# Patient Record
Sex: FEMALE | Race: WHITE | NOT HISPANIC OR LATINO | URBAN - METROPOLITAN AREA
[De-identification: names, ages, dates, MRNs, and addresses within clinical notes are randomized per-mention and may not be internally consistent; named-entity substitution may affect disease eponyms.]

---

## 2017-02-16 ENCOUNTER — INPATIENT (INPATIENT)
Facility: HOSPITAL | Age: 82
LOS: 2 days | Discharge: HOME | DRG: 393 | End: 2017-02-19
Attending: INTERNAL MEDICINE | Admitting: INTERNAL MEDICINE
Payer: COMMERCIAL

## 2017-02-16 ENCOUNTER — RESULT REVIEW (OUTPATIENT)
Age: 82
End: 2017-02-16

## 2017-02-16 VITALS
HEART RATE: 75 BPM | TEMPERATURE: 98 F | SYSTOLIC BLOOD PRESSURE: 146 MMHG | DIASTOLIC BLOOD PRESSURE: 80 MMHG | OXYGEN SATURATION: 98 % | WEIGHT: 223.99 LBS | RESPIRATION RATE: 16 BRPM | HEIGHT: 67 IN

## 2017-02-16 DIAGNOSIS — K94.01 COLOSTOMY HEMORRHAGE: ICD-10-CM

## 2017-02-16 DIAGNOSIS — R18.0 MALIGNANT ASCITES: ICD-10-CM

## 2017-02-16 LAB
ALBUMIN SERPL ELPH-MCNC: 3.6 G/DL — SIGNIFICANT CHANGE UP (ref 3.3–5)
ALP SERPL-CCNC: 110 U/L — SIGNIFICANT CHANGE UP (ref 40–120)
ALT FLD-CCNC: 17 U/L — SIGNIFICANT CHANGE UP (ref 12–78)
ANION GAP SERPL CALC-SCNC: 8 MMOL/L — SIGNIFICANT CHANGE UP (ref 5–17)
APTT BLD: 37.4 SEC — SIGNIFICANT CHANGE UP (ref 27.5–37.4)
AST SERPL-CCNC: 22 U/L — SIGNIFICANT CHANGE UP (ref 15–37)
BASOPHILS # BLD AUTO: 0.1 K/UL — SIGNIFICANT CHANGE UP (ref 0–0.2)
BASOPHILS NFR BLD AUTO: 1.2 % — SIGNIFICANT CHANGE UP (ref 0–2)
BILIRUB SERPL-MCNC: 0.5 MG/DL — SIGNIFICANT CHANGE UP (ref 0.2–1.2)
BUN SERPL-MCNC: 21 MG/DL — SIGNIFICANT CHANGE UP (ref 7–23)
CALCIUM SERPL-MCNC: 8.5 MG/DL — SIGNIFICANT CHANGE UP (ref 8.5–10.1)
CHLORIDE SERPL-SCNC: 104 MMOL/L — SIGNIFICANT CHANGE UP (ref 96–108)
CO2 SERPL-SCNC: 28 MMOL/L — SIGNIFICANT CHANGE UP (ref 22–31)
CREAT SERPL-MCNC: 1.2 MG/DL — SIGNIFICANT CHANGE UP (ref 0.5–1.3)
EOSINOPHIL # BLD AUTO: 0.1 K/UL — SIGNIFICANT CHANGE UP (ref 0–0.5)
EOSINOPHIL NFR BLD AUTO: 1.6 % — SIGNIFICANT CHANGE UP (ref 0–6)
GLUCOSE SERPL-MCNC: 105 MG/DL — HIGH (ref 70–99)
HCT VFR BLD CALC: 37.8 % — SIGNIFICANT CHANGE UP (ref 34.5–45)
HGB BLD-MCNC: 12 G/DL — SIGNIFICANT CHANGE UP (ref 11.5–15.5)
INR BLD: 1.9 RATIO — HIGH (ref 0.88–1.16)
LACTATE SERPL-SCNC: 1.1 MMOL/L — SIGNIFICANT CHANGE UP (ref 0.7–2)
LYMPHOCYTES # BLD AUTO: 0.6 K/UL — LOW (ref 1–3.3)
LYMPHOCYTES # BLD AUTO: 9.6 % — LOW (ref 13–44)
MCHC RBC-ENTMCNC: 26.1 PG — LOW (ref 27–34)
MCHC RBC-ENTMCNC: 31.8 GM/DL — LOW (ref 32–36)
MCV RBC AUTO: 82.1 FL — SIGNIFICANT CHANGE UP (ref 80–100)
MONOCYTES # BLD AUTO: 0.5 K/UL — SIGNIFICANT CHANGE UP (ref 0–0.9)
MONOCYTES NFR BLD AUTO: 7.6 % — SIGNIFICANT CHANGE UP (ref 1–9)
NEUTROPHILS # BLD AUTO: 5 K/UL — SIGNIFICANT CHANGE UP (ref 1.8–7.4)
NEUTROPHILS NFR BLD AUTO: 80 % — HIGH (ref 43–77)
PLATELET # BLD AUTO: 350 K/UL — SIGNIFICANT CHANGE UP (ref 150–400)
POTASSIUM SERPL-MCNC: 4.4 MMOL/L — SIGNIFICANT CHANGE UP (ref 3.5–5.3)
POTASSIUM SERPL-SCNC: 4.4 MMOL/L — SIGNIFICANT CHANGE UP (ref 3.5–5.3)
PROT SERPL-MCNC: 8.3 G/DL — SIGNIFICANT CHANGE UP (ref 6–8.3)
PROTHROM AB SERPL-ACNC: 21.2 SEC — HIGH (ref 10–13.1)
RBC # BLD: 4.61 M/UL — SIGNIFICANT CHANGE UP (ref 3.8–5.2)
RBC # FLD: 15.2 % — HIGH (ref 10.3–14.5)
SODIUM SERPL-SCNC: 140 MMOL/L — SIGNIFICANT CHANGE UP (ref 135–145)
WBC # BLD: 6.3 K/UL — SIGNIFICANT CHANGE UP (ref 3.8–10.5)
WBC # FLD AUTO: 6.3 K/UL — SIGNIFICANT CHANGE UP (ref 3.8–10.5)

## 2017-02-16 PROCEDURE — 99285 EMERGENCY DEPT VISIT HI MDM: CPT

## 2017-02-16 PROCEDURE — 74177 CT ABD & PELVIS W/CONTRAST: CPT | Mod: 26

## 2017-02-16 PROCEDURE — 71020: CPT | Mod: 26

## 2017-02-16 RX ORDER — GABAPENTIN 400 MG/1
300 CAPSULE ORAL THREE TIMES A DAY
Qty: 0 | Refills: 0 | Status: DISCONTINUED | OUTPATIENT
Start: 2017-02-16 | End: 2017-02-19

## 2017-02-16 RX ORDER — FUROSEMIDE 40 MG
20 TABLET ORAL
Qty: 0 | Refills: 0 | Status: DISCONTINUED | OUTPATIENT
Start: 2017-02-16 | End: 2017-02-19

## 2017-02-16 RX ORDER — CHOLECALCIFEROL (VITAMIN D3) 125 MCG
1000 CAPSULE ORAL DAILY
Qty: 0 | Refills: 0 | Status: DISCONTINUED | OUTPATIENT
Start: 2017-02-16 | End: 2017-02-19

## 2017-02-16 RX ORDER — LISINOPRIL 2.5 MG/1
5 TABLET ORAL DAILY
Qty: 0 | Refills: 0 | Status: DISCONTINUED | OUTPATIENT
Start: 2017-02-16 | End: 2017-02-19

## 2017-02-16 RX ORDER — FOLIC ACID 0.8 MG
1 TABLET ORAL DAILY
Qty: 0 | Refills: 0 | Status: DISCONTINUED | OUTPATIENT
Start: 2017-02-16 | End: 2017-02-19

## 2017-02-16 RX ORDER — MONTELUKAST 4 MG/1
10 TABLET, CHEWABLE ORAL AT BEDTIME
Qty: 0 | Refills: 0 | Status: DISCONTINUED | OUTPATIENT
Start: 2017-02-16 | End: 2017-02-19

## 2017-02-16 RX ORDER — IPRATROPIUM/ALBUTEROL SULFATE 18-103MCG
3 AEROSOL WITH ADAPTER (GRAM) INHALATION ONCE
Qty: 0 | Refills: 0 | Status: COMPLETED | OUTPATIENT
Start: 2017-02-16 | End: 2017-02-16

## 2017-02-16 RX ORDER — LEVOTHYROXINE SODIUM 125 MCG
225 TABLET ORAL DAILY
Qty: 0 | Refills: 0 | Status: DISCONTINUED | OUTPATIENT
Start: 2017-02-16 | End: 2017-02-19

## 2017-02-16 RX ORDER — ACETAMINOPHEN 500 MG
650 TABLET ORAL EVERY 6 HOURS
Qty: 0 | Refills: 0 | Status: DISCONTINUED | OUTPATIENT
Start: 2017-02-16 | End: 2017-02-19

## 2017-02-16 RX ORDER — PYRIDOXINE HCL (VITAMIN B6) 100 MG
100 TABLET ORAL DAILY
Qty: 0 | Refills: 0 | Status: DISCONTINUED | OUTPATIENT
Start: 2017-02-16 | End: 2017-02-19

## 2017-02-16 RX ADMIN — GABAPENTIN 300 MILLIGRAM(S): 400 CAPSULE ORAL at 23:48

## 2017-02-16 RX ADMIN — Medication 650 MILLIGRAM(S): at 23:48

## 2017-02-16 RX ADMIN — Medication 3 MILLILITER(S): at 16:12

## 2017-02-16 NOTE — ED ADULT TRIAGE NOTE - CHIEF COMPLAINT QUOTE
"I took a shower and then my stoma started bleeding. I changed my colostomy bag 4 times, it keeps bleeding & the bag won't stay on. I take xarelto"

## 2017-02-16 NOTE — ED PROVIDER NOTE - MEDICAL DECISION MAKING DETAILS
here with bleeding from colostomy x 1 day, now mostly resolved. No pain, nontender on exam. Lungs with wheezing. Plan for cbc to evaluate H&H, will check coags, CXR and nebs, consider CT abdomen, will touch base with Dr. Mckinney

## 2017-02-16 NOTE — ED PROVIDER NOTE - ENMT NEGATIVE STATEMENT, MLM
Ears: no ear pain and no hearing problems Nose: no nasal congestion and no nasal drainage.Mouth/Throat: no dysphagia, no hoarseness and no throat pain.Neck: no lumps, no pain, no stiffness and no swollen glands.

## 2017-02-16 NOTE — ED PROVIDER NOTE - SKIN, MLM
Skin normal color for race, warm, dry and intact. No evidence of rash. +right lower tib/fib [id number] black eschar ulcer wound

## 2017-02-16 NOTE — ED ADULT NURSE NOTE - OBJECTIVE STATEMENT
pt reports stoma is bleeding around the sides, bright red blood. minimal bleeding on arrival. pt on Xarelto. Surrounding stoma, redness noted, no pain on palpation. pt has hernia noted. pt abdomen distended on the left lower quad. hardness noted. pt also is wheezing throughout and reports she has been taking breathing treatments for a few days for it. denies fever, bowel and urinary problems. pt also hit right leg according to daughter, ulcer noted to the lateral middle of leg, black with sludge. 3cm oval shaped

## 2017-02-16 NOTE — ED ADULT NURSE NOTE - PSH
Artificial pacemaker    History of Appendectomy    History of Tonsillectomy    S/P Cataract Surgery  CASI  S/P colon resection    Status Post Total Knee Replacement  Left

## 2017-02-16 NOTE — ED ADULT NURSE NOTE - PMH
Asthma    Colon cancer    DM Type 2 (Diabetes Mellitus, Type 2)    HTN (Hypertension)    Hypothyroidism    Obese    Overactive Bladder

## 2017-02-16 NOTE — ED PROVIDER NOTE - OBJECTIVE STATEMENT
89F DM, HTN, hypothyroid, colon CA with colostomy, on Xarelto. Here with bleeding from stoma this morning. Reports changed colostomy bag this morning, noted bleeding, bag would not stay on due to bright red flood. Bleeding has now slowed down. no abdominal pain. No vomiting. Otherwise with normal stool output from colostomy. No fever, chills. No urinary symptoms. Also with cough and asthma exacerbation over the last week, relieved with home albuterol. No CP or SOB. No other easy bruising or bleeding noted.

## 2017-02-16 NOTE — ED PROVIDER NOTE - CARE PLAN
Principal Discharge DX:	Bleeding from colostomy Principal Discharge DX:	Bleeding from colostomy  Secondary Diagnosis:	Malignant ascites

## 2017-02-16 NOTE — ED PROVIDER NOTE - ATTENDING CONTRIBUTION TO CARE
89 yo female hx colon CA with colostomy bag c/o bleeding around the stoma noticed today and abdominal swelling, has hx of hernia in that area.  Physical exam pertinent for abdominal swelling and erythema around the left lower quadrant in the stoma region, +warmth.  r/o incarcerated hernia, bloodwork, CT a/p, [facility] surgical consult, lactate.      I performed a history and physical exam of the patient and discussed their management with the advanced care provider. I reviewed the advanced care provider's note and agree with the documented findings and plan of care. My medical decision making and objective findings are found above.

## 2017-02-16 NOTE — PATIENT PROFILE ADULT. - VISION (WITH CORRECTIVE LENSES IF THE PATIENT USUALLY WEARS THEM):
Partially impaired: cannot see medication labels or newsprint, but can see obstacles in path, and the surrounding layout; can count fingers at arm's length bl hearing aide/Partially impaired: cannot see medication labels or newsprint, but can see obstacles in path, and the surrounding layout; can count fingers at arm's length

## 2017-02-17 LAB
AFP-TM SERPL-MCNC: 2 NG/ML — SIGNIFICANT CHANGE UP
ALBUMIN FLD-MCNC: 2.8 G/DL — SIGNIFICANT CHANGE UP
ANION GAP SERPL CALC-SCNC: 7 MMOL/L — SIGNIFICANT CHANGE UP (ref 5–17)
B PERT IGG+IGM PNL SER: ABNORMAL
BUN SERPL-MCNC: 20 MG/DL — SIGNIFICANT CHANGE UP (ref 7–23)
CALCIUM SERPL-MCNC: 8 MG/DL — LOW (ref 8.5–10.1)
CANCER AG125 SERPL-ACNC: 698 U/ML — HIGH
CANCER AG19-9 SERPL-ACNC: 52.5 U/ML — HIGH
CEA SERPL-MCNC: 2.1 NG/ML — SIGNIFICANT CHANGE UP (ref 0–3.8)
CHLORIDE SERPL-SCNC: 107 MMOL/L — SIGNIFICANT CHANGE UP (ref 96–108)
CO2 SERPL-SCNC: 29 MMOL/L — SIGNIFICANT CHANGE UP (ref 22–31)
COLOR FLD: YELLOW — SIGNIFICANT CHANGE UP
CREAT SERPL-MCNC: 1.1 MG/DL — SIGNIFICANT CHANGE UP (ref 0.5–1.3)
FLUID INTAKE SUBSTANCE CLASS: SIGNIFICANT CHANGE UP
FLUID SEGMENTED GRANULOCYTES: 15 % — SIGNIFICANT CHANGE UP
GLUCOSE FLD-MCNC: 107 MG/DL — SIGNIFICANT CHANGE UP
GLUCOSE SERPL-MCNC: 78 MG/DL — SIGNIFICANT CHANGE UP (ref 70–99)
GRAM STN FLD: SIGNIFICANT CHANGE UP
HAV IGM SER-ACNC: SIGNIFICANT CHANGE UP
HBV CORE IGM SER-ACNC: SIGNIFICANT CHANGE UP
HBV SURFACE AG SER-ACNC: SIGNIFICANT CHANGE UP
HCT VFR BLD CALC: 33.6 % — LOW (ref 34.5–45)
HCV AB S/CO SERPL IA: 0.14 S/CO — SIGNIFICANT CHANGE UP
HCV AB SERPL-IMP: SIGNIFICANT CHANGE UP
HGB BLD-MCNC: 10.5 G/DL — LOW (ref 11.5–15.5)
INR BLD: 1.5 RATIO — HIGH (ref 0.88–1.16)
LDH SERPL L TO P-CCNC: 88 U/L — SIGNIFICANT CHANGE UP
LYMPHOCYTES # FLD: 41 % — SIGNIFICANT CHANGE UP
MAGNESIUM SERPL-MCNC: 2.4 MG/DL — SIGNIFICANT CHANGE UP (ref 1.8–2.4)
MCHC RBC-ENTMCNC: 26.2 PG — LOW (ref 27–34)
MCHC RBC-ENTMCNC: 31.2 GM/DL — LOW (ref 32–36)
MCV RBC AUTO: 83.8 FL — SIGNIFICANT CHANGE UP (ref 80–100)
MESOTHL CELL # FLD: 4 % — SIGNIFICANT CHANGE UP
MONOS+MACROS # FLD: 40 % — SIGNIFICANT CHANGE UP
NIGHT BLUE STAIN TISS: SIGNIFICANT CHANGE UP
PLATELET # BLD AUTO: 226 K/UL — SIGNIFICANT CHANGE UP (ref 150–400)
POTASSIUM SERPL-MCNC: 4.2 MMOL/L — SIGNIFICANT CHANGE UP (ref 3.5–5.3)
POTASSIUM SERPL-SCNC: 4.2 MMOL/L — SIGNIFICANT CHANGE UP (ref 3.5–5.3)
PROT FLD-MCNC: 5.2 G/DL — SIGNIFICANT CHANGE UP
PROTHROM AB SERPL-ACNC: 16.7 SEC — HIGH (ref 10–13.1)
RBC # BLD: 4.01 M/UL — SIGNIFICANT CHANGE UP (ref 3.8–5.2)
RBC # FLD: 15.3 % — HIGH (ref 10.3–14.5)
RCV VOL RI: 1000 /UL — HIGH (ref 0–5)
SODIUM SERPL-SCNC: 143 MMOL/L — SIGNIFICANT CHANGE UP (ref 135–145)
SPECIMEN SOURCE: SIGNIFICANT CHANGE UP
SPECIMEN SOURCE: SIGNIFICANT CHANGE UP
TOTAL NUCLEATED CELL COUNT, BODY FLUID: 212 /UL — HIGH (ref 0–5)
TUBE TYPE: SIGNIFICANT CHANGE UP
WBC # BLD: 4.5 K/UL — SIGNIFICANT CHANGE UP (ref 3.8–10.5)
WBC # FLD AUTO: 4.5 K/UL — SIGNIFICANT CHANGE UP (ref 3.8–10.5)

## 2017-02-17 PROCEDURE — 88108 CYTOPATH CONCENTRATE TECH: CPT | Mod: 26

## 2017-02-17 PROCEDURE — 88305 TISSUE EXAM BY PATHOLOGIST: CPT | Mod: 26

## 2017-02-17 PROCEDURE — 49083 ABD PARACENTESIS W/IMAGING: CPT

## 2017-02-17 PROCEDURE — 99222 1ST HOSP IP/OBS MODERATE 55: CPT

## 2017-02-17 RX ORDER — COLLAGENASE CLOSTRIDIUM HIST. 250 UNIT/G
1 OINTMENT (GRAM) TOPICAL DAILY
Qty: 0 | Refills: 0 | Status: DISCONTINUED | OUTPATIENT
Start: 2017-02-17 | End: 2017-02-19

## 2017-02-17 RX ORDER — RIVAROXABAN 15 MG-20MG
15 KIT ORAL
Qty: 0 | Refills: 0 | Status: DISCONTINUED | OUTPATIENT
Start: 2017-02-17 | End: 2017-02-19

## 2017-02-17 RX ORDER — IPRATROPIUM/ALBUTEROL SULFATE 18-103MCG
3 AEROSOL WITH ADAPTER (GRAM) INHALATION ONCE
Qty: 0 | Refills: 0 | Status: COMPLETED | OUTPATIENT
Start: 2017-02-17 | End: 2017-02-17

## 2017-02-17 RX ORDER — TIOTROPIUM BROMIDE 18 UG/1
1 CAPSULE ORAL; RESPIRATORY (INHALATION) ONCE
Qty: 0 | Refills: 0 | Status: DISCONTINUED | OUTPATIENT
Start: 2017-02-17 | End: 2017-02-19

## 2017-02-17 RX ORDER — FLUTICASONE PROPIONATE AND SALMETEROL 50; 250 UG/1; UG/1
1 POWDER ORAL; RESPIRATORY (INHALATION)
Qty: 0 | Refills: 0 | Status: DISCONTINUED | OUTPATIENT
Start: 2017-02-17 | End: 2017-02-19

## 2017-02-17 RX ORDER — IPRATROPIUM/ALBUTEROL SULFATE 18-103MCG
3 AEROSOL WITH ADAPTER (GRAM) INHALATION EVERY 6 HOURS
Qty: 0 | Refills: 0 | Status: DISCONTINUED | OUTPATIENT
Start: 2017-02-17 | End: 2017-02-19

## 2017-02-17 RX ADMIN — Medication 650 MILLIGRAM(S): at 00:40

## 2017-02-17 RX ADMIN — Medication 20 MILLIGRAM(S): at 17:10

## 2017-02-17 RX ADMIN — FLUTICASONE PROPIONATE AND SALMETEROL 1 DOSE(S): 50; 250 POWDER ORAL; RESPIRATORY (INHALATION) at 17:12

## 2017-02-17 RX ADMIN — GABAPENTIN 300 MILLIGRAM(S): 400 CAPSULE ORAL at 11:14

## 2017-02-17 RX ADMIN — Medication 1 MILLIGRAM(S): at 11:14

## 2017-02-17 RX ADMIN — Medication 100 MILLIGRAM(S): at 11:14

## 2017-02-17 RX ADMIN — GABAPENTIN 300 MILLIGRAM(S): 400 CAPSULE ORAL at 05:25

## 2017-02-17 RX ADMIN — Medication 3 MILLILITER(S): at 19:24

## 2017-02-17 RX ADMIN — Medication 1 TABLET(S): at 11:14

## 2017-02-17 RX ADMIN — Medication 3 MILLILITER(S): at 14:01

## 2017-02-17 RX ADMIN — GABAPENTIN 300 MILLIGRAM(S): 400 CAPSULE ORAL at 21:14

## 2017-02-17 RX ADMIN — Medication 1000 UNIT(S): at 11:14

## 2017-02-17 RX ADMIN — RIVAROXABAN 15 MILLIGRAM(S): KIT at 17:10

## 2017-02-17 RX ADMIN — Medication 20 MILLIGRAM(S): at 05:25

## 2017-02-17 RX ADMIN — MONTELUKAST 10 MILLIGRAM(S): 4 TABLET, CHEWABLE ORAL at 21:14

## 2017-02-17 RX ADMIN — Medication 225 MICROGRAM(S): at 05:25

## 2017-02-17 RX ADMIN — LISINOPRIL 5 MILLIGRAM(S): 2.5 TABLET ORAL at 05:25

## 2017-02-17 RX ADMIN — Medication 100 MILLIGRAM(S): at 17:10

## 2017-02-17 RX ADMIN — Medication 100 MILLIGRAM(S): at 06:34

## 2017-02-17 RX ADMIN — Medication 1 APPLICATION(S): at 17:12

## 2017-02-17 RX ADMIN — Medication 3 MILLILITER(S): at 07:10

## 2017-02-18 LAB
ANION GAP SERPL CALC-SCNC: 10 MMOL/L — SIGNIFICANT CHANGE UP (ref 5–17)
BUN SERPL-MCNC: 17 MG/DL — SIGNIFICANT CHANGE UP (ref 7–23)
CALCIUM SERPL-MCNC: 8.1 MG/DL — LOW (ref 8.5–10.1)
CHLORIDE SERPL-SCNC: 108 MMOL/L — SIGNIFICANT CHANGE UP (ref 96–108)
CHOLEST SERPL-MCNC: 118 MG/DL — SIGNIFICANT CHANGE UP (ref 10–199)
CO2 SERPL-SCNC: 26 MMOL/L — SIGNIFICANT CHANGE UP (ref 22–31)
CREAT SERPL-MCNC: 1.2 MG/DL — SIGNIFICANT CHANGE UP (ref 0.5–1.3)
GLUCOSE SERPL-MCNC: 134 MG/DL — HIGH (ref 70–99)
HCT VFR BLD CALC: 35.8 % — SIGNIFICANT CHANGE UP (ref 34.5–45)
HDLC SERPL-MCNC: 55 MG/DL — SIGNIFICANT CHANGE UP (ref 40–125)
HGB BLD-MCNC: 11.1 G/DL — LOW (ref 11.5–15.5)
INR BLD: 1.59 RATIO — HIGH (ref 0.88–1.16)
LIPID PNL WITH DIRECT LDL SERPL: 51 MG/DL — SIGNIFICANT CHANGE UP
MAGNESIUM SERPL-MCNC: 2.2 MG/DL — SIGNIFICANT CHANGE UP (ref 1.8–2.4)
MCHC RBC-ENTMCNC: 25.9 PG — LOW (ref 27–34)
MCHC RBC-ENTMCNC: 31.1 GM/DL — LOW (ref 32–36)
MCV RBC AUTO: 83.5 FL — SIGNIFICANT CHANGE UP (ref 80–100)
PLATELET # BLD AUTO: 284 K/UL — SIGNIFICANT CHANGE UP (ref 150–400)
POTASSIUM SERPL-MCNC: 3.9 MMOL/L — SIGNIFICANT CHANGE UP (ref 3.5–5.3)
POTASSIUM SERPL-SCNC: 3.9 MMOL/L — SIGNIFICANT CHANGE UP (ref 3.5–5.3)
PROTHROM AB SERPL-ACNC: 17.7 SEC — HIGH (ref 10–13.1)
RBC # BLD: 4.29 M/UL — SIGNIFICANT CHANGE UP (ref 3.8–5.2)
RBC # FLD: 15.6 % — HIGH (ref 10.3–14.5)
SODIUM SERPL-SCNC: 144 MMOL/L — SIGNIFICANT CHANGE UP (ref 135–145)
TOTAL CHOLESTEROL/HDL RATIO MEASUREMENT: 2.1 RATIO — LOW (ref 3.3–7.1)
TRIGL SERPL-MCNC: 62 MG/DL — SIGNIFICANT CHANGE UP (ref 10–149)
TSH SERPL-MCNC: 2.61 UIU/ML — SIGNIFICANT CHANGE UP (ref 0.36–3.74)
WBC # BLD: 5.8 K/UL — SIGNIFICANT CHANGE UP (ref 3.8–10.5)
WBC # FLD AUTO: 5.8 K/UL — SIGNIFICANT CHANGE UP (ref 3.8–10.5)

## 2017-02-18 PROCEDURE — 93010 ELECTROCARDIOGRAM REPORT: CPT

## 2017-02-18 RX ORDER — POTASSIUM CHLORIDE 20 MEQ
20 PACKET (EA) ORAL DAILY
Qty: 0 | Refills: 0 | Status: DISCONTINUED | OUTPATIENT
Start: 2017-02-18 | End: 2017-02-19

## 2017-02-18 RX ADMIN — Medication 100 MILLIGRAM(S): at 11:53

## 2017-02-18 RX ADMIN — RIVAROXABAN 15 MILLIGRAM(S): KIT at 17:30

## 2017-02-18 RX ADMIN — Medication 1 APPLICATION(S): at 13:53

## 2017-02-18 RX ADMIN — Medication 225 MICROGRAM(S): at 06:13

## 2017-02-18 RX ADMIN — GABAPENTIN 300 MILLIGRAM(S): 400 CAPSULE ORAL at 21:35

## 2017-02-18 RX ADMIN — GABAPENTIN 300 MILLIGRAM(S): 400 CAPSULE ORAL at 06:13

## 2017-02-18 RX ADMIN — Medication 3 MILLILITER(S): at 08:13

## 2017-02-18 RX ADMIN — GABAPENTIN 300 MILLIGRAM(S): 400 CAPSULE ORAL at 13:54

## 2017-02-18 RX ADMIN — Medication 3 MILLILITER(S): at 20:21

## 2017-02-18 RX ADMIN — MONTELUKAST 10 MILLIGRAM(S): 4 TABLET, CHEWABLE ORAL at 21:35

## 2017-02-18 RX ADMIN — LISINOPRIL 5 MILLIGRAM(S): 2.5 TABLET ORAL at 06:13

## 2017-02-18 RX ADMIN — Medication 20 MILLIGRAM(S): at 17:30

## 2017-02-18 RX ADMIN — FLUTICASONE PROPIONATE AND SALMETEROL 1 DOSE(S): 50; 250 POWDER ORAL; RESPIRATORY (INHALATION) at 08:13

## 2017-02-18 RX ADMIN — Medication 1 TABLET(S): at 11:54

## 2017-02-18 RX ADMIN — Medication 1 MILLIGRAM(S): at 11:53

## 2017-02-18 RX ADMIN — Medication 20 MILLIGRAM(S): at 06:13

## 2017-02-18 RX ADMIN — Medication 1000 UNIT(S): at 11:53

## 2017-02-18 RX ADMIN — FLUTICASONE PROPIONATE AND SALMETEROL 1 DOSE(S): 50; 250 POWDER ORAL; RESPIRATORY (INHALATION) at 19:00

## 2017-02-18 RX ADMIN — Medication 3 MILLILITER(S): at 13:34

## 2017-02-19 VITALS — OXYGEN SATURATION: 97 %

## 2017-02-19 RX ADMIN — Medication 225 MICROGRAM(S): at 05:57

## 2017-02-19 RX ADMIN — Medication 3 MILLILITER(S): at 08:05

## 2017-02-19 RX ADMIN — Medication 20 MILLIEQUIVALENT(S): at 11:48

## 2017-02-19 RX ADMIN — FLUTICASONE PROPIONATE AND SALMETEROL 1 DOSE(S): 50; 250 POWDER ORAL; RESPIRATORY (INHALATION) at 05:57

## 2017-02-19 RX ADMIN — Medication 1000 UNIT(S): at 11:48

## 2017-02-19 RX ADMIN — Medication 20 MILLIGRAM(S): at 05:57

## 2017-02-19 RX ADMIN — Medication 100 MILLIGRAM(S): at 11:48

## 2017-02-19 RX ADMIN — Medication 1 TABLET(S): at 11:48

## 2017-02-19 RX ADMIN — Medication 3 MILLILITER(S): at 13:52

## 2017-02-19 RX ADMIN — GABAPENTIN 300 MILLIGRAM(S): 400 CAPSULE ORAL at 05:57

## 2017-02-19 RX ADMIN — Medication 3 MILLILITER(S): at 00:20

## 2017-02-19 RX ADMIN — LISINOPRIL 5 MILLIGRAM(S): 2.5 TABLET ORAL at 05:57

## 2017-02-19 NOTE — DISCHARGE NOTE ADULT - HOSPITAL COURSE
Patient came in c/o bleeding from ostomy. H/H remained stable and it resolved on its own. CT: + ascites. S/P paracentesis: 4.9 L drained. Likely malignant, however, cytopath is still pending. CA-125: 698. She has RLE and para-ostomy wounds. No evidence of cellulitis. Para-ostomy wound was biopsied. Results pending. Needs to follow-up with Dr Devine in 2 weeks. She has appointment at wound care center on 2/20/17. Arrange for elective paracentesis if ascites recurs.

## 2017-02-19 NOTE — DISCHARGE NOTE ADULT - PLAN OF CARE
. Follow-up with Dr Bright in 1-2 weeks Follow-up with Dr Devine in 2 weeks. Follow-up with wound care clinic on 2/20/17. Apply bacitracin to wound.

## 2017-02-19 NOTE — DISCHARGE NOTE ADULT - CARE PLAN
Principal Discharge DX:	Malignant ascites  Goal:	.  Instructions for follow-up, activity and diet:	Follow-up with Dr Bright in 1-2 weeks  Secondary Diagnosis:	Bleeding from colostomy  Instructions for follow-up, activity and diet:	Follow-up with Dr Devine in 2 weeks.  Secondary Diagnosis:	Leg wound, right  Instructions for follow-up, activity and diet:	Follow-up with wound care clinic on 2/20/17. Apply bacitracin to wound.

## 2017-02-19 NOTE — DISCHARGE NOTE ADULT - MEDICATION SUMMARY - MEDICATIONS TO TAKE
I will START or STAY ON the medications listed below when I get home from the hospital:    lisinopril 5 mg oral tablet  -- 1 tab(s) by mouth once a day  -- Indication: For BP    Xarelto 15 mg oral tablet  -- 1 tab(s) by mouth once a day (in the evening)  -- Indication: For A fib    gabapentin 300 mg oral capsule  -- 1 cap(s) by mouth 3 times a day  -- Indication: For neuropathy    Lasix 20 mg oral tablet  -- 1 tab(s) by mouth 2 times a day  -- Indication: For water pill    montelukast 10 mg oral tablet  -- 1 tab(s) by mouth once a day (in the evening)  -- Indication: For asthma    magnesium gluconate 500 mg oral tablet  --  by mouth once a day  -- Indication: For supplement    potassium chloride 20 mEq oral granule, extended release  -- 1 tab(s) by mouth once a day  -- Indication: For supplement    Fish Oil 1000 mg oral capsule  -- 1 cap(s) by mouth once a day  -- Indication: For supplement    levothyroxine  -- 225 microgram(s) by mouth once a day  -- Indication: For thyroid    PreserVision oral capsule  -- 1 cap(s) by mouth 2 times a day  -- Indication: For supplement    Nephro-Tommy oral tablet  -- 1 tab(s) by mouth once a day  -- Indication: For supplement    Vitamin B6 100 mg oral tablet  -- 1 tab(s) by mouth once a day  -- Indication: For supplement    Vitamin D3 2000 intl units oral capsule  -- 1 cap(s) by mouth once a day  -- Indication: For supplement    folic acid  -- 400 milligram(s) by mouth once a day  -- Indication: For supplement

## 2017-02-19 NOTE — DISCHARGE NOTE ADULT - CARE PROVIDERS DIRECT ADDRESSES
[email or web address],DirectAddress_Unknown,DirectAddress_Unknown

## 2017-02-19 NOTE — DISCHARGE NOTE ADULT - CARE PROVIDER_API CALL
Aliza Chun), Internal Medicine  [address] [place] [address]  Phone: [phone]  Fax: [phone]    Nilesh Devine), ColonRectal Surgery; Surgery  [address]  [place] [address]  Phone: [phone]  Fax: [phone]    Iesha Bright), Hematology; Medical Oncology  [address] [place] [address] [address]  [place] [address]  Phone: [phone]  Fax: [phone]

## 2017-02-19 NOTE — DISCHARGE NOTE ADULT - PATIENT PORTAL LINK FT
“You can access the Follow[employer] Patient Portal, offered by [facility], by registering with the following website: [email or web address]”

## 2017-02-19 NOTE — DISCHARGE NOTE ADULT - VISION (WITH CORRECTIVE LENSES IF THE PATIENT USUALLY WEARS THEM):
bl hearing aide/Partially impaired: cannot see medication labels or newsprint, but can see obstacles in path, and the surrounding layout; can count fingers at arm's length

## 2017-02-20 ENCOUNTER — OUTPATIENT (OUTPATIENT)
Dept: OUTPATIENT SERVICES | Facility: HOSPITAL | Age: 82
LOS: 1 days | End: 2017-02-20
Payer: COMMERCIAL

## 2017-02-20 ENCOUNTER — RESULT REVIEW (OUTPATIENT)
Age: 82
End: 2017-02-20

## 2017-02-20 DIAGNOSIS — L97.801 NON-PRESSURE CHRONIC ULCER OF OTHER PART OF UNSPECIFIED LOWER LEG LIMITED TO BREAKDOWN OF SKIN: ICD-10-CM

## 2017-02-20 PROCEDURE — G0463: CPT

## 2017-02-21 LAB
CULTURE RESULTS: SIGNIFICANT CHANGE UP
CULTURE RESULTS: SIGNIFICANT CHANGE UP
NON-GYN CYTOLOGY SPEC: SIGNIFICANT CHANGE UP
SPECIMEN SOURCE: SIGNIFICANT CHANGE UP
SPECIMEN SOURCE: SIGNIFICANT CHANGE UP

## 2017-02-21 PROCEDURE — 80074 ACUTE HEPATITIS PANEL: CPT

## 2017-02-21 PROCEDURE — 80048 BASIC METABOLIC PNL TOTAL CA: CPT

## 2017-02-21 PROCEDURE — 87116 MYCOBACTERIA CULTURE: CPT

## 2017-02-21 PROCEDURE — 80053 COMPREHEN METABOLIC PANEL: CPT

## 2017-02-21 PROCEDURE — 87075 CULTR BACTERIA EXCEPT BLOOD: CPT

## 2017-02-21 PROCEDURE — 86304 IMMUNOASSAY TUMOR CA 125: CPT

## 2017-02-21 PROCEDURE — 87070 CULTURE OTHR SPECIMN AEROBIC: CPT

## 2017-02-21 PROCEDURE — 82945 GLUCOSE OTHER FLUID: CPT

## 2017-02-21 PROCEDURE — 93005 ELECTROCARDIOGRAM TRACING: CPT

## 2017-02-21 PROCEDURE — 80061 LIPID PANEL: CPT

## 2017-02-21 PROCEDURE — 86850 RBC ANTIBODY SCREEN: CPT

## 2017-02-21 PROCEDURE — 82042 OTHER SOURCE ALBUMIN QUAN EA: CPT

## 2017-02-21 PROCEDURE — 86301 IMMUNOASSAY TUMOR CA 19-9: CPT

## 2017-02-21 PROCEDURE — 94640 AIRWAY INHALATION TREATMENT: CPT

## 2017-02-21 PROCEDURE — 83605 ASSAY OF LACTIC ACID: CPT

## 2017-02-21 PROCEDURE — 86901 BLOOD TYPING SEROLOGIC RH(D): CPT

## 2017-02-21 PROCEDURE — 83735 ASSAY OF MAGNESIUM: CPT

## 2017-02-21 PROCEDURE — 82105 ALPHA-FETOPROTEIN SERUM: CPT

## 2017-02-21 PROCEDURE — 85730 THROMBOPLASTIN TIME PARTIAL: CPT

## 2017-02-21 PROCEDURE — 82378 CARCINOEMBRYONIC ANTIGEN: CPT

## 2017-02-21 PROCEDURE — 87206 SMEAR FLUORESCENT/ACID STAI: CPT

## 2017-02-21 PROCEDURE — 84443 ASSAY THYROID STIM HORMONE: CPT

## 2017-02-21 PROCEDURE — 88305 TISSUE EXAM BY PATHOLOGIST: CPT

## 2017-02-21 PROCEDURE — 84157 ASSAY OF PROTEIN OTHER: CPT

## 2017-02-21 PROCEDURE — 49083 ABD PARACENTESIS W/IMAGING: CPT

## 2017-02-21 PROCEDURE — 85027 COMPLETE CBC AUTOMATED: CPT

## 2017-02-21 PROCEDURE — 89051 BODY FLUID CELL COUNT: CPT

## 2017-02-21 PROCEDURE — 85610 PROTHROMBIN TIME: CPT

## 2017-02-21 PROCEDURE — 86900 BLOOD TYPING SEROLOGIC ABO: CPT

## 2017-02-21 PROCEDURE — 88305 TISSUE EXAM BY PATHOLOGIST: CPT | Mod: 26

## 2017-02-21 PROCEDURE — 87040 BLOOD CULTURE FOR BACTERIA: CPT

## 2017-02-21 PROCEDURE — 88108 CYTOPATH CONCENTRATE TECH: CPT

## 2017-02-21 PROCEDURE — 74177 CT ABD & PELVIS W/CONTRAST: CPT

## 2017-02-21 PROCEDURE — 71046 X-RAY EXAM CHEST 2 VIEWS: CPT

## 2017-02-21 PROCEDURE — 83615 LACTATE (LD) (LDH) ENZYME: CPT

## 2017-02-21 PROCEDURE — 87015 SPECIMEN INFECT AGNT CONCNTJ: CPT

## 2017-02-21 PROCEDURE — 87102 FUNGUS ISOLATION CULTURE: CPT

## 2017-02-21 PROCEDURE — 87205 SMEAR GRAM STAIN: CPT

## 2017-02-21 PROCEDURE — 99285 EMERGENCY DEPT VISIT HI MDM: CPT | Mod: 25

## 2017-02-22 DIAGNOSIS — M47.9 SPONDYLOSIS, UNSPECIFIED: ICD-10-CM

## 2017-02-22 DIAGNOSIS — K42.9 UMBILICAL HERNIA WITHOUT OBSTRUCTION OR GANGRENE: ICD-10-CM

## 2017-02-22 DIAGNOSIS — I48.91 UNSPECIFIED ATRIAL FIBRILLATION: ICD-10-CM

## 2017-02-22 DIAGNOSIS — R01.1 CARDIAC MURMUR, UNSPECIFIED: ICD-10-CM

## 2017-02-22 DIAGNOSIS — Z96.652 PRESENCE OF LEFT ARTIFICIAL KNEE JOINT: ICD-10-CM

## 2017-02-22 DIAGNOSIS — K25.4 CHRONIC OR UNSPECIFIED GASTRIC ULCER WITH HEMORRHAGE: ICD-10-CM

## 2017-02-22 DIAGNOSIS — Z95.0 PRESENCE OF CARDIAC PACEMAKER: ICD-10-CM

## 2017-02-22 DIAGNOSIS — Z79.01 LONG TERM (CURRENT) USE OF ANTICOAGULANTS: ICD-10-CM

## 2017-02-22 DIAGNOSIS — Z85.038 PERSONAL HISTORY OF OTHER MALIGNANT NEOPLASM OF LARGE INTESTINE: ICD-10-CM

## 2017-02-22 DIAGNOSIS — R18.0 MALIGNANT ASCITES: ICD-10-CM

## 2017-02-22 DIAGNOSIS — K94.01 COLOSTOMY HEMORRHAGE: ICD-10-CM

## 2017-02-22 DIAGNOSIS — Z92.21 PERSONAL HISTORY OF ANTINEOPLASTIC CHEMOTHERAPY: ICD-10-CM

## 2017-02-22 DIAGNOSIS — N32.81 OVERACTIVE BLADDER: ICD-10-CM

## 2017-02-22 DIAGNOSIS — D64.9 ANEMIA, UNSPECIFIED: ICD-10-CM

## 2017-02-22 DIAGNOSIS — K94.09 OTHER COMPLICATIONS OF COLOSTOMY: ICD-10-CM

## 2017-02-22 DIAGNOSIS — Z85.43 PERSONAL HISTORY OF MALIGNANT NEOPLASM OF OVARY: ICD-10-CM

## 2017-02-22 DIAGNOSIS — J45.909 UNSPECIFIED ASTHMA, UNCOMPLICATED: ICD-10-CM

## 2017-02-22 DIAGNOSIS — J44.9 CHRONIC OBSTRUCTIVE PULMONARY DISEASE, UNSPECIFIED: ICD-10-CM

## 2017-02-22 DIAGNOSIS — E03.9 HYPOTHYROIDISM, UNSPECIFIED: ICD-10-CM

## 2017-02-22 DIAGNOSIS — K74.60 UNSPECIFIED CIRRHOSIS OF LIVER: ICD-10-CM

## 2017-02-22 DIAGNOSIS — E66.9 OBESITY, UNSPECIFIED: ICD-10-CM

## 2017-02-22 DIAGNOSIS — I11.0 HYPERTENSIVE HEART DISEASE WITH HEART FAILURE: ICD-10-CM

## 2017-02-22 DIAGNOSIS — E11.9 TYPE 2 DIABETES MELLITUS WITHOUT COMPLICATIONS: ICD-10-CM

## 2017-02-22 DIAGNOSIS — I50.9 HEART FAILURE, UNSPECIFIED: ICD-10-CM

## 2017-02-22 LAB
CULTURE RESULTS: SIGNIFICANT CHANGE UP
SPECIMEN SOURCE: SIGNIFICANT CHANGE UP

## 2017-02-23 DIAGNOSIS — I48.91 UNSPECIFIED ATRIAL FIBRILLATION: ICD-10-CM

## 2017-02-23 DIAGNOSIS — K42.9 UMBILICAL HERNIA WITHOUT OBSTRUCTION OR GANGRENE: ICD-10-CM

## 2017-02-27 ENCOUNTER — OUTPATIENT (OUTPATIENT)
Dept: OUTPATIENT SERVICES | Facility: HOSPITAL | Age: 82
LOS: 1 days | End: 2017-02-27
Payer: COMMERCIAL

## 2017-02-27 DIAGNOSIS — E66.9 OBESITY, UNSPECIFIED: ICD-10-CM

## 2017-02-27 DIAGNOSIS — E11.9 TYPE 2 DIABETES MELLITUS WITHOUT COMPLICATIONS: ICD-10-CM

## 2017-02-27 DIAGNOSIS — Z93.3 COLOSTOMY STATUS: ICD-10-CM

## 2017-02-27 DIAGNOSIS — Z85.43 PERSONAL HISTORY OF MALIGNANT NEOPLASM OF OVARY: ICD-10-CM

## 2017-02-27 DIAGNOSIS — Z79.899 OTHER LONG TERM (CURRENT) DRUG THERAPY: ICD-10-CM

## 2017-02-27 DIAGNOSIS — J45.909 UNSPECIFIED ASTHMA, UNCOMPLICATED: ICD-10-CM

## 2017-02-27 DIAGNOSIS — Z98.41 CATARACT EXTRACTION STATUS, RIGHT EYE: ICD-10-CM

## 2017-02-27 DIAGNOSIS — Z96.653 PRESENCE OF ARTIFICIAL KNEE JOINT, BILATERAL: ICD-10-CM
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
## 2017-02-27 DIAGNOSIS — S80.811D ABRASION, RIGHT LOWER LEG, SUBSEQUENT ENCOUNTER: ICD-10-CM

## 2017-02-27 DIAGNOSIS — Z98.890 OTHER SPECIFIED POSTPROCEDURAL STATES: ICD-10-CM

## 2017-02-27 DIAGNOSIS — I48.91 UNSPECIFIED ATRIAL FIBRILLATION: ICD-10-CM
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
## 2017-02-27 DIAGNOSIS — Z90.49 ACQUIRED ABSENCE OF OTHER SPECIFIED PARTS OF DIGESTIVE TRACT: ICD-10-CM
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
## 2017-02-27 DIAGNOSIS — Z90.710 ACQUIRED ABSENCE OF BOTH CERVIX AND UTERUS: ICD-10-CM

## 2017-02-27 DIAGNOSIS — E03.9 HYPOTHYROIDISM, UNSPECIFIED: ICD-10-CM
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
## 2017-02-27 DIAGNOSIS — I50.9 HEART FAILURE, UNSPECIFIED: ICD-10-CM

## 2017-02-27 DIAGNOSIS — Z95.0 PRESENCE OF CARDIAC PACEMAKER: ICD-10-CM

## 2017-02-27 DIAGNOSIS — Z98.42 CATARACT EXTRACTION STATUS, LEFT EYE: ICD-10-CM

## 2017-02-27 DIAGNOSIS — L97.801 NON-PRESSURE CHRONIC ULCER OF OTHER PART OF UNSPECIFIED LOWER LEG LIMITED TO BREAKDOWN OF SKIN: ICD-10-CM

## 2017-02-27 DIAGNOSIS — I10 ESSENTIAL (PRIMARY) HYPERTENSION: ICD-10-CM

## 2017-02-28 DIAGNOSIS — E11.9 TYPE 2 DIABETES MELLITUS WITHOUT COMPLICATIONS: ICD-10-CM

## 2017-02-28 DIAGNOSIS — Z79.899 OTHER LONG TERM (CURRENT) DRUG THERAPY: ICD-10-CM

## 2017-02-28 DIAGNOSIS — Z90.49 ACQUIRED ABSENCE OF OTHER SPECIFIED PARTS OF DIGESTIVE TRACT: ICD-10-CM

## 2017-02-28 DIAGNOSIS — I83.90 ASYMPTOMATIC VARICOSE VEINS OF UNSPECIFIED LOWER EXTREMITY: ICD-10-CM

## 2017-02-28 DIAGNOSIS — Z98.42 CATARACT EXTRACTION STATUS, LEFT EYE: ICD-10-CM

## 2017-02-28 DIAGNOSIS — Z98.41 CATARACT EXTRACTION STATUS, RIGHT EYE: ICD-10-CM

## 2017-02-28 DIAGNOSIS — E03.9 HYPOTHYROIDISM, UNSPECIFIED: ICD-10-CM

## 2017-02-28 DIAGNOSIS — S80.811D ABRASION, RIGHT LOWER LEG, SUBSEQUENT ENCOUNTER: ICD-10-CM

## 2017-02-28 DIAGNOSIS — I50.9 HEART FAILURE, UNSPECIFIED: ICD-10-CM

## 2017-02-28 DIAGNOSIS — Z85.43 PERSONAL HISTORY OF MALIGNANT NEOPLASM OF OVARY: ICD-10-CM
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
## 2017-02-28 DIAGNOSIS — Z95.0 PRESENCE OF CARDIAC PACEMAKER: ICD-10-CM

## 2017-02-28 DIAGNOSIS — I10 ESSENTIAL (PRIMARY) HYPERTENSION: ICD-10-CM

## 2017-02-28 DIAGNOSIS — I48.91 UNSPECIFIED ATRIAL FIBRILLATION: ICD-10-CM

## 2017-02-28 DIAGNOSIS — Z96.653 PRESENCE OF ARTIFICIAL KNEE JOINT, BILATERAL: ICD-10-CM

## 2017-02-28 DIAGNOSIS — Z93.3 COLOSTOMY STATUS: ICD-10-CM

## 2017-02-28 DIAGNOSIS — Z98.890 OTHER SPECIFIED POSTPROCEDURAL STATES: ICD-10-CM

## 2017-02-28 DIAGNOSIS — J45.909 UNSPECIFIED ASTHMA, UNCOMPLICATED: ICD-10-CM
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
## 2017-02-28 DIAGNOSIS — Z90.710 ACQUIRED ABSENCE OF BOTH CERVIX AND UTERUS: ICD-10-CM

## 2017-02-28 DIAGNOSIS — E66.9 OBESITY, UNSPECIFIED: ICD-10-CM

## 2017-03-02 ENCOUNTER — APPOINTMENT (OUTPATIENT)
Dept: COLORECTAL SURGERY | Facility: CLINIC | Age: 82
End: 2017-03-02

## 2017-03-02 VITALS
HEIGHT: 68 IN | BODY MASS INDEX: 32.89 KG/M2 | DIASTOLIC BLOOD PRESSURE: 74 MMHG | SYSTOLIC BLOOD PRESSURE: 124 MMHG | RESPIRATION RATE: 14 BRPM | HEART RATE: 82 BPM | TEMPERATURE: 97.8 F | WEIGHT: 217 LBS

## 2017-03-06 ENCOUNTER — APPOINTMENT (OUTPATIENT)
Dept: COLORECTAL SURGERY | Facility: CLINIC | Age: 82
End: 2017-03-06

## 2017-03-06 ENCOUNTER — OUTPATIENT (OUTPATIENT)
Dept: OUTPATIENT SERVICES | Facility: HOSPITAL | Age: 82
LOS: 1 days | End: 2017-03-06
Payer: COMMERCIAL

## 2017-03-06 DIAGNOSIS — L97.801 NON-PRESSURE CHRONIC ULCER OF OTHER PART OF UNSPECIFIED LOWER LEG LIMITED TO BREAKDOWN OF SKIN: ICD-10-CM

## 2017-03-06 PROCEDURE — G0463: CPT

## 2017-03-07 DIAGNOSIS — J45.909 UNSPECIFIED ASTHMA, UNCOMPLICATED: ICD-10-CM
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
## 2017-03-07 DIAGNOSIS — I10 ESSENTIAL (PRIMARY) HYPERTENSION: ICD-10-CM

## 2017-03-07 DIAGNOSIS — Z95.0 PRESENCE OF CARDIAC PACEMAKER: ICD-10-CM

## 2017-03-07 DIAGNOSIS — E66.9 OBESITY, UNSPECIFIED: ICD-10-CM
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
## 2017-03-07 DIAGNOSIS — Z90.710 ACQUIRED ABSENCE OF BOTH CERVIX AND UTERUS: ICD-10-CM

## 2017-03-07 DIAGNOSIS — I50.9 HEART FAILURE, UNSPECIFIED: ICD-10-CM

## 2017-03-07 DIAGNOSIS — S80.811D ABRASION, RIGHT LOWER LEG, SUBSEQUENT ENCOUNTER: ICD-10-CM

## 2017-03-07 DIAGNOSIS — Z98.42 CATARACT EXTRACTION STATUS, LEFT EYE: ICD-10-CM

## 2017-03-07 DIAGNOSIS — Z96.653 PRESENCE OF ARTIFICIAL KNEE JOINT, BILATERAL: ICD-10-CM
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
## 2017-03-07 DIAGNOSIS — E11.9 TYPE 2 DIABETES MELLITUS WITHOUT COMPLICATIONS: ICD-10-CM

## 2017-03-07 DIAGNOSIS — S41.112A LACERATION WITHOUT FOREIGN BODY OF LEFT UPPER ARM, INITIAL ENCOUNTER: ICD-10-CM

## 2017-03-07 DIAGNOSIS — Z85.43 PERSONAL HISTORY OF MALIGNANT NEOPLASM OF OVARY: ICD-10-CM

## 2017-03-07 DIAGNOSIS — Z93.3 COLOSTOMY STATUS: ICD-10-CM

## 2017-03-07 DIAGNOSIS — Z79.899 OTHER LONG TERM (CURRENT) DRUG THERAPY: ICD-10-CM

## 2017-03-07 DIAGNOSIS — E03.9 HYPOTHYROIDISM, UNSPECIFIED: ICD-10-CM

## 2017-03-07 DIAGNOSIS — Z98.890 OTHER SPECIFIED POSTPROCEDURAL STATES: ICD-10-CM

## 2017-03-07 DIAGNOSIS — I48.91 UNSPECIFIED ATRIAL FIBRILLATION: ICD-10-CM
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
## 2017-03-07 DIAGNOSIS — Z90.49 ACQUIRED ABSENCE OF OTHER SPECIFIED PARTS OF DIGESTIVE TRACT: ICD-10-CM

## 2017-03-07 DIAGNOSIS — Z98.41 CATARACT EXTRACTION STATUS, RIGHT EYE: ICD-10-CM
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
## 2017-03-07 DIAGNOSIS — I83.90 ASYMPTOMATIC VARICOSE VEINS OF UNSPECIFIED LOWER EXTREMITY: ICD-10-CM

## 2017-03-13 ENCOUNTER — OUTPATIENT (OUTPATIENT)
Dept: OUTPATIENT SERVICES | Facility: HOSPITAL | Age: 82
LOS: 1 days | End: 2017-03-13
Payer: COMMERCIAL

## 2017-03-13 DIAGNOSIS — L97.801 NON-PRESSURE CHRONIC ULCER OF OTHER PART OF UNSPECIFIED LOWER LEG LIMITED TO BREAKDOWN OF SKIN: ICD-10-CM

## 2017-03-13 PROCEDURE — G0463: CPT
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
## 2017-03-16 DIAGNOSIS — Z98.42 CATARACT EXTRACTION STATUS, LEFT EYE: ICD-10-CM
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
## 2017-03-16 DIAGNOSIS — Z90.49 ACQUIRED ABSENCE OF OTHER SPECIFIED PARTS OF DIGESTIVE TRACT: ICD-10-CM

## 2017-03-16 DIAGNOSIS — Z85.43 PERSONAL HISTORY OF MALIGNANT NEOPLASM OF OVARY: ICD-10-CM

## 2017-03-16 DIAGNOSIS — S80.811D ABRASION, RIGHT LOWER LEG, SUBSEQUENT ENCOUNTER: ICD-10-CM

## 2017-03-16 DIAGNOSIS — Z98.890 OTHER SPECIFIED POSTPROCEDURAL STATES: ICD-10-CM

## 2017-03-16 DIAGNOSIS — I83.90 ASYMPTOMATIC VARICOSE VEINS OF UNSPECIFIED LOWER EXTREMITY: ICD-10-CM

## 2017-03-16 DIAGNOSIS — I48.91 UNSPECIFIED ATRIAL FIBRILLATION: ICD-10-CM

## 2017-03-16 DIAGNOSIS — S41.112D LACERATION WITHOUT FOREIGN BODY OF LEFT UPPER ARM, SUBSEQUENT ENCOUNTER: ICD-10-CM

## 2017-03-16 DIAGNOSIS — Z93.3 COLOSTOMY STATUS: ICD-10-CM
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
## 2017-03-16 DIAGNOSIS — I50.9 HEART FAILURE, UNSPECIFIED: ICD-10-CM

## 2017-03-16 DIAGNOSIS — Z98.41 CATARACT EXTRACTION STATUS, RIGHT EYE: ICD-10-CM

## 2017-03-16 DIAGNOSIS — J45.909 UNSPECIFIED ASTHMA, UNCOMPLICATED: ICD-10-CM

## 2017-03-16 DIAGNOSIS — Z90.710 ACQUIRED ABSENCE OF BOTH CERVIX AND UTERUS: ICD-10-CM

## 2017-03-16 DIAGNOSIS — I10 ESSENTIAL (PRIMARY) HYPERTENSION: ICD-10-CM

## 2017-03-16 DIAGNOSIS — Z96.653 PRESENCE OF ARTIFICIAL KNEE JOINT, BILATERAL: ICD-10-CM

## 2017-03-16 DIAGNOSIS — E66.9 OBESITY, UNSPECIFIED: ICD-10-CM

## 2017-03-16 DIAGNOSIS — E03.9 HYPOTHYROIDISM, UNSPECIFIED: ICD-10-CM

## 2017-03-16 DIAGNOSIS — E11.9 TYPE 2 DIABETES MELLITUS WITHOUT COMPLICATIONS: ICD-10-CM

## 2017-03-16 DIAGNOSIS — Z79.899 OTHER LONG TERM (CURRENT) DRUG THERAPY: ICD-10-CM

## 2017-03-16 DIAGNOSIS — Z95.0 PRESENCE OF CARDIAC PACEMAKER: ICD-10-CM
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
## 2017-03-18 LAB
CULTURE RESULTS: SIGNIFICANT CHANGE UP
SPECIMEN SOURCE: SIGNIFICANT CHANGE UP

## 2017-03-20 ENCOUNTER — OUTPATIENT (OUTPATIENT)
Dept: OUTPATIENT SERVICES | Facility: HOSPITAL | Age: 82
LOS: 1 days | End: 2017-03-20
Payer: COMMERCIAL

## 2017-03-20 DIAGNOSIS — L97.801 NON-PRESSURE CHRONIC ULCER OF OTHER PART OF UNSPECIFIED LOWER LEG LIMITED TO BREAKDOWN OF SKIN: ICD-10-CM

## 2017-03-20 PROCEDURE — 17250 CHEM CAUT OF GRANLTJ TISSUE: CPT

## 2017-03-21 DIAGNOSIS — I48.91 UNSPECIFIED ATRIAL FIBRILLATION: ICD-10-CM

## 2017-03-21 DIAGNOSIS — E03.9 HYPOTHYROIDISM, UNSPECIFIED: ICD-10-CM

## 2017-03-21 DIAGNOSIS — S81.801D UNSPECIFIED OPEN WOUND, RIGHT LOWER LEG, SUBSEQUENT ENCOUNTER: ICD-10-CM
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
## 2017-03-21 DIAGNOSIS — Z85.43 PERSONAL HISTORY OF MALIGNANT NEOPLASM OF OVARY: ICD-10-CM

## 2017-03-21 DIAGNOSIS — Z96.653 PRESENCE OF ARTIFICIAL KNEE JOINT, BILATERAL: ICD-10-CM

## 2017-03-21 DIAGNOSIS — L92.9 GRANULOMATOUS DISORDER OF THE SKIN AND SUBCUTANEOUS TISSUE, UNSPECIFIED: ICD-10-CM

## 2017-03-21 DIAGNOSIS — Z79.899 OTHER LONG TERM (CURRENT) DRUG THERAPY: ICD-10-CM

## 2017-03-21 DIAGNOSIS — Z95.0 PRESENCE OF CARDIAC PACEMAKER: ICD-10-CM

## 2017-03-21 DIAGNOSIS — I50.9 HEART FAILURE, UNSPECIFIED: ICD-10-CM

## 2017-03-21 DIAGNOSIS — Z93.3 COLOSTOMY STATUS: ICD-10-CM

## 2017-03-21 DIAGNOSIS — Z98.42 CATARACT EXTRACTION STATUS, LEFT EYE: ICD-10-CM
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
## 2017-03-21 DIAGNOSIS — J45.909 UNSPECIFIED ASTHMA, UNCOMPLICATED: ICD-10-CM

## 2017-03-21 DIAGNOSIS — Z90.710 ACQUIRED ABSENCE OF BOTH CERVIX AND UTERUS: ICD-10-CM

## 2017-03-21 DIAGNOSIS — I10 ESSENTIAL (PRIMARY) HYPERTENSION: ICD-10-CM

## 2017-03-21 DIAGNOSIS — I83.90 ASYMPTOMATIC VARICOSE VEINS OF UNSPECIFIED LOWER EXTREMITY: ICD-10-CM

## 2017-03-21 DIAGNOSIS — Z98.890 OTHER SPECIFIED POSTPROCEDURAL STATES: ICD-10-CM

## 2017-03-21 DIAGNOSIS — Z98.41 CATARACT EXTRACTION STATUS, RIGHT EYE: ICD-10-CM

## 2017-03-21 DIAGNOSIS — E11.9 TYPE 2 DIABETES MELLITUS WITHOUT COMPLICATIONS: ICD-10-CM

## 2017-03-21 DIAGNOSIS — Z90.49 ACQUIRED ABSENCE OF OTHER SPECIFIED PARTS OF DIGESTIVE TRACT: ICD-10-CM

## 2017-03-21 DIAGNOSIS — E66.9 OBESITY, UNSPECIFIED: ICD-10-CM

## 2017-04-01 LAB
CULTURE RESULTS: SIGNIFICANT CHANGE UP
SPECIMEN SOURCE: SIGNIFICANT CHANGE UP

## 2017-04-03 ENCOUNTER — OUTPATIENT (OUTPATIENT)
Dept: OUTPATIENT SERVICES | Facility: HOSPITAL | Age: 82
LOS: 1 days | End: 2017-04-03
Payer: COMMERCIAL

## 2017-04-03 DIAGNOSIS — L97.801 NON-PRESSURE CHRONIC ULCER OF OTHER PART OF UNSPECIFIED LOWER LEG LIMITED TO BREAKDOWN OF SKIN: ICD-10-CM

## 2017-04-03 PROCEDURE — G0463: CPT

## 2017-04-04 DIAGNOSIS — Z85.43 PERSONAL HISTORY OF MALIGNANT NEOPLASM OF OVARY: ICD-10-CM

## 2017-04-04 DIAGNOSIS — Z95.0 PRESENCE OF CARDIAC PACEMAKER: ICD-10-CM

## 2017-04-04 DIAGNOSIS — I10 ESSENTIAL (PRIMARY) HYPERTENSION: ICD-10-CM

## 2017-04-04 DIAGNOSIS — Z93.3 COLOSTOMY STATUS: ICD-10-CM

## 2017-04-04 DIAGNOSIS — I48.91 UNSPECIFIED ATRIAL FIBRILLATION: ICD-10-CM

## 2017-04-04 DIAGNOSIS — Z96.653 PRESENCE OF ARTIFICIAL KNEE JOINT, BILATERAL: ICD-10-CM

## 2017-04-04 DIAGNOSIS — Z90.710 ACQUIRED ABSENCE OF BOTH CERVIX AND UTERUS: ICD-10-CM

## 2017-04-04 DIAGNOSIS — E03.9 HYPOTHYROIDISM, UNSPECIFIED: ICD-10-CM

## 2017-04-04 DIAGNOSIS — Z79.899 OTHER LONG TERM (CURRENT) DRUG THERAPY: ICD-10-CM

## 2017-04-04 DIAGNOSIS — S81.801D UNSPECIFIED OPEN WOUND, RIGHT LOWER LEG, SUBSEQUENT ENCOUNTER: ICD-10-CM

## 2017-04-04 DIAGNOSIS — E11.9 TYPE 2 DIABETES MELLITUS WITHOUT COMPLICATIONS: ICD-10-CM
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
## 2017-04-04 DIAGNOSIS — Z98.42 CATARACT EXTRACTION STATUS, LEFT EYE: ICD-10-CM
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
## 2017-04-04 DIAGNOSIS — Z98.41 CATARACT EXTRACTION STATUS, RIGHT EYE: ICD-10-CM

## 2017-04-04 DIAGNOSIS — J45.909 UNSPECIFIED ASTHMA, UNCOMPLICATED: ICD-10-CM

## 2017-04-04 DIAGNOSIS — I50.9 HEART FAILURE, UNSPECIFIED: ICD-10-CM

## 2017-04-04 DIAGNOSIS — Z98.890 OTHER SPECIFIED POSTPROCEDURAL STATES: ICD-10-CM

## 2017-04-04 DIAGNOSIS — Z90.49 ACQUIRED ABSENCE OF OTHER SPECIFIED PARTS OF DIGESTIVE TRACT: ICD-10-CM

## 2017-04-04 DIAGNOSIS — I83.90 ASYMPTOMATIC VARICOSE VEINS OF UNSPECIFIED LOWER EXTREMITY: ICD-10-CM
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
## 2017-04-04 DIAGNOSIS — E66.9 OBESITY, UNSPECIFIED: ICD-10-CM
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
## 2017-04-13 ENCOUNTER — APPOINTMENT (OUTPATIENT)
Dept: COLORECTAL SURGERY | Facility: CLINIC | Age: 82
End: 2017-04-13

## 2017-04-13 VITALS
SYSTOLIC BLOOD PRESSURE: 154 MMHG | DIASTOLIC BLOOD PRESSURE: 74 MMHG | TEMPERATURE: 97.6 F | HEART RATE: 90 BPM | HEIGHT: 68 IN | BODY MASS INDEX: 32.89 KG/M2 | WEIGHT: 217 LBS | RESPIRATION RATE: 14 BRPM

## 2017-04-13 DIAGNOSIS — Z87.09 PERSONAL HISTORY OF OTHER DISEASES OF THE RESPIRATORY SYSTEM: ICD-10-CM

## 2017-04-13 DIAGNOSIS — Z86.39 PERSONAL HISTORY OF OTHER ENDOCRINE, NUTRITIONAL AND METABOLIC DISEASE: ICD-10-CM

## 2017-04-13 DIAGNOSIS — Z87.39 PERSONAL HISTORY OF OTHER DISEASES OF THE MUSCULOSKELETAL SYSTEM AND CONNECTIVE TISSUE: ICD-10-CM

## 2017-04-13 PROCEDURE — 97602 WOUND(S) CARE NON-SELECTIVE: CPT

## 2017-04-14 ENCOUNTER — EMERGENCY (EMERGENCY)
Facility: HOSPITAL | Age: 82
LOS: 1 days | Discharge: HOME | End: 2017-04-14
Attending: EMERGENCY MEDICINE | Admitting: EMERGENCY MEDICINE
Payer: COMMERCIAL

## 2017-04-14 VITALS
TEMPERATURE: 98 F | DIASTOLIC BLOOD PRESSURE: 73 MMHG | HEART RATE: 80 BPM | SYSTOLIC BLOOD PRESSURE: 120 MMHG | OXYGEN SATURATION: 98 % | RESPIRATION RATE: 16 BRPM

## 2017-04-14 VITALS
SYSTOLIC BLOOD PRESSURE: 136 MMHG | RESPIRATION RATE: 18 BRPM | TEMPERATURE: 98 F | WEIGHT: 210.1 LBS | DIASTOLIC BLOOD PRESSURE: 72 MMHG | HEIGHT: 68 IN | OXYGEN SATURATION: 100 % | HEART RATE: 80 BPM

## 2017-04-14 DIAGNOSIS — R18.8 OTHER ASCITES: ICD-10-CM

## 2017-04-14 DIAGNOSIS — C18.9 MALIGNANT NEOPLASM OF COLON, UNSPECIFIED: ICD-10-CM

## 2017-04-14 DIAGNOSIS — E11.9 TYPE 2 DIABETES MELLITUS WITHOUT COMPLICATIONS: ICD-10-CM

## 2017-04-14 DIAGNOSIS — I10 ESSENTIAL (PRIMARY) HYPERTENSION: ICD-10-CM

## 2017-04-14 DIAGNOSIS — Z90.49 ACQUIRED ABSENCE OF OTHER SPECIFIED PARTS OF DIGESTIVE TRACT: ICD-10-CM

## 2017-04-14 DIAGNOSIS — E03.9 HYPOTHYROIDISM, UNSPECIFIED: ICD-10-CM

## 2017-04-14 DIAGNOSIS — J45.909 UNSPECIFIED ASTHMA, UNCOMPLICATED: ICD-10-CM

## 2017-04-14 LAB
ALBUMIN SERPL ELPH-MCNC: 3.3 G/DL — SIGNIFICANT CHANGE UP (ref 3.3–5)
ALP SERPL-CCNC: 112 U/L — SIGNIFICANT CHANGE UP (ref 40–120)
ALT FLD-CCNC: 20 U/L — SIGNIFICANT CHANGE UP (ref 12–78)
AMYLASE P1 CFR SERPL: 63 U/L — SIGNIFICANT CHANGE UP (ref 25–115)
ANION GAP SERPL CALC-SCNC: 9 MMOL/L — SIGNIFICANT CHANGE UP (ref 5–17)
APTT BLD: 32.1 SEC — SIGNIFICANT CHANGE UP (ref 27.5–37.4)
AST SERPL-CCNC: 27 U/L — SIGNIFICANT CHANGE UP (ref 15–37)
BASOPHILS # BLD AUTO: 0.1 K/UL — SIGNIFICANT CHANGE UP (ref 0–0.2)
BASOPHILS NFR BLD AUTO: 2.1 % — HIGH (ref 0–2)
BILIRUB SERPL-MCNC: 0.7 MG/DL — SIGNIFICANT CHANGE UP (ref 0.2–1.2)
BLD GP AB SCN SERPL QL: SIGNIFICANT CHANGE UP
BUN SERPL-MCNC: 18 MG/DL — SIGNIFICANT CHANGE UP (ref 7–23)
CALCIUM SERPL-MCNC: 8.8 MG/DL — SIGNIFICANT CHANGE UP (ref 8.5–10.1)
CHLORIDE SERPL-SCNC: 104 MMOL/L — SIGNIFICANT CHANGE UP (ref 96–108)
CO2 SERPL-SCNC: 28 MMOL/L — SIGNIFICANT CHANGE UP (ref 22–31)
CREAT SERPL-MCNC: 1 MG/DL — SIGNIFICANT CHANGE UP (ref 0.5–1.3)
EOSINOPHIL # BLD AUTO: 0.2 K/UL — SIGNIFICANT CHANGE UP (ref 0–0.5)
EOSINOPHIL NFR BLD AUTO: 2.9 % — SIGNIFICANT CHANGE UP (ref 0–6)
GLUCOSE SERPL-MCNC: 127 MG/DL — HIGH (ref 70–99)
HCT VFR BLD CALC: 38.2 % — SIGNIFICANT CHANGE UP (ref 34.5–45)
HGB BLD-MCNC: 12 G/DL — SIGNIFICANT CHANGE UP (ref 11.5–15.5)
INR BLD: 1.62 RATIO — HIGH (ref 0.88–1.16)
LIDOCAIN IGE QN: 182 U/L — SIGNIFICANT CHANGE UP (ref 73–393)
LYMPHOCYTES # BLD AUTO: 0.9 K/UL — LOW (ref 1–3.3)
LYMPHOCYTES # BLD AUTO: 14.8 % — SIGNIFICANT CHANGE UP (ref 13–44)
MCHC RBC-ENTMCNC: 26.5 PG — LOW (ref 27–34)
MCHC RBC-ENTMCNC: 31.4 GM/DL — LOW (ref 32–36)
MCV RBC AUTO: 84.6 FL — SIGNIFICANT CHANGE UP (ref 80–100)
MONOCYTES # BLD AUTO: 0.4 K/UL — SIGNIFICANT CHANGE UP (ref 0–0.9)
MONOCYTES NFR BLD AUTO: 6.7 % — SIGNIFICANT CHANGE UP (ref 1–9)
NEUTROPHILS # BLD AUTO: 4.5 K/UL — SIGNIFICANT CHANGE UP (ref 1.8–7.4)
NEUTROPHILS NFR BLD AUTO: 73.5 % — SIGNIFICANT CHANGE UP (ref 43–77)
PLATELET # BLD AUTO: 452 K/UL — HIGH (ref 150–400)
POTASSIUM SERPL-MCNC: 3.9 MMOL/L — SIGNIFICANT CHANGE UP (ref 3.5–5.3)
POTASSIUM SERPL-SCNC: 3.9 MMOL/L — SIGNIFICANT CHANGE UP (ref 3.5–5.3)
PROT SERPL-MCNC: 8.1 G/DL — SIGNIFICANT CHANGE UP (ref 6–8.3)
PROTHROM AB SERPL-ACNC: 17.8 SEC — HIGH (ref 9.8–12.7)
RBC # BLD: 4.51 M/UL — SIGNIFICANT CHANGE UP (ref 3.8–5.2)
RBC # FLD: 15 % — HIGH (ref 10.3–14.5)
SODIUM SERPL-SCNC: 141 MMOL/L — SIGNIFICANT CHANGE UP (ref 135–145)
WBC # BLD: 6.1 K/UL — SIGNIFICANT CHANGE UP (ref 3.8–10.5)
WBC # FLD AUTO: 6.1 K/UL — SIGNIFICANT CHANGE UP (ref 3.8–10.5)

## 2017-04-14 PROCEDURE — 74176 CT ABD & PELVIS W/O CONTRAST: CPT | Mod: 26

## 2017-04-14 PROCEDURE — 85610 PROTHROMBIN TIME: CPT

## 2017-04-14 PROCEDURE — 86850 RBC ANTIBODY SCREEN: CPT

## 2017-04-14 PROCEDURE — 49083 ABD PARACENTESIS W/IMAGING: CPT

## 2017-04-14 PROCEDURE — 85027 COMPLETE CBC AUTOMATED: CPT

## 2017-04-14 PROCEDURE — 96374 THER/PROPH/DIAG INJ IV PUSH: CPT | Mod: 59

## 2017-04-14 PROCEDURE — 80053 COMPREHEN METABOLIC PANEL: CPT

## 2017-04-14 PROCEDURE — 96375 TX/PRO/DX INJ NEW DRUG ADDON: CPT | Mod: 59

## 2017-04-14 PROCEDURE — 99284 EMERGENCY DEPT VISIT MOD MDM: CPT | Mod: 25

## 2017-04-14 PROCEDURE — 85730 THROMBOPLASTIN TIME PARTIAL: CPT

## 2017-04-14 PROCEDURE — 96361 HYDRATE IV INFUSION ADD-ON: CPT | Mod: 59

## 2017-04-14 PROCEDURE — 82150 ASSAY OF AMYLASE: CPT

## 2017-04-14 PROCEDURE — 99285 EMERGENCY DEPT VISIT HI MDM: CPT

## 2017-04-14 PROCEDURE — 83690 ASSAY OF LIPASE: CPT

## 2017-04-14 PROCEDURE — 86900 BLOOD TYPING SEROLOGIC ABO: CPT

## 2017-04-14 PROCEDURE — 86901 BLOOD TYPING SEROLOGIC RH(D): CPT

## 2017-04-14 PROCEDURE — 74176 CT ABD & PELVIS W/O CONTRAST: CPT

## 2017-04-14 RX ORDER — SODIUM CHLORIDE 9 MG/ML
1000 INJECTION INTRAMUSCULAR; INTRAVENOUS; SUBCUTANEOUS ONCE
Qty: 0 | Refills: 0 | Status: COMPLETED | OUTPATIENT
Start: 2017-04-14 | End: 2017-04-14

## 2017-04-14 RX ORDER — MORPHINE SULFATE 50 MG/1
2 CAPSULE, EXTENDED RELEASE ORAL ONCE
Qty: 0 | Refills: 0 | Status: DISCONTINUED | OUTPATIENT
Start: 2017-04-14 | End: 2017-04-14

## 2017-04-14 RX ORDER — ONDANSETRON 8 MG/1
4 TABLET, FILM COATED ORAL ONCE
Qty: 0 | Refills: 0 | Status: COMPLETED | OUTPATIENT
Start: 2017-04-14 | End: 2017-04-14

## 2017-04-14 RX ADMIN — ONDANSETRON 4 MILLIGRAM(S): 8 TABLET, FILM COATED ORAL at 08:30

## 2017-04-14 RX ADMIN — SODIUM CHLORIDE 1000 MILLILITER(S): 9 INJECTION INTRAMUSCULAR; INTRAVENOUS; SUBCUTANEOUS at 08:21

## 2017-04-14 RX ADMIN — MORPHINE SULFATE 2 MILLIGRAM(S): 50 CAPSULE, EXTENDED RELEASE ORAL at 08:30

## 2017-04-14 RX ADMIN — MORPHINE SULFATE 2 MILLIGRAM(S): 50 CAPSULE, EXTENDED RELEASE ORAL at 10:49

## 2017-04-14 NOTE — ED PROVIDER NOTE - PROGRESS NOTE DETAILS
Pt resting comfortably.  No distress. Feels much better and wants to go home.  Ate a meal.   Abd: soft, ND/NT.   D/W surgery Dr Mckinney.  Will d/c home.  Will f/u in the office.  Pt/family given detailed return instructions.

## 2017-04-14 NOTE — ED ADULT NURSE NOTE - OBJECTIVE STATEMENT
Pt arrived c/o abdominal pain today with nausea and dry heaving. Pt alert no acute distress VSS afebrile at this time. Abd soft, tender. Hernia present. Colostomy present. Patient states she emptied colostomy this AM. Family at bedside. IV started labs drawn and sent pt tolerated well. Dr Mckinney at bedside. Awaiting further orders.

## 2017-04-14 NOTE — CONSULT NOTE ADULT - SUBJECTIVE AND OBJECTIVE BOX
Pt well-known to me from [facility] colostomy for obstruction of sigmoid colon 5 years ago.  I saw her in ER 2 months ago at which time she had tense ascites from metastatic ovarian cancer causing pain at her chronic paracolostomy hernia.  IR drained 4 liters of ascites by paracentesis and she was no longer symptomatic.  Early this AM she developed pain at a new incisional hernia in RLQ with retching.    PE; 10 cm ventral hernia in RLQ which I was able to reduce fairly easily.  There is a 1 1/2 cm fascial defect.  Pain and retching resolved.  Paracolostomy hernia  in LLQ has no pain or tenderness.    CT shows large amount of ascites, a little less than in February    Labs noted    P- paracentesis today .  She can go home if she remains free of pain and nausea.  I told daughter that a tunneled catheter can be placed for home paracentesis if she is off xarelto for 2 days.  Would repair hernia if incarceration recurs but healing would be problematic if she has recurrent ascites.  Tunneled catheter cannot be done until ascites reaccumulates

## 2017-04-14 NOTE — ED PROVIDER NOTE - OBJECTIVE STATEMENT
89 y/o F with h/o DM, HTN, colon cancer s/p colon resection with colostomy 5 years ago c/o abdominal pain and vomiting today . Has chronic ventral hernia.  c/o pain over hernia and into the back.  Denies fever.  + output in colostomy.

## 2017-04-17 ENCOUNTER — OUTPATIENT (OUTPATIENT)
Dept: OUTPATIENT SERVICES | Facility: HOSPITAL | Age: 82
LOS: 1 days | End: 2017-04-17
Payer: COMMERCIAL

## 2017-04-17 DIAGNOSIS — L97.801 NON-PRESSURE CHRONIC ULCER OF OTHER PART OF UNSPECIFIED LOWER LEG LIMITED TO BREAKDOWN OF SKIN: ICD-10-CM

## 2017-04-17 PROCEDURE — G0463: CPT
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
## 2017-04-20 DIAGNOSIS — Z98.890 OTHER SPECIFIED POSTPROCEDURAL STATES: ICD-10-CM

## 2017-04-20 DIAGNOSIS — Z90.710 ACQUIRED ABSENCE OF BOTH CERVIX AND UTERUS: ICD-10-CM

## 2017-04-20 DIAGNOSIS — E11.9 TYPE 2 DIABETES MELLITUS WITHOUT COMPLICATIONS: ICD-10-CM

## 2017-04-20 DIAGNOSIS — S81.801D UNSPECIFIED OPEN WOUND, RIGHT LOWER LEG, SUBSEQUENT ENCOUNTER: ICD-10-CM

## 2017-04-20 DIAGNOSIS — Z98.41 CATARACT EXTRACTION STATUS, RIGHT EYE: ICD-10-CM
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
## 2017-04-20 DIAGNOSIS — J45.909 UNSPECIFIED ASTHMA, UNCOMPLICATED: ICD-10-CM

## 2017-04-20 DIAGNOSIS — Z96.653 PRESENCE OF ARTIFICIAL KNEE JOINT, BILATERAL: ICD-10-CM

## 2017-04-20 DIAGNOSIS — I83.90 ASYMPTOMATIC VARICOSE VEINS OF UNSPECIFIED LOWER EXTREMITY: ICD-10-CM

## 2017-04-20 DIAGNOSIS — E03.9 HYPOTHYROIDISM, UNSPECIFIED: ICD-10-CM
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
## 2017-04-20 DIAGNOSIS — I50.9 HEART FAILURE, UNSPECIFIED: ICD-10-CM

## 2017-04-20 DIAGNOSIS — Z98.42 CATARACT EXTRACTION STATUS, LEFT EYE: ICD-10-CM

## 2017-04-20 DIAGNOSIS — E66.3 OVERWEIGHT: ICD-10-CM
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
## 2017-04-20 DIAGNOSIS — Z90.49 ACQUIRED ABSENCE OF OTHER SPECIFIED PARTS OF DIGESTIVE TRACT: ICD-10-CM

## 2017-04-20 DIAGNOSIS — I48.91 UNSPECIFIED ATRIAL FIBRILLATION: ICD-10-CM

## 2017-04-20 DIAGNOSIS — Z85.43 PERSONAL HISTORY OF MALIGNANT NEOPLASM OF OVARY: ICD-10-CM

## 2017-04-20 DIAGNOSIS — Z93.3 COLOSTOMY STATUS: ICD-10-CM

## 2017-04-20 DIAGNOSIS — I10 ESSENTIAL (PRIMARY) HYPERTENSION: ICD-10-CM

## 2017-04-20 DIAGNOSIS — Z79.899 OTHER LONG TERM (CURRENT) DRUG THERAPY: ICD-10-CM

## 2017-04-20 DIAGNOSIS — Z95.0 PRESENCE OF CARDIAC PACEMAKER: ICD-10-CM

## 2017-05-03 ENCOUNTER — OUTPATIENT (OUTPATIENT)
Dept: OUTPATIENT SERVICES | Facility: HOSPITAL | Age: 82
LOS: 1 days | End: 2017-05-03
Payer: COMMERCIAL

## 2017-05-03 DIAGNOSIS — L97.801 NON-PRESSURE CHRONIC ULCER OF OTHER PART OF UNSPECIFIED LOWER LEG LIMITED TO BREAKDOWN OF SKIN: ICD-10-CM

## 2017-05-03 PROCEDURE — 17250 CHEM CAUT OF GRANLTJ TISSUE: CPT

## 2017-05-04 DIAGNOSIS — E11.9 TYPE 2 DIABETES MELLITUS WITHOUT COMPLICATIONS: ICD-10-CM

## 2017-05-04 DIAGNOSIS — I10 ESSENTIAL (PRIMARY) HYPERTENSION: ICD-10-CM

## 2017-05-04 DIAGNOSIS — L92.9 GRANULOMATOUS DISORDER OF THE SKIN AND SUBCUTANEOUS TISSUE, UNSPECIFIED: ICD-10-CM

## 2017-05-04 DIAGNOSIS — Z95.0 PRESENCE OF CARDIAC PACEMAKER: ICD-10-CM

## 2017-05-04 DIAGNOSIS — E03.9 HYPOTHYROIDISM, UNSPECIFIED: ICD-10-CM

## 2017-05-04 DIAGNOSIS — Z85.43 PERSONAL HISTORY OF MALIGNANT NEOPLASM OF OVARY: ICD-10-CM
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
## 2017-05-04 DIAGNOSIS — Z79.899 OTHER LONG TERM (CURRENT) DRUG THERAPY: ICD-10-CM

## 2017-05-04 DIAGNOSIS — Z98.41 CATARACT EXTRACTION STATUS, RIGHT EYE: ICD-10-CM

## 2017-05-04 DIAGNOSIS — I50.9 HEART FAILURE, UNSPECIFIED: ICD-10-CM

## 2017-05-04 DIAGNOSIS — Z90.49 ACQUIRED ABSENCE OF OTHER SPECIFIED PARTS OF DIGESTIVE TRACT: ICD-10-CM

## 2017-05-04 DIAGNOSIS — I48.91 UNSPECIFIED ATRIAL FIBRILLATION: ICD-10-CM

## 2017-05-04 DIAGNOSIS — E66.9 OBESITY, UNSPECIFIED: ICD-10-CM

## 2017-05-04 DIAGNOSIS — J45.909 UNSPECIFIED ASTHMA, UNCOMPLICATED: ICD-10-CM

## 2017-05-04 DIAGNOSIS — I83.90 ASYMPTOMATIC VARICOSE VEINS OF UNSPECIFIED LOWER EXTREMITY: ICD-10-CM

## 2017-05-04 DIAGNOSIS — Z93.3 COLOSTOMY STATUS: ICD-10-CM

## 2017-05-04 DIAGNOSIS — Z96.653 PRESENCE OF ARTIFICIAL KNEE JOINT, BILATERAL: ICD-10-CM

## 2017-05-04 DIAGNOSIS — Z98.890 OTHER SPECIFIED POSTPROCEDURAL STATES: ICD-10-CM
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
## 2017-05-04 DIAGNOSIS — Z98.42 CATARACT EXTRACTION STATUS, LEFT EYE: ICD-10-CM
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
## 2017-05-04 DIAGNOSIS — Z90.710 ACQUIRED ABSENCE OF BOTH CERVIX AND UTERUS: ICD-10-CM

## 2017-05-04 DIAGNOSIS — S81.801D UNSPECIFIED OPEN WOUND, RIGHT LOWER LEG, SUBSEQUENT ENCOUNTER: ICD-10-CM

## 2017-05-17 ENCOUNTER — OUTPATIENT (OUTPATIENT)
Dept: OUTPATIENT SERVICES | Facility: HOSPITAL | Age: 82
LOS: 1 days | End: 2017-05-17
Payer: COMMERCIAL

## 2017-05-17 DIAGNOSIS — L97.801 NON-PRESSURE CHRONIC ULCER OF OTHER PART OF UNSPECIFIED LOWER LEG LIMITED TO BREAKDOWN OF SKIN: ICD-10-CM

## 2017-05-17 LAB
APTT BLD: 35 SEC — SIGNIFICANT CHANGE UP (ref 27.5–37.4)
BASOPHILS # BLD AUTO: 0.1 K/UL — SIGNIFICANT CHANGE UP (ref 0–0.2)
BASOPHILS NFR BLD AUTO: 1 % — SIGNIFICANT CHANGE UP (ref 0–2)
BUN SERPL-MCNC: 22 MG/DL — SIGNIFICANT CHANGE UP (ref 7–23)
CREAT SERPL-MCNC: 1.1 MG/DL — SIGNIFICANT CHANGE UP (ref 0.5–1.3)
EOSINOPHIL # BLD AUTO: 0.2 K/UL — SIGNIFICANT CHANGE UP (ref 0–0.5)
EOSINOPHIL NFR BLD AUTO: 3.1 % — SIGNIFICANT CHANGE UP (ref 0–6)
HCT VFR BLD CALC: 36.8 % — SIGNIFICANT CHANGE UP (ref 34.5–45)
HGB BLD-MCNC: 11 G/DL — LOW (ref 11.5–15.5)
INR BLD: 1.58 RATIO — HIGH (ref 0.88–1.16)
LYMPHOCYTES # BLD AUTO: 1 K/UL — SIGNIFICANT CHANGE UP (ref 1–3.3)
LYMPHOCYTES # BLD AUTO: 15.5 % — SIGNIFICANT CHANGE UP (ref 13–44)
MCHC RBC-ENTMCNC: 25 PG — LOW (ref 27–34)
MCHC RBC-ENTMCNC: 30 GM/DL — LOW (ref 32–36)
MCV RBC AUTO: 83.5 FL — SIGNIFICANT CHANGE UP (ref 80–100)
MONOCYTES # BLD AUTO: 0.4 K/UL — SIGNIFICANT CHANGE UP (ref 0–0.9)
MONOCYTES NFR BLD AUTO: 6.7 % — SIGNIFICANT CHANGE UP (ref 1–9)
NEUTROPHILS # BLD AUTO: 4.8 K/UL — SIGNIFICANT CHANGE UP (ref 1.8–7.4)
NEUTROPHILS NFR BLD AUTO: 73.8 % — SIGNIFICANT CHANGE UP (ref 43–77)
PLATELET # BLD AUTO: 291 K/UL — SIGNIFICANT CHANGE UP (ref 150–400)
PROTHROM AB SERPL-ACNC: 17.4 SEC — HIGH (ref 9.8–12.7)
RBC # BLD: 4.41 M/UL — SIGNIFICANT CHANGE UP (ref 3.8–5.2)
RBC # FLD: 15.3 % — HIGH (ref 10.3–14.5)
WBC # BLD: 6.5 K/UL — SIGNIFICANT CHANGE UP (ref 3.8–10.5)
WBC # FLD AUTO: 6.5 K/UL — SIGNIFICANT CHANGE UP (ref 3.8–10.5)

## 2017-05-17 PROCEDURE — 84520 ASSAY OF UREA NITROGEN: CPT

## 2017-05-17 PROCEDURE — 85610 PROTHROMBIN TIME: CPT

## 2017-05-17 PROCEDURE — 85027 COMPLETE CBC AUTOMATED: CPT

## 2017-05-17 PROCEDURE — G0463: CPT

## 2017-05-17 PROCEDURE — 82565 ASSAY OF CREATININE: CPT

## 2017-05-17 PROCEDURE — 85730 THROMBOPLASTIN TIME PARTIAL: CPT

## 2017-05-18 DIAGNOSIS — I48.91 UNSPECIFIED ATRIAL FIBRILLATION: ICD-10-CM

## 2017-05-18 DIAGNOSIS — Z98.41 CATARACT EXTRACTION STATUS, RIGHT EYE: ICD-10-CM
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
## 2017-05-18 DIAGNOSIS — Z95.0 PRESENCE OF CARDIAC PACEMAKER: ICD-10-CM
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
## 2017-05-18 DIAGNOSIS — Z96.653 PRESENCE OF ARTIFICIAL KNEE JOINT, BILATERAL: ICD-10-CM

## 2017-05-18 DIAGNOSIS — I50.9 HEART FAILURE, UNSPECIFIED: ICD-10-CM

## 2017-05-18 DIAGNOSIS — Z98.890 OTHER SPECIFIED POSTPROCEDURAL STATES: ICD-10-CM

## 2017-05-18 DIAGNOSIS — Z98.42 CATARACT EXTRACTION STATUS, LEFT EYE: ICD-10-CM

## 2017-05-18 DIAGNOSIS — Z90.710 ACQUIRED ABSENCE OF BOTH CERVIX AND UTERUS: ICD-10-CM
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
## 2017-05-18 DIAGNOSIS — E66.9 OBESITY, UNSPECIFIED: ICD-10-CM

## 2017-05-18 DIAGNOSIS — Z90.49 ACQUIRED ABSENCE OF OTHER SPECIFIED PARTS OF DIGESTIVE TRACT: ICD-10-CM

## 2017-05-18 DIAGNOSIS — Z79.899 OTHER LONG TERM (CURRENT) DRUG THERAPY: ICD-10-CM

## 2017-05-18 DIAGNOSIS — E03.9 HYPOTHYROIDISM, UNSPECIFIED: ICD-10-CM

## 2017-05-18 DIAGNOSIS — Z93.3 COLOSTOMY STATUS: ICD-10-CM

## 2017-05-18 DIAGNOSIS — I83.90 ASYMPTOMATIC VARICOSE VEINS OF UNSPECIFIED LOWER EXTREMITY: ICD-10-CM
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
## 2017-05-18 DIAGNOSIS — S81.801D UNSPECIFIED OPEN WOUND, RIGHT LOWER LEG, SUBSEQUENT ENCOUNTER: ICD-10-CM

## 2017-05-18 DIAGNOSIS — E11.9 TYPE 2 DIABETES MELLITUS WITHOUT COMPLICATIONS: ICD-10-CM

## 2017-05-18 DIAGNOSIS — Z85.43 PERSONAL HISTORY OF MALIGNANT NEOPLASM OF OVARY: ICD-10-CM

## 2017-05-18 DIAGNOSIS — J45.909 UNSPECIFIED ASTHMA, UNCOMPLICATED: ICD-10-CM

## 2017-05-18 DIAGNOSIS — I10 ESSENTIAL (PRIMARY) HYPERTENSION: ICD-10-CM

## 2017-05-31 ENCOUNTER — OUTPATIENT (OUTPATIENT)
Dept: OUTPATIENT SERVICES | Facility: HOSPITAL | Age: 82
LOS: 1 days | End: 2017-05-31
Payer: COMMERCIAL

## 2017-05-31 DIAGNOSIS — S81.801D UNSPECIFIED OPEN WOUND, RIGHT LOWER LEG, SUBSEQUENT ENCOUNTER: ICD-10-CM

## 2017-05-31 PROCEDURE — G0463: CPT | Mod: 25

## 2017-05-31 PROCEDURE — 17250 CHEM CAUT OF GRANLTJ TISSUE: CPT

## 2017-06-01 ENCOUNTER — OUTPATIENT (OUTPATIENT)
Dept: OUTPATIENT SERVICES | Facility: HOSPITAL | Age: 82
LOS: 1 days | End: 2017-06-01
Payer: COMMERCIAL

## 2017-06-01 DIAGNOSIS — R18.0 MALIGNANT ASCITES: ICD-10-CM

## 2017-06-01 PROCEDURE — 49083 ABD PARACENTESIS W/IMAGING: CPT

## 2017-06-02 DIAGNOSIS — E03.9 HYPOTHYROIDISM, UNSPECIFIED: ICD-10-CM

## 2017-06-02 DIAGNOSIS — I50.9 HEART FAILURE, UNSPECIFIED: ICD-10-CM

## 2017-06-02 DIAGNOSIS — E11.9 TYPE 2 DIABETES MELLITUS WITHOUT COMPLICATIONS: ICD-10-CM

## 2017-06-02 DIAGNOSIS — E66.9 OBESITY, UNSPECIFIED: ICD-10-CM

## 2017-06-02 DIAGNOSIS — L92.9 GRANULOMATOUS DISORDER OF THE SKIN AND SUBCUTANEOUS TISSUE, UNSPECIFIED: ICD-10-CM
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
## 2017-06-02 DIAGNOSIS — Z85.43 PERSONAL HISTORY OF MALIGNANT NEOPLASM OF OVARY: ICD-10-CM

## 2017-06-02 DIAGNOSIS — Z98.890 OTHER SPECIFIED POSTPROCEDURAL STATES: ICD-10-CM

## 2017-06-02 DIAGNOSIS — Z95.0 PRESENCE OF CARDIAC PACEMAKER: ICD-10-CM

## 2017-06-02 DIAGNOSIS — T81.89XA OTHER COMPLICATIONS OF PROCEDURES, NOT ELSEWHERE CLASSIFIED, INITIAL ENCOUNTER: ICD-10-CM

## 2017-06-02 DIAGNOSIS — Z98.42 CATARACT EXTRACTION STATUS, LEFT EYE: ICD-10-CM

## 2017-06-02 DIAGNOSIS — J45.909 UNSPECIFIED ASTHMA, UNCOMPLICATED: ICD-10-CM
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
## 2017-06-02 DIAGNOSIS — Z98.41 CATARACT EXTRACTION STATUS, RIGHT EYE: ICD-10-CM

## 2017-06-02 DIAGNOSIS — I83.90 ASYMPTOMATIC VARICOSE VEINS OF UNSPECIFIED LOWER EXTREMITY: ICD-10-CM

## 2017-06-02 DIAGNOSIS — Z90.49 ACQUIRED ABSENCE OF OTHER SPECIFIED PARTS OF DIGESTIVE TRACT: ICD-10-CM
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
## 2017-06-02 DIAGNOSIS — I10 ESSENTIAL (PRIMARY) HYPERTENSION: ICD-10-CM

## 2017-06-02 DIAGNOSIS — I48.91 UNSPECIFIED ATRIAL FIBRILLATION: ICD-10-CM

## 2017-06-02 DIAGNOSIS — S81.801D UNSPECIFIED OPEN WOUND, RIGHT LOWER LEG, SUBSEQUENT ENCOUNTER: ICD-10-CM

## 2017-06-02 DIAGNOSIS — Z90.710 ACQUIRED ABSENCE OF BOTH CERVIX AND UTERUS: ICD-10-CM

## 2017-06-02 DIAGNOSIS — Z79.899 OTHER LONG TERM (CURRENT) DRUG THERAPY: ICD-10-CM

## 2017-06-02 DIAGNOSIS — Z96.653 PRESENCE OF ARTIFICIAL KNEE JOINT, BILATERAL: ICD-10-CM
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
## 2017-06-02 DIAGNOSIS — Z93.3 COLOSTOMY STATUS: ICD-10-CM

## 2017-06-19 ENCOUNTER — OUTPATIENT (OUTPATIENT)
Dept: OUTPATIENT SERVICES | Facility: HOSPITAL | Age: 82
LOS: 1 days | End: 2017-06-19
Payer: COMMERCIAL

## 2017-06-19 DIAGNOSIS — T81.89XA OTHER COMPLICATIONS OF PROCEDURES, NOT ELSEWHERE CLASSIFIED, INITIAL ENCOUNTER: ICD-10-CM

## 2017-06-19 PROCEDURE — G0463: CPT

## 2017-06-19 PROCEDURE — 99213 OFFICE O/P EST LOW 20 MIN: CPT

## 2017-06-21 DIAGNOSIS — Z96.653 PRESENCE OF ARTIFICIAL KNEE JOINT, BILATERAL: ICD-10-CM
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
## 2017-06-21 DIAGNOSIS — Z95.0 PRESENCE OF CARDIAC PACEMAKER: ICD-10-CM

## 2017-06-21 DIAGNOSIS — I10 ESSENTIAL (PRIMARY) HYPERTENSION: ICD-10-CM

## 2017-06-21 DIAGNOSIS — Z93.3 COLOSTOMY STATUS: ICD-10-CM

## 2017-06-21 DIAGNOSIS — J45.909 UNSPECIFIED ASTHMA, UNCOMPLICATED: ICD-10-CM

## 2017-06-21 DIAGNOSIS — I50.9 HEART FAILURE, UNSPECIFIED: ICD-10-CM

## 2017-06-21 DIAGNOSIS — I83.893 VARICOSE VEINS OF BILATERAL LOWER EXTREMITIES WITH OTHER COMPLICATIONS: ICD-10-CM

## 2017-06-21 DIAGNOSIS — I48.91 UNSPECIFIED ATRIAL FIBRILLATION: ICD-10-CM

## 2017-06-21 DIAGNOSIS — Z98.42 CATARACT EXTRACTION STATUS, LEFT EYE: ICD-10-CM

## 2017-06-21 DIAGNOSIS — Z90.710 ACQUIRED ABSENCE OF BOTH CERVIX AND UTERUS: ICD-10-CM

## 2017-06-21 DIAGNOSIS — Z85.43 PERSONAL HISTORY OF MALIGNANT NEOPLASM OF OVARY: ICD-10-CM

## 2017-06-21 DIAGNOSIS — E03.9 HYPOTHYROIDISM, UNSPECIFIED: ICD-10-CM

## 2017-06-21 DIAGNOSIS — S81.801D UNSPECIFIED OPEN WOUND, RIGHT LOWER LEG, SUBSEQUENT ENCOUNTER: ICD-10-CM
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
## 2017-06-21 DIAGNOSIS — Z90.49 ACQUIRED ABSENCE OF OTHER SPECIFIED PARTS OF DIGESTIVE TRACT: ICD-10-CM

## 2017-06-21 DIAGNOSIS — K42.9 UMBILICAL HERNIA WITHOUT OBSTRUCTION OR GANGRENE: ICD-10-CM
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
## 2017-06-21 DIAGNOSIS — E11.9 TYPE 2 DIABETES MELLITUS WITHOUT COMPLICATIONS: ICD-10-CM

## 2017-06-21 DIAGNOSIS — E66.9 OBESITY, UNSPECIFIED: ICD-10-CM

## 2017-06-21 DIAGNOSIS — Z98.890 OTHER SPECIFIED POSTPROCEDURAL STATES: ICD-10-CM

## 2017-06-21 DIAGNOSIS — Z79.899 OTHER LONG TERM (CURRENT) DRUG THERAPY: ICD-10-CM

## 2017-07-11 ENCOUNTER — OUTPATIENT (OUTPATIENT)
Dept: OUTPATIENT SERVICES | Facility: HOSPITAL | Age: 82
LOS: 1 days | End: 2017-07-11
Payer: COMMERCIAL

## 2017-07-11 DIAGNOSIS — T81.89XA OTHER COMPLICATIONS OF PROCEDURES, NOT ELSEWHERE CLASSIFIED, INITIAL ENCOUNTER: ICD-10-CM

## 2017-07-11 PROCEDURE — G0463: CPT

## 2017-07-13 DIAGNOSIS — S81.801D UNSPECIFIED OPEN WOUND, RIGHT LOWER LEG, SUBSEQUENT ENCOUNTER: ICD-10-CM
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
## 2017-07-13 DIAGNOSIS — Z85.43 PERSONAL HISTORY OF MALIGNANT NEOPLASM OF OVARY: ICD-10-CM

## 2017-07-13 DIAGNOSIS — E66.9 OBESITY, UNSPECIFIED: ICD-10-CM

## 2017-07-13 DIAGNOSIS — Z98.890 OTHER SPECIFIED POSTPROCEDURAL STATES: ICD-10-CM

## 2017-07-13 DIAGNOSIS — Z79.899 OTHER LONG TERM (CURRENT) DRUG THERAPY: ICD-10-CM

## 2017-07-13 DIAGNOSIS — I50.9 HEART FAILURE, UNSPECIFIED: ICD-10-CM
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
## 2017-07-13 DIAGNOSIS — Z95.0 PRESENCE OF CARDIAC PACEMAKER: ICD-10-CM

## 2017-07-13 DIAGNOSIS — K42.9 UMBILICAL HERNIA WITHOUT OBSTRUCTION OR GANGRENE: ICD-10-CM

## 2017-07-13 DIAGNOSIS — I48.91 UNSPECIFIED ATRIAL FIBRILLATION: ICD-10-CM

## 2017-07-13 DIAGNOSIS — Z96.653 PRESENCE OF ARTIFICIAL KNEE JOINT, BILATERAL: ICD-10-CM
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
## 2017-07-13 DIAGNOSIS — I83.893 VARICOSE VEINS OF BILATERAL LOWER EXTREMITIES WITH OTHER COMPLICATIONS: ICD-10-CM

## 2017-07-13 DIAGNOSIS — I10 ESSENTIAL (PRIMARY) HYPERTENSION: ICD-10-CM

## 2017-07-13 DIAGNOSIS — Z90.710 ACQUIRED ABSENCE OF BOTH CERVIX AND UTERUS: ICD-10-CM

## 2017-07-13 DIAGNOSIS — Z98.41 CATARACT EXTRACTION STATUS, RIGHT EYE: ICD-10-CM

## 2017-07-13 DIAGNOSIS — J45.909 UNSPECIFIED ASTHMA, UNCOMPLICATED: ICD-10-CM

## 2017-07-13 DIAGNOSIS — E11.9 TYPE 2 DIABETES MELLITUS WITHOUT COMPLICATIONS: ICD-10-CM
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
## 2017-07-13 DIAGNOSIS — Z90.49 ACQUIRED ABSENCE OF OTHER SPECIFIED PARTS OF DIGESTIVE TRACT: ICD-10-CM

## 2017-07-13 DIAGNOSIS — E03.9 HYPOTHYROIDISM, UNSPECIFIED: ICD-10-CM

## 2017-07-13 DIAGNOSIS — Z93.3 COLOSTOMY STATUS: ICD-10-CM

## 2017-07-13 DIAGNOSIS — Z98.42 CATARACT EXTRACTION STATUS, LEFT EYE: ICD-10-CM

## 2017-07-21 ENCOUNTER — OUTPATIENT (OUTPATIENT)
Dept: OUTPATIENT SERVICES | Facility: HOSPITAL | Age: 82
LOS: 1 days | End: 2017-07-21
Payer: COMMERCIAL

## 2017-07-21 DIAGNOSIS — C56.9 MALIGNANT NEOPLASM OF UNSPECIFIED OVARY: ICD-10-CM

## 2017-07-21 DIAGNOSIS — R18.0 MALIGNANT ASCITES: ICD-10-CM

## 2017-07-21 LAB
APTT BLD: 33.1 SEC — SIGNIFICANT CHANGE UP (ref 27.5–37.4)
HCT VFR BLD CALC: 34.2 % — LOW (ref 34.5–45)
HGB BLD-MCNC: 10.9 G/DL — LOW (ref 11.5–15.5)
INR BLD: 1.36 RATIO — HIGH (ref 0.88–1.16)
MCHC RBC-ENTMCNC: 25.9 PG — LOW (ref 27–34)
MCHC RBC-ENTMCNC: 31.8 GM/DL — LOW (ref 32–36)
MCV RBC AUTO: 81.2 FL — SIGNIFICANT CHANGE UP (ref 80–100)
PLATELET # BLD AUTO: 351 K/UL — SIGNIFICANT CHANGE UP (ref 150–400)
PROTHROM AB SERPL-ACNC: 14.9 SEC — HIGH (ref 9.8–12.7)
RBC # BLD: 4.21 M/UL — SIGNIFICANT CHANGE UP (ref 3.8–5.2)
RBC # FLD: 14.6 % — HIGH (ref 10.3–14.5)
WBC # BLD: 6.9 K/UL — SIGNIFICANT CHANGE UP (ref 3.8–10.5)
WBC # FLD AUTO: 6.9 K/UL — SIGNIFICANT CHANGE UP (ref 3.8–10.5)

## 2017-07-21 PROCEDURE — 85610 PROTHROMBIN TIME: CPT

## 2017-07-21 PROCEDURE — 85730 THROMBOPLASTIN TIME PARTIAL: CPT

## 2017-07-21 PROCEDURE — 85027 COMPLETE CBC AUTOMATED: CPT

## 2017-07-28 ENCOUNTER — OUTPATIENT (OUTPATIENT)
Dept: OUTPATIENT SERVICES | Facility: HOSPITAL | Age: 82
LOS: 1 days | End: 2017-07-28
Payer: COMMERCIAL

## 2017-07-28 DIAGNOSIS — R18.0 MALIGNANT ASCITES: ICD-10-CM

## 2017-07-28 DIAGNOSIS — C56.9 MALIGNANT NEOPLASM OF UNSPECIFIED OVARY: ICD-10-CM

## 2017-07-28 PROCEDURE — 49083 ABD PARACENTESIS W/IMAGING: CPT

## 2017-08-02 ENCOUNTER — OUTPATIENT (OUTPATIENT)
Dept: OUTPATIENT SERVICES | Facility: HOSPITAL | Age: 82
LOS: 1 days | End: 2017-08-02
Payer: COMMERCIAL

## 2017-08-02 DIAGNOSIS — T81.89XA OTHER COMPLICATIONS OF PROCEDURES, NOT ELSEWHERE CLASSIFIED, INITIAL ENCOUNTER: ICD-10-CM

## 2017-08-02 PROCEDURE — G0463: CPT
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
## 2017-08-03 DIAGNOSIS — I10 ESSENTIAL (PRIMARY) HYPERTENSION: ICD-10-CM

## 2017-08-03 DIAGNOSIS — Z85.43 PERSONAL HISTORY OF MALIGNANT NEOPLASM OF OVARY: ICD-10-CM

## 2017-08-03 DIAGNOSIS — Z98.41 CATARACT EXTRACTION STATUS, RIGHT EYE: ICD-10-CM

## 2017-08-03 DIAGNOSIS — Z90.49 ACQUIRED ABSENCE OF OTHER SPECIFIED PARTS OF DIGESTIVE TRACT: ICD-10-CM
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
## 2017-08-03 DIAGNOSIS — Z95.0 PRESENCE OF CARDIAC PACEMAKER: ICD-10-CM

## 2017-08-03 DIAGNOSIS — K42.9 UMBILICAL HERNIA WITHOUT OBSTRUCTION OR GANGRENE: ICD-10-CM

## 2017-08-03 DIAGNOSIS — E11.9 TYPE 2 DIABETES MELLITUS WITHOUT COMPLICATIONS: ICD-10-CM

## 2017-08-03 DIAGNOSIS — I83.893 VARICOSE VEINS OF BILATERAL LOWER EXTREMITIES WITH OTHER COMPLICATIONS: ICD-10-CM

## 2017-08-03 DIAGNOSIS — Z79.899 OTHER LONG TERM (CURRENT) DRUG THERAPY: ICD-10-CM
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
## 2017-08-03 DIAGNOSIS — E03.9 HYPOTHYROIDISM, UNSPECIFIED: ICD-10-CM

## 2017-08-03 DIAGNOSIS — Z98.42 CATARACT EXTRACTION STATUS, LEFT EYE: ICD-10-CM

## 2017-08-03 DIAGNOSIS — Z98.890 OTHER SPECIFIED POSTPROCEDURAL STATES: ICD-10-CM

## 2017-08-03 DIAGNOSIS — Z96.653 PRESENCE OF ARTIFICIAL KNEE JOINT, BILATERAL: ICD-10-CM

## 2017-08-03 DIAGNOSIS — S81.801D UNSPECIFIED OPEN WOUND, RIGHT LOWER LEG, SUBSEQUENT ENCOUNTER: ICD-10-CM

## 2017-08-03 DIAGNOSIS — Z93.3 COLOSTOMY STATUS: ICD-10-CM

## 2017-08-03 DIAGNOSIS — I48.91 UNSPECIFIED ATRIAL FIBRILLATION: ICD-10-CM

## 2017-08-03 DIAGNOSIS — Z90.710 ACQUIRED ABSENCE OF BOTH CERVIX AND UTERUS: ICD-10-CM

## 2017-08-03 DIAGNOSIS — I50.9 HEART FAILURE, UNSPECIFIED: ICD-10-CM

## 2017-08-03 DIAGNOSIS — E66.9 OBESITY, UNSPECIFIED: ICD-10-CM

## 2017-08-03 DIAGNOSIS — J45.909 UNSPECIFIED ASTHMA, UNCOMPLICATED: ICD-10-CM
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
## 2017-08-17 ENCOUNTER — OUTPATIENT (OUTPATIENT)
Dept: OUTPATIENT SERVICES | Facility: HOSPITAL | Age: 82
LOS: 1 days | End: 2017-08-17
Payer: COMMERCIAL

## 2017-08-17 DIAGNOSIS — T81.89XA OTHER COMPLICATIONS OF PROCEDURES, NOT ELSEWHERE CLASSIFIED, INITIAL ENCOUNTER: ICD-10-CM

## 2017-08-17 PROCEDURE — G0463: CPT
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
## 2017-08-21 DIAGNOSIS — K42.9 UMBILICAL HERNIA WITHOUT OBSTRUCTION OR GANGRENE: ICD-10-CM

## 2017-08-21 DIAGNOSIS — E66.9 OBESITY, UNSPECIFIED: ICD-10-CM

## 2017-08-21 DIAGNOSIS — I48.91 UNSPECIFIED ATRIAL FIBRILLATION: ICD-10-CM
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
## 2017-08-21 DIAGNOSIS — Z98.890 OTHER SPECIFIED POSTPROCEDURAL STATES: ICD-10-CM

## 2017-08-21 DIAGNOSIS — Z95.0 PRESENCE OF CARDIAC PACEMAKER: ICD-10-CM

## 2017-08-21 DIAGNOSIS — Z93.3 COLOSTOMY STATUS: ICD-10-CM

## 2017-08-21 DIAGNOSIS — S81.801D UNSPECIFIED OPEN WOUND, RIGHT LOWER LEG, SUBSEQUENT ENCOUNTER: ICD-10-CM

## 2017-08-21 DIAGNOSIS — Z98.41 CATARACT EXTRACTION STATUS, RIGHT EYE: ICD-10-CM

## 2017-08-21 DIAGNOSIS — Z79.899 OTHER LONG TERM (CURRENT) DRUG THERAPY: ICD-10-CM

## 2017-08-21 DIAGNOSIS — E03.9 HYPOTHYROIDISM, UNSPECIFIED: ICD-10-CM

## 2017-08-21 DIAGNOSIS — J45.909 UNSPECIFIED ASTHMA, UNCOMPLICATED: ICD-10-CM

## 2017-08-21 DIAGNOSIS — E11.9 TYPE 2 DIABETES MELLITUS WITHOUT COMPLICATIONS: ICD-10-CM

## 2017-08-21 DIAGNOSIS — Z85.43 PERSONAL HISTORY OF MALIGNANT NEOPLASM OF OVARY: ICD-10-CM

## 2017-08-21 DIAGNOSIS — Z90.710 ACQUIRED ABSENCE OF BOTH CERVIX AND UTERUS: ICD-10-CM

## 2017-08-21 DIAGNOSIS — I50.9 HEART FAILURE, UNSPECIFIED: ICD-10-CM

## 2017-08-21 DIAGNOSIS — Z96.653 PRESENCE OF ARTIFICIAL KNEE JOINT, BILATERAL: ICD-10-CM

## 2017-08-21 DIAGNOSIS — Z98.42 CATARACT EXTRACTION STATUS, LEFT EYE: ICD-10-CM

## 2017-08-21 DIAGNOSIS — I83.893 VARICOSE VEINS OF BILATERAL LOWER EXTREMITIES WITH OTHER COMPLICATIONS: ICD-10-CM
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
## 2017-08-21 DIAGNOSIS — I10 ESSENTIAL (PRIMARY) HYPERTENSION: ICD-10-CM

## 2017-08-21 DIAGNOSIS — Z90.49 ACQUIRED ABSENCE OF OTHER SPECIFIED PARTS OF DIGESTIVE TRACT: ICD-10-CM

## 2017-08-25 ENCOUNTER — OUTPATIENT (OUTPATIENT)
Dept: OUTPATIENT SERVICES | Facility: HOSPITAL | Age: 82
LOS: 1 days | End: 2017-08-25
Payer: COMMERCIAL

## 2017-08-25 DIAGNOSIS — T81.89XA OTHER COMPLICATIONS OF PROCEDURES, NOT ELSEWHERE CLASSIFIED, INITIAL ENCOUNTER: ICD-10-CM

## 2017-08-25 PROCEDURE — G0463: CPT

## 2017-08-26 DIAGNOSIS — Z96.653 PRESENCE OF ARTIFICIAL KNEE JOINT, BILATERAL: ICD-10-CM

## 2017-08-26 DIAGNOSIS — Z98.41 CATARACT EXTRACTION STATUS, RIGHT EYE: ICD-10-CM

## 2017-08-26 DIAGNOSIS — Z93.3 COLOSTOMY STATUS: ICD-10-CM

## 2017-08-26 DIAGNOSIS — J45.909 UNSPECIFIED ASTHMA, UNCOMPLICATED: ICD-10-CM

## 2017-08-26 DIAGNOSIS — Z79.899 OTHER LONG TERM (CURRENT) DRUG THERAPY: ICD-10-CM

## 2017-08-26 DIAGNOSIS — Z98.42 CATARACT EXTRACTION STATUS, LEFT EYE: ICD-10-CM

## 2017-08-26 DIAGNOSIS — E03.9 HYPOTHYROIDISM, UNSPECIFIED: ICD-10-CM

## 2017-08-26 DIAGNOSIS — Z95.0 PRESENCE OF CARDIAC PACEMAKER: ICD-10-CM

## 2017-08-26 DIAGNOSIS — Z90.49 ACQUIRED ABSENCE OF OTHER SPECIFIED PARTS OF DIGESTIVE TRACT: ICD-10-CM
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
## 2017-08-26 DIAGNOSIS — K42.9 UMBILICAL HERNIA WITHOUT OBSTRUCTION OR GANGRENE: ICD-10-CM

## 2017-08-26 DIAGNOSIS — Z98.890 OTHER SPECIFIED POSTPROCEDURAL STATES: ICD-10-CM

## 2017-08-26 DIAGNOSIS — E11.9 TYPE 2 DIABETES MELLITUS WITHOUT COMPLICATIONS: ICD-10-CM
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
## 2017-08-26 DIAGNOSIS — I10 ESSENTIAL (PRIMARY) HYPERTENSION: ICD-10-CM

## 2017-08-26 DIAGNOSIS — Z90.710 ACQUIRED ABSENCE OF BOTH CERVIX AND UTERUS: ICD-10-CM

## 2017-08-26 DIAGNOSIS — I50.9 HEART FAILURE, UNSPECIFIED: ICD-10-CM

## 2017-08-26 DIAGNOSIS — Z85.43 PERSONAL HISTORY OF MALIGNANT NEOPLASM OF OVARY: ICD-10-CM

## 2017-08-26 DIAGNOSIS — I83.893 VARICOSE VEINS OF BILATERAL LOWER EXTREMITIES WITH OTHER COMPLICATIONS: ICD-10-CM

## 2017-08-26 DIAGNOSIS — S81.801D UNSPECIFIED OPEN WOUND, RIGHT LOWER LEG, SUBSEQUENT ENCOUNTER: ICD-10-CM

## 2017-08-26 DIAGNOSIS — I48.91 UNSPECIFIED ATRIAL FIBRILLATION: ICD-10-CM

## 2017-08-26 DIAGNOSIS — E66.9 OBESITY, UNSPECIFIED: ICD-10-CM

## 2017-09-08 ENCOUNTER — OUTPATIENT (OUTPATIENT)
Dept: OUTPATIENT SERVICES | Facility: HOSPITAL | Age: 82
LOS: 1 days | End: 2017-09-08
Payer: COMMERCIAL

## 2017-09-08 DIAGNOSIS — T81.89XA OTHER COMPLICATIONS OF PROCEDURES, NOT ELSEWHERE CLASSIFIED, INITIAL ENCOUNTER: ICD-10-CM

## 2017-09-08 PROCEDURE — G0463: CPT

## 2017-09-09 DIAGNOSIS — I50.9 HEART FAILURE, UNSPECIFIED: ICD-10-CM

## 2017-09-09 DIAGNOSIS — Z79.899 OTHER LONG TERM (CURRENT) DRUG THERAPY: ICD-10-CM

## 2017-09-09 DIAGNOSIS — S81.801D UNSPECIFIED OPEN WOUND, RIGHT LOWER LEG, SUBSEQUENT ENCOUNTER: ICD-10-CM
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
## 2017-09-09 DIAGNOSIS — Z96.653 PRESENCE OF ARTIFICIAL KNEE JOINT, BILATERAL: ICD-10-CM

## 2017-09-09 DIAGNOSIS — Z90.710 ACQUIRED ABSENCE OF BOTH CERVIX AND UTERUS: ICD-10-CM

## 2017-09-09 DIAGNOSIS — I83.893 VARICOSE VEINS OF BILATERAL LOWER EXTREMITIES WITH OTHER COMPLICATIONS: ICD-10-CM
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
## 2017-09-09 DIAGNOSIS — Z90.49 ACQUIRED ABSENCE OF OTHER SPECIFIED PARTS OF DIGESTIVE TRACT: ICD-10-CM

## 2017-09-09 DIAGNOSIS — Z98.42 CATARACT EXTRACTION STATUS, LEFT EYE: ICD-10-CM

## 2017-09-09 DIAGNOSIS — E11.9 TYPE 2 DIABETES MELLITUS WITHOUT COMPLICATIONS: ICD-10-CM

## 2017-09-09 DIAGNOSIS — E03.9 HYPOTHYROIDISM, UNSPECIFIED: ICD-10-CM

## 2017-09-09 DIAGNOSIS — Z95.0 PRESENCE OF CARDIAC PACEMAKER: ICD-10-CM

## 2017-09-09 DIAGNOSIS — K42.9 UMBILICAL HERNIA WITHOUT OBSTRUCTION OR GANGRENE: ICD-10-CM

## 2017-09-09 DIAGNOSIS — Z98.890 OTHER SPECIFIED POSTPROCEDURAL STATES: ICD-10-CM

## 2017-09-09 DIAGNOSIS — J45.909 UNSPECIFIED ASTHMA, UNCOMPLICATED: ICD-10-CM
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
## 2017-09-09 DIAGNOSIS — I10 ESSENTIAL (PRIMARY) HYPERTENSION: ICD-10-CM

## 2017-09-09 DIAGNOSIS — E66.9 OBESITY, UNSPECIFIED: ICD-10-CM
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
## 2017-09-09 DIAGNOSIS — Z98.41 CATARACT EXTRACTION STATUS, RIGHT EYE: ICD-10-CM

## 2017-09-09 DIAGNOSIS — Z85.43 PERSONAL HISTORY OF MALIGNANT NEOPLASM OF OVARY: ICD-10-CM

## 2017-09-09 DIAGNOSIS — I48.91 UNSPECIFIED ATRIAL FIBRILLATION: ICD-10-CM

## 2017-09-09 DIAGNOSIS — Z93.3 COLOSTOMY STATUS: ICD-10-CM

## 2017-09-22 ENCOUNTER — OUTPATIENT (OUTPATIENT)
Dept: OUTPATIENT SERVICES | Facility: HOSPITAL | Age: 82
LOS: 1 days | End: 2017-09-22
Payer: COMMERCIAL

## 2017-09-22 DIAGNOSIS — T81.89XA OTHER COMPLICATIONS OF PROCEDURES, NOT ELSEWHERE CLASSIFIED, INITIAL ENCOUNTER: ICD-10-CM

## 2017-09-22 PROCEDURE — G0463: CPT

## 2017-09-25 DIAGNOSIS — E11.9 TYPE 2 DIABETES MELLITUS WITHOUT COMPLICATIONS: ICD-10-CM

## 2017-09-25 DIAGNOSIS — Z98.41 CATARACT EXTRACTION STATUS, RIGHT EYE: ICD-10-CM

## 2017-09-25 DIAGNOSIS — I10 ESSENTIAL (PRIMARY) HYPERTENSION: ICD-10-CM
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
## 2017-09-25 DIAGNOSIS — E66.9 OBESITY, UNSPECIFIED: ICD-10-CM

## 2017-09-25 DIAGNOSIS — Z93.3 COLOSTOMY STATUS: ICD-10-CM

## 2017-09-25 DIAGNOSIS — Z98.890 OTHER SPECIFIED POSTPROCEDURAL STATES: ICD-10-CM
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
## 2017-09-25 DIAGNOSIS — I50.9 HEART FAILURE, UNSPECIFIED: ICD-10-CM

## 2017-09-25 DIAGNOSIS — S81.801D UNSPECIFIED OPEN WOUND, RIGHT LOWER LEG, SUBSEQUENT ENCOUNTER: ICD-10-CM

## 2017-09-25 DIAGNOSIS — Z95.0 PRESENCE OF CARDIAC PACEMAKER: ICD-10-CM

## 2017-09-25 DIAGNOSIS — Z90.710 ACQUIRED ABSENCE OF BOTH CERVIX AND UTERUS: ICD-10-CM

## 2017-09-25 DIAGNOSIS — Z85.43 PERSONAL HISTORY OF MALIGNANT NEOPLASM OF OVARY: ICD-10-CM

## 2017-09-25 DIAGNOSIS — Z96.653 PRESENCE OF ARTIFICIAL KNEE JOINT, BILATERAL: ICD-10-CM
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
## 2017-09-25 DIAGNOSIS — Z98.42 CATARACT EXTRACTION STATUS, LEFT EYE: ICD-10-CM

## 2017-09-25 DIAGNOSIS — I83.893 VARICOSE VEINS OF BILATERAL LOWER EXTREMITIES WITH OTHER COMPLICATIONS: ICD-10-CM

## 2017-09-25 DIAGNOSIS — Z90.49 ACQUIRED ABSENCE OF OTHER SPECIFIED PARTS OF DIGESTIVE TRACT: ICD-10-CM

## 2017-09-25 DIAGNOSIS — Z79.899 OTHER LONG TERM (CURRENT) DRUG THERAPY: ICD-10-CM

## 2017-09-25 DIAGNOSIS — K42.9 UMBILICAL HERNIA WITHOUT OBSTRUCTION OR GANGRENE: ICD-10-CM

## 2017-09-25 DIAGNOSIS — E03.9 HYPOTHYROIDISM, UNSPECIFIED: ICD-10-CM

## 2017-09-25 DIAGNOSIS — I48.91 UNSPECIFIED ATRIAL FIBRILLATION: ICD-10-CM

## 2017-09-25 DIAGNOSIS — J45.909 UNSPECIFIED ASTHMA, UNCOMPLICATED: ICD-10-CM

## 2017-10-02 ENCOUNTER — OUTPATIENT (OUTPATIENT)
Dept: OUTPATIENT SERVICES | Facility: HOSPITAL | Age: 82
LOS: 1 days | End: 2017-10-02
Payer: COMMERCIAL

## 2017-10-02 DIAGNOSIS — R18.0 MALIGNANT ASCITES: ICD-10-CM

## 2017-10-02 DIAGNOSIS — C56.9 MALIGNANT NEOPLASM OF UNSPECIFIED OVARY: ICD-10-CM

## 2017-10-02 PROCEDURE — 85027 COMPLETE CBC AUTOMATED: CPT

## 2017-10-02 PROCEDURE — 85610 PROTHROMBIN TIME: CPT

## 2017-10-04 ENCOUNTER — OUTPATIENT (OUTPATIENT)
Dept: OUTPATIENT SERVICES | Facility: HOSPITAL | Age: 82
LOS: 1 days | End: 2017-10-04
Payer: COMMERCIAL

## 2017-10-04 DIAGNOSIS — C56.9 MALIGNANT NEOPLASM OF UNSPECIFIED OVARY: ICD-10-CM

## 2017-10-04 DIAGNOSIS — R18.0 MALIGNANT ASCITES: ICD-10-CM

## 2017-10-04 PROCEDURE — 49083 ABD PARACENTESIS W/IMAGING: CPT

## 2017-10-09 ENCOUNTER — TRANSCRIPTION ENCOUNTER (OUTPATIENT)
Age: 82
End: 2017-10-09

## 2017-10-09 ENCOUNTER — INPATIENT (INPATIENT)
Facility: HOSPITAL | Age: 82
LOS: 3 days | Discharge: HOME HEALTH | DRG: 354 | End: 2017-10-13
Attending: SPECIALIST | Admitting: SPECIALIST
Payer: COMMERCIAL

## 2017-10-09 ENCOUNTER — RESULT REVIEW (OUTPATIENT)
Age: 82
End: 2017-10-09

## 2017-10-09 ENCOUNTER — OUTPATIENT (OUTPATIENT)
Dept: OUTPATIENT SERVICES | Facility: HOSPITAL | Age: 82
LOS: 1 days | End: 2017-10-09
Payer: COMMERCIAL

## 2017-10-09 VITALS
SYSTOLIC BLOOD PRESSURE: 120 MMHG | RESPIRATION RATE: 14 BRPM | OXYGEN SATURATION: 97 % | HEART RATE: 78 BPM | TEMPERATURE: 98 F | DIASTOLIC BLOOD PRESSURE: 60 MMHG | WEIGHT: 205.03 LBS

## 2017-10-09 DIAGNOSIS — I48.91 UNSPECIFIED ATRIAL FIBRILLATION: ICD-10-CM

## 2017-10-09 DIAGNOSIS — C56.9 MALIGNANT NEOPLASM OF UNSPECIFIED OVARY: ICD-10-CM

## 2017-10-09 DIAGNOSIS — R10.9 UNSPECIFIED ABDOMINAL PAIN: ICD-10-CM

## 2017-10-09 DIAGNOSIS — E03.9 HYPOTHYROIDISM, UNSPECIFIED: ICD-10-CM

## 2017-10-09 DIAGNOSIS — I10 ESSENTIAL (PRIMARY) HYPERTENSION: ICD-10-CM

## 2017-10-09 DIAGNOSIS — K43.6 OTHER AND UNSPECIFIED VENTRAL HERNIA WITH OBSTRUCTION, WITHOUT GANGRENE: ICD-10-CM

## 2017-10-09 DIAGNOSIS — T81.89XA OTHER COMPLICATIONS OF PROCEDURES, NOT ELSEWHERE CLASSIFIED, INITIAL ENCOUNTER: ICD-10-CM

## 2017-10-09 LAB
ALBUMIN SERPL ELPH-MCNC: 3.1 G/DL — LOW (ref 3.3–5)
ALP SERPL-CCNC: 90 U/L — SIGNIFICANT CHANGE UP (ref 40–120)
ALT FLD-CCNC: 18 U/L — SIGNIFICANT CHANGE UP (ref 12–78)
ANION GAP SERPL CALC-SCNC: 6 MMOL/L — SIGNIFICANT CHANGE UP (ref 5–17)
APTT BLD: 32.9 SEC — SIGNIFICANT CHANGE UP (ref 27.5–37.4)
AST SERPL-CCNC: 19 U/L — SIGNIFICANT CHANGE UP (ref 15–37)
BASOPHILS # BLD AUTO: 0.1 K/UL — SIGNIFICANT CHANGE UP (ref 0–0.2)
BASOPHILS NFR BLD AUTO: 1.2 % — SIGNIFICANT CHANGE UP (ref 0–2)
BILIRUB SERPL-MCNC: 0.4 MG/DL — SIGNIFICANT CHANGE UP (ref 0.2–1.2)
BLD GP AB SCN SERPL QL: SIGNIFICANT CHANGE UP
BUN SERPL-MCNC: 26 MG/DL — HIGH (ref 7–23)
CALCIUM SERPL-MCNC: 8.7 MG/DL — SIGNIFICANT CHANGE UP (ref 8.5–10.1)
CHLORIDE SERPL-SCNC: 104 MMOL/L — SIGNIFICANT CHANGE UP (ref 96–108)
CO2 SERPL-SCNC: 29 MMOL/L — SIGNIFICANT CHANGE UP (ref 22–31)
CREAT SERPL-MCNC: 1.2 MG/DL — SIGNIFICANT CHANGE UP (ref 0.5–1.3)
EOSINOPHIL # BLD AUTO: 0.2 K/UL — SIGNIFICANT CHANGE UP (ref 0–0.5)
EOSINOPHIL NFR BLD AUTO: 2.2 % — SIGNIFICANT CHANGE UP (ref 0–6)
GLUCOSE SERPL-MCNC: 97 MG/DL — SIGNIFICANT CHANGE UP (ref 70–99)
HCT VFR BLD CALC: 40.2 % — SIGNIFICANT CHANGE UP (ref 34.5–45)
HGB BLD-MCNC: 12.1 G/DL — SIGNIFICANT CHANGE UP (ref 11.5–15.5)
INR BLD: 1.38 RATIO — HIGH (ref 0.88–1.16)
LACTATE SERPL-SCNC: 0.6 MMOL/L — LOW (ref 0.7–2)
LYMPHOCYTES # BLD AUTO: 1 K/UL — SIGNIFICANT CHANGE UP (ref 1–3.3)
LYMPHOCYTES # BLD AUTO: 13 % — SIGNIFICANT CHANGE UP (ref 13–44)
MCHC RBC-ENTMCNC: 25.7 PG — LOW (ref 27–34)
MCHC RBC-ENTMCNC: 30.2 GM/DL — LOW (ref 32–36)
MCV RBC AUTO: 85.2 FL — SIGNIFICANT CHANGE UP (ref 80–100)
MONOCYTES # BLD AUTO: 0.4 K/UL — SIGNIFICANT CHANGE UP (ref 0–0.9)
MONOCYTES NFR BLD AUTO: 5.4 % — SIGNIFICANT CHANGE UP (ref 1–9)
NEUTROPHILS # BLD AUTO: 5.9 K/UL — SIGNIFICANT CHANGE UP (ref 1.8–7.4)
NEUTROPHILS NFR BLD AUTO: 78.1 % — HIGH (ref 43–77)
PLATELET # BLD AUTO: 457 K/UL — HIGH (ref 150–400)
POTASSIUM SERPL-MCNC: 3.9 MMOL/L — SIGNIFICANT CHANGE UP (ref 3.5–5.3)
POTASSIUM SERPL-SCNC: 3.9 MMOL/L — SIGNIFICANT CHANGE UP (ref 3.5–5.3)
PROT SERPL-MCNC: 7.8 G/DL — SIGNIFICANT CHANGE UP (ref 6–8.3)
PROTHROM AB SERPL-ACNC: 15.1 SEC — HIGH (ref 9.8–12.7)
RBC # BLD: 4.72 M/UL — SIGNIFICANT CHANGE UP (ref 3.8–5.2)
RBC # FLD: 15.5 % — HIGH (ref 10.3–14.5)
SODIUM SERPL-SCNC: 139 MMOL/L — SIGNIFICANT CHANGE UP (ref 135–145)
WBC # BLD: 7.6 K/UL — SIGNIFICANT CHANGE UP (ref 3.8–10.5)
WBC # FLD AUTO: 7.6 K/UL — SIGNIFICANT CHANGE UP (ref 3.8–10.5)

## 2017-10-09 PROCEDURE — 49568: CPT | Mod: AS

## 2017-10-09 PROCEDURE — 93010 ELECTROCARDIOGRAM REPORT: CPT

## 2017-10-09 PROCEDURE — G0463: CPT

## 2017-10-09 PROCEDURE — 88302 TISSUE EXAM BY PATHOLOGIST: CPT | Mod: 26

## 2017-10-09 PROCEDURE — 49561: CPT | Mod: AS,50

## 2017-10-09 PROCEDURE — 74176 CT ABD & PELVIS W/O CONTRAST: CPT | Mod: 26

## 2017-10-09 PROCEDURE — 99285 EMERGENCY DEPT VISIT HI MDM: CPT

## 2017-10-09 RX ORDER — ONDANSETRON 8 MG/1
4 TABLET, FILM COATED ORAL EVERY 6 HOURS
Qty: 0 | Refills: 0 | Status: DISCONTINUED | OUTPATIENT
Start: 2017-10-09 | End: 2017-10-13

## 2017-10-09 RX ORDER — ONDANSETRON 8 MG/1
4 TABLET, FILM COATED ORAL ONCE
Qty: 0 | Refills: 0 | Status: COMPLETED | OUTPATIENT
Start: 2017-10-09 | End: 2017-10-09

## 2017-10-09 RX ORDER — SODIUM CHLORIDE 9 MG/ML
1000 INJECTION INTRAMUSCULAR; INTRAVENOUS; SUBCUTANEOUS
Qty: 0 | Refills: 0 | Status: DISCONTINUED | OUTPATIENT
Start: 2017-10-09 | End: 2017-10-09

## 2017-10-09 RX ORDER — MORPHINE SULFATE 50 MG/1
4 CAPSULE, EXTENDED RELEASE ORAL ONCE
Qty: 0 | Refills: 0 | Status: DISCONTINUED | OUTPATIENT
Start: 2017-10-09 | End: 2017-10-09

## 2017-10-09 RX ORDER — MORPHINE SULFATE 50 MG/1
2 CAPSULE, EXTENDED RELEASE ORAL
Qty: 0 | Refills: 0 | Status: DISCONTINUED | OUTPATIENT
Start: 2017-10-09 | End: 2017-10-09

## 2017-10-09 RX ORDER — SODIUM CHLORIDE 9 MG/ML
1000 INJECTION, SOLUTION INTRAVENOUS
Qty: 0 | Refills: 0 | Status: DISCONTINUED | OUTPATIENT
Start: 2017-10-09 | End: 2017-10-11

## 2017-10-09 RX ORDER — MORPHINE SULFATE 50 MG/1
2 CAPSULE, EXTENDED RELEASE ORAL ONCE
Qty: 0 | Refills: 0 | Status: DISCONTINUED | OUTPATIENT
Start: 2017-10-09 | End: 2017-10-09

## 2017-10-09 RX ORDER — ACETAMINOPHEN 500 MG
1000 TABLET ORAL ONCE
Qty: 0 | Refills: 0 | Status: DISCONTINUED | OUTPATIENT
Start: 2017-10-09 | End: 2017-10-09

## 2017-10-09 RX ORDER — MORPHINE SULFATE 50 MG/1
4 CAPSULE, EXTENDED RELEASE ORAL EVERY 4 HOURS
Qty: 0 | Refills: 0 | Status: DISCONTINUED | OUTPATIENT
Start: 2017-10-09 | End: 2017-10-10

## 2017-10-09 RX ORDER — METRONIDAZOLE 500 MG
500 TABLET ORAL ONCE
Qty: 0 | Refills: 0 | Status: DISCONTINUED | OUTPATIENT
Start: 2017-10-09 | End: 2017-10-09

## 2017-10-09 RX ORDER — SODIUM CHLORIDE 9 MG/ML
3 INJECTION INTRAMUSCULAR; INTRAVENOUS; SUBCUTANEOUS ONCE
Qty: 0 | Refills: 0 | Status: COMPLETED | OUTPATIENT
Start: 2017-10-09 | End: 2017-10-09

## 2017-10-09 RX ORDER — LEVOTHYROXINE SODIUM 125 MCG
100 TABLET ORAL DAILY
Qty: 0 | Refills: 0 | Status: CANCELLED | OUTPATIENT
Start: 2017-10-10 | End: 2017-10-09

## 2017-10-09 RX ORDER — CEFAZOLIN SODIUM 1 G
2000 VIAL (EA) INJECTION ONCE
Qty: 0 | Refills: 0 | Status: DISCONTINUED | OUTPATIENT
Start: 2017-10-09 | End: 2017-10-09

## 2017-10-09 RX ORDER — LEVOTHYROXINE SODIUM 125 MCG
100 TABLET ORAL DAILY
Qty: 0 | Refills: 0 | Status: DISCONTINUED | OUTPATIENT
Start: 2017-10-09 | End: 2017-10-11

## 2017-10-09 RX ORDER — MORPHINE SULFATE 50 MG/1
2 CAPSULE, EXTENDED RELEASE ORAL EVERY 4 HOURS
Qty: 0 | Refills: 0 | Status: DISCONTINUED | OUTPATIENT
Start: 2017-10-09 | End: 2017-10-10

## 2017-10-09 RX ORDER — PROTHROMBIN COMPLEX CONCENTRATE (HUMAN) 25.5; 16.5; 24; 22; 22; 26 [IU]/ML; [IU]/ML; [IU]/ML; [IU]/ML; [IU]/ML; [IU]/ML
1000 POWDER, FOR SOLUTION INTRAVENOUS ONCE
Qty: 0 | Refills: 0 | Status: COMPLETED | OUTPATIENT
Start: 2017-10-09 | End: 2017-10-09

## 2017-10-09 RX ORDER — SODIUM CHLORIDE 9 MG/ML
1000 INJECTION, SOLUTION INTRAVENOUS
Qty: 0 | Refills: 0 | Status: DISCONTINUED | OUTPATIENT
Start: 2017-10-09 | End: 2017-10-09

## 2017-10-09 RX ADMIN — MORPHINE SULFATE 2 MILLIGRAM(S): 50 CAPSULE, EXTENDED RELEASE ORAL at 15:04

## 2017-10-09 RX ADMIN — ONDANSETRON 4 MILLIGRAM(S): 8 TABLET, FILM COATED ORAL at 20:20

## 2017-10-09 RX ADMIN — MORPHINE SULFATE 4 MILLIGRAM(S): 50 CAPSULE, EXTENDED RELEASE ORAL at 14:15

## 2017-10-09 RX ADMIN — MORPHINE SULFATE 2 MILLIGRAM(S): 50 CAPSULE, EXTENDED RELEASE ORAL at 13:20

## 2017-10-09 RX ADMIN — ONDANSETRON 4 MILLIGRAM(S): 8 TABLET, FILM COATED ORAL at 13:20

## 2017-10-09 RX ADMIN — MORPHINE SULFATE 4 MILLIGRAM(S): 50 CAPSULE, EXTENDED RELEASE ORAL at 15:04

## 2017-10-09 RX ADMIN — PROTHROMBIN COMPLEX CONCENTRATE (HUMAN) 400 INTERNATIONAL UNIT(S): 25.5; 16.5; 24; 22; 22; 26 POWDER, FOR SOLUTION INTRAVENOUS at 15:34

## 2017-10-09 RX ADMIN — MORPHINE SULFATE 4 MILLIGRAM(S): 50 CAPSULE, EXTENDED RELEASE ORAL at 15:41

## 2017-10-09 RX ADMIN — ONDANSETRON 4 MILLIGRAM(S): 8 TABLET, FILM COATED ORAL at 15:41

## 2017-10-09 RX ADMIN — SODIUM CHLORIDE 125 MILLILITER(S): 9 INJECTION INTRAMUSCULAR; INTRAVENOUS; SUBCUTANEOUS at 13:20

## 2017-10-09 RX ADMIN — SODIUM CHLORIDE 100 MILLILITER(S): 9 INJECTION, SOLUTION INTRAVENOUS at 19:54

## 2017-10-09 RX ADMIN — SODIUM CHLORIDE 3 MILLILITER(S): 9 INJECTION INTRAMUSCULAR; INTRAVENOUS; SUBCUTANEOUS at 13:15

## 2017-10-09 NOTE — CONSULT NOTE ADULT - SUBJECTIVE AND OBJECTIVE BOX
Patient is a 89y old  Female who presents with a chief complaint of painful ventral hernia (09 Oct 2017 17:20)    24 hour events: ***  PAST MEDICAL & SURGICAL HISTORY:  Ascites, malignant: from advanced ovarian cancer  Colon cancer  Obese  Hypothyroidism  HTN (Hypertension)  Overactive Bladder  DM Type 2 (Diabetes Mellitus, Type 2)  Asthma  Artificial pacemaker  S/P colon resection: with sigmoid colostomy for obstructing ovarian cancer  S/P Cataract Surgery: CASI  History of Tonsillectomy  History of Appendectomy  Status Post Total Knee Replacement: Left      Review of Systems:  Constitutional: No fever, chills, fatigue  Neuro: No headache, numbness, weakness  Resp: No cough, wheezing, shortness of breath  CVS: No chest pain, palpitations, leg swelling  GI: No abdominal pain, nausea, vomiting, diarrhea   : No dysuria, frequency, incontinence  Skin: No itching, burning, rashes, or lesions   Msk: No joint pain or swelling  Psych: No depression, anxiety, mood swings    T(F): 97.7 (10-09-17 @ 20:15), Max: 98.9 (10-09-17 @ 15:42)  HR: 80 (10-09-17 @ 20:15) (78 - 81)  BP: 132/82 (10-09-17 @ 20:15) (109/69 - 141/81)  RR: 14 (10-09-17 @ 20:15) (13 - 16)  SpO2: 100% (10-09-17 @ 20:15) (97% - 100%)  Wt(kg): --        CAPILLARY BLOOD GLUCOSE          I&O's Summary      Physical Exam:     Gen:   Neuro: A&Ox3, non-focal  HEENT: NC/AT  Resp: CTA b/l  CVS: nl S1/S2, RRR  Abd: soft, nt, nd, +bs  Ext: no edema, +pulses  Skin: well perfused, warm    Meds:          acetaminophen  IVPB. IV Intermittent PRN  morphine  - Injectable IV Push PRN            lactated ringers. IV Continuous                                  12.1   7.6   )-----------( 457      ( 09 Oct 2017 13:32 )             40.2       10-09    139  |  104  |  26<H>  ----------------------------<  97  3.9   |  29  |  1.20    Ca    8.7      09 Oct 2017 13:32    TPro  7.8  /  Alb  3.1<L>  /  TBili  0.4  /  DBili  x   /  AST  19  /  ALT  18  /  AlkPhos  90  10-09    Lactate 0.6           10-09 @ 13:32          PT/INR - ( 09 Oct 2017 13:32 )   PT: 15.1 sec;   INR: 1.38 ratio         PTT - ( 09 Oct 2017 13:32 )  PTT:32.9 sec            Radiology: ***    Bedside lung ultrasound: ***    Bedside ECHO: ***    CENTRAL LINE: Y/N          DATE INSERTED:              REMOVE: Y/N    CULP: Y/N                        DATE INSERTED:              REMOVE: Y/N    A-LINE: Y/N                       DATE INSERTED:              REMOVE: Y/N    GLOBAL ISSUE/BEST PRACTICE:  Analgesia:  Sedation:  HOB elevation: yes  Stress ulcer prophylaxis:  VTE prophylaxis:  Glycemic control:  Nutrition:      CODE STATUS: ***  GOC discussion: Y  Critical care time spent (mins): ***  (Reviewing data, imaging, discussing with multidisciplinary team, non inclusive of procedures, discussing goals of care with patient/family) Patient is a 89y old  Female who presents with a chief complaint of painful ventral hernia (09 Oct 2017 17:20)    24 hour events: In Brief:  89F with PMHx of colon ca. ovarian ca, malignant ascitis, s/p colectomy with colostomy, morbid obesity, hypothyroidism, htn, DM, overactive bladder. asthma, s/p PM for A-fib (on xarelto), s/p cataract surgery, s/p tonsillectomy, s/p appy, s/p Left TKR, presented with acute abdominal pain, found to have incarcerated ventral hernia s/p emergent OR for repair of ventral hernia. EBL ~10cc, received ~ 800cc LR intra-op. Pt had ~2 liters of ascitis drained intra-op. Pt admitted to ICU for observation due to recent xarelto usage and risk for post op bleed    PAST MEDICAL & SURGICAL HISTORY:  Ascites, malignant: from advanced ovarian cancer  Colon cancer  Obese  Hypothyroidism  HTN (Hypertension)  Overactive Bladder  DM Type 2 (Diabetes Mellitus, Type 2)  Asthma  Artificial pacemaker  S/P colon resection: with sigmoid colostomy for obstructing ovarian cancer  S/P Cataract Surgery: CASI  History of Tonsillectomy  History of Appendectomy  Status Post Total Knee Replacement: Left      Review of Systems:  unable to obtain    T(F): 97.7 (10-09-17 @ 20:15), Max: 98.9 (10-09-17 @ 15:42)  HR: 80 (10-09-17 @ 20:15) (78 - 81)  BP: 132/82 (10-09-17 @ 20:15) (109/69 - 141/81)  RR: 14 (10-09-17 @ 20:15) (13 - 16)  SpO2: 100% (10-09-17 @ 20:15) (97% - 100%)  Wt(kg): --        CAPILLARY BLOOD GLUCOSE          I&O's Summary      Physical Exam:       Neuro: somnolent, easily arousable  HEENT: NC/AT  Resp: CTA b/l  CVS: nl S1/S2, RRR  Abd: Mild incisional tenderness, soft, nd, +bs  Ext: no edema, +pulses  Skin: well perfused, warm    Meds:    acetaminophen  IVPB. IV Intermittent PRN  morphine  - Injectable IV Push PRN  lactated ringers. IV Continuous                              12.1   7.6   )-----------( 457      ( 09 Oct 2017 13:32 )             40.2       10-09    139  |  104  |  26<H>  ----------------------------<  97  3.9   |  29  |  1.20    Ca    8.7      09 Oct 2017 13:32    TPro  7.8  /  Alb  3.1<L>  /  TBili  0.4  /  DBili  x   /  AST  19  /  ALT  18  /  AlkPhos  90  10-09    Lactate 0.6           10-09 @ 13:32          PT/INR - ( 09 Oct 2017 13:32 )   PT: 15.1 sec;   INR: 1.38 ratio         PTT - ( 09 Oct 2017 13:32 )  PTT:32.9 sec          CT A/P:  Impression:    Findings suggestive of incarcerated, possibly strangulated ventral  periumbilical hernia with thickened fluid-filled loops of small bowel.  This critical value was discussed with Dr. Mckinney at the time of  interpretation on 10/9/2017..      CENTRAL LINE: No    CULP: Yes    A-LINE: No    GLOBAL ISSUE/BEST PRACTICE:  Analgesia: Yes  Sedation: No  HOB elevation: yes  Stress ulcer prophylaxis: Yes  VTE prophylaxis: SCD  Glycemic control: Yes  Nutrition: NPO      CODE STATUS: Full  GOC discussion: Y  Critical care time spent (mins): 45  (Reviewing data, imaging, discussing with multidisciplinary team, non inclusive of procedures, discussing goals of care with patient/family)

## 2017-10-09 NOTE — ED PROVIDER NOTE - GASTROINTESTINAL, MLM
Abdomen soft, periumbilical-tender, with mass and distention to r of umbilical hernia firm to palp and warmth possible incarcerated hernia?

## 2017-10-09 NOTE — CONSULT NOTE ADULT - SUBJECTIVE AND OBJECTIVE BOX
Patient is a 89y old  Female who presents with a chief complaint of abdominal pain    HPI:  89 yo woman with history of ovarian cancer with met to colon s/p surgery and debulking in 2012 followed by Dr. Smalls. She initially had adjuvant chemotherapy (presume Carbo/Taxol) for 6 cycles and did well until about late 2013 when tumor markers started to rise. She was again on IV chemotherapy for 2 cycles which she did not tolerate due to toxicities and at that time her  passed away. She remained off treatment and in 2/2017 she was hospitalized for new ascites. She has presumably malignant ascites which she requires paracentesis for every 8 weeks.  She presented to the ER with 3 hour history of severe abdominal pain with associated nausea; Found on CT to have likely strangulated hernia and planned for OR later today. She is on Xarelto for cardiac arrhythmia and needs reversal.  Daughter and grand-daughter at bedside provide majority of history    ROS:  Negative except for: severe abdominal pain; nausea/vomiting/poor po intake    PAST MEDICAL & SURGICAL HISTORY:  Colon cancer  Obese  Hypothyroidism  HTN (Hypertension)  Overactive Bladder  DM Type 2 (Diabetes Mellitus, Type 2)  Asthma  Artificial pacemaker  S/P colon resection  S/P Cataract Surgery: CASI  History of Tonsillectomy  History of Appendectomy  Status Post Total Knee Replacement: Left      SOCIAL HISTORY:  ; lives at home; denies [employer] use  FAMILY HISTORY:  non-contirubutory    MEDICATIONS  (STANDING):    ·     magnesium gluconate 500 mg oral tablet:  orally once a day  · 	montelukast 10 mg oral tablet: 1 tab(s) orally once a day (in the evening)  · 	folic acid: 400 milligram(s) orally once a day  · 	Xarelto 15 mg oral tablet: 1 tab(s) orally once a day (in the evening)  · 	gabapentin 300 mg oral capsule: 1 cap(s) orally 3 times a day  · 	Lasix 20 mg oral tablet: 1 tab(s) orally 2 times a day  · 	levothyroxine: 225 microgram(s) orally once a day  · 	potassium chloride 20 mEq oral granule, extended release: 1 tab(s) orally once a day  · 	lisinopril 5 mg oral tablet: 1 tab(s) orally once a day  · 	Nephro-Tommy oral tablet: 1 tab(s) orally once a day  · 	Vitamin B6 100 mg oral tablet: 1 tab(s) orally once a day  · 	Vitamin D3 2000 intl units oral capsule: 1 cap(s) orally once a day  · 	PreserVision oral capsule: 1 cap(s) orally 2 times a day          · 	Fish Oil 1000 mg oral capsule: 1 cap(s) orally once a day    Allergies    No Known Allergies    Vital Signs Last 24 Hrs  T(C): 36.8 (09 Oct 2017 12:54), Max: 36.8 (09 Oct 2017 12:54)  T(F): 98.2 (09 Oct 2017 12:54), Max: 98.2 (09 Oct 2017 12:54)  HR: 78 (09 Oct 2017 12:54) (78 - 78)  BP: 120/60 (09 Oct 2017 12:54) (120/60 - 120/60)  RR: 14 (09 Oct 2017 12:54) (14 - 14)  SpO2: 97% (09 Oct 2017 12:54) (97% - 97%)    PHYSICAL EXAM  General: elderly woman in moderate distress  HEENT: clear oropharynx, anicteric sclera, pink conjunctivae  Neck: supple  CV: normal S1S2 with no murmur rubs or gallops  Lungs: clear to auscultation, no wheezes, no rhales  Abdomen: + colostomy in place; large 10cm hernia in right abdomen; tender to touch  Ext: no clubbing cyanosis or edema  Skin: no rashes and no petichiae  Neuro: alert and oriented X3 no focal deficits      LABS:    CBC Full  -  ( 09 Oct 2017 13:32 )  WBC Count : 7.6 K/uL  Hemoglobin : 12.1 g/dL  Hematocrit : 40.2 %  Platelet Count - Automated : 457 K/uL  Mean Cell Volume : 85.2 fl  Mean Cell Hemoglobin : 25.7 pg  Mean Cell Hemoglobin Concentration : 30.2 gm/dL  Auto Neutrophil # : 5.9 K/uL  Auto Lymphocyte # : 1.0 K/uL  Auto Monocyte # : 0.4 K/uL  Auto Eosinophil # : 0.2 K/uL  Auto Basophil # : 0.1 K/uL  Auto Neutrophil % : 78.1 %  Auto Lymphocyte % : 13.0 %  Auto Monocyte % : 5.4 %  Auto Eosinophil % : 2.2 %  Auto Basophil % : 1.2 %    10-09    139  |  104  |  26<H>  ----------------------------<  97  3.9   |  29  |  1.20    Ca    8.7      09 Oct 2017 13:32    TPro  7.8  /  Alb  3.1<L>  /  TBili  0.4  /  DBili  x   /  AST  19  /  ALT  18  /  AlkPhos  90  10-09    PT/INR - ( 09 Oct 2017 13:32 )   PT: 15.1 sec;   INR: 1.38 ratio    PTT - ( 09 Oct 2017 13:32 )  PTT:32.9 sec    RADIOLOGY :  < from: CT Abdomen and Pelvis No Cont (10.09.17 @ 13:50) >  EXAM:  CT ABDOMEN AND PELVIS                            PROCEDURE DATE:  10/09/2017          INTERPRETATION:  CT ABDOMEN AND PELVIS    CLINICAL INFORMATION:  Left-sided abdominal pain. History of colostomy   and ovarian cancer.    PROCEDURE:  Usingmultislice helical CT, 2.5 mm sections were obtained   from the domes of the diaphragm to the ischial tuberosities.  Multiplanar   MPR's were performed.    COMPARISON: CT scan 4/14/2017.    FINDINGS:      Again noted is a loculated pleural effusion left lung base.  There is small nodules noted right lung base, stable in appearance.    The evaluation of the solid organ parenchyma is limited without   intravenous contrast.     Cholelithiasis is noted.  The common bile duct is not dilated.    There is a moderate volume of abdominal pelvic ascites.    The evaluation of the stomach and gastrointestinal tract is limited   without oral contrast distention.  Again noted is left-sided colonic resection with Chavez's pouch and left   lower quadrant colostomy with parastomal hernia.  There are  dilated fluid-filled loops of small bowel. There is a ventral   periumbilical hernia containing dilated, likely thick-walled fluid-filled   loops of small bowel. The distal small bowel is relatively nondistended.   Findings may represent an incarcerated, strangulated hernia.  No localized intra-abdominal fluid collection or pneumoperitoneum is   noted.    No hydronephrosis is noted.    There is a stable appearance of the left retroperitoneal lymph nodes.    There is subcutaneous soft tissue anasarca.    There are multilevel degenerative changes of the spine.    Impression:    Findings suggestive of incarcerated, possibly strangulated ventral periumbilical hernia with thickened fluid-filled loops ofsmall bowel.  This critical value was discussed with Dr. Mckinney at the time of interpretation on 10/9/2017..    < end of copied text >

## 2017-10-09 NOTE — H&P ADULT - ASSESSMENT
Incarcerated ventral hernia with obstruction and severe pain 10/10 needs immediate surgery despite being on elaquis because of likely progression to gangrene if surgery delayed.  Nature of surgery and its high risk discussed with pt and daughter.

## 2017-10-09 NOTE — BRIEF OPERATIVE NOTE - OPERATION/FINDINGS
single loop of small bowel tightly incarcerated in small fascial defect to right of midline.  Bowel became perfused and viable after release.  About 2 liters of malignant ascites aspirated.

## 2017-10-09 NOTE — H&P ADULT - PSH
Artificial pacemaker    History of Appendectomy    History of Tonsillectomy    S/P Cataract Surgery  CASI  S/P colon resection  with sigmoid colostomy for obstructing ovarian cancer  Status Post Total Knee Replacement  Left

## 2017-10-09 NOTE — CONSULT NOTE ADULT - SUBJECTIVE AND OBJECTIVE BOX
89 female w metastatic ovarian CA w hx malignant ascites requiring drainage, colostomy adm w abd pain secondary to incarcerated, possibly strangulated ventral hernia.  Pt is on xarelto for afib, to receive PCC pre op.  pt complaining of abdominal pain  pt is for OR today      T(C): 36.8 (10-09-17 @ 12:54), Max: 36.8 (10-09-17 @ 12:54)  HR: 78 (10-09-17 @ 12:54) (78 - 78)  BP: 120/60 (10-09-17 @ 12:54) (120/60 - 120/60)  RR: 14 (10-09-17 @ 12:54) (14 - 14)  SpO2: 97% (10-09-17 @ 12:54) (97% - 97%)  Wt(kg): --  I&O's Summary      PAST MEDICAL & SURGICAL HISTORY:  Colon cancer  Obese  Hypothyroidism  HTN (Hypertension)  Overactive Bladder  DM Type 2 (Diabetes Mellitus, Type 2)  Asthma  Artificial pacemaker  S/P colon resection  S/P Cataract Surgery: CASI  History of Tonsillectomy  History of Appendectomy  Status Post Total Knee Replacement: Left      SOCIAL HISTORY  Alcohol: neg   Tobacco: neg  Illicit substance use: none    FAMILY HISTORY:  non contrib    LABS:                        12.1   7.6   )-----------( 457      ( 09 Oct 2017 13:32 )             40.2     10-09    139  |  104  |  26<H>  ----------------------------<  97  3.9   |  29  |  1.20    Ca    8.7      09 Oct 2017 13:32    TPro  7.8  /  Alb  3.1<L>  /  TBili  0.4  /  DBili  x   /  AST  19  /  ALT  18  /  AlkPhos  90  10-09    PT/INR - ( 09 Oct 2017 13:32 )   PT: 15.1 sec;   INR: 1.38 ratio         PTT - ( 09 Oct 2017 13:32 )  PTT:32.9 sec        MEDICATIONS  (STANDING):  morphine  - Injectable 4 milliGRAM(s) IV Push once  ondansetron Injectable 4 milliGRAM(s) IV Push once  sodium chloride 0.9%. 1000 milliLiter(s) (125 mL/Hr) IV Continuous <Continuous>    MEDICATIONS  (PRN):      REVIEW OF SYSTEMS:  CONSTITUTIONAL: No fever,  EYES: No eye pain, visual disturbances, or discharge  ENMT:  some difficulty hearing  NECK: No pain or stiffness  RESPIRATORY: No cough, wheezing, chills or hemoptysis; No shortness of breath  CARDIOVASCULAR: No chest pain, palpitations, dizziness, or leg swelling  GASTROINTESTINAL: pt had abd pain with associated nausea and vomiting  GENITOURINARY: No dysuria, frequency, hematuria, or incontinence  NEUROLOGICAL: No headaches, memory loss, loss of strength, numbness, or tremors  SKIN: No itching, burning, rashes, or lesions   LYMPH NODES: No enlarged glands  ENDOCRINE: No heat or cold intolerance; No hair loss  MUSCULOSKELETAL: No joint pain or swelling; No muscle, back, or extremity pain  PSYCHIATRIC: No depression, anxiety, mood swings, or difficulty sleeping  HEME/LYMPH: No easy bruising, or bleeding gums  ALLERY AND IMMUNOLOGIC: No hives or eczema    RADIOLOGY & ADDITIONAL TESTS:    Imaging Personally Reviewed:  [ ] YES  [ ] NO    Consultant(s) Notes Reviewed:  [x ] YES  [ ] NO    PHYSICAL EXAM:  GENERAL: mild distress from abd pain and nausea  HEAD:  Atraumatic, Normocephalic  EYES: EOMI, PERRLA, conjunctiva and sclera clear  ENMT: No tonsillar erythema, exudates, or enlargement; Moist mucous membranes, Good dentition, No lesions  NECK: Supple, No JVD, Normal thyroid  NERVOUS SYSTEM:  Alert & Oriented X3, Good concentration; Motor Strength 5/5 B/L upper and lower extremities; DTRs 2+ intact and symmetric  CHEST/LUNG: Clear to percussion bilaterally; No rales, rhonchi, wheezing, or rubs  HEART: Regular rate and rhythm; No murmurs, rubs, or gallops  ABDOMEN: hernia with assoc tenderness  EXTREMITIES:  2+ Peripheral Pulses, No clubbing, cyanosis, or edema  LYMPH: No lymphadenopathy noted  SKIN: No rashes or lesions    Care Discussed with Consultants/Other Providers [x ] YES  [ ] NO

## 2017-10-09 NOTE — H&P ADULT - NSHPPHYSICALEXAM_GEN_ALL_CORE
large tender incarcerated ventral hernia just right of midline near umbilicus containing small bowel on CT

## 2017-10-09 NOTE — CONSULT NOTE ADULT - SUBJECTIVE AND OBJECTIVE BOX
[facility] [place]     Patient is a 89y old  Female who presents with a chief complaint of painful ventral hernia (09 Oct 2017 17:20)      HPI:  Onset 12:30 Pm today of pain at central abdominal bulge, with nausea. (09 Oct 2017 17:20)      HPI:    89y Female for Cardiology Consult    PAST MEDICAL & SURGICAL HISTORY:  Ascites, malignant: from advanced ovarian cancer  Colon cancer  Obese  Hypothyroidism  HTN (Hypertension)  Overactive Bladder  DM Type 2 (Diabetes Mellitus, Type 2)  Asthma  Artificial pacemaker  S/P colon resection: with sigmoid colostomy for obstructing ovarian cancer  S/P Cataract Surgery: CASI  History of Tonsillectomy  History of Appendectomy  Status Post Total Knee Replacement: Left      FAMILY HISTORY:      SOCIAL HISTORY:   Alcohol:  Smoking:    Allergies    No Known Allergies    Intolerances        MEDICATIONS  (STANDING):  lactated ringers. 1000 milliLiter(s) (100 mL/Hr) IV Continuous <Continuous>  levothyroxine Injectable 100 MICROGram(s) IV Push daily    MEDICATIONS  (PRN):  morphine  - Injectable 2 milliGRAM(s) IV Push every 4 hours PRN Moderate Pain (4 - 6)  morphine  - Injectable 4 milliGRAM(s) IV Push every 4 hours PRN Severe Pain (7 - 10)  ondansetron Injectable 4 milliGRAM(s) IV Push every 6 hours PRN Nausea and/or Vomiting      REVIEW OF SYSTEMS:  CONSTITUTIONAL: No fever, weight loss, chills, shakes, or fat  RESPIRATORY: No cough, wheezing, hemoptysis, or shortness of breath  CARDIOVASCULAR: No chest pain, dyspnea, palpitations, dizziness, syncope, paroxysmal nocturnal dyspnea, orthopnea, or arm or leg swelling  GASTROINTESTINAL: No abdominal  or epigastric pain, nausea, vomiting, hematemesis, diarrhea, constipation, melena or bright red bloo  NEUROLOGICAL: No headaches, memory loss, slurred speech, limb weakness, loss of strength, numbness, or tremors  SKIN: No itching, burning, rashes, or lesions  ENDOCRINE: No heat or cold intolerance, or hair loss  MUSCULOSKELETAL: No joint pain or swelling, muscle, back, or extremity pain  HEME/LYMPH: No easy bruising or bleeding gums  ALLERY AND IMMUNOLOGIC: No hives or rash.    Vital Signs Last 24 Hrs  T(C): 36.5 (09 Oct 2017 22:15), Max: 37.2 (09 Oct 2017 15:42)  T(F): 97.7 (09 Oct 2017 22:15), Max: 98.9 (09 Oct 2017 15:42)  HR: 80 (09 Oct 2017 22:15) (78 - 81)  BP: 125/72 (09 Oct 2017 22:15) (109/69 - 141/81)  BP(mean): --  RR: 14 (09 Oct 2017 22:15) (13 - 16)  SpO2: 100% (09 Oct 2017 22:15) (97% - 100%)    PHYSICAL EXAM:  HEAD:  Atraumatic, Normocephalic  EYES: EOMI, PERRLA, conjunctiva and sclera clear  NECK: Supple and normal thyroid.  No JVD or carotid bruit.   HEART: S1, S2 regular , 1/6 soft ejection systolic murmur in mitral area , no thrill and no gallops .  PULMONARY: Bilateral vesicular breathing , few scattered ronchi and few scattered rales are present .  ABDOMEN: Soft nontender and positive bowl sounds   EXTREMITIES:  No clubbing, cyanosis, or pedal  edema  NEUROLOGICAL: AAOX3 , no focal deficit .    LABS:                        12.1   7.6   )-----------( 457      ( 09 Oct 2017 13:32 )             40.2     10-09    139  |  104  |  26<H>  ----------------------------<  97  3.9   |  29  |  1.20    Ca    8.7      09 Oct 2017 13:32    TPro  7.8  /  Alb  3.1<L>  /  TBili  0.4  /  DBili  x   /  AST  19  /  ALT  18  /  AlkPhos  90  10-09        PT/INR - ( 09 Oct 2017 13:32 )   PT: 15.1 sec;   INR: 1.38 ratio         PTT - ( 09 Oct 2017 13:32 )  PTT:32.9 sec    BNP      EKG:  ECHO:  IMAGING:    Assessment and Plan :     Will continue to follow during hospital course and give further recommendations as needed . Thanks for your referral . if any questions please contact me at office ([phone] [phone]) [facility] [place]     Patient is a 89y old  Female who presents with a chief complaint of painful ventral hernia (09 Oct 2017 17:20)      HPI:  Onset 12:30 Pm today of pain at central abdominal bulge, with nausea. (09 Oct 2017 17:20)      HPI:    89y Female for Cardiology Consult    PAST MEDICAL & SURGICAL HISTORY:  Ascites, malignant: from advanced ovarian cancer  Colon cancer  Obese  Hypothyroidism  HTN (Hypertension)  Overactive Bladder  DM Type 2 (Diabetes Mellitus, Type 2)  Asthma  Artificial pacemaker  S/P colon resection: with sigmoid colostomy for obstructing ovarian cancer  S/P Cataract Surgery: CASI  History of Tonsillectomy  History of Appendectomy  Status Post Total Knee Replacement: Left      FAMILY HISTORY:      SOCIAL HISTORY:   Alcohol:  Smoking:    Allergies    No Known Allergies    Intolerances        MEDICATIONS  (STANDING):  lactated ringers. 1000 milliLiter(s) (100 mL/Hr) IV Continuous <Continuous>  levothyroxine Injectable 100 MICROGram(s) IV Push daily    MEDICATIONS  (PRN):  morphine  - Injectable 2 milliGRAM(s) IV Push every 4 hours PRN Moderate Pain (4 - 6)  morphine  - Injectable 4 milliGRAM(s) IV Push every 4 hours PRN Severe Pain (7 - 10)  ondansetron Injectable 4 milliGRAM(s) IV Push every 6 hours PRN Nausea and/or Vomiting      REVIEW OF SYSTEMS:  CONSTITUTIONAL: No fever, weight loss, chills, shakes, or fat  RESPIRATORY: No cough, wheezing, hemoptysis, or shortness of breath  CARDIOVASCULAR: No chest pain, dyspnea, palpitations, dizziness, syncope, paroxysmal nocturnal dyspnea, orthopnea, or arm or leg swelling  GASTROINTESTINAL: No abdominal  or epigastric pain, nausea, vomiting, hematemesis, diarrhea, constipation, melena or bright red bloo  NEUROLOGICAL: No headaches, memory loss, slurred speech, limb weakness, loss of strength, numbness, or tremors  SKIN: No itching, burning, rashes, or lesions  ENDOCRINE: No heat or cold intolerance, or hair loss  MUSCULOSKELETAL: No joint pain or swelling, muscle, back, or extremity pain  HEME/LYMPH: No easy bruising or bleeding gums  ALLERY AND IMMUNOLOGIC: No hives or rash.    Vital Signs Last 24 Hrs  T(C): 36.5 (09 Oct 2017 22:15), Max: 37.2 (09 Oct 2017 15:42)  T(F): 97.7 (09 Oct 2017 22:15), Max: 98.9 (09 Oct 2017 15:42)  HR: 80 (09 Oct 2017 22:15) (78 - 81)  BP: 125/72 (09 Oct 2017 22:15) (109/69 - 141/81)  BP(mean): --  RR: 14 (09 Oct 2017 22:15) (13 - 16)  SpO2: 100% (09 Oct 2017 22:15) (97% - 100%)    PHYSICAL EXAM:  HEAD:  Atraumatic, Normocephalic  EYES: EOMI, PERRLA, conjunctiva and sclera clear  NECK: Supple and normal thyroid.  No JVD or carotid bruit.   HEART: S1, S2 regular , 1/6 soft ejection systolic murmur in mitral area , no thrill and no gallops .  PULMONARY: Bilateral vesicular breathing , few scattered ronchi and few scattered rales are present .  ABDOMEN: Soft nontender and positive bowl sounds   EXTREMITIES:  No clubbing, cyanosis, or pedal  edema  NEUROLOGICAL: AAOX3 , no focal deficit .    LABS:                        12.1   7.6   )-----------( 457      ( 09 Oct 2017 13:32 )             40.2     10-09    139  |  104  |  26<H>  ----------------------------<  97  3.9   |  29  |  1.20    Ca    8.7      09 Oct 2017 13:32    TPro  7.8  /  Alb  3.1<L>  /  TBili  0.4  /  DBili  x   /  AST  19  /  ALT  18  /  AlkPhos  90  10-09        PT/INR - ( 09 Oct 2017 13:32 )   PT: 15.1 sec;   INR: 1.38 ratio         PTT - ( 09 Oct 2017 13:32 )  PTT:32.9 sec    BNP      EKG:  ECHO:  IMAGING:    Assessment and Plan :   FULL CONSULT DICTATED .  patient has H/O CHF , PPM . CAD and now has abdominal surgery . Patient post operatively stable so far .  Will continue to follow during hospital course and give further recommendations as needed . Thanks for your referral . if any questions please contact me at office ([phone])cell [phone])

## 2017-10-09 NOTE — ED ADULT NURSE NOTE - OBJECTIVE STATEMENT
pt with abdominal pain radiating to back with nausea and vomiting; right abdominal hernia and LLQ abdominal colostomy, recent paracentesis. will continue to monitor.

## 2017-10-09 NOTE — H&P ADULT - PMH
Ascites, malignant  from advanced ovarian cancer  Asthma    Colon cancer    DM Type 2 (Diabetes Mellitus, Type 2)    HTN (Hypertension)    Hypothyroidism    Obese    Overactive Bladder

## 2017-10-09 NOTE — ED PROVIDER NOTE - CONSTITUTIONAL, MLM
normal... Well appearing, well nourished, awake, alert, oriented to person, place, time/situation and in uncomfortable holding abd

## 2017-10-09 NOTE — BRIEF OPERATIVE NOTE - PROCEDURE
<<-----Click on this checkbox to enter Procedure Ventral hernia repair  10/09/2017    Active  VALARIE

## 2017-10-09 NOTE — ED PROVIDER NOTE - OBJECTIVE STATEMENT
sudden on set severe abd pain 1 hour ago. pt has hx of colostomy, ovarian cancer with abd ascites sp paracentesis last week with 6 liters of fluid removed by dr lindo with constant leak a r leg wound, pmd dr jose at [facility] [place]  not a smoker no allergies she is nauseated and in a lot of pain  similar to previous abd incarcerated hernia.

## 2017-10-09 NOTE — CONSULT NOTE ADULT - ASSESSMENT
89 yo woman with metastatic ovarian cancer followed by Dr. Iesha Smalls and off therapy at this point due to age, comorbidities, and poor tolerance to chemotherapy; presents now with strangulated abdominal hernia and is on Xarelto which was started in 2014 after pacemaker placement and cardiac arrythmia  - patient needs to go to OR for emergent surgery given strangulated hernia  - advised patient's family about concern for intra-abdominal malignancy as possible cause for symptoms as her hernia is rather large  - will give off label KCentra (Protein Complex Concentrate) 1000 units for hemostasis; patient, daughter and grand-daughter understand that this is off label use of KCentra and may cause pro-coagulant state; discussed with blood bank director and pharmacy  - continue to hold Xarelto until post op cleared by surgery  - call placed to Dr. Smalls as Mrs. Hartley is her outpatient; Heme/Onc care to be resumed by her in a.m.
89F with PMHx as above, s/p ventral hernia repair due to incarcerated Hernia POD 0. Pt admitted to ICU for observation due to risk of bleed    -Pain management  -HD's stable. Monitor  -Continue NC as needed. Chest PT/IS  -NPO. PPI  -NGT to suction  -Surgical management per Dr. Mckinney  -Monitor UOP/Lytes. Continue IVF's  -SCD's for DVT ppx  -Hold AC until cleared by Dr. Mckinney  -Supportive care
incarcerated hernia
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
## 2017-10-10 DIAGNOSIS — Z79.899 OTHER LONG TERM (CURRENT) DRUG THERAPY: ICD-10-CM

## 2017-10-10 DIAGNOSIS — Z98.41 CATARACT EXTRACTION STATUS, RIGHT EYE: ICD-10-CM

## 2017-10-10 DIAGNOSIS — Z98.42 CATARACT EXTRACTION STATUS, LEFT EYE: ICD-10-CM

## 2017-10-10 DIAGNOSIS — J45.909 UNSPECIFIED ASTHMA, UNCOMPLICATED: ICD-10-CM

## 2017-10-10 DIAGNOSIS — Z85.43 PERSONAL HISTORY OF MALIGNANT NEOPLASM OF OVARY: ICD-10-CM

## 2017-10-10 DIAGNOSIS — Z95.0 PRESENCE OF CARDIAC PACEMAKER: ICD-10-CM

## 2017-10-10 DIAGNOSIS — E66.01 MORBID (SEVERE) OBESITY DUE TO EXCESS CALORIES: ICD-10-CM

## 2017-10-10 DIAGNOSIS — Z93.3 COLOSTOMY STATUS: ICD-10-CM

## 2017-10-10 DIAGNOSIS — Z96.653 PRESENCE OF ARTIFICIAL KNEE JOINT, BILATERAL: ICD-10-CM

## 2017-10-10 DIAGNOSIS — K42.9 UMBILICAL HERNIA WITHOUT OBSTRUCTION OR GANGRENE: ICD-10-CM

## 2017-10-10 DIAGNOSIS — Z90.710 ACQUIRED ABSENCE OF BOTH CERVIX AND UTERUS: ICD-10-CM

## 2017-10-10 DIAGNOSIS — S81.801D UNSPECIFIED OPEN WOUND, RIGHT LOWER LEG, SUBSEQUENT ENCOUNTER: ICD-10-CM

## 2017-10-10 DIAGNOSIS — I83.893 VARICOSE VEINS OF BILATERAL LOWER EXTREMITIES WITH OTHER COMPLICATIONS: ICD-10-CM
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
## 2017-10-10 DIAGNOSIS — Z98.890 OTHER SPECIFIED POSTPROCEDURAL STATES: ICD-10-CM

## 2017-10-10 DIAGNOSIS — Z90.49 ACQUIRED ABSENCE OF OTHER SPECIFIED PARTS OF DIGESTIVE TRACT: ICD-10-CM

## 2017-10-10 DIAGNOSIS — I10 ESSENTIAL (PRIMARY) HYPERTENSION: ICD-10-CM

## 2017-10-10 DIAGNOSIS — I48.91 UNSPECIFIED ATRIAL FIBRILLATION: ICD-10-CM

## 2017-10-10 DIAGNOSIS — E03.9 HYPOTHYROIDISM, UNSPECIFIED: ICD-10-CM

## 2017-10-10 DIAGNOSIS — I50.9 HEART FAILURE, UNSPECIFIED: ICD-10-CM
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
## 2017-10-10 DIAGNOSIS — E11.9 TYPE 2 DIABETES MELLITUS WITHOUT COMPLICATIONS: ICD-10-CM

## 2017-10-10 LAB
ALBUMIN SERPL ELPH-MCNC: 2.5 G/DL — LOW (ref 3.3–5)
ALP SERPL-CCNC: 74 U/L — SIGNIFICANT CHANGE UP (ref 40–120)
ALT FLD-CCNC: 13 U/L — SIGNIFICANT CHANGE UP (ref 12–78)
ANION GAP SERPL CALC-SCNC: 4 MMOL/L — LOW (ref 5–17)
APTT BLD: 28.2 SEC — SIGNIFICANT CHANGE UP (ref 27.5–37.4)
AST SERPL-CCNC: 15 U/L — SIGNIFICANT CHANGE UP (ref 15–37)
BASOPHILS # BLD AUTO: 0.1 K/UL — SIGNIFICANT CHANGE UP (ref 0–0.2)
BASOPHILS NFR BLD AUTO: 1.1 % — SIGNIFICANT CHANGE UP (ref 0–2)
BILIRUB SERPL-MCNC: 0.6 MG/DL — SIGNIFICANT CHANGE UP (ref 0.2–1.2)
BUN SERPL-MCNC: 22 MG/DL — SIGNIFICANT CHANGE UP (ref 7–23)
CALCIUM SERPL-MCNC: 7.8 MG/DL — LOW (ref 8.5–10.1)
CHLORIDE SERPL-SCNC: 105 MMOL/L — SIGNIFICANT CHANGE UP (ref 96–108)
CO2 SERPL-SCNC: 31 MMOL/L — SIGNIFICANT CHANGE UP (ref 22–31)
CREAT SERPL-MCNC: 1.1 MG/DL — SIGNIFICANT CHANGE UP (ref 0.5–1.3)
EOSINOPHIL # BLD AUTO: 0 K/UL — SIGNIFICANT CHANGE UP (ref 0–0.5)
EOSINOPHIL NFR BLD AUTO: 0.2 % — SIGNIFICANT CHANGE UP (ref 0–6)
GLUCOSE SERPL-MCNC: 104 MG/DL — HIGH (ref 70–99)
GRAM STN FLD: SIGNIFICANT CHANGE UP
HCT VFR BLD CALC: 41.6 % — SIGNIFICANT CHANGE UP (ref 34.5–45)
HGB BLD-MCNC: 12.7 G/DL — SIGNIFICANT CHANGE UP (ref 11.5–15.5)
INR BLD: 1.27 RATIO — HIGH (ref 0.88–1.16)
LYMPHOCYTES # BLD AUTO: 0.7 K/UL — LOW (ref 1–3.3)
LYMPHOCYTES # BLD AUTO: 5.3 % — LOW (ref 13–44)
MAGNESIUM SERPL-MCNC: 2.3 MG/DL — SIGNIFICANT CHANGE UP (ref 1.6–2.6)
MCHC RBC-ENTMCNC: 26.2 PG — LOW (ref 27–34)
MCHC RBC-ENTMCNC: 30.6 GM/DL — LOW (ref 32–36)
MCV RBC AUTO: 85.4 FL — SIGNIFICANT CHANGE UP (ref 80–100)
MONOCYTES # BLD AUTO: 0.8 K/UL — SIGNIFICANT CHANGE UP (ref 0–0.9)
MONOCYTES NFR BLD AUTO: 6.3 % — SIGNIFICANT CHANGE UP (ref 1–9)
NEUTROPHILS # BLD AUTO: 10.7 K/UL — HIGH (ref 1.8–7.4)
NEUTROPHILS NFR BLD AUTO: 87 % — HIGH (ref 43–77)
PHOSPHATE SERPL-MCNC: 3.6 MG/DL — SIGNIFICANT CHANGE UP (ref 2.5–4.5)
PLATELET # BLD AUTO: 387 K/UL — SIGNIFICANT CHANGE UP (ref 150–400)
POTASSIUM SERPL-MCNC: 4.5 MMOL/L — SIGNIFICANT CHANGE UP (ref 3.5–5.3)
POTASSIUM SERPL-SCNC: 4.5 MMOL/L — SIGNIFICANT CHANGE UP (ref 3.5–5.3)
PROT SERPL-MCNC: 6.7 G/DL — SIGNIFICANT CHANGE UP (ref 6–8.3)
PROTHROM AB SERPL-ACNC: 13.9 SEC — HIGH (ref 9.8–12.7)
RBC # BLD: 4.87 M/UL — SIGNIFICANT CHANGE UP (ref 3.8–5.2)
RBC # FLD: 15.3 % — HIGH (ref 10.3–14.5)
SODIUM SERPL-SCNC: 140 MMOL/L — SIGNIFICANT CHANGE UP (ref 135–145)
SPECIMEN SOURCE: SIGNIFICANT CHANGE UP
WBC # BLD: 12.3 K/UL — HIGH (ref 3.8–10.5)
WBC # FLD AUTO: 12.3 K/UL — HIGH (ref 3.8–10.5)

## 2017-10-10 PROCEDURE — 99233 SBSQ HOSP IP/OBS HIGH 50: CPT

## 2017-10-10 RX ORDER — HYDROMORPHONE HYDROCHLORIDE 2 MG/ML
1 INJECTION INTRAMUSCULAR; INTRAVENOUS; SUBCUTANEOUS EVERY 6 HOURS
Qty: 0 | Refills: 0 | Status: DISCONTINUED | OUTPATIENT
Start: 2017-10-10 | End: 2017-10-12

## 2017-10-10 RX ORDER — MORPHINE SULFATE 50 MG/1
2 CAPSULE, EXTENDED RELEASE ORAL EVERY 4 HOURS
Qty: 0 | Refills: 0 | Status: DISCONTINUED | OUTPATIENT
Start: 2017-10-10 | End: 2017-10-12

## 2017-10-10 RX ORDER — SODIUM CHLORIDE 9 MG/ML
500 INJECTION, SOLUTION INTRAVENOUS ONCE
Qty: 0 | Refills: 0 | Status: COMPLETED | OUTPATIENT
Start: 2017-10-10 | End: 2017-10-10

## 2017-10-10 RX ORDER — MORPHINE SULFATE 50 MG/1
4 CAPSULE, EXTENDED RELEASE ORAL EVERY 4 HOURS
Qty: 0 | Refills: 0 | Status: DISCONTINUED | OUTPATIENT
Start: 2017-10-10 | End: 2017-10-12

## 2017-10-10 RX ADMIN — ONDANSETRON 4 MILLIGRAM(S): 8 TABLET, FILM COATED ORAL at 12:04

## 2017-10-10 RX ADMIN — MORPHINE SULFATE 4 MILLIGRAM(S): 50 CAPSULE, EXTENDED RELEASE ORAL at 04:23

## 2017-10-10 RX ADMIN — HYDROMORPHONE HYDROCHLORIDE 1 MILLIGRAM(S): 2 INJECTION INTRAMUSCULAR; INTRAVENOUS; SUBCUTANEOUS at 11:14

## 2017-10-10 RX ADMIN — SODIUM CHLORIDE 500 MILLILITER(S): 9 INJECTION, SOLUTION INTRAVENOUS at 14:00

## 2017-10-10 RX ADMIN — MORPHINE SULFATE 4 MILLIGRAM(S): 50 CAPSULE, EXTENDED RELEASE ORAL at 04:53

## 2017-10-10 RX ADMIN — HYDROMORPHONE HYDROCHLORIDE 1 MILLIGRAM(S): 2 INJECTION INTRAMUSCULAR; INTRAVENOUS; SUBCUTANEOUS at 11:00

## 2017-10-10 RX ADMIN — HYDROMORPHONE HYDROCHLORIDE 1 MILLIGRAM(S): 2 INJECTION INTRAMUSCULAR; INTRAVENOUS; SUBCUTANEOUS at 20:28

## 2017-10-10 RX ADMIN — SODIUM CHLORIDE 500 MILLILITER(S): 9 INJECTION, SOLUTION INTRAVENOUS at 05:44

## 2017-10-10 RX ADMIN — SODIUM CHLORIDE 100 MILLILITER(S): 9 INJECTION, SOLUTION INTRAVENOUS at 11:00

## 2017-10-10 RX ADMIN — HYDROMORPHONE HYDROCHLORIDE 1 MILLIGRAM(S): 2 INJECTION INTRAMUSCULAR; INTRAVENOUS; SUBCUTANEOUS at 21:00

## 2017-10-10 RX ADMIN — Medication 100 MICROGRAM(S): at 05:24

## 2017-10-10 NOTE — DIETITIAN INITIAL EVALUATION ADULT. - OTHER INFO
Pt sleeping soundly at time of visit. S/p ventral hernia repair POD#1. NGT to LWS. BS absent. Old colostomy- no stool output thus far. Awaiting return of bowel function. Hx ovarian, colon Ca. +ascites. Noted 2L malignant ascites drained during sx

## 2017-10-10 NOTE — PROVIDER CONTACT NOTE (CHANGE IN STATUS NOTIFICATION) - ASSESSMENT
Patient is a 89yo female with permanent colostomy. I was asked to consult on meclhor stomal skin which was noted to be denuded: New appliance in place not due to be changed skin around the appliance intact will follow up 10/11/17 with patient

## 2017-10-10 NOTE — PROGRESS NOTE ADULT - SUBJECTIVE AND OBJECTIVE BOX
The patient was interviewed and evaluated  89y Female    Vital Signs Last 24 Hrs  T(C): 36.6 (10 Oct 2017 07:58), Max: 37.2 (09 Oct 2017 15:42)  T(F): 97.8 (10 Oct 2017 07:58), Max: 98.9 (09 Oct 2017 15:42)  HR: 80 (10 Oct 2017 08:00) (78 - 81)  BP: 105/63 (10 Oct 2017 08:00) (105/59 - 141/81)  BP(mean): 79 (10 Oct 2017 08:00) (77 - 95)  RR: 18 (10 Oct 2017 08:00) (12 - 24)  SpO2: 100% (10 Oct 2017 08:00) (97% - 100%)    Pt seen, doing well, no anesthesia complications or complaints noted or reported.   No Nausea    No additional recommendations.     Pain well controlled

## 2017-10-10 NOTE — PROGRESS NOTE ADULT - SUBJECTIVE AND OBJECTIVE BOX
24 hour events:     Review of Systems:  Constitutional: No fever, chills, fatigue  Neuro: No headache, numbness, weakness  Resp: No cough, wheezing, shortness of breath  CVS: No chest pain, palpitations, leg swelling  GI: No abdominal pain, nausea, vomiting, diarrhea   : No dysuria, frequency, incontinence  Skin: No itching, burning, rashes, or lesions   Msk: No joint pain or swelling  Psych: No depression, anxiety, mood swings    T(F): 97.8 (10-10-17 @ 07:58), Max: 98.9 (10-09-17 @ 15:42)  HR: 80 (10-10-17 @ 06:00) (78 - 81)  BP: 113/64 (10-10-17 @ 06:00) (105/59 - 141/81)  RR: 13 (10-10-17 @ 06:00) (12 - 24)  SpO2: 100% (10-10-17 @ 06:00) (97% - 100%)  Wt(kg): --        CAPILLARY BLOOD GLUCOSE          I&O's Summary    09 Oct 2017 07:01  -  10 Oct 2017 07:00  --------------------------------------------------------  IN: 1600 mL / OUT: 780 mL / NET: 820 mL        Physical Exam:   Gen:  Neuro:  HEENT:  Resp:  CVS:  Abd:  Ext:  Skin:    Meds:      levothyroxine Injectable IV Push      morphine  - Injectable IV Push PRN  morphine  - Injectable IV Push PRN  ondansetron Injectable IV Push PRN            lactated ringers. IV Continuous                                  12.1   7.6   )-----------( 457      ( 09 Oct 2017 13:32 )             40.2       10-09    139  |  104  |  26<H>  ----------------------------<  97  3.9   |  29  |  1.20    Ca    8.7      09 Oct 2017 13:32    TPro  7.8  /  Alb  3.1<L>  /  TBili  0.4  /  DBili  x   /  AST  19  /  ALT  18  /  AlkPhos  90  10-09    Lactate 0.6           10-09 @ 13:32          PT/INR - ( 09 Oct 2017 13:32 )   PT: 15.1 sec;   INR: 1.38 ratio         PTT - ( 09 Oct 2017 13:32 )  PTT:32.9 sec    Peritoneal PERITONEAL FLUID --   No polymorphonuclear cells seen per low power field  No organisms seen per oil power field  by cytocentrifuge 10-10 @ 01:11              Radiology: ***    Bedside ultrasound: ***    CENTRAL LINE: N/Y          DATE INSERTED:              REMOVE: Y/N  CULP: N/Y                       DATE INSERTED:              REMOVE: Y/N  A-LINE: N/Y                       DATE INSERTED:              REMOVE: Y/N    GLOBAL ISSUE/BEST PRACTICE:  Analgesia:  Sedation:  CAM-ICU:   HOB elevation: yes  Stress ulcer prophylaxis:  VTE prophylaxis:  Glycemic control:  Nutrition:    CODE STATUS: *** 24 hour events:     89 yo female came in with abdominal pain. On CT scan there was SBO in melchor-umbilical hernia, to the right of umbilicus. At around 7 pm was taken to the OR. Operative findings were a single loop of small bowel tightly incarcerated in small fascial defect to the right of umbilicus. Bowel became viable after release. Pre-operatively patients takes Xarelto on a regular basis 2/2 Afib so she was transferred to ICU for observation.     Review of Systems:  Constitutional: No fever, chills.  Neuro: No headache, numbness of ventral left hand due to Carpal tunnel.  Resp: No cough, wheezing, shortness of breath  CVS: No chest pain, palpitations, leg swelling  GI: No abdominal pain, nausea, vomiting, diarrhea   : No dysuria, frequency.  Skin: No itching, burning, rashes, or lesions   Msk: No joint pain or swelling, fingers and toes feel cold      T(F): 97.8 (10-10-17 @ 07:58), Max: 98.9 (10-09-17 @ 15:42)  HR: 80 (10-10-17 @ 06:00) (78 - 81)  BP: 113/64 (10-10-17 @ 06:00) (105/59 - 141/81)  RR: 13 (10-10-17 @ 06:00) (12 - 24)  SpO2: 100% (10-10-17 @ 06:00) (97% - 100%)  Wt(kg): --    CAPILLARY BLOOD GLUCOSE    I&O's Summary    09 Oct 2017 07:01  -  10 Oct 2017 07:00  --------------------------------------------------------  IN: 1600 mL / OUT: 780 mL / NET: 820 mL      Physical Exam:   Gen: well developed and well nourished, lying comfortably in bed.   Neuro: A&O x3, awake, responsive  HEENT: NCAT, PERRL bilaterally, supple, NG tube in place  Resp: breath sounds diminished bilaterally, no wheezing, rales or ronchi appreciated.  CVS: S1S2 normal, regular rhythm, no murmurs, rubs or gallops appreciated.  Abd: BS diminished in all quadrants, a little bit distended, tender in all quadrants to palpation. The wound dressing is dry and clean, no oozing, no erythema around the dressing. Colostomy is clean and functioning.   Ext: no visible deformities, no edema or clubbing, fingers and toes cold to palpation and bluish but 2/4 peripheral pulses.  Skin: no rashes, no bruises, no abnormal lesions.    Meds:    levothyroxine Injectable IV Push  morphine  - Injectable IV Push PRN  morphine  - Injectable IV Push PRN  ondansetron Injectable IV Push PRN  lactated ringers. IV Continuous                         12.1   7.6   )-----------( 457      ( 09 Oct 2017 13:32 )             40.2       10-09    139  |  104  |  26<H>  ----------------------------<  97  3.9   |  29  |  1.20    Ca    8.7      09 Oct 2017 13:32    TPro  7.8  /  Alb  3.1<L>  /  TBili  0.4  /  DBili  x   /  AST  19  /  ALT  18  /  AlkPhos  90  10-09    Lactate 0.6           10-09 @ 13:32    PT/INR - ( 09 Oct 2017 13:32 )   PT: 15.1 sec;   INR: 1.38 ratio       PTT - ( 09 Oct 2017 13:32 )  PTT:32.9 sec    Peritoneal PERITONEAL FLUID --   No polymorphonuclear cells seen per low power field  No organisms seen per oil power field  by cytocentrifuge 10-10 @ 01:11    CT ABDOMEN AND PELVIS    PROCEDURE DATE: 10/09/2017    INTERPRETATION: CT ABDOMEN AND PELVIS    CLINICAL INFORMATION: Left-sided abdominal pain. History of colostomy and  ovarian cancer.    PROCEDURE: Using multislice helical CT, 2.5 mm sections were obtained from  the domes of the diaphragm to the ischial tuberosities. Multiplanar MPR's  were performed.    COMPARISON: CT scan 4/14/2017.    FINDINGS:  Again noted is a loculated pleural effusion left lung base.  There is small nodules noted right lung base, stable in appearance.    The evaluation of the solid organ parenchyma is limited without intravenous  contrast.    Cholelithiasis is noted.  The common bile duct is not dilated.    There is a moderate volume of abdominal pelvic ascites.    The evaluation of the stomach and gastrointestinal tract is limited without  oral contrast distention.  Again noted is left-sided colonic resection with Chavez's pouch and left  lower quadrant colostomy with parastomal hernia.  There are dilated fluid-filled loops of small bowel. There is a ventral  periumbilical hernia containing dilated, likely thick-walled fluid-filled  loops of small bowel. The distal small bowel is relatively nondistended.  Findings may represent an incarcerated, strangulated hernia.  No localized intra-abdominal fluid collection or pneumoperitoneum is noted.    No hydronephrosis is noted.    There is a stable appearance of the left retroperitoneal lymph nodes.    There is subcutaneous soft tissue anasarca.    There are multilevel degenerative changes of the spine.    Impression:    Findings suggestive of incarcerated, possibly strangulated ventral  periumbilical hernia with thickened fluid-filled loops of small bowel.  This critical value was discussed with Dr. Mckinney at the time of  interpretation on 10/9/2017..        CODE STATUS: *** 24 hour events:     89 yo female came in with abdominal pain. On CT scan there was SBO in melchor-umbilical hernia, to the right of umbilicus. At around 7 pm was taken to the OR. Operative findings were a single loop of small bowel tightly incarcerated in small fascial defect to the right of umbilicus. Bowel became viable after release. Pre-operatively patients takes Xarelto on a regular basis 2/2 Afib so she was transferred to ICU for observation.     Review of Systems:  Constitutional: No fever, chills.  Neuro: No headache, numbness of ventral left hand due to Carpal tunnel.  Resp: No cough, wheezing, shortness of breath  CVS: No chest pain, palpitations, leg swelling  GI: No abdominal pain, nausea, vomiting, diarrhea   : No dysuria, frequency.  Skin: No itching, burning, rashes, or lesions   Msk: No joint pain or swelling, fingers and toes feel cold      T(F): 97.8 (10-10-17 @ 07:58), Max: 98.9 (10-09-17 @ 15:42)  HR: 80 (10-10-17 @ 06:00) (78 - 81)  BP: 113/64 (10-10-17 @ 06:00) (105/59 - 141/81)  RR: 13 (10-10-17 @ 06:00) (12 - 24)  SpO2: 100% (10-10-17 @ 06:00) (97% - 100%)    I&O's Summary    09 Oct 2017 07:01  -  10 Oct 2017 07:00  --------------------------------------------------------  IN: 1600 mL / OUT: 780 mL / NET: 820 mL      Physical Exam:   Gen: well developed and well nourished, lying comfortably in bed.   Neuro: A&O x3, awake, responsive  HEENT: NCAT, PERRL bilaterally, supple, NG tube in place  Resp: breath sounds diminished bilaterally, no wheezing, rales or ronchi appreciated.  CVS: S1S2 normal, regular rhythm, no murmurs, rubs or gallops appreciated.  Abd: BS diminished in all quadrants, a little bit distended, tender in all quadrants to palpation. The wound dressing is dry and clean, no oozing, no erythema around the dressing. Colostomy is clean and functioning.   Ext: no visible deformities, no edema or clubbing, fingers and toes cold to palpation and bluish but 2/4 peripheral pulses.  Skin: no rashes, no bruises, no abnormal lesions.    Meds:    levothyroxine Injectable IV Push  morphine  - Injectable IV Push PRN  morphine  - Injectable IV Push PRN  ondansetron Injectable IV Push PRN  lactated ringers. IV Continuous                         12.1   7.6   )-----------( 457      ( 09 Oct 2017 13:32 )             40.2       10-09    139  |  104  |  26<H>  ----------------------------<  97  3.9   |  29  |  1.20    Ca    8.7      09 Oct 2017 13:32    TPro  7.8  /  Alb  3.1<L>  /  TBili  0.4  /  DBili  x   /  AST  19  /  ALT  18  /  AlkPhos  90  10-09    Lactate 0.6           10-09 @ 13:32    PT/INR - ( 09 Oct 2017 13:32 )   PT: 15.1 sec;   INR: 1.38 ratio       PTT - ( 09 Oct 2017 13:32 )  PTT:32.9 sec    Peritoneal PERITONEAL FLUID --   No polymorphonuclear cells seen per low power field  No organisms seen per oil power field  by cytocentrifuge 10-10 @ 01:11    CT ABDOMEN AND PELVIS    PROCEDURE DATE: 10/09/2017    INTERPRETATION: CT ABDOMEN AND PELVIS    CLINICAL INFORMATION: Left-sided abdominal pain. History of colostomy and  ovarian cancer.    PROCEDURE: Using multislice helical CT, 2.5 mm sections were obtained from  the domes of the diaphragm to the ischial tuberosities. Multiplanar MPR's  were performed.    COMPARISON: CT scan 4/14/2017.    FINDINGS:  Again noted is a loculated pleural effusion left lung base.  There is small nodules noted right lung base, stable in appearance.    The evaluation of the solid organ parenchyma is limited without intravenous  contrast.    Cholelithiasis is noted.  The common bile duct is not dilated.    There is a moderate volume of abdominal pelvic ascites.    The evaluation of the stomach and gastrointestinal tract is limited without  oral contrast distention.  Again noted is left-sided colonic resection with Chavez's pouch and left  lower quadrant colostomy with parastomal hernia.  There are dilated fluid-filled loops of small bowel. There is a ventral  periumbilical hernia containing dilated, likely thick-walled fluid-filled  loops of small bowel. The distal small bowel is relatively nondistended.  Findings may represent an incarcerated, strangulated hernia.  No localized intra-abdominal fluid collection or pneumoperitoneum is noted.    No hydronephrosis is noted.    There is a stable appearance of the left retroperitoneal lymph nodes.    There is subcutaneous soft tissue anasarca.    There are multilevel degenerative changes of the spine.    Impression:    Findings suggestive of incarcerated, possibly strangulated ventral  periumbilical hernia with thickened fluid-filled loops of small bowel.  This critical value was discussed with Dr. Mckinney at the time of  interpretation on 10/9/2017..        CODE STATUS: DNR, DNI

## 2017-10-11 DIAGNOSIS — I10 ESSENTIAL (PRIMARY) HYPERTENSION: ICD-10-CM

## 2017-10-11 DIAGNOSIS — I48.2 CHRONIC ATRIAL FIBRILLATION: ICD-10-CM

## 2017-10-11 DIAGNOSIS — C56.9 MALIGNANT NEOPLASM OF UNSPECIFIED OVARY: ICD-10-CM

## 2017-10-11 DIAGNOSIS — L98.499 NON-PRESSURE CHRONIC ULCER OF SKIN OF OTHER SITES WITH UNSPECIFIED SEVERITY: ICD-10-CM

## 2017-10-11 DIAGNOSIS — L03.90 CELLULITIS, UNSPECIFIED: ICD-10-CM

## 2017-10-11 LAB
ALBUMIN SERPL ELPH-MCNC: 2.2 G/DL — LOW (ref 3.3–5)
ALP SERPL-CCNC: 65 U/L — SIGNIFICANT CHANGE UP (ref 40–120)
ALT FLD-CCNC: 9 U/L — LOW (ref 12–78)
ANION GAP SERPL CALC-SCNC: 6 MMOL/L — SIGNIFICANT CHANGE UP (ref 5–17)
AST SERPL-CCNC: 9 U/L — LOW (ref 15–37)
BILIRUB SERPL-MCNC: 0.6 MG/DL — SIGNIFICANT CHANGE UP (ref 0.2–1.2)
BUN SERPL-MCNC: 22 MG/DL — SIGNIFICANT CHANGE UP (ref 7–23)
CALCIUM SERPL-MCNC: 7.7 MG/DL — LOW (ref 8.5–10.1)
CHLORIDE SERPL-SCNC: 107 MMOL/L — SIGNIFICANT CHANGE UP (ref 96–108)
CO2 SERPL-SCNC: 29 MMOL/L — SIGNIFICANT CHANGE UP (ref 22–31)
CREAT SERPL-MCNC: 1.1 MG/DL — SIGNIFICANT CHANGE UP (ref 0.5–1.3)
GLUCOSE SERPL-MCNC: 91 MG/DL — SIGNIFICANT CHANGE UP (ref 70–99)
HCT VFR BLD CALC: 38.1 % — SIGNIFICANT CHANGE UP (ref 34.5–45)
HGB BLD-MCNC: 11.8 G/DL — SIGNIFICANT CHANGE UP (ref 11.5–15.5)
MAGNESIUM SERPL-MCNC: 2.2 MG/DL — SIGNIFICANT CHANGE UP (ref 1.6–2.6)
MCHC RBC-ENTMCNC: 26.4 PG — LOW (ref 27–34)
MCHC RBC-ENTMCNC: 30.8 GM/DL — LOW (ref 32–36)
MCV RBC AUTO: 85.7 FL — SIGNIFICANT CHANGE UP (ref 80–100)
PHOSPHATE SERPL-MCNC: 3 MG/DL — SIGNIFICANT CHANGE UP (ref 2.5–4.5)
PLATELET # BLD AUTO: 364 K/UL — SIGNIFICANT CHANGE UP (ref 150–400)
POTASSIUM SERPL-MCNC: 4.3 MMOL/L — SIGNIFICANT CHANGE UP (ref 3.5–5.3)
POTASSIUM SERPL-SCNC: 4.3 MMOL/L — SIGNIFICANT CHANGE UP (ref 3.5–5.3)
PROT SERPL-MCNC: 6.1 G/DL — SIGNIFICANT CHANGE UP (ref 6–8.3)
RBC # BLD: 4.45 M/UL — SIGNIFICANT CHANGE UP (ref 3.8–5.2)
RBC # FLD: 15.8 % — HIGH (ref 10.3–14.5)
SODIUM SERPL-SCNC: 142 MMOL/L — SIGNIFICANT CHANGE UP (ref 135–145)
SURGICAL PATHOLOGY FINAL REPORT - CH: SIGNIFICANT CHANGE UP
WBC # BLD: 12.5 K/UL — HIGH (ref 3.8–10.5)
WBC # FLD AUTO: 12.5 K/UL — HIGH (ref 3.8–10.5)

## 2017-10-11 PROCEDURE — 99233 SBSQ HOSP IP/OBS HIGH 50: CPT | Mod: GC

## 2017-10-11 RX ORDER — RIVAROXABAN 15 MG-20MG
15 KIT ORAL EVERY 24 HOURS
Qty: 0 | Refills: 0 | Status: DISCONTINUED | OUTPATIENT
Start: 2017-10-11 | End: 2017-10-13

## 2017-10-11 RX ORDER — LEVOTHYROXINE SODIUM 125 MCG
225 TABLET ORAL DAILY
Qty: 0 | Refills: 0 | Status: DISCONTINUED | OUTPATIENT
Start: 2017-10-11 | End: 2017-10-13

## 2017-10-11 RX ORDER — FUROSEMIDE 40 MG
20 TABLET ORAL ONCE
Qty: 0 | Refills: 0 | Status: COMPLETED | OUTPATIENT
Start: 2017-10-11 | End: 2017-10-11

## 2017-10-11 RX ADMIN — MORPHINE SULFATE 2 MILLIGRAM(S): 50 CAPSULE, EXTENDED RELEASE ORAL at 21:44

## 2017-10-11 RX ADMIN — Medication 100 MICROGRAM(S): at 06:59

## 2017-10-11 RX ADMIN — MORPHINE SULFATE 2 MILLIGRAM(S): 50 CAPSULE, EXTENDED RELEASE ORAL at 22:00

## 2017-10-11 RX ADMIN — Medication 300 MILLIGRAM(S): at 18:27

## 2017-10-11 RX ADMIN — Medication 20 MILLIGRAM(S): at 14:27

## 2017-10-11 RX ADMIN — SODIUM CHLORIDE 100 MILLILITER(S): 9 INJECTION, SOLUTION INTRAVENOUS at 06:59

## 2017-10-11 RX ADMIN — RIVAROXABAN 15 MILLIGRAM(S): KIT at 18:27

## 2017-10-11 NOTE — PROGRESS NOTE ADULT - SUBJECTIVE AND OBJECTIVE BOX
POD 2   OOB in chair  Tmax 99.9 now98.2  Little pain, dressing dry, good gas in stoma bag  Abd: nontender    WBC 12.5  H/H stable  P: clear liquids

## 2017-10-11 NOTE — CONSULT NOTE ADULT - CONSULT REASON
coagulopathy
post op incarcerated ventral hernia repair
rt leg ulcer with cellulitis
cardiac evaluation . PPM .CHF , Post -op
IM eval

## 2017-10-11 NOTE — PROGRESS NOTE ADULT - SUBJECTIVE AND OBJECTIVE BOX
Patient is a 89y old  Female who presents with a chief complaint of painful ventral hernia (09 Oct 2017 17:20)      INTERVAL HPI/OVERNIGHT EVENTS: Patient seen and examined. NAD. No complaints.      Vital Signs Last 24 Hrs  T(C): 36.5 (11 Oct 2017 11:33), Max: 37.7 (10 Oct 2017 20:04)  T(F): 97.7 (11 Oct 2017 11:33), Max: 99.9 (10 Oct 2017 20:04)  HR: 80 (11 Oct 2017 11:00) (79 - 80)  BP: 111/58 (11 Oct 2017 11:00) (97/55 - 113/59)  BP(mean): 79 (11 Oct 2017 11:00) (70 - 83)  RR: 24 (11 Oct 2017 11:00) (17 - 29)  SpO2: 97% (11 Oct 2017 11:00) (92% - 98%)I&O's Summary    10 Oct 2017 07:01  -  11 Oct 2017 07:00  --------------------------------------------------------  IN: 2900 mL / OUT: 730 mL / NET: 2170 mL    11 Oct 2017 07:01  -  11 Oct 2017 14:20  --------------------------------------------------------  IN: 540 mL / OUT: 50 mL / NET: 490 mL        LABS:                        11.8   12.5  )-----------( 364      ( 11 Oct 2017 07:04 )             38.1     10-11    142  |  107  |  22  ----------------------------<  91  4.3   |  29  |  1.10    Ca    7.7<L>      11 Oct 2017 07:04  Phos  3.0     10-11  Mg     2.2     10-11    TPro  6.1  /  Alb  2.2<L>  /  TBili  0.6  /  DBili  x   /  AST  9<L>  /  ALT  9<L>  /  AlkPhos  65  10-11    PT/INR - ( 10 Oct 2017 09:23 )   PT: 13.9 sec;   INR: 1.27 ratio         PTT - ( 10 Oct 2017 09:23 )  PTT:28.2 sec    CAPILLARY BLOOD GLUCOSE        blood culture --  10-10 @ 01:11     urine culture --  10-10 @ 01:11  results   No growth to date.        clindamycin   Capsule 300 milliGRAM(s) Oral every 6 hours  furosemide   Injectable 20 milliGRAM(s) IV Push once  HYDROmorphone  Injectable 1 milliGRAM(s) IV Push every 6 hours PRN  levothyroxine 225 MICROGram(s) Oral daily  morphine  - Injectable 2 milliGRAM(s) IV Push every 4 hours PRN  morphine  - Injectable 4 milliGRAM(s) IV Push every 4 hours PRN  ondansetron Injectable 4 milliGRAM(s) IV Push every 6 hours PRN  rivaroxaban 15 milliGRAM(s) Oral every 24 hours      REVIEW OF SYSTEMS:  CONSTITUTIONAL: No fever, weight loss, or fatigue  NECK: No pain or stiffness  RESPIRATORY: No cough, wheezing, chills or hemoptysis; No shortness of breath  CARDIOVASCULAR: No chest pain, palpitations, dizziness, or leg swelling  GASTROINTESTINAL: No abdominal or epigastric pain. No nausea, vomiting, or hematemesis; No diarrhea or constipation. No melena or hematochezia.  GENITOURINARY: No dysuria, frequency, hematuria, or incontinence  NEUROLOGICAL: No headaches, loss of strength, numbness, or tremors  SKIN: No itching, burning  MUSCULOSKELETAL: No joint pain or swelling; No muscle, back, or extremity pain  PSYCHIATRIC: No depression, mood swings, HEME/LYMPH: No easy bruising, or bleeding gums  ALLERY AND IMMUNOLOGIC: No hives       Consultant(s) Notes Reviewed:  [x ] YES  [ ] NO    PHYSICAL EXAM:  GENERAL: NAD, well-groomed, well-developed  HEAD:  Atraumatic, Normocephalic  EYES: EOMI, PERRLA, conjunctiva and sclera clear  ENMT: No tonsillar erythema, exudates, or enlargement; Moist mucous membranes  NECK: Supple, No JVD  NERVOUS SYSTEM:  Awake & alert  CHEST/LUNG: Clear to auscultation bilaterally; No rales, rhonchi, wheezing,  HEART: Regular rate and rhythm  ABDOMEN: Soft, Nontender, Nondistended; Bowel sounds present  EXTREMITIES:  No clubbing, cyanosis, or edema  LYMPH: No lymphadenopathy noted  SKIN: No rashes      Advanced care planning discussed with patient/family [x ] YES   [ ] NO

## 2017-10-11 NOTE — PROGRESS NOTE ADULT - SUBJECTIVE AND OBJECTIVE BOX
24 hour events:     No fevers or chills. The night was uneventful. Urine output decreased to 25-30 ml per hour despite fluid bolus. Wound on right lateral calve a little more erythematous than yesterday and there is discharge now. has been seen by would care specialist that recommended start of antibiotics and warm compress for 15 min intermittently.     Review of Systems:  Constitutional: No fever, chills.   Neuro: No headache, numbness, weakness  Resp: No cough, wheezing, shortness of breath  CVS: No chest pain, palpitations.  GI: No abdominal pain, nausea, vomiting, diarrhea.  : No dysuria, frequency.  Skin: + lesions and + itching on right lateral calve.  Msk: No joint pain or swelling, fingers and toes warm and pink.     T(F): 97.7 (10-11-17 @ 11:33), Max: 99.9 (10-10-17 @ 20:04)  HR: 80 (10-11-17 @ 11:00) (79 - 80)  BP: 111/58 (10-11-17 @ 11:00) (97/55 - 113/59)  RR: 24 (10-11-17 @ 11:00) (17 - 29)  SpO2: 97% (10-11-17 @ 11:00) (92% - 98%)  Wt(kg): --      I&O's Summary    10 Oct 2017 07:01  -  11 Oct 2017 07:00  --------------------------------------------------------  IN: 2900 mL / OUT: 730 mL / NET: 2170 mL    11 Oct 2017 07:01  -  11 Oct 2017 14:33  --------------------------------------------------------  IN: 540 mL / OUT: 50 mL / NET: 490 mL        Physical Exam:   Gen: well developed. well nourished, comfortable in bed.  Neuro: A&O x3, awake.  HEENT: NCAT, PERRL bilaterally, supple, no NG tube.  Resp: breath sounds diminished bilaterally, no wheezing ronchi or crackles appreciated.  CVS: S1S2 normal, RRR, no murmurs, rubs or gallops  Abd: +BS, soft, not distended, tender to palpation around wound, skin and dressing around incision is dry, not erythematous. Colostomy is pink, there is gas but no stool.   Ext: no visible deformities, no edema or clubbing, DP pulse is 2/4  Skin: pink, dry, warm, there are 3 2x3 cm lesions on right lateral calve, erythematous skin.    Meds:  clindamycin   Capsule Oral  levothyroxine Oral  HYDROmorphone  Injectable IV Push PRN  morphine  - Injectable IV Push PRN  morphine  - Injectable IV Push PRN  ondansetron Injectable IV Push PRN  rivaroxaban Oral                            11.8   12.5  )-----------( 364      ( 11 Oct 2017 07:04 )             38.1       10-11    142  |  107  |  22  ----------------------------<  91  4.3   |  29  |  1.10    Ca    7.7<L>      11 Oct 2017 07:04  Phos  3.0     10-11  Mg     2.2     10-11    TPro  6.1  /  Alb  2.2<L>  /  TBili  0.6  /  DBili  x   /  AST  9<L>  /  ALT  9<L>  /  AlkPhos  65  10-11    PT/INR - ( 10 Oct 2017 09:23 )   PT: 13.9 sec;   INR: 1.27 ratio       PTT - ( 10 Oct 2017 09:23 )  PTT:28.2 sec    Peritoneal PERITONEAL FLUID   No growth to date.   No polymorphonuclear cells seen per low power field  No organisms seen per oil power field  by cytocentrifuge 10-10 @ 01:11    CT ABDOMEN AND PELVIS    PROCEDURE DATE: 10/09/2017    INTERPRETATION: CT ABDOMEN AND PELVIS    CLINICAL INFORMATION: Left-sided abdominal pain. History of colostomy and  ovarian cancer.    PROCEDURE: Using multislice helical CT, 2.5 mm sections were obtained from  the domes of the diaphragm to the ischial tuberosities. Multiplanar MPR's  were performed.    COMPARISON: CT scan 4/14/2017.    FINDINGS:  Again noted is a loculated pleural effusion left lung base.  There is small nodules noted right lung base, stable in appearance.    The evaluation of the solid organ parenchyma is limited without intravenous  contrast.    Cholelithiasis is noted.  The common bile duct is not dilated.    There is a moderate volume of abdominal pelvic ascites.    The evaluation of the stomach and gastrointestinal tract is limited without  oral contrast distention.  Again noted is left-sided colonic resection with Chavez's pouch and left  lower quadrant colostomy with parastomal hernia.  There are dilated fluid-filled loops of small bowel. There is a ventral  periumbilical hernia containing dilated, likely thick-walled fluid-filled  loops of small bowel. The distal small bowel is relatively nondistended.  Findings may represent an incarcerated, strangulated hernia.  No localized intra-abdominal fluid collection or pneumoperitoneum is noted.    No hydronephrosis is noted.    There is a stable appearance of the left retroperitoneal lymph nodes.    There is subcutaneous soft tissue anasarca.    There are multilevel degenerative changes of the spine.    Impression:    Findings suggestive of incarcerated, possibly strangulated ventral  periumbilical hernia with thickened fluid-filled loops of small bowel.  This critical value was discussed with Dr. Mckinney at the time of interpretation on 10/9/2017.      CODE STATUS: DNI, DNR 24 hour events:     No fevers or chills. The night was uneventful. Urine output decreased to 25-30 ml per hour despite fluid bolus. Wound on right lateral calve a little more erythematous than yesterday and there is discharge now. has been seen by would care specialist that recommended start of antibiotics and warm compress for 15 min intermittently.     Review of Systems:  Constitutional: No fever, chills.   Neuro: No headache, numbness, weakness  Resp: No cough, wheezing, shortness of breath  CVS: No chest pain, palpitations.  GI: No abdominal pain, nausea, vomiting, diarrhea.  : No dysuria, frequency.  Skin: + lesions and + itching on right lateral calve.  Msk: No joint pain or swelling, fingers and toes warm and pink.     T(F): 97.7 (10-11-17 @ 11:33), Max: 99.9 (10-10-17 @ 20:04)  HR: 80 (10-11-17 @ 11:00) (79 - 80)  BP: 111/58 (10-11-17 @ 11:00) (97/55 - 113/59)  RR: 24 (10-11-17 @ 11:00) (17 - 29)  SpO2: 97% (10-11-17 @ 11:00) (92% - 98%)  Wt(kg): --      I&O's Summary    10 Oct 2017 07:01  -  11 Oct 2017 07:00  --------------------------------------------------------  IN: 2900 mL / OUT: 730 mL / NET: 2170 mL    11 Oct 2017 07:01  -  11 Oct 2017 14:33  --------------------------------------------------------  IN: 540 mL / OUT: 50 mL / NET: 490 mL        Physical Exam:   Gen: well developed. well nourished, comfortable in bed.  Neuro: A&O x3, awake.  HEENT: NCAT, PERRL bilaterally, supple, no NG tube.  Resp: breath sounds diminished bilaterally, no wheezing ronchi or crackles appreciated.  CVS: S1S2 normal, RRR, no murmurs, rubs or gallops  Abd: +BS, soft, not distended, tender to palpation around wound, skin and dressing around incision is dry, not erythematous. Colostomy is pink, there is gas but no stool.   Ext: no visible deformities, no edema or clubbing, DP pulse is 2/4  Skin: pink, dry, warm, there are 3 2x3 cm lesions on right lateral calve, erythematous skin.    Meds:  clindamycin   Capsule Oral  levothyroxine Oral  HYDROmorphone  Injectable IV Push PRN  morphine  - Injectable IV Push PRN  morphine  - Injectable IV Push PRN  ondansetron Injectable IV Push PRN  rivaroxaban Oral                          11.8   12.5  )-----------( 364      ( 11 Oct 2017 07:04 )             38.1     142  |  107  |  22  ----------------------<  91  4.3   |  29  |  1.10    Ca    7.7<L>      11 Oct 2017 07:04  Phos  3.0     10-11  Mg     2.2     10-11    TPro  6.1  /  Alb  2.2<L>  /  TBili  0.6  /  DBili  x   /  AST  9<L>  /  ALT  9<L>  /  AlkPhos  65  10-11    PT/INR - ( 10 Oct 2017 09:23 )   PT: 13.9 sec;   INR: 1.27 ratio       PTT - ( 10 Oct 2017 09:23 )  PTT:28.2 sec    Peritoneal PERITONEAL FLUID   No growth to date.   No polymorphonuclear cells seen per low power field  No organisms seen per oil power field  by cytocentrifuge 10-10 @ 01:11    CT ABDOMEN AND PELVIS    PROCEDURE DATE: 10/09/2017    INTERPRETATION: CT ABDOMEN AND PELVIS    CLINICAL INFORMATION: Left-sided abdominal pain. History of colostomy and  ovarian cancer.    PROCEDURE: Using multislice helical CT, 2.5 mm sections were obtained from  the domes of the diaphragm to the ischial tuberosities. Multiplanar MPR's  were performed.    COMPARISON: CT scan 4/14/2017.    FINDINGS:  Again noted is a loculated pleural effusion left lung base.  There is small nodules noted right lung base, stable in appearance.    The evaluation of the solid organ parenchyma is limited without intravenous  contrast.    Cholelithiasis is noted.  The common bile duct is not dilated.    There is a moderate volume of abdominal pelvic ascites.    The evaluation of the stomach and gastrointestinal tract is limited without  oral contrast distention.  Again noted is left-sided colonic resection with Chavez's pouch and left  lower quadrant colostomy with parastomal hernia.  There are dilated fluid-filled loops of small bowel. There is a ventral  periumbilical hernia containing dilated, likely thick-walled fluid-filled  loops of small bowel. The distal small bowel is relatively nondistended.  Findings may represent an incarcerated, strangulated hernia.  No localized intra-abdominal fluid collection or pneumoperitoneum is noted.    No hydronephrosis is noted.    There is a stable appearance of the left retroperitoneal lymph nodes.    There is subcutaneous soft tissue anasarca.    There are multilevel degenerative changes of the spine.    Impression:    Findings suggestive of incarcerated, possibly strangulated ventral  periumbilical hernia with thickened fluid-filled loops of small bowel.  This critical value was discussed with Dr. Mckinney at the time of interpretation on 10/9/2017.      CENTRAL LINE: N  CULP: Y --> Remove  A-LINE: N    GLOBAL SYSTEMS BEST PRACTICE:  Anesthesia: yes  Sedation: N  HOB elevated: Y  Nutrition: clears  Glycemic control: Y  CAM-ICU: N  DVT Ppx: Y  GI ppx: N/A    CODE STATUS: DNI, DNR

## 2017-10-11 NOTE — CONSULT NOTE ADULT - SUBJECTIVE AND OBJECTIVE BOX
above Chief Complaint:    right leg   ulcer   and  cellulitis    and   abdominal  pain   consistent   with  intestinal obstruction    from  incisional  hernia..  malignant   ascites   found  HPI: has been taken care of  [facility]   for  many  weeks  last   visit  applied  pastora   and  told  to return  in  3 weeks    PAST MEDICAL & SURGICAL HISTORY:  Ascites, malignant: from advanced ovarian cancer  Colon cancer  Obese  Hypothyroidism  HTN (Hypertension)  Overactive Bladder  DM Type 2 (Diabetes Mellitus, Type 2)  Asthma  Artificial pacemaker  S/P colon resection: with sigmoid colostomy for obstructing ovarian cancer  S/P Cataract Surgery: CASI  History of Tonsillectomy  History of Appendectomy  Status Post Total Knee Replacement: Left      Allergies    No Known Allergies    Intolerances        MEDICATIONS  (STANDING):  lactated ringers. 1000 milliLiter(s) (100 mL/Hr) IV Continuous <Continuous>  levothyroxine Injectable 100 MICROGram(s) IV Push daily    MEDICATIONS  (PRN):  HYDROmorphone  Injectable 1 milliGRAM(s) IV Push every 6 hours PRN Severe Pain (7 - 10)  morphine  - Injectable 2 milliGRAM(s) IV Push every 4 hours PRN Mild Pain (1 - 3)  morphine  - Injectable 4 milliGRAM(s) IV Push every 4 hours PRN Moderate Pain (4 - 6)  ondansetron Injectable 4 milliGRAM(s) IV Push every 6 hours PRN Nausea and/or Vomiting      FAMILY HISTORY:          ROS:  CONSTITUTIONAL: No fever, weight loss, or fatigue  EYES: No eye pain, visual disturbances, or discharge  ENMT:  No difficulty hearing, tinnitus, vertigo; No sinus or throat pain  NECK: No pain or stiffness  BREASTS: No pain, masses, or nipple discharge  RESPIRATORY: No cough, wheezing, chills or hemoptysis; No shortness of breath  CARDIOVASCULAR: No chest pain, palpitations, dizziness, or leg swelling  GASTROINTESTINAL: No abdominal or epigastric pain. No nausea, vomiting, or hematemesis; No diarrhea or constipation. No melena or hematochezia.  GENITOURINARY: No dysuria, frequency, hematuria, or incontinence  NEUROLOGICAL: No headaches, memory loss, loss of strength, numbness, or tremors  SKIN:  ulcer   right pre tibial;   area  LYMPH NODES: No enlarged glands  ENDOCRINE: No heat or cold intolerance; No hair loss  MUSCULOSKELETAL: No joint pain or swelling; No muscle, back, or extremity pain  PSYCHIATRIC: No depression, anxiety, mood swings, or difficulty sleeping  HEME/LYMPH: No easy bruising, or bleeding gums  ALLERGY AND IMMUNOLOGIC: No hives or eczema    PHYSICAL EXAM-      Vital Signs Last 24 Hrs  T(C): 36.5 (11 Oct 2017 11:33), Max: 37.7 (10 Oct 2017 20:04)  T(F): 97.7 (11 Oct 2017 11:33), Max: 99.9 (10 Oct 2017 20:04)  HR: 80 (11 Oct 2017 11:00) (79 - 80)  BP: 111/58 (11 Oct 2017 11:00) (97/55 - 130/56)  BP(mean): 79 (11 Oct 2017 11:00) (70 - 83)  RR: 24 (11 Oct 2017 11:00) (17 - 29)  SpO2: 97% (11 Oct 2017 11:00) (92% - 98%)    Constitutional: well developed, well nourished, no apparent distress, alert, oriented x 3.  Pulmonary: no respiratory distress, normal respiratory rhythm and effort, lungs are clear to auscultation/percussion. No CVA tenderness.  Cardiovascular: heart rate normal, normal sinus rhythm; no murmurs, gallops, rubs, heaves or thrills   Abdomen: soft, non-tender, +BS, no guarding/rebound/rigidity.  Vascular:    trace pulses  ENMT:    wnl  Neck:    normal  Extremites:    dry eschar   right pre tibial   area   with 8x6  cm  area  of  surrounding   adelaide ness and   warmth   consistent   with localized   cellulitis  Skin:     as above                            11.8   12.5  )-----------( 364      ( 11 Oct 2017 07:04 )             38.1     10-11    142  |  107  |  22  ----------------------------<  91  4.3   |  29  |  1.10    Ca    7.7<L>      11 Oct 2017 07:04  Phos  3.0     10-11  Mg     2.2     10-11    TPro  6.1  /  Alb  2.2<L>  /  TBili  0.6  /  DBili  x   /  AST  9<L>  /  ALT  9<L>  /  AlkPhos  65  10-11      Radiology    Ct scan of   abdomen   showed   intestinal  obstructions    from incisional  hernia

## 2017-10-11 NOTE — PROGRESS NOTE ADULT - SUBJECTIVE AND OBJECTIVE BOX
[facility]       HPI:  Onset 12:30 Pm today of pain at central abdominal bulge, with nausea. (09 Oct 2017 17:20)        SUBJECTIVE:      ALLERGIES:  Allergies    No Known Allergies    Intolerances          MEDICATIONS  (STANDING):  clindamycin   Capsule 300 milliGRAM(s) Oral every 6 hours  levothyroxine 225 MICROGram(s) Oral daily  rivaroxaban 15 milliGRAM(s) Oral every 24 hours    MEDICATIONS  (PRN):  HYDROmorphone  Injectable 1 milliGRAM(s) IV Push every 6 hours PRN Severe Pain (7 - 10)  morphine  - Injectable 2 milliGRAM(s) IV Push every 4 hours PRN Mild Pain (1 - 3)  morphine  - Injectable 4 milliGRAM(s) IV Push every 4 hours PRN Moderate Pain (4 - 6)  ondansetron Injectable 4 milliGRAM(s) IV Push every 6 hours PRN Nausea and/or Vomiting      REVIEW OF SYSTEMS:  CONSTITUTIONAL: No fever,  RESPIRATORY: No cough, wheezing, shortness of breath  CARDIOVASCULAR: No chest pain, dyspnea, palpitations, dizziness, syncope, paroxysmal nocturnal dyspnea, orthopnea, or arm or leg swelling  GASTROINTESTINAL: No abdominal  or epigastric pain, nausea, vomiting,  diarrhea  NEUROLOGICAL: No headaches,  loss of strength, numbness, or tremors    Vital Signs Last 24 Hrs  T(C): 37.2 (11 Oct 2017 20:00), Max: 37.3 (10 Oct 2017 23:42)  T(F): 99 (11 Oct 2017 20:00), Max: 99.2 (11 Oct 2017 03:52)  HR: 80 (11 Oct 2017 22:00) (79 - 80)  BP: 103/53 (11 Oct 2017 22:00) (97/55 - 116/59)  BP(mean): 76 (11 Oct 2017 22:00) (70 - 84)  RR: 25 (11 Oct 2017 22:00) (17 - 30)  SpO2: 97% (11 Oct 2017 22:00) (92% - 100%)    PHYSICAL EXAM:  HEAD:  Atraumatic, Normocephalic  NECK: Supple and normal thyroid.  No JVD or carotid bruit.   HEART: S1, S2 regular , 1/6 soft ejection systolic murmur in mitral area , no thrill and no gallops .  PULMONARY: Bilateral vesicular breathing , few scattered ronchi and few scattered rales are present .  ABDOMEN: Soft nontender and positive bowl sounds   EXTREMITIES:  No clubbing, cyanosis, or pedal  edema  NEUROLOGICAL: AAOX3 , no focal deficit .    LABS:                        11.8   12.5  )-----------( 364      ( 11 Oct 2017 07:04 )             38.1     10-11    142  |  107  |  22  ----------------------------<  91  4.3   |  29  |  1.10    Ca    7.7<L>      11 Oct 2017 07:04  Phos  3.0     10-11  Mg     2.2     10-11    TPro  6.1  /  Alb  2.2<L>  /  TBili  0.6  /  DBili  x   /  AST  9<L>  /  ALT  9<L>  /  AlkPhos  65  10-11        PT/INR - ( 10 Oct 2017 09:23 )   PT: 13.9 sec;   INR: 1.27 ratio         PTT - ( 10 Oct 2017 09:23 )  PTT:28.2 sec    BNP      EKG:  ECHO:  IMAGING:    Assessment/Plan    Will continue to follow during hospital course and give further recommendations as needed . Thanks for your referral . if any questions please contact me at office [phone] [phone]) [facility]       HPI:  Onset 12:30 Pm today of pain at central abdominal bulge, with nausea. (09 Oct 2017 17:20)        SUBJECTIVE: Patient lying comfortable . No chest pain and SOB .      ALLERGIES:  Allergies    No Known Allergies    Intolerances          MEDICATIONS  (STANDING):  clindamycin   Capsule 300 milliGRAM(s) Oral every 6 hours  levothyroxine 225 MICROGram(s) Oral daily  rivaroxaban 15 milliGRAM(s) Oral every 24 hours    MEDICATIONS  (PRN):  HYDROmorphone  Injectable 1 milliGRAM(s) IV Push every 6 hours PRN Severe Pain (7 - 10)  morphine  - Injectable 2 milliGRAM(s) IV Push every 4 hours PRN Mild Pain (1 - 3)  morphine  - Injectable 4 milliGRAM(s) IV Push every 4 hours PRN Moderate Pain (4 - 6)  ondansetron Injectable 4 milliGRAM(s) IV Push every 6 hours PRN Nausea and/or Vomiting      REVIEW OF SYSTEMS:  CONSTITUTIONAL: No fever,  RESPIRATORY: No cough, wheezing, shortness of breath  CARDIOVASCULAR: No chest pain, dyspnea, palpitations, dizziness, syncope, paroxysmal nocturnal dyspnea, orthopnea, or arm or leg swelling  GASTROINTESTINAL: No abdominal  or epigastric pain, nausea, vomiting,  diarrhea  NEUROLOGICAL: No headaches,  loss of strength, numbness, or tremors    Vital Signs Last 24 Hrs  T(C): 37.2 (11 Oct 2017 20:00), Max: 37.3 (10 Oct 2017 23:42)  T(F): 99 (11 Oct 2017 20:00), Max: 99.2 (11 Oct 2017 03:52)  HR: 80 (11 Oct 2017 22:00) (79 - 80)  BP: 103/53 (11 Oct 2017 22:00) (97/55 - 116/59)  BP(mean): 76 (11 Oct 2017 22:00) (70 - 84)  RR: 25 (11 Oct 2017 22:00) (17 - 30)  SpO2: 97% (11 Oct 2017 22:00) (92% - 100%)    PHYSICAL EXAM:  HEAD:  Atraumatic, Normocephalic  NECK: Supple and normal thyroid.  No JVD or carotid bruit.   HEART: S1, S2 regular , 1/6 soft ejection systolic murmur in mitral area , no thrill and no gallops .  PULMONARY: Bilateral vesicular breathing , few scattered ronchi and few scattered rales are present .  ABDOMEN: Soft and positive bowl sounds   EXTREMITIES:  No clubbing, cyanosis, or pedal  edema  NEUROLOGICAL: AAOX3 , no focal deficit .    LABS:                        11.8   12.5  )-----------( 364      ( 11 Oct 2017 07:04 )             38.1     10-11    142  |  107  |  22  ----------------------------<  91  4.3   |  29  |  1.10    Ca    7.7<L>      11 Oct 2017 07:04  Phos  3.0     10-11  Mg     2.2     10-11    TPro  6.1  /  Alb  2.2<L>  /  TBili  0.6  /  DBili  x   /  AST  9<L>  /  ALT  9<L>  /  AlkPhos  65  10-11        PT/INR - ( 10 Oct 2017 09:23 )   PT: 13.9 sec;   INR: 1.27 ratio         PTT - ( 10 Oct 2017 09:23 )  PTT:28.2 sec    BNP      EKG:  ECHO:  IMAGING:    Assessment/Plan  Patient has :  1) S/P abdominal surgery .  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  3) H/O Paroxysmal atrial fibrillation and PPM and pacemaker dependant .  Plan : 1) Cardiac stable so far . 2) Monitor hemoglobin and electrolytes .  Will continue to follow during hospital course and give further recommendations as needed . Thanks for your referral . if any questions please contact me at office ([phone])cell [phone])

## 2017-10-11 NOTE — PROGRESS NOTE ADULT - SUBJECTIVE AND OBJECTIVE BOX
HPI:  Patient is well known to me: 89 years old woman with metastatic Ovarian cancer managed at this point with palliative paracentesis and admitted with pain at central abdominal bulge, with nausea. (09 Oct 2017 17:20). Patient was found to have incarcerated umbilical hernia. Anticoagulation with Xarelto was reversed with Kcentra and patient was taking to OR. Small bowel became viable after release and patient seems to be doing remarkably well.     Pt is seen and examined  pt is awake and out of bed to chair  pt seems comfortable and denies any complaints at this time    ROS: negative    MEDICATIONS  (STANDING):  lactated ringers. 1000 milliLiter(s) (100 mL/Hr) IV Continuous <Continuous>  levothyroxine Injectable 100 MICROGram(s) IV Push daily    MEDICATIONS  (PRN):  HYDROmorphone  Injectable 1 milliGRAM(s) IV Push every 6 hours PRN Severe Pain (7 - 10)  morphine  - Injectable 2 milliGRAM(s) IV Push every 4 hours PRN Mild Pain (1 - 3)  morphine  - Injectable 4 milliGRAM(s) IV Push every 4 hours PRN Moderate Pain (4 - 6)  ondansetron Injectable 4 milliGRAM(s) IV Push every 6 hours PRN Nausea and/or Vomiting      Allergies    No Known Allergies    Intolerances        Vital Signs Last 24 Hrs  T(C): 36.8 (11 Oct 2017 07:59), Max: 37.7 (10 Oct 2017 20:04)  T(F): 98.2 (11 Oct 2017 07:59), Max: 99.9 (10 Oct 2017 20:04)  HR: 80 (11 Oct 2017 08:00) (79 - 80)  BP: 101/54 (11 Oct 2017 08:00) (97/55 - 130/56)  BP(mean): 76 (11 Oct 2017 08:00) (70 - 85)  RR: 23 (11 Oct 2017 08:00) (17 - 29)  SpO2: 98% (11 Oct 2017 08:00) (92% - 100%)    PHYSICAL EXAM  General: adult in NAD  HEENT: clear oropharynx, anicteric sclera, pink conjunctiva  Neck: supple  CV: normal S1/S2 with no murmur rubs or gallops  Lungs: positive air movement b/l ant lungs,clear to auscultation, no wheezes, no rales  Abdomen: soft non-tender non-distended, no hepatosplenomegaly; + Ostomy  Ext: no clubbing cyanosis or edema  Skin: no rashes and no petechiae; RLE erythema and scabbed wounds  Neuro: alert and oriented X 4, no focal deficits  LABS:                          11.8   12.5  )-----------( 364      ( 11 Oct 2017 07:04 )             38.1         Mean Cell Volume : 85.7 fl  Mean Cell Hemoglobin : 26.4 pg  Mean Cell Hemoglobin Concentration : 30.8 gm/dL  Auto Neutrophil # : x  Auto Lymphocyte # : x  Auto Monocyte # : x  Auto Eosinophil # : x  Auto Basophil # : x  Auto Neutrophil % : x  Auto Lymphocyte % : x  Auto Monocyte % : x  Auto Eosinophil % : x  Auto Basophil % : x    Serial CBC's  10-11 @ 07:04  Hct-38.1 / Hgb-11.8 / Plat-364 / RBC-4.45 / WBC-12.5          Serial CBC's  10-10 @ 09:23  Hct-41.6 / Hgb-12.7 / Plat-387 / RBC-4.87 / WBC-12.3          Serial CBC's  10-09 @ 13:32  Hct-40.2 / Hgb-12.1 / Plat-457 / RBC-4.72 / WBC-7.6            10-11    142  |  107  |  22  ----------------------------<  91  4.3   |  29  |  1.10    Ca    7.7<L>      11 Oct 2017 07:04  Phos  3.0     10-11  Mg     2.2     10-11    TPro  6.1  /  Alb  2.2<L>  /  TBili  0.6  /  DBili  x   /  AST  9<L>  /  ALT  9<L>  /  AlkPhos  65  10-11      PT/INR - ( 10 Oct 2017 09:23 )   PT: 13.9 sec;   INR: 1.27 ratio         PTT - ( 10 Oct 2017 09:23 )  PTT:28.2 sec

## 2017-10-11 NOTE — CONSULT NOTE ADULT - PROBLEM SELECTOR RECOMMENDATION 9
leave open
secondary to incarcerated hernia.  there are no absolute medical contraindications to this urgent surgical intervention

## 2017-10-11 NOTE — PROGRESS NOTE ADULT - PROBLEM SELECTOR PLAN 2
rate controlled  A/C on hold until ok with surgery
s/p surgery and seems to be doing well. Management as per surgery.

## 2017-10-11 NOTE — PROVIDER CONTACT NOTE (CHANGE IN STATUS NOTIFICATION) - ASSESSMENT
Patient is an 83 yo female right lower extremity anterior 1.2 x .3 hard slough present leg is red and warm to touch

## 2017-10-11 NOTE — PROGRESS NOTE ADULT - PROBLEM SELECTOR PLAN 1
S/P SBO in incarcerated ventral hernia, s/p repair, POD #2  Continue post-op care  Started on clears  Surgical f/u
Patient has malignant ascites. Last paracentesis last week. Not on chemotherapy. Continue to monitor.

## 2017-10-12 ENCOUNTER — TRANSCRIPTION ENCOUNTER (OUTPATIENT)
Age: 82
End: 2017-10-12

## 2017-10-12 LAB
ALBUMIN SERPL ELPH-MCNC: 2.1 G/DL — LOW (ref 3.3–5)
ALP SERPL-CCNC: 70 U/L — SIGNIFICANT CHANGE UP (ref 40–120)
ALT FLD-CCNC: 8 U/L — LOW (ref 12–78)
ANION GAP SERPL CALC-SCNC: 6 MMOL/L — SIGNIFICANT CHANGE UP (ref 5–17)
AST SERPL-CCNC: 10 U/L — LOW (ref 15–37)
BILIRUB SERPL-MCNC: 0.6 MG/DL — SIGNIFICANT CHANGE UP (ref 0.2–1.2)
BUN SERPL-MCNC: 20 MG/DL — SIGNIFICANT CHANGE UP (ref 7–23)
CALCIUM SERPL-MCNC: 7.7 MG/DL — LOW (ref 8.5–10.1)
CHLORIDE SERPL-SCNC: 106 MMOL/L — SIGNIFICANT CHANGE UP (ref 96–108)
CO2 SERPL-SCNC: 30 MMOL/L — SIGNIFICANT CHANGE UP (ref 22–31)
CREAT SERPL-MCNC: 1 MG/DL — SIGNIFICANT CHANGE UP (ref 0.5–1.3)
GLUCOSE SERPL-MCNC: 115 MG/DL — HIGH (ref 70–99)
HCT VFR BLD CALC: 34.4 % — LOW (ref 34.5–45)
HGB BLD-MCNC: 10.6 G/DL — LOW (ref 11.5–15.5)
MAGNESIUM SERPL-MCNC: 2.1 MG/DL — SIGNIFICANT CHANGE UP (ref 1.6–2.6)
MCHC RBC-ENTMCNC: 26.3 PG — LOW (ref 27–34)
MCHC RBC-ENTMCNC: 30.8 GM/DL — LOW (ref 32–36)
MCV RBC AUTO: 85.4 FL — SIGNIFICANT CHANGE UP (ref 80–100)
PHOSPHATE SERPL-MCNC: 2.3 MG/DL — LOW (ref 2.5–4.5)
PLATELET # BLD AUTO: 325 K/UL — SIGNIFICANT CHANGE UP (ref 150–400)
POTASSIUM SERPL-MCNC: 3.8 MMOL/L — SIGNIFICANT CHANGE UP (ref 3.5–5.3)
POTASSIUM SERPL-SCNC: 3.8 MMOL/L — SIGNIFICANT CHANGE UP (ref 3.5–5.3)
PROT SERPL-MCNC: 6 G/DL — SIGNIFICANT CHANGE UP (ref 6–8.3)
RBC # BLD: 4.03 M/UL — SIGNIFICANT CHANGE UP (ref 3.8–5.2)
RBC # FLD: 15.1 % — HIGH (ref 10.3–14.5)
SODIUM SERPL-SCNC: 142 MMOL/L — SIGNIFICANT CHANGE UP (ref 135–145)
WBC # BLD: 9 K/UL — SIGNIFICANT CHANGE UP (ref 3.8–10.5)
WBC # FLD AUTO: 9 K/UL — SIGNIFICANT CHANGE UP (ref 3.8–10.5)

## 2017-10-12 PROCEDURE — 99233 SBSQ HOSP IP/OBS HIGH 50: CPT | Mod: GC

## 2017-10-12 RX ORDER — MORPHINE SULFATE 50 MG/1
2 CAPSULE, EXTENDED RELEASE ORAL EVERY 4 HOURS
Qty: 0 | Refills: 0 | Status: DISCONTINUED | OUTPATIENT
Start: 2017-10-12 | End: 2017-10-13

## 2017-10-12 RX ORDER — FUROSEMIDE 40 MG
20 TABLET ORAL
Qty: 0 | Refills: 0 | Status: DISCONTINUED | OUTPATIENT
Start: 2017-10-12 | End: 2017-10-13

## 2017-10-12 RX ORDER — POTASSIUM PHOSPHATE, MONOBASIC POTASSIUM PHOSPHATE, DIBASIC 236; 224 MG/ML; MG/ML
15 INJECTION, SOLUTION INTRAVENOUS ONCE
Qty: 0 | Refills: 0 | Status: COMPLETED | OUTPATIENT
Start: 2017-10-12 | End: 2017-10-12

## 2017-10-12 RX ORDER — MONTELUKAST 4 MG/1
10 TABLET, CHEWABLE ORAL DAILY
Qty: 0 | Refills: 0 | Status: DISCONTINUED | OUTPATIENT
Start: 2017-10-12 | End: 2017-10-13

## 2017-10-12 RX ORDER — ACETAMINOPHEN 500 MG
650 TABLET ORAL EVERY 6 HOURS
Qty: 0 | Refills: 0 | Status: DISCONTINUED | OUTPATIENT
Start: 2017-10-12 | End: 2017-10-13

## 2017-10-12 RX ORDER — BUDESONIDE AND FORMOTEROL FUMARATE DIHYDRATE 160; 4.5 UG/1; UG/1
2 AEROSOL RESPIRATORY (INHALATION)
Qty: 0 | Refills: 0 | Status: DISCONTINUED | OUTPATIENT
Start: 2017-10-12 | End: 2017-10-13

## 2017-10-12 RX ORDER — OXYCODONE HYDROCHLORIDE 5 MG/1
5 TABLET ORAL EVERY 4 HOURS
Qty: 0 | Refills: 0 | Status: DISCONTINUED | OUTPATIENT
Start: 2017-10-12 | End: 2017-10-13

## 2017-10-12 RX ADMIN — Medication 300 MILLIGRAM(S): at 01:09

## 2017-10-12 RX ADMIN — POTASSIUM PHOSPHATE, MONOBASIC POTASSIUM PHOSPHATE, DIBASIC 62.5 MILLIMOLE(S): 236; 224 INJECTION, SOLUTION INTRAVENOUS at 07:36

## 2017-10-12 RX ADMIN — Medication 300 MILLIGRAM(S): at 17:39

## 2017-10-12 RX ADMIN — Medication 20 MILLIGRAM(S): at 10:26

## 2017-10-12 RX ADMIN — Medication 300 MILLIGRAM(S): at 23:20

## 2017-10-12 RX ADMIN — BUDESONIDE AND FORMOTEROL FUMARATE DIHYDRATE 2 PUFF(S): 160; 4.5 AEROSOL RESPIRATORY (INHALATION) at 21:29

## 2017-10-12 RX ADMIN — Medication 225 MICROGRAM(S): at 07:35

## 2017-10-12 RX ADMIN — BUDESONIDE AND FORMOTEROL FUMARATE DIHYDRATE 2 PUFF(S): 160; 4.5 AEROSOL RESPIRATORY (INHALATION) at 11:26

## 2017-10-12 RX ADMIN — MONTELUKAST 10 MILLIGRAM(S): 4 TABLET, CHEWABLE ORAL at 10:26

## 2017-10-12 RX ADMIN — Medication 300 MILLIGRAM(S): at 11:26

## 2017-10-12 RX ADMIN — Medication 20 MILLIGRAM(S): at 17:39

## 2017-10-12 RX ADMIN — Medication 300 MILLIGRAM(S): at 07:35

## 2017-10-12 RX ADMIN — RIVAROXABAN 15 MILLIGRAM(S): KIT at 17:39

## 2017-10-12 NOTE — PHYSICAL THERAPY INITIAL EVALUATION ADULT - CRITERIA FOR SKILLED THERAPEUTIC INTERVENTIONS
risk reduction/prevention/functional limitations in following categories/predicted duration of therapy intervention/anticipated discharge recommendation/impairments found/rehab potential/therapy frequency

## 2017-10-12 NOTE — PHYSICAL THERAPY INITIAL EVALUATION ADULT - GAIT TRAINING, PT EVAL
Pt will be supervision level of assistance for ambulation with appropriate assistive device for 50 feet in 2-3 sessions

## 2017-10-12 NOTE — DISCHARGE NOTE ADULT - PATIENT PORTAL LINK FT
“You can access the [facility] Patient Portal, offered by [facility], by registering with the following website: [email or web address]”

## 2017-10-12 NOTE — DISCHARGE NOTE ADULT - HOSPITAL COURSE
Immediate repair of incarcerated ventral hernia.  Bowel hemorrhagic but viable.  Uncomplicated recovery

## 2017-10-12 NOTE — DISCHARGE NOTE ADULT - NS AS ACTIVITY OBS
Walking-Outdoors allowed/Stairs allowed/No Heavy lifting/straining/Showering allowed/Walking-Indoors allowed

## 2017-10-12 NOTE — PROGRESS NOTE ADULT - SUBJECTIVE AND OBJECTIVE BOX
POD 3  Afeb VS wnl  WBC down to 9   alb 2.1  Tolerating clears with no nausea  Gas from colostomy, no stool yet  P: PT for ambulaton and discharge planning;  advance diet when colostomy puts out stool

## 2017-10-12 NOTE — PHYSICAL THERAPY INITIAL EVALUATION ADULT - ADDITIONAL COMMENTS
Pt lives alone in a private home with 2 steps to enter. Pt is independent with ambulation using RW and performing all ADLs independently. Daughter assists patient as needed.

## 2017-10-12 NOTE — DISCHARGE NOTE ADULT - MEDICATION SUMMARY - MEDICATIONS TO TAKE
I will START or STAY ON the medications listed below when I get home from the hospital:    lisinopril 5 mg oral tablet  -- 1 tab(s) by mouth once a day  -- Indication: For .    Xarelto 15 mg oral tablet  -- 1 tab(s) by mouth once a day (in the evening)  -- Indication: For .    gabapentin 300 mg oral capsule  -- 1 cap(s) by mouth 3 times a day  -- Indication: For .    Lasix 20 mg oral tablet  -- 1 tab(s) by mouth 2 times a day  -- Indication: For .    montelukast 10 mg oral tablet  -- 1 tab(s) by mouth once a day (in the evening)  -- Indication: For .    magnesium gluconate 500 mg oral tablet  --  by mouth once a day  -- Indication: For .    potassium chloride 20 mEq oral granule, extended release  -- 1 tab(s) by mouth once a day  -- Indication: For .    Fish Oil 1000 mg oral capsule  -- 1 cap(s) by mouth once a day  -- Indication: For .    levothyroxine  -- 225 microgram(s) by mouth once a day  -- Indication: For .    PreserVision oral capsule  -- 1 cap(s) by mouth 2 times a day  -- Indication: For .    Nephro-Tommy oral tablet  -- 1 tab(s) by mouth once a day  -- Indication: For .    Vitamin B6 100 mg oral tablet  -- 1 tab(s) by mouth once a day  -- Indication: For .    Vitamin D3 2000 intl units oral capsule  -- 1 cap(s) by mouth once a day  -- Indication: For .    folic acid  -- 400 milligram(s) by mouth once a day  -- Indication: For .

## 2017-10-12 NOTE — DISCHARGE NOTE ADULT - CARE PROVIDER_API CALL
Edwardo Mckinney), [facility]  [address] [place] [address]  Phone: [phone]  Fax: [phone]

## 2017-10-12 NOTE — PROGRESS NOTE ADULT - ASSESSMENT
89 female with ovarian ca w assoc malignant ascites admitted w abd pain, incarcerated hernia, s/p   s/p emergent colostomy.  afib- xarelto resumed  htn- agree w continuing to hold ace-i for now.   low dose lasix resumed to maintain euvolemic state  hypothyroid - po synthroid resumed
Assessment and Plan:     89 yo female presents PMH of ovarian cancer, ascites, asthma, colon cancer, DMII, HTN, hypothyroidism, over-active bladder presents with incarcerated melchor-umbilical hernia. Was taken to OR, a loop os small bowel was released and became viable. Transferred to ICU for observation 2/2 to Xarelto use for chronic Afib pre-preoperatively. Received Kcentra pre-op.    Plan:   Neuro: continue morphine to 4 mg Q6  for moderate pain and Dilaudid 1 mg for severe pain.  CVS: restart Xarelto 15 mg PO for Afib, hold lisinopril for now  Pulm: no active issues  GI:  stop IVF (Lactate Ringer's), start diet- clear liquids, zofran 4 mg IV Q6 PRN  : GFR is 43, Cr is 1.10 (CKD) stop IVF, lasix 20 mg IV push, check urine output and discontinue Yusuf afterwards  Endo: switch to oral synthroid 200 mcg   ID: start clindamycin 300 mg capsule, Q4 as per ID consult     Monitor I&Os.
Patient is a 89 years old woman with A.fib, Hypothyroidism, and Metastatic Ovarian cancer with malignant ascites managed with palliative paracentesis. Last Paracentesis last week. Patient admitted with incarserated umbilical hernia and seems to be doing well now.
89 yo female presents PMH of ovarian cancer, ascites, asthma, colon cancer, DMII, HTN, hypothyroidism, over-active bladder presents with incarcerated melchor-umbilical hernia. Was taken to OR, a loop os small bowel was released and became viable. Transferred to ICU for observation 2/2 to Xarelto use for chronic Afib pre-preoperatively. Received Kcentra pre-op.    Plan:   Neuro: increase morphine to 4 mg Q6  for moderate pain and Dilaudid 1 mg for severe pain.  CVS: hold Xarelto for now  Pulm: no active issues  GI: continue IVF (Lactate Ringer's), NPO for now, speech and swallow consult for tomorrow, zofran 4 mg IV Q6 PRN  : GFR is 43, Cr is 1.10 (CKD) continue IVF, continue Yusuf  Endo: continue synthroid 100 mcg IV  ID: no active issues    Monitor I&Os.

## 2017-10-12 NOTE — PROGRESS NOTE ADULT - SUBJECTIVE AND OBJECTIVE BOX
Patient is a 89y old  Female who presents with a chief complaint of painful ventral hernia (12 Oct 2017 16:15)  pod 3, colostomy for incarcerated hernia. on clears      INTERVAL HPI/OVERNIGHT EVENTS:  T(C): 36.4 (10-12-17 @ 14:13), Max: 37.2 (10-11-17 @ 20:00)  HR: 80 (10-12-17 @ 14:13) (79 - 80)  BP: 111/69 (10-12-17 @ 14:13) (96/53 - 123/58)  RR: 19 (10-12-17 @ 14:13) (19 - 30)  SpO2: 100% (10-12-17 @ 14:13) (96% - 100%)  Wt(kg): --  I&O's Summary    11 Oct 2017 07:01  -  12 Oct 2017 07:00  --------------------------------------------------------  IN: 1440 mL / OUT: 1440 mL / NET: 0 mL    12 Oct 2017 07:01  -  12 Oct 2017 18:08  --------------------------------------------------------  IN: 702 mL / OUT: 250 mL / NET: 452 mL        LABS:                        10.6   9.0   )-----------( 325      ( 12 Oct 2017 05:54 )             34.4     10-12    142  |  106  |  20  ----------------------------<  115<H>  3.8   |  30  |  1.00    Ca    7.7<L>      12 Oct 2017 05:54  Phos  2.3     10-12  Mg     2.1     10-12    TPro  6.0  /  Alb  2.1<L>  /  TBili  0.6  /  DBili  x   /  AST  10<L>  /  ALT  8<L>  /  AlkPhos  70  10-12        MEDICATIONS  (STANDING):  buDESOnide  80 MICROgram(s)/formoterol 4.5 MICROgram(s) Inhaler 2 Puff(s) Inhalation two times a day  clindamycin   Capsule 300 milliGRAM(s) Oral every 6 hours  furosemide    Tablet 20 milliGRAM(s) Oral two times a day  levothyroxine 225 MICROGram(s) Oral daily  montelukast 10 milliGRAM(s) Oral daily  rivaroxaban 15 milliGRAM(s) Oral every 24 hours    MEDICATIONS  (PRN):  acetaminophen   Tablet. 650 milliGRAM(s) Oral every 6 hours PRN Mild Pain (1 - 3)  morphine  - Injectable 2 milliGRAM(s) IV Push every 4 hours PRN Severe Pain (7 - 10)  ondansetron Injectable 4 milliGRAM(s) IV Push every 6 hours PRN Nausea and/or Vomiting  oxyCODONE    IR 5 milliGRAM(s) Oral every 4 hours PRN Moderate Pain (4 - 6)      REVIEW OF SYSTEMS:  CONSTITUTIONAL: No fever  EYES: No eye pain, visual disturbances, or discharge  ENMT:  No difficulty hearing, tinnitus, vertigo; No sinus or throat pain  NECK: No pain or stiffness  RESPIRATORY: No cough, wheezing, chills or hemoptysis; No shortness of breath  CARDIOVASCULAR: No chest pain, palpitations, dizziness, or leg swelling  GASTROINTESTINAL: No abdominal or epigastric pain. no stool in colostomy yet  SKIN: erythema of lle as noted  LYMPH NODES: No enlarged glands  ERADIOLOGY & ADDITIONAL TESTS:    Imaging Personally Reviewed:  [y ] YES  [ ] NO    Consultant(s) Notes Reviewed:  [ ] YES  [ ] NO    PHYSICAL EXAM:  GENERAL: NAD,  HEAD:  Atraumatic, Normocephalic  EYES: EOMI, PERRLA, conjunctiva and sclera clear  ENMT: No tonsillar erythema, exudates, or enlargement; Moist mucous membranes, Good dentition, No lesions  NECK: Supple, No JVD, Normal thyroid  NERVOUS SYSTEM:  Alert & Oriented X3, Good concentration; Motor Strength 5/5 B/L upper and lower extremities; DTRs 2+ intact and symmetric  CHEST/LUNG: Clear to percussion bilaterally; No rales, rhonchi, wheezing, or rubs  HEART: Regular rate and rhythm; No murmurs, rubs, or gallops  ABDOMEN: Soft, Nontender, colostomy  EXTREMITIES:  lle erythema c/w cellulitis  LYMPH: No lymphadenopathy noted  SKIN: No rashes or lesions    Care Discussed with Consultants/Other Providers [ ] YES  [ ] NO

## 2017-10-12 NOTE — PHYSICAL THERAPY INITIAL EVALUATION ADULT - TRANSFER TRAINING, PT EVAL
Pt will be supervision level of assistance for sit to stand/ stand to sit transfers with appropriate AD in 2-3 sessions

## 2017-10-12 NOTE — PROGRESS NOTE ADULT - SUBJECTIVE AND OBJECTIVE BOX
24 hour events:     pt was seen by wound care specialist that recommended antibiotic treatment for her cellulitis on right lateral calve. Overnight patient had no fevers or chills, no headache, no nausea no chest pain. Patient is getting uot of bed.     Review of Systems:  Constitutional: No fever, chills  Neuro: No headache, numbness, weakness  Resp: No cough, wheezing, shortness of breath  CVS: No chest pain, palpitations  GI: Sensitive around the incision when strains abdominal wall, no nausea, vomiting, or diarrhea   : No dysuria  Skin: slight itching of skin where cellulitis is  Msk: No joint pain or swelling        T(F): 98 (10-12-17 @ 08:00), Max: 99 (10-11-17 @ 20:00)  HR: 80 (10-12-17 @ 07:00) (80 - 80)  BP: 112/58 (10-12-17 @ 07:00) (96/53 - 123/58)  RR: 22 (10-12-17 @ 07:00) (17 - 30)  SpO2: 97% (10-12-17 @ 07:00) (96% - 100%)  Wt(kg): --        I&O's Summary    11 Oct 2017 07:01  -  12 Oct 2017 07:00  --------------------------------------------------------  IN: 1440 mL / OUT: 1440 mL / NET: 0 mL        Physical Exam:   Gen: well developed and well nourished,   Neuro: A&O x3, awake and alert  HEENT: NCAT, PERRL bilaterally, supple  Resp: breah sounds diminished bilaterally, expiratory and inspiratory wheezing on right lung and expiratory wheezing on left lung, no ronchi or crackles	  CVS: S1S2 normal, no murmurs, gallops or rubs  Abd:  Ext:  Skin:    Meds:  clindamycin   Capsule Oral      levothyroxine Oral      HYDROmorphone  Injectable IV Push PRN  morphine  - Injectable IV Push PRN  morphine  - Injectable IV Push PRN  ondansetron Injectable IV Push PRN      rivaroxaban Oral                                        10.6   9.0   )-----------( 325      ( 12 Oct 2017 05:54 )             34.4       10-12    142  |  106  |  20  ----------------------------<  115<H>  3.8   |  30  |  1.00    Ca    7.7<L>      12 Oct 2017 05:54  Phos  2.3     10-12  Mg     2.1     10-12    TPro  6.0  /  Alb  2.1<L>  /  TBili  0.6  /  DBili  x   /  AST  10<L>  /  ALT  8<L>  /  AlkPhos  70  10-12          PT/INR - ( 10 Oct 2017 09:23 )   PT: 13.9 sec;   INR: 1.27 ratio         PTT - ( 10 Oct 2017 09:23 )  PTT:28.2 sec    Peritoneal PERITONEAL FLUID   No growth to date.   No polymorphonuclear cells seen per low power field  No organisms seen per oil power field  by cytocentrifuge 10-10 @ 01:11              Radiology: ***    Bedside ultrasound: ***    CENTRAL LINE: N/Y          DATE INSERTED:              REMOVE: Y/N  CULP: N/Y                       DATE INSERTED:              REMOVE: Y/N  A-LINE: N/Y                       DATE INSERTED:              REMOVE: Y/N    GLOBAL ISSUE/BEST PRACTICE:  Analgesia:  Sedation:  CAM-ICU:   HOB elevation: yes  Stress ulcer prophylaxis:  VTE prophylaxis:  Glycemic control:  Nutrition:    CODE STATUS: *** 24 hour events:     pt was seen by wound care specialist that recommended antibiotic treatment for her cellulitis on right lateral calve. Overnight patient had no fevers or chills, no headache, no nausea no chest pain. Patient is getting uot of bed.     Review of Systems:  Constitutional: No fever, chills  Neuro: No headache, numbness, weakness  Resp: No cough, wheezing, shortness of breath  CVS: No chest pain, palpitations  GI: Sensitive around the incision when strains abdominal wall, no nausea, vomiting, or diarrhea   : No dysuria  Skin: slight itching of skin where cellulitis is  Msk: No joint pain or swelling        T(F): 98 (10-12-17 @ 08:00), Max: 99 (10-11-17 @ 20:00)  HR: 80 (10-12-17 @ 07:00) (80 - 80)  BP: 112/58 (10-12-17 @ 07:00) (96/53 - 123/58)  RR: 22 (10-12-17 @ 07:00) (17 - 30)  SpO2: 97% (10-12-17 @ 07:00) (96% - 100%)      I&O's Summary    11 Oct 2017 07:01  -  12 Oct 2017 07:00  --------------------------------------------------------  IN: 1440 mL / OUT: 1440 mL / NET: 0 mL        Physical Exam:   Gen: well developed and well nourished,   Neuro: A&O x3, awake and alert  HEENT: NCAT, PERRL bilaterally, supple  Resp: breah sounds diminished bilaterally, expiratory and inspiratory wheezing on right lung and expiratory wheezing on left lung, no ronchi or crackles	  CVS: S1S2 normal, no murmurs, gallops or rubs  Abd: soft; ND; +mild TTP; normal BS; +LLQ colostomy with air  Ext: no edema; pulses intact  Skin: +area of erythema with minimal purulent drainage over right leg, improved; warm and well perfused    Meds:  clindamycin   Capsule Oral  levothyroxine Oral  HYDROmorphone  Injectable IV Push PRN  morphine  - Injectable IV Push PRN  morphine  - Injectable IV Push PRN  ondansetron Injectable IV Push PRN  rivaroxaban Oral                          10.6   9.0   )-----------( 325      ( 12 Oct 2017 05:54 )             34.4     142  |  106  |  20  ------------------------<  115<H>  3.8   |  30  |  1.00    Ca    7.7<L>      12 Oct 2017 05:54  Phos  2.3     10-12  Mg     2.1     10-12    TPro  6.0  /  Alb  2.1<L>  /  TBili  0.6  /  DBili  x   /  AST  10<L>  /  ALT  8<L>  /  AlkPhos  70  10-12    PT/INR - ( 10 Oct 2017 09:23 )   PT: 13.9 sec;   INR: 1.27 ratio    PTT - ( 10 Oct 2017 09:23 )  PTT:28.2 sec    Peritoneal PERITONEAL FLUID   No growth to date.   No polymorphonuclear cells seen per low power field  No organisms seen per oil power field  by cytocentrifuge 10-10 @ 01:11      CENTRAL LINE: N  CULP: N  A-LINE: N    GLOBAL ISSUE/BEST PRACTICE:  Analgesia: N  Sedation: N  CAM-ICU: N  HOB elevation: yes  Stress ulcer prophylaxis: n/a  VTE prophylaxis: Y  Glycemic control: Y  Nutrition: Y    CODE STATUS: DNR/DNI

## 2017-10-12 NOTE — DISCHARGE NOTE ADULT - CARE PLAN
Principal Discharge DX:	Ventral hernia with bowel obstruction  Goal:	na  Instructions for follow-up, activity and diet:	as above

## 2017-10-13 VITALS
HEART RATE: 80 BPM | OXYGEN SATURATION: 97 % | TEMPERATURE: 98 F | DIASTOLIC BLOOD PRESSURE: 69 MMHG | SYSTOLIC BLOOD PRESSURE: 100 MMHG | RESPIRATION RATE: 18 BRPM

## 2017-10-13 LAB
ALBUMIN SERPL ELPH-MCNC: 2.2 G/DL — LOW (ref 3.3–5)
ALP SERPL-CCNC: 77 U/L — SIGNIFICANT CHANGE UP (ref 40–120)
ALT FLD-CCNC: 10 U/L — LOW (ref 12–78)
ANION GAP SERPL CALC-SCNC: 7 MMOL/L — SIGNIFICANT CHANGE UP (ref 5–17)
AST SERPL-CCNC: 16 U/L — SIGNIFICANT CHANGE UP (ref 15–37)
BASOPHILS # BLD AUTO: 0.1 K/UL — SIGNIFICANT CHANGE UP (ref 0–0.2)
BASOPHILS NFR BLD AUTO: 0.8 % — SIGNIFICANT CHANGE UP (ref 0–2)
BILIRUB SERPL-MCNC: 0.6 MG/DL — SIGNIFICANT CHANGE UP (ref 0.2–1.2)
BUN SERPL-MCNC: 18 MG/DL — SIGNIFICANT CHANGE UP (ref 7–23)
CALCIUM SERPL-MCNC: 7.7 MG/DL — LOW (ref 8.5–10.1)
CHLORIDE SERPL-SCNC: 104 MMOL/L — SIGNIFICANT CHANGE UP (ref 96–108)
CO2 SERPL-SCNC: 27 MMOL/L — SIGNIFICANT CHANGE UP (ref 22–31)
CREAT SERPL-MCNC: 0.71 MG/DL — SIGNIFICANT CHANGE UP (ref 0.5–1.3)
EOSINOPHIL # BLD AUTO: 0.1 K/UL — SIGNIFICANT CHANGE UP (ref 0–0.5)
EOSINOPHIL NFR BLD AUTO: 1.8 % — SIGNIFICANT CHANGE UP (ref 0–6)
GLUCOSE SERPL-MCNC: 97 MG/DL — SIGNIFICANT CHANGE UP (ref 70–99)
HCT VFR BLD CALC: 35.7 % — SIGNIFICANT CHANGE UP (ref 34.5–45)
HGB BLD-MCNC: 11 G/DL — LOW (ref 11.5–15.5)
LYMPHOCYTES # BLD AUTO: 0.7 K/UL — LOW (ref 1–3.3)
LYMPHOCYTES # BLD AUTO: 10.6 % — LOW (ref 13–44)
MAGNESIUM SERPL-MCNC: 2 MG/DL — SIGNIFICANT CHANGE UP (ref 1.6–2.6)
MCHC RBC-ENTMCNC: 26.3 PG — LOW (ref 27–34)
MCHC RBC-ENTMCNC: 30.8 GM/DL — LOW (ref 32–36)
MCV RBC AUTO: 85.6 FL — SIGNIFICANT CHANGE UP (ref 80–100)
MONOCYTES # BLD AUTO: 0.6 K/UL — SIGNIFICANT CHANGE UP (ref 0–0.9)
MONOCYTES NFR BLD AUTO: 8.9 % — SIGNIFICANT CHANGE UP (ref 1–9)
NEUTROPHILS # BLD AUTO: 5.3 K/UL — SIGNIFICANT CHANGE UP (ref 1.8–7.4)
NEUTROPHILS NFR BLD AUTO: 77.8 % — HIGH (ref 43–77)
PHOSPHATE SERPL-MCNC: 2.4 MG/DL — LOW (ref 2.5–4.5)
PLATELET # BLD AUTO: 339 K/UL — SIGNIFICANT CHANGE UP (ref 150–400)
POTASSIUM SERPL-MCNC: 3.5 MMOL/L — SIGNIFICANT CHANGE UP (ref 3.5–5.3)
POTASSIUM SERPL-SCNC: 3.5 MMOL/L — SIGNIFICANT CHANGE UP (ref 3.5–5.3)
PROT SERPL-MCNC: 6.1 G/DL — SIGNIFICANT CHANGE UP (ref 6–8.3)
RBC # BLD: 4.17 M/UL — SIGNIFICANT CHANGE UP (ref 3.8–5.2)
RBC # FLD: 15.7 % — HIGH (ref 10.3–14.5)
SODIUM SERPL-SCNC: 138 MMOL/L — SIGNIFICANT CHANGE UP (ref 135–145)
WBC # BLD: 6.8 K/UL — SIGNIFICANT CHANGE UP (ref 3.8–10.5)
WBC # FLD AUTO: 6.8 K/UL — SIGNIFICANT CHANGE UP (ref 3.8–10.5)

## 2017-10-13 RX ADMIN — Medication 300 MILLIGRAM(S): at 05:47

## 2017-10-13 RX ADMIN — Medication 20 MILLIGRAM(S): at 05:47

## 2017-10-13 RX ADMIN — RIVAROXABAN 15 MILLIGRAM(S): KIT at 18:05

## 2017-10-13 RX ADMIN — Medication 300 MILLIGRAM(S): at 12:19

## 2017-10-13 RX ADMIN — MONTELUKAST 10 MILLIGRAM(S): 4 TABLET, CHEWABLE ORAL at 12:19

## 2017-10-13 RX ADMIN — Medication 20 MILLIGRAM(S): at 18:05

## 2017-10-13 RX ADMIN — BUDESONIDE AND FORMOTEROL FUMARATE DIHYDRATE 2 PUFF(S): 160; 4.5 AEROSOL RESPIRATORY (INHALATION) at 05:58

## 2017-10-13 RX ADMIN — Medication 225 MICROGRAM(S): at 05:47

## 2017-10-13 RX ADMIN — Medication 300 MILLIGRAM(S): at 18:05

## 2017-10-13 NOTE — PROGRESS NOTE ADULT - PROVIDER SPECIALTY LIST ADULT
Anesthesia
Cardiology
Critical Care
Critical Care
Heme/Onc
Internal Medicine
Internal Medicine
Surgery
Critical Care
Internal Medicine

## 2017-10-13 NOTE — PROGRESS NOTE ADULT - SUBJECTIVE AND OBJECTIVE BOX
Afeb VS wnl  WBC wnl  Feels well on liquids  Colostomy bag full of gas and stool  Abd: NT ND dressing dry  P: low fiber diet and discharge home

## 2017-10-13 NOTE — PROGRESS NOTE ADULT - ATTENDING COMMENTS
pt comfortable with normal bowel sounds no fever tolerating diet.  to be discharged today
89F PMH metastatic ovarian cancer s/p colostomy for obstruction, DM2, afib on xarelto, HTN, HLD, CAD, s/p PPM who presents with SBO due to  incarcerated ventral hernia POD#3 s/p ventral hernia repair.    - Pain control  - HD stable, restart lisinopril and furosemide  - Continue Rivaroxaban 15 mg qd for A/C  - Stable from pulmonary perspective, incentive spirometry, OOB to chair  - Continue with clears, f/up surgery, will advance diet when has stool  - Stable kidney function, replete hypophosphatemia  - Continue synthroid  - Continue with Clindamycin for R leg wound cellulitis (day 2/7), f/up wound care  - DVT ppx  - Discussed with patient at bedside  - Stable for floors
89F PMH metastatic ovarian cancer s/p colostomy for obstruction, DM2, afib on xarelto, HTN, HLD, CAD, s/p PPM who presents with SBO due to  incarcerated ventral hernia POD#2 s/p ventral hernia repair.    - Pain control  - HD stable, hold lisinopril, restart furosemide given low urine output, d/c IVF since tolerating clears  - Restart Rivaroxaban 15 mg qd for A/C  - Stable from pulmonary perspective, incentive spirometry, OOB to chair  - Start clears, f/up surgery  - Mild CKD stable, stable lytes   - Continue synthroid  - Start Clindamycin for R leg wound cellulitis, f/up wound care  - DVT ppx  - Discussed with patient at bedside  - Stable for floors
89 yo F with hx metastatic ovarian cancer resulting in colostomy for obstruction, DM2, afib on xarelto, htn, PPM, POD 1 s/p ventral hernia repair.      --still with pain, add dilaudid for severe pain, continue morphine for moderate pain  --htn, CAD, CHF, Afib  Afib rate controlled and hemodynamically stable  was anticoagulated with xarelto, s/p Kcentra preop  restart AC when surgically appropriate  --respiratory status stable  --continue NPO, NGT, IVF  --CKD, low UOP, trial of IVF bolus  --hypothyroid, continue synthroid  --SCD for ppx  --plan discussed with pt

## 2017-10-13 NOTE — PROGRESS NOTE ADULT - SUBJECTIVE AND OBJECTIVE BOX
Patient is a 89y old  Female who presents with a chief complaint of incarcerated ventral hernia with SBO, severe pain (12 Oct 2017 16:15)      INTERVAL HPI/OVERNIGHT EVENTS:  T(C): 36.9 (10-13-17 @ 14:20), Max: 36.9 (10-13-17 @ 05:06)  HR: 80 (10-13-17 @ 14:20) (79 - 80)  BP: 100/69 (10-13-17 @ 14:20) (100/69 - 107/67)  RR: 18 (10-13-17 @ 14:20) (18 - 18)  SpO2: 97% (10-13-17 @ 14:20) (95% - 97%)  Wt(kg): --  I&O's Summary    12 Oct 2017 07:01  -  13 Oct 2017 07:00  --------------------------------------------------------  IN: 702 mL / OUT: 250 mL / NET: 452 mL        PAST MEDICAL & SURGICAL HISTORY:  Ascites, malignant: from advanced ovarian cancer  Colon cancer  Obese  Hypothyroidism  HTN (Hypertension)  Overactive Bladder  DM Type 2 (Diabetes Mellitus, Type 2)  Asthma  Artificial pacemaker  S/P colon resection: with sigmoid colostomy for obstructing ovarian cancer  S/P Cataract Surgery: CASI  History of Tonsillectomy  History of Appendectomy  Status Post Total Knee Replacement: Left      SOCIAL HISTORY  Alcohol:  Tobacco:  Illicit substance use:      FAMILY HISTORY:      LABS:                        11.0   6.8   )-----------( 339      ( 13 Oct 2017 09:31 )             35.7     10-13    138  |  104  |  18  ----------------------------<  97  3.5   |  27  |  0.71    Ca    7.7<L>      13 Oct 2017 09:31  Phos  2.4     10-13  Mg     2.0     10-13    TPro  6.1  /  Alb  2.2<L>  /  TBili  0.6  /  DBili  x   /  AST  16  /  ALT  10<L>  /  AlkPhos  77  10-13        CAPILLARY BLOOD GLUCOSE                MEDICATIONS  (STANDING):  buDESOnide  80 MICROgram(s)/formoterol 4.5 MICROgram(s) Inhaler 2 Puff(s) Inhalation two times a day  clindamycin   Capsule 300 milliGRAM(s) Oral every 6 hours  furosemide    Tablet 20 milliGRAM(s) Oral two times a day  levothyroxine 225 MICROGram(s) Oral daily  montelukast 10 milliGRAM(s) Oral daily  rivaroxaban 15 milliGRAM(s) Oral every 24 hours    MEDICATIONS  (PRN):  acetaminophen   Tablet. 650 milliGRAM(s) Oral every 6 hours PRN Mild Pain (1 - 3)  morphine  - Injectable 2 milliGRAM(s) IV Push every 4 hours PRN Severe Pain (7 - 10)  ondansetron Injectable 4 milliGRAM(s) IV Push every 6 hours PRN Nausea and/or Vomiting  oxyCODONE    IR 5 milliGRAM(s) Oral every 4 hours PRN Moderate Pain (4 - 6)      REVIEW OF SYSTEMS:  CONSTITUTIONAL: No fever, weight loss, or fatigue  EYES: No eye pain, visual disturbances, or discharge  ENMT:  No difficulty hearing, tinnitus, vertigo; No sinus or throat pain  NECK: No pain or stiffness  RESPIRATORY: No cough, wheezing, chills or hemoptysis; No shortness of breath  CARDIOVASCULAR: No chest pain, palpitations, dizziness, or leg swelling  GASTROINTESTINAL: No abdominal or epigastric pain. No nausea, vomiting, or hematemesis; No diarrhea or constipation. No melena or hematochezia.  GENITOURINARY: No dysuria, frequency, hematuria, or incontinence  NEUROLOGICAL: No headaches, memory loss, loss of strength, numbness, or tremors  SKIN: No itching, burning, rashes, or lesions   LYMPH NODES: No enlarged glands  ENDOCRINE: No heat or cold intolerance; No hair loss  MUSCULOSKELETAL: No joint pain or swelling; No muscle, back, or extremity pain  PSYCHIATRIC: No depression, anxiety, mood swings, or difficulty sleeping  HEME/LYMPH: No easy bruising, or bleeding gums  ALLERY AND IMMUNOLOGIC: No hives or eczema    RADIOLOGY & ADDITIONAL TESTS:    Imaging Personally Reviewed:  [ ] YES  [ ] NO    Consultant(s) Notes Reviewed:  [ ] YES  [ ] NO    PHYSICAL EXAM:  GENERAL: NAD, well-groomed, well-developed  HEAD:  Atraumatic, Normocephalic  EYES: EOMI, PERRLA, conjunctiva and sclera clear  ENMT: No tonsillar erythema, exudates, or enlargement; Moist mucous membranes, Good dentition, No lesions  NECK: Supple, No JVD, Normal thyroid  NERVOUS SYSTEM:  Alert & Oriented X3, Good concentration; Motor Strength 5/5 B/L upper and lower extremities; DTRs 2+ intact and symmetric  CHEST/LUNG: Clear to percussion bilaterally; No rales, rhonchi, wheezing, or rubs  HEART: Regular rate and rhythm; No murmurs, rubs, or gallops  ABDOMEN: Soft, Nontender, Nondistended; Bowel sounds present  EXTREMITIES:  2+ Peripheral Pulses, No clubbing, cyanosis, or edema  LYMPH: No lymphadenopathy noted  SKIN: No rashes or lesions    Care Discussed with Consultants/Other Providers [ ] YES  [ ] NO

## 2017-10-14 LAB
CULTURE RESULTS: SIGNIFICANT CHANGE UP
SPECIMEN SOURCE: SIGNIFICANT CHANGE UP

## 2017-10-16 DIAGNOSIS — E20.9 HYPOPARATHYROIDISM, UNSPECIFIED: ICD-10-CM

## 2017-10-16 DIAGNOSIS — Z43.3 ENCOUNTER FOR ATTENTION TO COLOSTOMY: ICD-10-CM

## 2017-10-16 DIAGNOSIS — I13.0 HYPERTENSIVE HEART AND CHRONIC KIDNEY DISEASE WITH HEART FAILURE AND STAGE 1 THROUGH STAGE 4 CHRONIC KIDNEY DISEASE, OR UNSPECIFIED CHRONIC KIDNEY DISEASE: ICD-10-CM

## 2017-10-16 DIAGNOSIS — E66.9 OBESITY, UNSPECIFIED: ICD-10-CM

## 2017-10-16 DIAGNOSIS — K43.6 OTHER AND UNSPECIFIED VENTRAL HERNIA WITH OBSTRUCTION, WITHOUT GANGRENE: ICD-10-CM

## 2017-10-16 DIAGNOSIS — C79.60 SECONDARY MALIGNANT NEOPLASM OF UNSPECIFIED OVARY: ICD-10-CM

## 2017-10-16 DIAGNOSIS — I25.10 ATHEROSCLEROTIC HEART DISEASE OF NATIVE CORONARY ARTERY WITHOUT ANGINA PECTORIS: ICD-10-CM

## 2017-10-16 DIAGNOSIS — R18.0 MALIGNANT ASCITES: ICD-10-CM

## 2017-10-16 DIAGNOSIS — L03.115 CELLULITIS OF RIGHT LOWER LIMB: ICD-10-CM

## 2017-10-16 DIAGNOSIS — Z95.0 PRESENCE OF CARDIAC PACEMAKER: ICD-10-CM

## 2017-10-16 DIAGNOSIS — Z85.038 PERSONAL HISTORY OF OTHER MALIGNANT NEOPLASM OF LARGE INTESTINE: ICD-10-CM

## 2017-10-16 DIAGNOSIS — I48.2 CHRONIC ATRIAL FIBRILLATION: ICD-10-CM

## 2017-10-16 DIAGNOSIS — I50.42 CHRONIC COMBINED SYSTOLIC (CONGESTIVE) AND DIASTOLIC (CONGESTIVE) HEART FAILURE: ICD-10-CM

## 2017-10-16 DIAGNOSIS — N32.81 OVERACTIVE BLADDER: ICD-10-CM

## 2017-10-16 DIAGNOSIS — E03.9 HYPOTHYROIDISM, UNSPECIFIED: ICD-10-CM

## 2017-10-16 DIAGNOSIS — Z90.49 ACQUIRED ABSENCE OF OTHER SPECIFIED PARTS OF DIGESTIVE TRACT: ICD-10-CM

## 2017-10-16 DIAGNOSIS — N18.9 CHRONIC KIDNEY DISEASE, UNSPECIFIED: ICD-10-CM

## 2017-10-16 DIAGNOSIS — J45.909 UNSPECIFIED ASTHMA, UNCOMPLICATED: ICD-10-CM

## 2017-10-16 DIAGNOSIS — E11.22 TYPE 2 DIABETES MELLITUS WITH DIABETIC CHRONIC KIDNEY DISEASE: ICD-10-CM

## 2017-10-16 DIAGNOSIS — Z96.652 PRESENCE OF LEFT ARTIFICIAL KNEE JOINT: ICD-10-CM

## 2017-10-16 DIAGNOSIS — Z79.01 LONG TERM (CURRENT) USE OF ANTICOAGULANTS: ICD-10-CM

## 2017-10-31 ENCOUNTER — OUTPATIENT (OUTPATIENT)
Dept: OUTPATIENT SERVICES | Facility: HOSPITAL | Age: 82
LOS: 1 days | End: 2017-10-31
Payer: COMMERCIAL

## 2017-10-31 DIAGNOSIS — T81.89XA OTHER COMPLICATIONS OF PROCEDURES, NOT ELSEWHERE CLASSIFIED, INITIAL ENCOUNTER: ICD-10-CM

## 2017-10-31 PROCEDURE — G0463: CPT

## 2017-11-01 DIAGNOSIS — J45.909 UNSPECIFIED ASTHMA, UNCOMPLICATED: ICD-10-CM

## 2017-11-01 DIAGNOSIS — Z98.41 CATARACT EXTRACTION STATUS, RIGHT EYE: ICD-10-CM

## 2017-11-01 DIAGNOSIS — I10 ESSENTIAL (PRIMARY) HYPERTENSION: ICD-10-CM

## 2017-11-01 DIAGNOSIS — S81.801D UNSPECIFIED OPEN WOUND, RIGHT LOWER LEG, SUBSEQUENT ENCOUNTER: ICD-10-CM

## 2017-11-01 DIAGNOSIS — E11.9 TYPE 2 DIABETES MELLITUS WITHOUT COMPLICATIONS: ICD-10-CM
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
## 2017-11-01 DIAGNOSIS — E66.01 MORBID (SEVERE) OBESITY DUE TO EXCESS CALORIES: ICD-10-CM

## 2017-11-01 DIAGNOSIS — I48.91 UNSPECIFIED ATRIAL FIBRILLATION: ICD-10-CM

## 2017-11-01 DIAGNOSIS — Z85.43 PERSONAL HISTORY OF MALIGNANT NEOPLASM OF OVARY: ICD-10-CM

## 2017-11-01 DIAGNOSIS — Z90.49 ACQUIRED ABSENCE OF OTHER SPECIFIED PARTS OF DIGESTIVE TRACT: ICD-10-CM

## 2017-11-01 DIAGNOSIS — Z98.42 CATARACT EXTRACTION STATUS, LEFT EYE: ICD-10-CM

## 2017-11-01 DIAGNOSIS — Z90.710 ACQUIRED ABSENCE OF BOTH CERVIX AND UTERUS: ICD-10-CM

## 2017-11-01 DIAGNOSIS — Z93.3 COLOSTOMY STATUS: ICD-10-CM
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
## 2017-11-01 DIAGNOSIS — Z79.899 OTHER LONG TERM (CURRENT) DRUG THERAPY: ICD-10-CM
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
## 2017-11-01 DIAGNOSIS — Z95.0 PRESENCE OF CARDIAC PACEMAKER: ICD-10-CM

## 2017-11-01 DIAGNOSIS — E03.9 HYPOTHYROIDISM, UNSPECIFIED: ICD-10-CM

## 2017-11-01 DIAGNOSIS — Z96.653 PRESENCE OF ARTIFICIAL KNEE JOINT, BILATERAL: ICD-10-CM

## 2017-11-01 DIAGNOSIS — I50.9 HEART FAILURE, UNSPECIFIED: ICD-10-CM

## 2017-11-01 DIAGNOSIS — K42.9 UMBILICAL HERNIA WITHOUT OBSTRUCTION OR GANGRENE: ICD-10-CM
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
## 2017-11-01 DIAGNOSIS — Z98.890 OTHER SPECIFIED POSTPROCEDURAL STATES: ICD-10-CM

## 2017-11-01 DIAGNOSIS — I83.893 VARICOSE VEINS OF BILATERAL LOWER EXTREMITIES WITH OTHER COMPLICATIONS: ICD-10-CM

## 2017-11-03 ENCOUNTER — OUTPATIENT (OUTPATIENT)
Dept: OUTPATIENT SERVICES | Facility: HOSPITAL | Age: 82
LOS: 1 days | End: 2017-11-03
Payer: COMMERCIAL

## 2017-11-03 DIAGNOSIS — C56.9 MALIGNANT NEOPLASM OF UNSPECIFIED OVARY: ICD-10-CM

## 2017-11-03 DIAGNOSIS — R18.0 MALIGNANT ASCITES: ICD-10-CM

## 2017-11-03 PROCEDURE — 49083 ABD PARACENTESIS W/IMAGING: CPT

## 2017-11-06 ENCOUNTER — OUTPATIENT (OUTPATIENT)
Dept: OUTPATIENT SERVICES | Facility: HOSPITAL | Age: 82
LOS: 1 days | End: 2017-11-06
Payer: COMMERCIAL

## 2017-11-06 DIAGNOSIS — I50.22 CHRONIC SYSTOLIC (CONGESTIVE) HEART FAILURE: ICD-10-CM

## 2017-11-06 DIAGNOSIS — E11.9 TYPE 2 DIABETES MELLITUS WITHOUT COMPLICATIONS: ICD-10-CM

## 2017-11-06 DIAGNOSIS — I10 ESSENTIAL (PRIMARY) HYPERTENSION: ICD-10-CM

## 2017-11-06 DIAGNOSIS — Z95.0 PRESENCE OF CARDIAC PACEMAKER: ICD-10-CM

## 2017-11-06 DIAGNOSIS — I48.2 CHRONIC ATRIAL FIBRILLATION: ICD-10-CM

## 2017-11-06 LAB
ALBUMIN SERPL ELPH-MCNC: 2.5 G/DL — LOW (ref 3.3–5)
ALP SERPL-CCNC: 93 U/L — SIGNIFICANT CHANGE UP (ref 40–120)
ALT FLD-CCNC: 16 U/L — SIGNIFICANT CHANGE UP (ref 12–78)
ANION GAP SERPL CALC-SCNC: 6 MMOL/L — SIGNIFICANT CHANGE UP (ref 5–17)
AST SERPL-CCNC: 15 U/L — SIGNIFICANT CHANGE UP (ref 15–37)
BASOPHILS # BLD AUTO: 0.1 K/UL — SIGNIFICANT CHANGE UP (ref 0–0.2)
BASOPHILS NFR BLD AUTO: 1.5 % — SIGNIFICANT CHANGE UP (ref 0–2)
BILIRUB SERPL-MCNC: 0.3 MG/DL — SIGNIFICANT CHANGE UP (ref 0.2–1.2)
BUN SERPL-MCNC: 16 MG/DL — SIGNIFICANT CHANGE UP (ref 7–23)
CALCIUM SERPL-MCNC: 8.1 MG/DL — LOW (ref 8.5–10.1)
CHLORIDE SERPL-SCNC: 106 MMOL/L — SIGNIFICANT CHANGE UP (ref 96–108)
CHOLEST SERPL-MCNC: 125 MG/DL — SIGNIFICANT CHANGE UP (ref 10–199)
CO2 SERPL-SCNC: 29 MMOL/L — SIGNIFICANT CHANGE UP (ref 22–31)
CREAT SERPL-MCNC: 0.92 MG/DL — SIGNIFICANT CHANGE UP (ref 0.5–1.3)
EOSINOPHIL # BLD AUTO: 0.2 K/UL — SIGNIFICANT CHANGE UP (ref 0–0.5)
EOSINOPHIL NFR BLD AUTO: 2.5 % — SIGNIFICANT CHANGE UP (ref 0–6)
GLUCOSE SERPL-MCNC: 100 MG/DL — HIGH (ref 70–99)
HCT VFR BLD CALC: 39.1 % — SIGNIFICANT CHANGE UP (ref 34.5–45)
HDLC SERPL-MCNC: 57 MG/DL — SIGNIFICANT CHANGE UP (ref 40–125)
HGB BLD-MCNC: 12 G/DL — SIGNIFICANT CHANGE UP (ref 11.5–15.5)
LIPID PNL WITH DIRECT LDL SERPL: 56 MG/DL — SIGNIFICANT CHANGE UP
LYMPHOCYTES # BLD AUTO: 0.9 K/UL — LOW (ref 1–3.3)
LYMPHOCYTES # BLD AUTO: 13.7 % — SIGNIFICANT CHANGE UP (ref 13–44)
MCHC RBC-ENTMCNC: 25.8 PG — LOW (ref 27–34)
MCHC RBC-ENTMCNC: 30.8 GM/DL — LOW (ref 32–36)
MCV RBC AUTO: 83.7 FL — SIGNIFICANT CHANGE UP (ref 80–100)
MONOCYTES # BLD AUTO: 0.5 K/UL — SIGNIFICANT CHANGE UP (ref 0–0.9)
MONOCYTES NFR BLD AUTO: 7.8 % — SIGNIFICANT CHANGE UP (ref 1–9)
NEUTROPHILS # BLD AUTO: 4.9 K/UL — SIGNIFICANT CHANGE UP (ref 1.8–7.4)
NEUTROPHILS NFR BLD AUTO: 74.6 % — SIGNIFICANT CHANGE UP (ref 43–77)
PLATELET # BLD AUTO: 430 K/UL — HIGH (ref 150–400)
POTASSIUM SERPL-MCNC: 4 MMOL/L — SIGNIFICANT CHANGE UP (ref 3.5–5.3)
POTASSIUM SERPL-SCNC: 4 MMOL/L — SIGNIFICANT CHANGE UP (ref 3.5–5.3)
PROT SERPL-MCNC: 7 G/DL — SIGNIFICANT CHANGE UP (ref 6–8.3)
RBC # BLD: 4.66 M/UL — SIGNIFICANT CHANGE UP (ref 3.8–5.2)
RBC # FLD: 15.7 % — HIGH (ref 10.3–14.5)
SODIUM SERPL-SCNC: 141 MMOL/L — SIGNIFICANT CHANGE UP (ref 135–145)
TOTAL CHOLESTEROL/HDL RATIO MEASUREMENT: 2.2 RATIO — LOW (ref 3.3–7.1)
TRIGL SERPL-MCNC: 62 MG/DL — SIGNIFICANT CHANGE UP (ref 10–149)
WBC # BLD: 6.5 K/UL — SIGNIFICANT CHANGE UP (ref 3.8–10.5)
WBC # FLD AUTO: 6.5 K/UL — SIGNIFICANT CHANGE UP (ref 3.8–10.5)

## 2017-11-06 PROCEDURE — 80053 COMPREHEN METABOLIC PANEL: CPT

## 2017-11-06 PROCEDURE — 80061 LIPID PANEL: CPT

## 2017-11-06 PROCEDURE — 82248 BILIRUBIN DIRECT: CPT

## 2017-11-06 PROCEDURE — 85027 COMPLETE CBC AUTOMATED: CPT

## 2017-11-14 ENCOUNTER — OUTPATIENT (OUTPATIENT)
Dept: OUTPATIENT SERVICES | Facility: HOSPITAL | Age: 82
LOS: 1 days | Discharge: HOME | End: 2017-11-14
Payer: COMMERCIAL

## 2017-11-14 DIAGNOSIS — S81.801D UNSPECIFIED OPEN WOUND, RIGHT LOWER LEG, SUBSEQUENT ENCOUNTER: ICD-10-CM

## 2017-11-14 PROCEDURE — G0463: CPT

## 2017-11-16 DIAGNOSIS — I10 ESSENTIAL (PRIMARY) HYPERTENSION: ICD-10-CM

## 2017-11-16 DIAGNOSIS — E11.9 TYPE 2 DIABETES MELLITUS WITHOUT COMPLICATIONS: ICD-10-CM

## 2017-11-16 DIAGNOSIS — Z98.42 CATARACT EXTRACTION STATUS, LEFT EYE: ICD-10-CM

## 2017-11-16 DIAGNOSIS — Z93.3 COLOSTOMY STATUS: ICD-10-CM

## 2017-11-16 DIAGNOSIS — Z90.49 ACQUIRED ABSENCE OF OTHER SPECIFIED PARTS OF DIGESTIVE TRACT: ICD-10-CM

## 2017-11-16 DIAGNOSIS — Z95.0 PRESENCE OF CARDIAC PACEMAKER: ICD-10-CM

## 2017-11-16 DIAGNOSIS — Z98.890 OTHER SPECIFIED POSTPROCEDURAL STATES: ICD-10-CM

## 2017-11-16 DIAGNOSIS — E03.9 HYPOTHYROIDISM, UNSPECIFIED: ICD-10-CM
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
## 2017-11-16 DIAGNOSIS — Z79.899 OTHER LONG TERM (CURRENT) DRUG THERAPY: ICD-10-CM
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
## 2017-11-16 DIAGNOSIS — Z98.41 CATARACT EXTRACTION STATUS, RIGHT EYE: ICD-10-CM

## 2017-11-16 DIAGNOSIS — K42.9 UMBILICAL HERNIA WITHOUT OBSTRUCTION OR GANGRENE: ICD-10-CM

## 2017-11-16 DIAGNOSIS — J45.909 UNSPECIFIED ASTHMA, UNCOMPLICATED: ICD-10-CM

## 2017-11-16 DIAGNOSIS — S81.801D UNSPECIFIED OPEN WOUND, RIGHT LOWER LEG, SUBSEQUENT ENCOUNTER: ICD-10-CM

## 2017-11-16 DIAGNOSIS — I48.91 UNSPECIFIED ATRIAL FIBRILLATION: ICD-10-CM

## 2017-11-16 DIAGNOSIS — Z85.43 PERSONAL HISTORY OF MALIGNANT NEOPLASM OF OVARY: ICD-10-CM

## 2017-11-16 DIAGNOSIS — E66.9 OBESITY, UNSPECIFIED: ICD-10-CM
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
## 2017-11-16 DIAGNOSIS — I50.9 HEART FAILURE, UNSPECIFIED: ICD-10-CM

## 2017-11-16 DIAGNOSIS — Z90.710 ACQUIRED ABSENCE OF BOTH CERVIX AND UTERUS: ICD-10-CM

## 2017-11-16 DIAGNOSIS — I83.893 VARICOSE VEINS OF BILATERAL LOWER EXTREMITIES WITH OTHER COMPLICATIONS: ICD-10-CM

## 2017-11-16 DIAGNOSIS — Z96.653 PRESENCE OF ARTIFICIAL KNEE JOINT, BILATERAL: ICD-10-CM
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
## 2017-12-08 ENCOUNTER — OUTPATIENT (OUTPATIENT)
Dept: PREADMISSION | Facility: HOSPITAL | Age: 82
LOS: 1 days | End: 2017-12-08
Payer: COMMERCIAL

## 2017-12-08 DIAGNOSIS — R18.0 MALIGNANT ASCITES: ICD-10-CM

## 2017-12-08 PROCEDURE — 85610 PROTHROMBIN TIME: CPT

## 2017-12-08 PROCEDURE — 85027 COMPLETE CBC AUTOMATED: CPT

## 2017-12-08 PROCEDURE — 85730 THROMBOPLASTIN TIME PARTIAL: CPT

## 2017-12-13 ENCOUNTER — OUTPATIENT (OUTPATIENT)
Dept: OUTPATIENT SERVICES | Facility: HOSPITAL | Age: 82
LOS: 1 days | End: 2017-12-13
Payer: COMMERCIAL

## 2017-12-13 DIAGNOSIS — R18.0 MALIGNANT ASCITES: ICD-10-CM

## 2017-12-13 PROCEDURE — 49083 ABD PARACENTESIS W/IMAGING: CPT

## 2017-12-19 PROCEDURE — 94640 AIRWAY INHALATION TREATMENT: CPT

## 2017-12-19 PROCEDURE — 93005 ELECTROCARDIOGRAM TRACING: CPT

## 2017-12-19 PROCEDURE — 88302 TISSUE EXAM BY PATHOLOGIST: CPT

## 2017-12-19 PROCEDURE — 36415 COLL VENOUS BLD VENIPUNCTURE: CPT

## 2017-12-19 PROCEDURE — 99285 EMERGENCY DEPT VISIT HI MDM: CPT | Mod: 25

## 2017-12-19 PROCEDURE — 86901 BLOOD TYPING SEROLOGIC RH(D): CPT

## 2017-12-19 PROCEDURE — 85610 PROTHROMBIN TIME: CPT

## 2017-12-19 PROCEDURE — 85027 COMPLETE CBC AUTOMATED: CPT

## 2017-12-19 PROCEDURE — 80053 COMPREHEN METABOLIC PANEL: CPT

## 2017-12-19 PROCEDURE — 83605 ASSAY OF LACTIC ACID: CPT

## 2017-12-19 PROCEDURE — 96375 TX/PRO/DX INJ NEW DRUG ADDON: CPT

## 2017-12-19 PROCEDURE — 84100 ASSAY OF PHOSPHORUS: CPT

## 2017-12-19 PROCEDURE — 87205 SMEAR GRAM STAIN: CPT

## 2017-12-19 PROCEDURE — 86900 BLOOD TYPING SEROLOGIC ABO: CPT

## 2017-12-19 PROCEDURE — 87075 CULTR BACTERIA EXCEPT BLOOD: CPT

## 2017-12-19 PROCEDURE — 97161 PT EVAL LOW COMPLEX 20 MIN: CPT

## 2017-12-19 PROCEDURE — 96374 THER/PROPH/DIAG INJ IV PUSH: CPT

## 2017-12-19 PROCEDURE — 83735 ASSAY OF MAGNESIUM: CPT

## 2017-12-19 PROCEDURE — 74176 CT ABD & PELVIS W/O CONTRAST: CPT

## 2017-12-19 PROCEDURE — 86850 RBC ANTIBODY SCREEN: CPT

## 2017-12-19 PROCEDURE — 87070 CULTURE OTHR SPECIMN AEROBIC: CPT

## 2017-12-19 PROCEDURE — 96376 TX/PRO/DX INJ SAME DRUG ADON: CPT

## 2017-12-19 PROCEDURE — 85730 THROMBOPLASTIN TIME PARTIAL: CPT

## 2018-01-05 ENCOUNTER — EMERGENCY (EMERGENCY)
Facility: HOSPITAL | Age: 83
LOS: 1 days | Discharge: HOME | End: 2018-01-05
Attending: EMERGENCY MEDICINE | Admitting: EMERGENCY MEDICINE
Payer: COMMERCIAL

## 2018-01-05 VITALS
HEIGHT: 68 IN | RESPIRATION RATE: 20 BRPM | TEMPERATURE: 98 F | HEART RATE: 80 BPM | DIASTOLIC BLOOD PRESSURE: 56 MMHG | OXYGEN SATURATION: 96 % | WEIGHT: 205.03 LBS | SYSTOLIC BLOOD PRESSURE: 116 MMHG

## 2018-01-05 VITALS
DIASTOLIC BLOOD PRESSURE: 71 MMHG | HEART RATE: 88 BPM | SYSTOLIC BLOOD PRESSURE: 103 MMHG | RESPIRATION RATE: 18 BRPM | OXYGEN SATURATION: 96 % | TEMPERATURE: 98 F

## 2018-01-05 DIAGNOSIS — Z98.890 OTHER SPECIFIED POSTPROCEDURAL STATES: Chronic | ICD-10-CM

## 2018-01-05 LAB
ALBUMIN SERPL ELPH-MCNC: 2.7 G/DL — LOW (ref 3.3–5)
ALP SERPL-CCNC: 99 U/L — SIGNIFICANT CHANGE UP (ref 40–120)
ALT FLD-CCNC: 15 U/L — SIGNIFICANT CHANGE UP (ref 12–78)
ANION GAP SERPL CALC-SCNC: 6 MMOL/L — SIGNIFICANT CHANGE UP (ref 5–17)
APPEARANCE UR: ABNORMAL
APTT BLD: 32.5 SEC — SIGNIFICANT CHANGE UP (ref 27.5–37.4)
AST SERPL-CCNC: 16 U/L — SIGNIFICANT CHANGE UP (ref 15–37)
BACTERIA # UR AUTO: ABNORMAL
BASOPHILS # BLD AUTO: 0.1 K/UL — SIGNIFICANT CHANGE UP (ref 0–0.2)
BASOPHILS NFR BLD AUTO: 0.8 % — SIGNIFICANT CHANGE UP (ref 0–2)
BILIRUB SERPL-MCNC: 0.8 MG/DL — SIGNIFICANT CHANGE UP (ref 0.2–1.2)
BILIRUB UR-MCNC: NEGATIVE — SIGNIFICANT CHANGE UP
BUN SERPL-MCNC: 23 MG/DL — SIGNIFICANT CHANGE UP (ref 7–23)
CALCIUM SERPL-MCNC: 8.3 MG/DL — LOW (ref 8.5–10.1)
CHLORIDE SERPL-SCNC: 107 MMOL/L — SIGNIFICANT CHANGE UP (ref 96–108)
CO2 SERPL-SCNC: 30 MMOL/L — SIGNIFICANT CHANGE UP (ref 22–31)
COLOR SPEC: YELLOW — SIGNIFICANT CHANGE UP
CREAT SERPL-MCNC: 1.1 MG/DL — SIGNIFICANT CHANGE UP (ref 0.5–1.3)
DIFF PNL FLD: ABNORMAL
EOSINOPHIL # BLD AUTO: 0 K/UL — SIGNIFICANT CHANGE UP (ref 0–0.5)
EOSINOPHIL NFR BLD AUTO: 0.1 % — SIGNIFICANT CHANGE UP (ref 0–6)
EPI CELLS # UR: ABNORMAL
GLUCOSE SERPL-MCNC: 128 MG/DL — HIGH (ref 70–99)
GLUCOSE UR QL: NEGATIVE — SIGNIFICANT CHANGE UP
HCT VFR BLD CALC: 35.2 % — SIGNIFICANT CHANGE UP (ref 34.5–45)
HGB BLD-MCNC: 10.8 G/DL — LOW (ref 11.5–15.5)
INR BLD: 1.6 RATIO — HIGH (ref 0.88–1.16)
KETONES UR-MCNC: ABNORMAL
LACTATE SERPL-SCNC: 0.8 MMOL/L — SIGNIFICANT CHANGE UP (ref 0.7–2)
LEUKOCYTE ESTERASE UR-ACNC: ABNORMAL
LYMPHOCYTES # BLD AUTO: 0.6 K/UL — LOW (ref 1–3.3)
LYMPHOCYTES # BLD AUTO: 5 % — LOW (ref 13–44)
MCHC RBC-ENTMCNC: 25.4 PG — LOW (ref 27–34)
MCHC RBC-ENTMCNC: 30.8 GM/DL — LOW (ref 32–36)
MCV RBC AUTO: 82.6 FL — SIGNIFICANT CHANGE UP (ref 80–100)
MONOCYTES # BLD AUTO: 0.9 K/UL — SIGNIFICANT CHANGE UP (ref 0–0.9)
MONOCYTES NFR BLD AUTO: 7.7 % — SIGNIFICANT CHANGE UP (ref 1–9)
NEUTROPHILS # BLD AUTO: 10 K/UL — HIGH (ref 1.8–7.4)
NEUTROPHILS NFR BLD AUTO: 86.4 % — HIGH (ref 43–77)
NITRITE UR-MCNC: NEGATIVE — SIGNIFICANT CHANGE UP
PH UR: 7 — SIGNIFICANT CHANGE UP (ref 5–8)
PLATELET # BLD AUTO: 392 K/UL — SIGNIFICANT CHANGE UP (ref 150–400)
POTASSIUM SERPL-MCNC: 4.1 MMOL/L — SIGNIFICANT CHANGE UP (ref 3.5–5.3)
POTASSIUM SERPL-SCNC: 4.1 MMOL/L — SIGNIFICANT CHANGE UP (ref 3.5–5.3)
PROT SERPL-MCNC: 7.5 G/DL — SIGNIFICANT CHANGE UP (ref 6–8.3)
PROT UR-MCNC: NEGATIVE — SIGNIFICANT CHANGE UP
PROTHROM AB SERPL-ACNC: 17.6 SEC — HIGH (ref 9.8–12.7)
RAPID RVP RESULT: SIGNIFICANT CHANGE UP
RBC # BLD: 4.26 M/UL — SIGNIFICANT CHANGE UP (ref 3.8–5.2)
RBC # FLD: 15.2 % — HIGH (ref 10.3–14.5)
RBC CASTS # UR COMP ASSIST: >50 /HPF (ref 0–4)
SODIUM SERPL-SCNC: 143 MMOL/L — SIGNIFICANT CHANGE UP (ref 135–145)
SP GR SPEC: 1 — LOW (ref 1.01–1.02)
UROBILINOGEN FLD QL: NEGATIVE — SIGNIFICANT CHANGE UP
WBC # BLD: 11.6 K/UL — HIGH (ref 3.8–10.5)
WBC # FLD AUTO: 11.6 K/UL — HIGH (ref 3.8–10.5)
WBC UR QL: ABNORMAL

## 2018-01-05 PROCEDURE — 81001 URINALYSIS AUTO W/SCOPE: CPT

## 2018-01-05 PROCEDURE — 87581 M.PNEUMON DNA AMP PROBE: CPT

## 2018-01-05 PROCEDURE — 87086 URINE CULTURE/COLONY COUNT: CPT

## 2018-01-05 PROCEDURE — 99284 EMERGENCY DEPT VISIT MOD MDM: CPT | Mod: 25

## 2018-01-05 PROCEDURE — 87486 CHLMYD PNEUM DNA AMP PROBE: CPT

## 2018-01-05 PROCEDURE — 76705 ECHO EXAM OF ABDOMEN: CPT

## 2018-01-05 PROCEDURE — 87798 DETECT AGENT NOS DNA AMP: CPT

## 2018-01-05 PROCEDURE — 76705 ECHO EXAM OF ABDOMEN: CPT | Mod: 26

## 2018-01-05 PROCEDURE — 71045 X-RAY EXAM CHEST 1 VIEW: CPT

## 2018-01-05 PROCEDURE — 87040 BLOOD CULTURE FOR BACTERIA: CPT

## 2018-01-05 PROCEDURE — 87633 RESP VIRUS 12-25 TARGETS: CPT

## 2018-01-05 PROCEDURE — 83605 ASSAY OF LACTIC ACID: CPT

## 2018-01-05 PROCEDURE — 80053 COMPREHEN METABOLIC PANEL: CPT

## 2018-01-05 PROCEDURE — 85730 THROMBOPLASTIN TIME PARTIAL: CPT

## 2018-01-05 PROCEDURE — 85610 PROTHROMBIN TIME: CPT

## 2018-01-05 PROCEDURE — 71045 X-RAY EXAM CHEST 1 VIEW: CPT | Mod: 26

## 2018-01-05 PROCEDURE — 99285 EMERGENCY DEPT VISIT HI MDM: CPT

## 2018-01-05 PROCEDURE — 85027 COMPLETE CBC AUTOMATED: CPT

## 2018-01-05 PROCEDURE — 94640 AIRWAY INHALATION TREATMENT: CPT

## 2018-01-05 RX ORDER — IPRATROPIUM/ALBUTEROL SULFATE 18-103MCG
3 AEROSOL WITH ADAPTER (GRAM) INHALATION
Qty: 84 | Refills: 0 | OUTPATIENT
Start: 2018-01-05 | End: 2018-01-11

## 2018-01-05 RX ORDER — POTASSIUM CHLORIDE 20 MEQ
1 PACKET (EA) ORAL
Qty: 0 | Refills: 0 | COMMUNITY

## 2018-01-05 RX ORDER — IPRATROPIUM/ALBUTEROL SULFATE 18-103MCG
3 AEROSOL WITH ADAPTER (GRAM) INHALATION ONCE
Qty: 0 | Refills: 0 | Status: COMPLETED | OUTPATIENT
Start: 2018-01-05 | End: 2018-01-05

## 2018-01-05 RX ADMIN — Medication 3 MILLILITER(S): at 14:09

## 2018-01-05 RX ADMIN — Medication 3 MILLILITER(S): at 09:45

## 2018-01-05 NOTE — ED PROVIDER NOTE - PROGRESS NOTE DETAILS
pt doing better has uri, aware o flabs xray will give additional neb and dc this plan was also discussed with family and they agree with plan

## 2018-01-05 NOTE — ED PROVIDER NOTE - GASTROINTESTINAL, MLM
Abdomen soft, non-tender, no guarding. large non painful umbilical hernia no fluid wave no pain to palp

## 2018-01-05 NOTE — ED PROVIDER NOTE - SKIN, MLM
Skin normal color for race, warm, dry and intact. No evidence of rash. mild distal digital cyanosis of fingers (old)

## 2018-01-05 NOTE — ED PROVIDER NOTE - RESPIRATORY, MLM
Breath sounds mild wet, majority of wheeze clears with vigerious cough, no g f r speaks full sentences

## 2018-01-05 NOTE — ED PROVIDER NOTE - CARE PLAN
Principal Discharge DX:	Shortness of breath Principal Discharge DX:	Shortness of breath  Secondary Diagnosis:	URI (upper respiratory infection)  Secondary Diagnosis:	RAD (reactive airway disease) with wheezing

## 2018-01-05 NOTE — ED ADULT NURSE NOTE - OBJECTIVE STATEMENT
Pt received in bed alert and oriented and has c/o SOB. As per Md's orders IV jory placed blood specimen obtained and sent to the lab. Neb tx given and tolerated well. Nursing care ongoing.

## 2018-01-05 NOTE — ED PROVIDER NOTE - OBJECTIVE STATEMENT
Pt is a 89 yo f who has hx of ovarian cancer with regular paracentesis due to abd ascites. she has colostomy sp strangulated hernia umbilical hernia (now painful due to frequent cough) a cardiac pace maker never smoker hx of asthma  she has been coughing for 2 days and nights with poor sleep. now here because she is sob and coughing

## 2018-01-05 NOTE — ED PROVIDER NOTE - MEDICAL DECISION MAKING DETAILS
pt with multiple medical problems presents with cough and cold and now sob onxarelto hx of cancer surgery

## 2018-01-05 NOTE — ED ADULT NURSE NOTE - PSH
Artificial pacemaker    H/O hernia repair    History of Appendectomy    History of Tonsillectomy    S/P Cataract Surgery  CASI  S/P colon resection  with sigmoid colostomy for obstructing ovarian cancer  Status Post Total Knee Replacement  Left

## 2018-01-05 NOTE — ED PROVIDER NOTE - CONSTITUTIONAL, MLM
normal... Well appearing, well nourished, awake, alert, oriented to person, place, time/situation and in no apparent distress frequent coughing

## 2018-01-06 LAB
CULTURE RESULTS: SIGNIFICANT CHANGE UP
SPECIMEN SOURCE: SIGNIFICANT CHANGE UP

## 2018-01-07 ENCOUNTER — RESULT REVIEW (OUTPATIENT)
Age: 83
End: 2018-01-07

## 2018-01-07 ENCOUNTER — INPATIENT (INPATIENT)
Facility: HOSPITAL | Age: 83
LOS: 3 days | Discharge: HOME HEALTH | DRG: 330 | End: 2018-01-11
Attending: SPECIALIST | Admitting: SPECIALIST
Payer: COMMERCIAL

## 2018-01-07 ENCOUNTER — TRANSCRIPTION ENCOUNTER (OUTPATIENT)
Age: 83
End: 2018-01-07

## 2018-01-07 VITALS
HEIGHT: 68 IN | OXYGEN SATURATION: 99 % | TEMPERATURE: 98 F | WEIGHT: 212.08 LBS | SYSTOLIC BLOOD PRESSURE: 124 MMHG | HEART RATE: 75 BPM | RESPIRATION RATE: 16 BRPM | DIASTOLIC BLOOD PRESSURE: 76 MMHG

## 2018-01-07 DIAGNOSIS — E03.9 HYPOTHYROIDISM, UNSPECIFIED: ICD-10-CM

## 2018-01-07 DIAGNOSIS — R06.02 SHORTNESS OF BREATH: ICD-10-CM

## 2018-01-07 DIAGNOSIS — I48.2 CHRONIC ATRIAL FIBRILLATION: ICD-10-CM

## 2018-01-07 DIAGNOSIS — Z29.9 ENCOUNTER FOR PROPHYLACTIC MEASURES, UNSPECIFIED: ICD-10-CM

## 2018-01-07 DIAGNOSIS — I48.91 UNSPECIFIED ATRIAL FIBRILLATION: ICD-10-CM

## 2018-01-07 DIAGNOSIS — K45.0 OTHER SPECIFIED ABDOMINAL HERNIA WITH OBSTRUCTION, WITHOUT GANGRENE: ICD-10-CM

## 2018-01-07 DIAGNOSIS — J45.909 UNSPECIFIED ASTHMA, UNCOMPLICATED: ICD-10-CM

## 2018-01-07 DIAGNOSIS — Z98.890 OTHER SPECIFIED POSTPROCEDURAL STATES: Chronic | ICD-10-CM

## 2018-01-07 DIAGNOSIS — R60.9 EDEMA, UNSPECIFIED: ICD-10-CM

## 2018-01-07 DIAGNOSIS — I10 ESSENTIAL (PRIMARY) HYPERTENSION: ICD-10-CM

## 2018-01-07 DIAGNOSIS — R18.0 MALIGNANT ASCITES: ICD-10-CM

## 2018-01-07 DIAGNOSIS — E11.9 TYPE 2 DIABETES MELLITUS WITHOUT COMPLICATIONS: ICD-10-CM

## 2018-01-07 DIAGNOSIS — K46.0 UNSPECIFIED ABDOMINAL HERNIA WITH OBSTRUCTION, WITHOUT GANGRENE: Chronic | ICD-10-CM

## 2018-01-07 DIAGNOSIS — R10.9 UNSPECIFIED ABDOMINAL PAIN: ICD-10-CM

## 2018-01-07 LAB
ALBUMIN SERPL ELPH-MCNC: 2.4 G/DL — LOW (ref 3.3–5)
ALP SERPL-CCNC: 95 U/L — SIGNIFICANT CHANGE UP (ref 40–120)
ALT FLD-CCNC: 17 U/L — SIGNIFICANT CHANGE UP (ref 12–78)
ANION GAP SERPL CALC-SCNC: 9 MMOL/L — SIGNIFICANT CHANGE UP (ref 5–17)
APTT BLD: 33.3 SEC — SIGNIFICANT CHANGE UP (ref 27.5–37.4)
AST SERPL-CCNC: 21 U/L — SIGNIFICANT CHANGE UP (ref 15–37)
BASOPHILS # BLD AUTO: 0.1 K/UL — SIGNIFICANT CHANGE UP (ref 0–0.2)
BASOPHILS NFR BLD AUTO: 1.1 % — SIGNIFICANT CHANGE UP (ref 0–2)
BILIRUB SERPL-MCNC: 0.6 MG/DL — SIGNIFICANT CHANGE UP (ref 0.2–1.2)
BLD GP AB SCN SERPL QL: SIGNIFICANT CHANGE UP
BUN SERPL-MCNC: 28 MG/DL — HIGH (ref 7–23)
CALCIUM SERPL-MCNC: 8.2 MG/DL — LOW (ref 8.5–10.1)
CHLORIDE SERPL-SCNC: 103 MMOL/L — SIGNIFICANT CHANGE UP (ref 96–108)
CO2 SERPL-SCNC: 26 MMOL/L — SIGNIFICANT CHANGE UP (ref 22–31)
CREAT SERPL-MCNC: 1.2 MG/DL — SIGNIFICANT CHANGE UP (ref 0.5–1.3)
EOSINOPHIL # BLD AUTO: 0 K/UL — SIGNIFICANT CHANGE UP (ref 0–0.5)
EOSINOPHIL NFR BLD AUTO: 0 % — SIGNIFICANT CHANGE UP (ref 0–6)
GLUCOSE SERPL-MCNC: 145 MG/DL — HIGH (ref 70–99)
HCT VFR BLD CALC: 35.4 % — SIGNIFICANT CHANGE UP (ref 34.5–45)
HGB BLD-MCNC: 11 G/DL — LOW (ref 11.5–15.5)
INR BLD: 1.79 RATIO — HIGH (ref 0.88–1.16)
LACTATE SERPL-SCNC: 0.9 MMOL/L — SIGNIFICANT CHANGE UP (ref 0.7–2)
LYMPHOCYTES # BLD AUTO: 0.5 K/UL — LOW (ref 1–3.3)
LYMPHOCYTES # BLD AUTO: 4 % — LOW (ref 13–44)
MCHC RBC-ENTMCNC: 25.3 PG — LOW (ref 27–34)
MCHC RBC-ENTMCNC: 31 GM/DL — LOW (ref 32–36)
MCV RBC AUTO: 81.8 FL — SIGNIFICANT CHANGE UP (ref 80–100)
MONOCYTES # BLD AUTO: 1 K/UL — HIGH (ref 0–0.9)
MONOCYTES NFR BLD AUTO: 8.8 % — SIGNIFICANT CHANGE UP (ref 1–9)
NEUTROPHILS # BLD AUTO: 9.8 K/UL — HIGH (ref 1.8–7.4)
NEUTROPHILS NFR BLD AUTO: 86.1 % — HIGH (ref 43–77)
NT-PROBNP SERPL-SCNC: 5506 PG/ML — HIGH (ref 0–450)
PLATELET # BLD AUTO: 447 K/UL — HIGH (ref 150–400)
POTASSIUM SERPL-MCNC: 4.6 MMOL/L — SIGNIFICANT CHANGE UP (ref 3.5–5.3)
POTASSIUM SERPL-SCNC: 4.6 MMOL/L — SIGNIFICANT CHANGE UP (ref 3.5–5.3)
PROT SERPL-MCNC: 7.1 G/DL — SIGNIFICANT CHANGE UP (ref 6–8.3)
PROTHROM AB SERPL-ACNC: 19.8 SEC — HIGH (ref 9.8–12.7)
RBC # BLD: 4.33 M/UL — SIGNIFICANT CHANGE UP (ref 3.8–5.2)
RBC # FLD: 15.2 % — HIGH (ref 10.3–14.5)
SODIUM SERPL-SCNC: 138 MMOL/L — SIGNIFICANT CHANGE UP (ref 135–145)
WBC # BLD: 11.3 K/UL — HIGH (ref 3.8–10.5)
WBC # FLD AUTO: 11.3 K/UL — HIGH (ref 3.8–10.5)

## 2018-01-07 PROCEDURE — 74019 RADEX ABDOMEN 2 VIEWS: CPT | Mod: 26

## 2018-01-07 PROCEDURE — 74176 CT ABD & PELVIS W/O CONTRAST: CPT | Mod: 26

## 2018-01-07 PROCEDURE — 71045 X-RAY EXAM CHEST 1 VIEW: CPT | Mod: 26

## 2018-01-07 PROCEDURE — 99223 1ST HOSP IP/OBS HIGH 75: CPT | Mod: AI,GC

## 2018-01-07 PROCEDURE — 49587: CPT | Mod: AS

## 2018-01-07 PROCEDURE — 99285 EMERGENCY DEPT VISIT HI MDM: CPT

## 2018-01-07 PROCEDURE — 88302 TISSUE EXAM BY PATHOLOGIST: CPT | Mod: 26

## 2018-01-07 PROCEDURE — 93010 ELECTROCARDIOGRAM REPORT: CPT

## 2018-01-07 RX ORDER — HYDROMORPHONE HYDROCHLORIDE 2 MG/ML
0.5 INJECTION INTRAMUSCULAR; INTRAVENOUS; SUBCUTANEOUS
Qty: 0 | Refills: 0 | Status: DISCONTINUED | OUTPATIENT
Start: 2018-01-07 | End: 2018-01-09

## 2018-01-07 RX ORDER — CHOLECALCIFEROL (VITAMIN D3) 125 MCG
2000 CAPSULE ORAL DAILY
Qty: 0 | Refills: 0 | Status: DISCONTINUED | OUTPATIENT
Start: 2018-01-07 | End: 2018-01-07

## 2018-01-07 RX ORDER — GABAPENTIN 400 MG/1
300 CAPSULE ORAL THREE TIMES A DAY
Qty: 0 | Refills: 0 | Status: DISCONTINUED | OUTPATIENT
Start: 2018-01-07 | End: 2018-01-07

## 2018-01-07 RX ORDER — LEVOTHYROXINE SODIUM 125 MCG
225 TABLET ORAL DAILY
Qty: 0 | Refills: 0 | Status: DISCONTINUED | OUTPATIENT
Start: 2018-01-07 | End: 2018-01-07

## 2018-01-07 RX ORDER — GLUCAGON INJECTION, SOLUTION 0.5 MG/.1ML
1 INJECTION, SOLUTION SUBCUTANEOUS ONCE
Qty: 0 | Refills: 0 | Status: DISCONTINUED | OUTPATIENT
Start: 2018-01-07 | End: 2018-01-07

## 2018-01-07 RX ORDER — FUROSEMIDE 40 MG
40 TABLET ORAL DAILY
Qty: 0 | Refills: 0 | Status: DISCONTINUED | OUTPATIENT
Start: 2018-01-07 | End: 2018-01-07

## 2018-01-07 RX ORDER — DEXTROSE 50 % IN WATER 50 %
25 SYRINGE (ML) INTRAVENOUS ONCE
Qty: 0 | Refills: 0 | Status: DISCONTINUED | OUTPATIENT
Start: 2018-01-07 | End: 2018-01-07

## 2018-01-07 RX ORDER — CEFAZOLIN SODIUM 1 G
2000 VIAL (EA) INJECTION ONCE
Qty: 0 | Refills: 0 | Status: COMPLETED | OUTPATIENT
Start: 2018-01-07 | End: 2018-01-07

## 2018-01-07 RX ORDER — MONTELUKAST 4 MG/1
10 TABLET, CHEWABLE ORAL DAILY
Qty: 0 | Refills: 0 | Status: DISCONTINUED | OUTPATIENT
Start: 2018-01-07 | End: 2018-01-07

## 2018-01-07 RX ORDER — ALBUTEROL 90 UG/1
1 AEROSOL, METERED ORAL EVERY 4 HOURS
Qty: 0 | Refills: 0 | Status: DISCONTINUED | OUTPATIENT
Start: 2018-01-07 | End: 2018-01-08

## 2018-01-07 RX ORDER — LISINOPRIL 2.5 MG/1
10 TABLET ORAL DAILY
Qty: 0 | Refills: 0 | Status: DISCONTINUED | OUTPATIENT
Start: 2018-01-07 | End: 2018-01-07

## 2018-01-07 RX ORDER — TIOTROPIUM BROMIDE 18 UG/1
1 CAPSULE ORAL; RESPIRATORY (INHALATION) DAILY
Qty: 0 | Refills: 0 | Status: DISCONTINUED | OUTPATIENT
Start: 2018-01-07 | End: 2018-01-08

## 2018-01-07 RX ORDER — IPRATROPIUM/ALBUTEROL SULFATE 18-103MCG
3 AEROSOL WITH ADAPTER (GRAM) INHALATION ONCE
Qty: 0 | Refills: 0 | Status: COMPLETED | OUTPATIENT
Start: 2018-01-07 | End: 2018-01-07

## 2018-01-07 RX ORDER — DEXTROSE 50 % IN WATER 50 %
1 SYRINGE (ML) INTRAVENOUS ONCE
Qty: 0 | Refills: 0 | Status: DISCONTINUED | OUTPATIENT
Start: 2018-01-07 | End: 2018-01-07

## 2018-01-07 RX ORDER — ALBUMIN HUMAN 25 %
500 VIAL (ML) INTRAVENOUS ONCE
Qty: 0 | Refills: 0 | Status: COMPLETED | OUTPATIENT
Start: 2018-01-07 | End: 2018-01-07

## 2018-01-07 RX ORDER — PROTHROMBIN COMPLEX CONCENTRATE (HUMAN) 25.5; 16.5; 24; 22; 22; 26 [IU]/ML; [IU]/ML; [IU]/ML; [IU]/ML; [IU]/ML; [IU]/ML
4800 POWDER, FOR SOLUTION INTRAVENOUS ONCE
Qty: 0 | Refills: 0 | Status: DISCONTINUED | OUTPATIENT
Start: 2018-01-07 | End: 2018-01-07

## 2018-01-07 RX ORDER — MORPHINE SULFATE 50 MG/1
2 CAPSULE, EXTENDED RELEASE ORAL ONCE
Qty: 0 | Refills: 0 | Status: DISCONTINUED | OUTPATIENT
Start: 2018-01-07 | End: 2018-01-07

## 2018-01-07 RX ORDER — DEXTROSE 50 % IN WATER 50 %
12.5 SYRINGE (ML) INTRAVENOUS ONCE
Qty: 0 | Refills: 0 | Status: DISCONTINUED | OUTPATIENT
Start: 2018-01-07 | End: 2018-01-07

## 2018-01-07 RX ORDER — PYRIDOXINE HCL (VITAMIN B6) 100 MG
100 TABLET ORAL DAILY
Qty: 0 | Refills: 0 | Status: DISCONTINUED | OUTPATIENT
Start: 2018-01-07 | End: 2018-01-07

## 2018-01-07 RX ORDER — SODIUM CHLORIDE 9 MG/ML
1000 INJECTION, SOLUTION INTRAVENOUS
Qty: 0 | Refills: 0 | Status: DISCONTINUED | OUTPATIENT
Start: 2018-01-07 | End: 2018-01-08

## 2018-01-07 RX ORDER — ONDANSETRON 8 MG/1
4 TABLET, FILM COATED ORAL ONCE
Qty: 0 | Refills: 0 | Status: DISCONTINUED | OUTPATIENT
Start: 2018-01-07 | End: 2018-01-11

## 2018-01-07 RX ORDER — FOLIC ACID 0.8 MG
400 TABLET ORAL DAILY
Qty: 0 | Refills: 0 | Status: DISCONTINUED | OUTPATIENT
Start: 2018-01-07 | End: 2018-01-07

## 2018-01-07 RX ORDER — ONDANSETRON 8 MG/1
4 TABLET, FILM COATED ORAL ONCE
Qty: 0 | Refills: 0 | Status: COMPLETED | OUTPATIENT
Start: 2018-01-07 | End: 2018-01-07

## 2018-01-07 RX ORDER — OMEGA-3 ACID ETHYL ESTERS 1 G
1 CAPSULE ORAL DAILY
Qty: 0 | Refills: 0 | Status: DISCONTINUED | OUTPATIENT
Start: 2018-01-07 | End: 2018-01-07

## 2018-01-07 RX ORDER — LEVOTHYROXINE SODIUM 125 MCG
112.5 TABLET ORAL AT BEDTIME
Qty: 0 | Refills: 0 | Status: DISCONTINUED | OUTPATIENT
Start: 2018-01-07 | End: 2018-01-08

## 2018-01-07 RX ORDER — INSULIN LISPRO 100/ML
VIAL (ML) SUBCUTANEOUS EVERY 6 HOURS
Qty: 0 | Refills: 0 | Status: DISCONTINUED | OUTPATIENT
Start: 2018-01-07 | End: 2018-01-07

## 2018-01-07 RX ORDER — IPRATROPIUM/ALBUTEROL SULFATE 18-103MCG
3 AEROSOL WITH ADAPTER (GRAM) INHALATION EVERY 6 HOURS
Qty: 0 | Refills: 0 | Status: DISCONTINUED | OUTPATIENT
Start: 2018-01-07 | End: 2018-01-11

## 2018-01-07 RX ORDER — SODIUM CHLORIDE 9 MG/ML
1000 INJECTION, SOLUTION INTRAVENOUS
Qty: 0 | Refills: 0 | Status: DISCONTINUED | OUTPATIENT
Start: 2018-01-07 | End: 2018-01-07

## 2018-01-07 RX ORDER — SODIUM CHLORIDE 9 MG/ML
1000 INJECTION INTRAMUSCULAR; INTRAVENOUS; SUBCUTANEOUS
Qty: 0 | Refills: 0 | Status: DISCONTINUED | OUTPATIENT
Start: 2018-01-07 | End: 2018-01-07

## 2018-01-07 RX ORDER — LISINOPRIL 2.5 MG/1
1 TABLET ORAL
Qty: 0 | Refills: 0 | COMMUNITY

## 2018-01-07 RX ORDER — FUROSEMIDE 40 MG
1 TABLET ORAL
Qty: 0 | Refills: 0 | COMMUNITY

## 2018-01-07 RX ADMIN — Medication 250 MILLILITER(S): at 23:31

## 2018-01-07 RX ADMIN — Medication 3 MILLILITER(S): at 08:44

## 2018-01-07 RX ADMIN — SODIUM CHLORIDE 200 MILLILITER(S): 9 INJECTION INTRAMUSCULAR; INTRAVENOUS; SUBCUTANEOUS at 08:43

## 2018-01-07 RX ADMIN — SODIUM CHLORIDE 75 MILLILITER(S): 9 INJECTION, SOLUTION INTRAVENOUS at 16:42

## 2018-01-07 RX ADMIN — HYDROMORPHONE HYDROCHLORIDE 0.5 MILLIGRAM(S): 2 INJECTION INTRAMUSCULAR; INTRAVENOUS; SUBCUTANEOUS at 16:55

## 2018-01-07 RX ADMIN — MORPHINE SULFATE 2 MILLIGRAM(S): 50 CAPSULE, EXTENDED RELEASE ORAL at 09:05

## 2018-01-07 RX ADMIN — Medication 112.5 MICROGRAM(S): at 21:35

## 2018-01-07 RX ADMIN — Medication 3 MILLILITER(S): at 08:22

## 2018-01-07 RX ADMIN — HYDROMORPHONE HYDROCHLORIDE 0.5 MILLIGRAM(S): 2 INJECTION INTRAMUSCULAR; INTRAVENOUS; SUBCUTANEOUS at 16:40

## 2018-01-07 RX ADMIN — ONDANSETRON 4 MILLIGRAM(S): 8 TABLET, FILM COATED ORAL at 09:05

## 2018-01-07 NOTE — BRIEF OPERATIVE NOTE - PROCEDURE
<<-----Click on this checkbox to enter Procedure Umbilical hernia repair with mesh  01/07/2018  incarcerated with reduction of small bowel and removal of 6+ liters clear ascites  Active  [email or web address]

## 2018-01-07 NOTE — H&P ADULT - PSH
Artificial pacemaker    History of Appendectomy    History of Tonsillectomy    S/P Cataract Surgery  CASI  S/P colon resection  with sigmoid colostomy for obstructing ovarian cancer  Status Post Total Knee Replacement  Left Artificial pacemaker    H/O hernia repair    History of Appendectomy    History of Tonsillectomy    Incarcerated hernia  s/p repair 10/2017  S/P Cataract Surgery  CASI  S/P colon resection  with sigmoid colostomy for obstructing ovarian cancer  Status Post Total Knee Replacement  Left

## 2018-01-07 NOTE — CONSULT NOTE ADULT - SUBJECTIVE AND OBJECTIVE BOX
89 y/o F PMHX ovarian cancer w/ ascites, colon ca s/p colostomy, s/p incarcerated ventral hernia repair 10/2017, DM2, HTN, afib s/p PPM, hypothyroid, obesity, and overactive bladder presents with abdominal pain since this morning found to have an incarcerated umbilical hernia.      Events last 24 hours:   Patient found to have incarcerated umbilical hernia went for laparotomy for umbilical hernia repair and reduction of small bowel and had 6+ liters of clear ascites removed.  Patient tolerated operation well, brought to MICU, currently lying in bed with only complaint being abdominal pain when she coughs.        PAST MEDICAL & SURGICAL HISTORY:  Afib: s/p PPM  Ascites, malignant: from advanced ovarian cancer  Colon cancer  Obese  Hypothyroidism  HTN (Hypertension)  Overactive Bladder  DM Type 2 (Diabetes Mellitus, Type 2)  Asthma  Incarcerated hernia: s/p repair 10/2017  H/O hernia repair  Artificial pacemaker  S/P colon resection: with sigmoid colostomy for obstructing ovarian cancer  S/P Cataract Surgery: CASI  History of Tonsillectomy  History of Appendectomy  Status Post Total Knee Replacement: Left    Allergies  No Known Allergies      FAMILY HISTORY:  No pertinent family history in first degree relatives      Review of Systems:  CONSTITUTIONAL: No fever, chills, or fatigue  EYES: No eye pain, visual disturbances, or discharge  ENMT:  No difficulty hearing, tinnitus, vertigo; No sinus or throat pain  NECK: No pain or stiffness  RESPIRATORY: No cough, wheezing, chills or hemoptysis; No shortness of breath  CARDIOVASCULAR: No chest pain, palpitations, dizziness, or leg swelling  GASTROINTESTINAL: +abdominal pain with cough. No nausea, vomiting, or hematemesis; No diarrhea or constipation. No melena or hematochezia.  GENITOURINARY: No dysuria, frequency, hematuria, or incontinence  NEUROLOGICAL: No headaches, memory loss, loss of strength, numbness, or tremors  SKIN: No itching, burning, rashes, or lesions   MUSCULOSKELETAL: No joint pain or swelling; No muscle, back, or extremity pain  PSYCHIATRIC: No depression, anxiety, mood swings, or difficulty sleeping      Medications:  HYDROmorphone  Injectable 0.5 milliGRAM(s) IV Push every 10 minutes PRN  ondansetron Injectable 4 milliGRAM(s) IV Push once PRN  lactated ringers. 1000 milliLiter(s) IV Continuous <Continuous>      ICU Vital Signs Last 24 Hrs  T(C): 36.7 (07 Jan 2018 17:44), Max: 37.2 (07 Jan 2018 13:02)  T(F): 98.1 (07 Jan 2018 17:44), Max: 98.9 (07 Jan 2018 13:02)  HR: 80 (07 Jan 2018 18:00) (75 - 80)  BP: 106/56 (07 Jan 2018 18:00) (104/57 - 133/74)  BP(mean): 75 (07 Jan 2018 18:00) (75 - 96)  ABP: --  ABP(mean): --  RR: 23 (07 Jan 2018 18:00) (16 - 32)  SpO2: 98% (07 Jan 2018 18:00) (92% - 100%)    Vital Signs Last 24 Hrs  T(C): 36.7 (07 Jan 2018 17:44), Max: 37.2 (07 Jan 2018 13:02)  T(F): 98.1 (07 Jan 2018 17:44), Max: 98.9 (07 Jan 2018 13:02)  HR: 80 (07 Jan 2018 18:00) (75 - 80)  BP: 106/56 (07 Jan 2018 18:00) (104/57 - 133/74)  BP(mean): 75 (07 Jan 2018 18:00) (75 - 96)  RR: 23 (07 Jan 2018 18:00) (16 - 32)  SpO2: 98% (07 Jan 2018 18:00) (92% - 100%)        I&O's Detail    07 Jan 2018 07:01  -  07 Jan 2018 18:38  --------------------------------------------------------  IN:    lactated ringers.: 225 mL  Total IN: 225 mL    OUT:  Indwelling Catheter - Urethral: 230 mL  Total OUT: 230 mL  Total NET: -5 mL      LABS:                        11.0   11.3  )-----------( 447      ( 07 Jan 2018 08:24 )             35.4     01-07    138  |  103  |  28<H>  ----------------------------<  145<H>  4.6   |  26  |  1.20    Ca    8.2<L>      07 Jan 2018 08:24    TPro  7.1  /  Alb  2.4<L>  /  TBili  0.6  /  DBili  x   /  AST  21  /  ALT  17  /  AlkPhos  95  01-07      CAPILLARY BLOOD GLUCOSE      PT/INR - ( 07 Jan 2018 08:24 )   PT: 19.8 sec;   INR: 1.79 ratio    PTT - ( 07 Jan 2018 08:24 )  PTT:33.3 sec    CULTURES:  Culture Results:   No growth to date. (01-05 @ 17:18)  Culture Results:   No growth to date. (01-05 @ 17:16)  Culture Results:   10,000 - 49,000 CFU/mL Aerococcus species  There is no standardized, established, or validated  method for susceptibility testing of this organism.  "Aerococcus spp. are predictably susceptible to penicillin,  ampicillin, tetracycline, and vancomycin.  All isolates are  resistant to sulfonamides"  <10,000 CFU/ml Normal Urogenital yudith present (01-05 @ 16:33)      Physical Examination:    General: Patient resting comfortably in bed.     HEENT: NC/AT, Pupils equal, reactive to light.  Symmetric.    PULM: Symmetrical thorax movement upon respiration, Clear to auscultation bilaterally, no significant sputum production    CVS: Regular rate and rhythm, no murmurs, rubs, or gallops appreciated     ABD: Soft, nondistended, tender around surgical site, normoactive bowel sounds, no masses, surgical dressing clean, dry and intact.     EXT: +2 pitting edema, nontender    SKIN: Warm and well perfused, no rashes noted.    NEURO: Alert, oriented, interactive, nonfocal    RADIOLOGY:   < from: CT Abdomen and Pelvis No Cont (01.07.18 @ 11:49) >  Impression:  Periumbilical hernia containing small bowel and ascites with mild   dilatation of proximal small bowel loops and collapse of distal small   bowel loops; correlate for incarcerated hernia. There is possible partial   small bowel obstruction.  Large left parastomal hernia containing colon and ascites.  Moderate to large volume of abdominal pelvic ascites.

## 2018-01-07 NOTE — H&P ADULT - PROBLEM SELECTOR PLAN 7
Controlled - no home meds  Diet NPO, accuchecks q6h, hypoglycemic protocol, f/u HbA1C Stable - Continue with synthroid

## 2018-01-07 NOTE — H&P ADULT - PROBLEM SELECTOR PLAN 2
likely 2/2 viral syndrome vs pleural effusions  Continue with duonebs   consider repeat RVP  Consider pulm consult likely 2/2 viral syndrome vs pleural effusions  Continue with duonebs and lasix  consider repeat RVP and pulm consult likely 2/2 viral URI vs pleural effusions  Continue with duonebs and lasix  consider repeat RVP and pulm consult likely 2/2 viral URI vs pleural effusions  Continue with duonebs and lasix  RVP was negative on 1/5/18, blood/urine cultures from that visit were negative as well  consider repeat RVP and pulm consult if symptoms persist

## 2018-01-07 NOTE — H&P ADULT - NSHPSOCIALHISTORY_GEN_ALL_CORE
Former smoker > 40years ago social for < 1 year  Denies ETOH or illicit drugs  Lives at home by herself  Ambulate with walker  Flu shot 2017 Former smoker > 40years ago describes as social for < 1 year  Denies ETOH or illicit drugs  Lives at home by herself  Ambulate with walker  Flu shot 2017

## 2018-01-07 NOTE — CONSULT NOTE ADULT - SUBJECTIVE AND OBJECTIVE BOX
Patient is a 89y old  Female who presents with a chief complaint of abdominal pain (07 Jan 2018 11:42)    HPI:  89 y/o F PMHX ovarian cancer w/ ascites, colon ca s/p colostomy, s/p incarcerated ventral hernia repair 10/2017, DM2, HTN, afib s/p PPM, hypothyroid, obesity, and overactive bladder presents with abdominal pain since this morning. Patient reports the abdominal pain is located over the umbilical hernia and near the scar site of the previous hernia. Worst when coughing. Started at 3am. Admits to nausea with 1 episode of NBNB emesis yesterday. Patient states she has had productive cough bringing up green sputum x2 day with increase fatigue. Patient was seen at [facility] ED on 1/5/17 for cough. At that time RVP negative. Currently, denies fever, chills, chest pain, headache, ear pain, nasal congestion, rhinorrhea, increased stool in colostomy bag, dysuria, or hematuria. No sick contacts. No recent travel.     In the ED, VS: afebrile T 97.8, HR 75, /76, RR 16, sat 99% on RA, WBC 11.3, H/H 11/35.4, plat 447, PT 19.8, INR 1.79, PTT 33.3, BUN 28, Cr 1.20, Glu 145, Ca 8.2, lactate 0.9, BNP 5506, Type screen O pos, blood cx/ urine cx/rvp neg from 1/5/17, Received NS @ 200cc/hr, duoneb x2, morphine, zofran, and O2. (07 Jan 2018 11:42)        PAST MEDICAL & SURGICAL HISTORY:  Afib: s/p PPM  Ascites, malignant: from advanced ovarian cancer  Colon cancer  Obese  Hypothyroidism  HTN (Hypertension)  Overactive Bladder  DM Type 2 (Diabetes Mellitus, Type 2)  Asthma  H/O hernia repair  Artificial pacemaker  S/P colon resection: with sigmoid colostomy for obstructing ovarian cancer  S/P Cataract Surgery: CASI  History of Tonsillectomy  History of Appendectomy  Status Post Total Knee Replacement: Left        HEALTH ISSUES - PROBLEM Dx:  Prophylactic measure: Prophylactic measure  DM Type 2 (Diabetes Mellitus, Type 2): DM Type 2 (Diabetes Mellitus, Type 2)  Ascites, malignant: Ascites, malignant  Hypothyroidism: Hypothyroidism  HTN (Hypertension): HTN (Hypertension)  Afib: Afib  Shortness of breath: Shortness of breath  Incarcerated hernia of abdominal cavity: Incarcerated hernia of abdominal cavity            FAMILY HISTORY:  No pertinent family history in first degree relatives        [SOCIAL HISTORY: ]  smoking:   EtOH:   illicit drugs:   occupation:   marital status:         [ALLERGIES/INTOLERANCES:]  Allergies    No Known Allergies    Intolerances          MEDICATIONS  (STANDING):  cholecalciferol 2000 Unit(s) Oral daily  folic acid 400 milliGRAM(s) Oral daily  furosemide    Tablet 40 milliGRAM(s) Oral daily  gabapentin 300 milliGRAM(s) Oral three times a day  levothyroxine 225 MICROGram(s) Oral daily  lisinopril 10 milliGRAM(s) Oral daily  montelukast 10 milliGRAM(s) Oral daily  multivitamin 1 Tablet(s) Oral daily  omega-3-Acid Ethyl Esters 1 Gram(s) Oral daily  prothrombin complex concentrate IVPB (KCENTRA) 4800 International Unit(s) IV Intermittent once  pyridoxine 100 milliGRAM(s) Oral daily  sodium chloride 0.9%. 1000 milliLiter(s) (200 mL/Hr) IV Continuous <Continuous>    MEDICATIONS  (PRN):        [REVIEW OF SYSTEMS: ]  CONSTITUTIONAL: normal   EYES: No eye pain, no visual disturbances, no discharge  ENMT:  no discharge  NECK: No pain, no stiffness  BREASTS: No pain, no masses, no nipple discharge  RESPIRATORY: No cough, no wheezing, no chills, no hemoptysis; No shortness of breath  CARDIOVASCULAR: No chest pain, no palpitations, no dizziness, no leg swelling  GASTROINTESTINAL: No abdominal or epigastric pain. No nausea, no vomiting, no hematemesis; No diarrhea , no constipation. No melena, no hematochezia.  GENITOURINARY: No dysuria, no frequency, no hematuria, no incontinence  NEUROLOGICAL: No headaches, no memory loss, no loss of strength, no numbness, no tremors  SKIN: No itching, no burning, no rashes, no lesions   LYMPH NODES: No enlarged glands  ENDOCRINE: No heat or cold intolerance; No hair loss  MUSCULOSKELETAL: No joint pain or swelling; No muscle, no back, or extremity pain  PSYCHIATRIC: No depression, no anxiety, no mood swings, no difficulty sleeping  HEME/LYMPH: No easy bruising, no bleeding gums    Vital Signs Last 24 Hrs  T(C): 37.2 (07 Jan 2018 13:02), Max: 37.2 (07 Jan 2018 13:02)  T(F): 98.9 (07 Jan 2018 13:02), Max: 98.9 (07 Jan 2018 13:02)  HR: 80 (07 Jan 2018 13:02) (75 - 80)  BP: 126/71 (07 Jan 2018 13:02) (105/62 - 126/71)  BP(mean): --  RR: 16 (07 Jan 2018 13:02) (16 - 16)  SpO2: 94% (07 Jan 2018 13:02) (94% - 100%)    Last Menstrual Period      I&O's Summary        [PHYSICAL EXAM]  General: WN, WD, adult in NAD  HEENT: clear oropharynx, anicteric sclera, pink conjunctivae  Neck: supple, no thryoid mass  CV: normal S1S2 RRR, nomurmur, no rubs, no gallops  Lungs: clear to auscultation BL, no wheezes, no rales, no rhonchi  Abdomen: soft non-tender non-distended, no hepatomegaly, no splenomegaly  Ext: no clubbing, no cyanosis, no edema  Skin: no rashes, no petichiae  Neuro: alert and oriented X3, no focal deficits      [LABS:]                        11.0   11.3  )-----------( 447      ( 07 Jan 2018 08:24 )             35.4     CBC Full  -  ( 07 Jan 2018 08:24 )  WBC Count : 11.3 K/uL  Hemoglobin : 11.0 g/dL  Hematocrit : 35.4 %  Platelet Count - Automated : 447 K/uL  Mean Cell Volume : 81.8 fl  Mean Cell Hemoglobin : 25.3 pg  Mean Cell Hemoglobin Concentration : 31.0 gm/dL  Auto Neutrophil # : 9.8 K/uL  Auto Lymphocyte # : 0.5 K/uL  Auto Monocyte # : 1.0 K/uL  Auto Eosinophil # : 0.0 K/uL  Auto Basophil # : 0.1 K/uL  Auto Neutrophil % : 86.1 %  Auto Lymphocyte % : 4.0 %  Auto Monocyte % : 8.8 %  Auto Eosinophil % : 0.0 %  Auto Basophil % : 1.1 %    01-07    138  |  103  |  28<H>  ----------------------------<  145<H>  4.6   |  26  |  1.20    Ca    8.2<L>      07 Jan 2018 08:24    TPro  7.1  /  Alb  2.4<L>  /  TBili  0.6  /  DBili  x   /  AST  21  /  ALT  17  /  AlkPhos  95  01-07    PT/INR - ( 07 Jan 2018 08:24 )   PT: 19.8 sec;   INR: 1.79 ratio         PTT - ( 07 Jan 2018 08:24 )  PTT:33.3 sec  LIVER FUNCTIONS - ( 07 Jan 2018 08:24 )  Alb: 2.4 g/dL / Pro: 7.1 g/dL / ALK PHOS: 95 U/L / ALT: 17 U/L / AST: 21 U/L / GGT: x               WBC  TREND (5 Days)  WBC Count: 11.3 K/uL (01-07 @ 08:24)  WBC Count: 11.6 K/uL (01-05 @ 11:41)    HGB  TREND (5 Days)  Hemoglobin: 11.0 g/dL (01-07 @ 08:24)  Hemoglobin: 10.8 g/dL (01-05 @ 11:41)    HCT  TREND (5 Days)  Hematocrit: 35.4 % (01-07 @ 08:24)  Hematocrit: 35.2 % (01-05 @ 11:41)    PLT  TREND (5 Days)  Platelet Count - Automated: 447 K/uL (01-07 @ 08:24)  Platelet Count - Automated: 392 K/uL (01-05 @ 11:41)        [BLOOD SMEAR INTERPRETATION: ]  RBC:   WBC:   Plts:       [RADIOLOGY & ADDITIONAL STUDIES:]        I have discussed the above plan of care with patient/family in detail. They expressed understanding of the treatment plan . Risks, benefits and alternatives discussed in detail. I have asked if they have any questions or concerns and appropriately addressed them; all questions answered to their satisfactions and in lay terms.     > xxxxxx minutes spent in direct patient care, examining and counseling patient,  reviewing  the notes, lab data/ imaging , discussion with multidisciplinary team. Patient is a 89y old  Female who presents with a chief complaint of abdominal pain (07 Jan 2018 11:42)    HPI:  89 y/o F PMHX ovarian cancer w/ ascites, colon ca s/p colostomy, s/p incarcerated ventral hernia repair 10/2017, DM2, HTN, afib s/p PPM, hypothyroid, obesity, and overactive bladder presents with abdominal pain since this morning. Patient reports the abdominal pain is located over the umbilical hernia and near the scar site of the previous hernia. Worst when coughing. Started at 3am. Admits to nausea with 1 episode of NBNB emesis yesterday. Patient states she has had productive cough bringing up green sputum x2 day with increase fatigue. Patient was seen at [facility] ED on 1/5/17 for cough. At that time RVP negative. Currently, denies fever, chills, chest pain, headache, ear pain, nasal congestion, rhinorrhea, increased stool in colostomy bag, dysuria, or hematuria. No sick contacts. No recent travel.     In the ED, VS: afebrile T 97.8, HR 75, /76, RR 16, sat 99% on RA, WBC 11.3, H/H 11/35.4, plat 447, PT 19.8, INR 1.79, PTT 33.3, BUN 28, Cr 1.20, Glu 145, Ca 8.2, lactate 0.9, BNP 5506, Type screen O pos, blood cx/ urine cx/rvp neg from 1/5/17, Received NS @ 200cc/hr, duoneb x2, morphine, zofran, and O2. (07 Jan 2018 11:42)    Heme-Onc asked to see pt emergently to assist to anticoagulant issued for planned emergency surgery. Pt is followed by Dr Bright for heme onc outpt. I rvcd call from Dr Raygoza, as pt to undergo emergency surgery. Awaiting call back from Dr Butler, per Dr Raygoza. I was asked to see pt as I was in hospital at time, events unfolding.      Pt with metastatic ovarian cancer off treatment for last 1+ year, on q4wk paracentesis performed by interventional radiology. Pt developed sx which after eval by ER and surgeon are consistent with incarcerated hernia. Pt on Xarelto daily for cardiac reasons, for Afib. Last dose was 5PM. No bleeding otherwise.     I discussed with Dr Mckinney pt case, needs to bring pt to OR ASAP, cannot wait for medication. Will need to be given en-route, or intra-op.   Blood bank director was notified as per usual protocol. Pharmacy notified, medication needed, and use.     I discussed with pt 2 dtr, indication for use, off label use, as no antidote for Xarelto. Discussed that there is risk for thrombosis with K-Centra, with MI, CVA, VTE, PE, and other thrombotic/cardiovascualr complications. Dtrs understood, agreed to have administered as alternative of possible bleeding, and pt has had prior in 10/2017, per them .All questions about medication answered to their satisfaction, and in lay terms. Adequate time was given to ask questions.     PAST MEDICAL & SURGICAL HISTORY:  Afib: s/p PPM  Ascites, malignant: from advanced ovarian cancer  Colon cancer  Obese  Hypothyroidism  HTN (Hypertension)  Overactive Bladder  DM Type 2 (Diabetes Mellitus, Type 2)  Asthma  H/O hernia repair  Artificial pacemaker  S/P colon resection: with sigmoid colostomy for obstructing ovarian cancer  S/P Cataract Surgery: CASI  History of Tonsillectomy  History of Appendectomy  Status Post Total Knee Replacement: Left        HEALTH ISSUES - PROBLEM Dx:  Prophylactic measure: Prophylactic measure  DM Type 2 (Diabetes Mellitus, Type 2): DM Type 2 (Diabetes Mellitus, Type 2)  Ascites, malignant: Ascites, malignant  Hypothyroidism: Hypothyroidism  HTN (Hypertension): HTN (Hypertension)  Afib: Afib  Shortness of breath: Shortness of breath  Incarcerated hernia of abdominal cavity: Incarcerated hernia of abdominal cavity            FAMILY HISTORY:  No pertinent family history in first degree relatives        [SOCIAL HISTORY: ]  smoking: denies  EtOH: denies  illicit drugs: denies  occupation: retired  marital status:         [ALLERGIES/INTOLERANCES:]  Allergies    No Known Allergies    Intolerances          MEDICATIONS  (STANDING):  cholecalciferol 2000 Unit(s) Oral daily  folic acid 400 milliGRAM(s) Oral daily  furosemide    Tablet 40 milliGRAM(s) Oral daily  gabapentin 300 milliGRAM(s) Oral three times a day  levothyroxine 225 MICROGram(s) Oral daily  lisinopril 10 milliGRAM(s) Oral daily  montelukast 10 milliGRAM(s) Oral daily  multivitamin 1 Tablet(s) Oral daily  omega-3-Acid Ethyl Esters 1 Gram(s) Oral daily  prothrombin complex concentrate IVPB (KCENTRA) 4800 International Unit(s) IV Intermittent once  pyridoxine 100 milliGRAM(s) Oral daily  sodium chloride 0.9%. 1000 milliLiter(s) (200 mL/Hr) IV Continuous <Continuous>    MEDICATIONS  (PRN):        [REVIEW OF SYSTEMS: ]  CONSTITUTIONAL: normal   EYES: No eye pain, no visual disturbances, no discharge  ENMT:  no discharge  NECK: No pain, no stiffness  BREASTS: No pain, no masses, no nipple discharge  RESPIRATORY: No cough, no wheezing, no chills, no hemoptysis; No shortness of breath  CARDIOVASCULAR: No chest pain, no palpitations, no dizziness, no leg swelling  GASTROINTESTINAL: No abdominal or epigastric pain. No nausea, no vomiting, no hematemesis; No diarrhea , no constipation. No melena, no hematochezia.  GENITOURINARY: No dysuria, no frequency, no hematuria, no incontinence  NEUROLOGICAL: No headaches, no memory loss, no loss of strength, no numbness, no tremors  SKIN: No itching, no burning, no rashes, no lesions   LYMPH NODES: No enlarged glands  ENDOCRINE: No heat or cold intolerance; No hair loss  MUSCULOSKELETAL: No joint pain or swelling; No muscle, no back, or extremity pain  PSYCHIATRIC: No depression, no anxiety, no mood swings, no difficulty sleeping  HEME/LYMPH: No easy bruising, no bleeding gums    Vital Signs Last 24 Hrs  T(C): 37.2 (07 Jan 2018 13:02), Max: 37.2 (07 Jan 2018 13:02)  T(F): 98.9 (07 Jan 2018 13:02), Max: 98.9 (07 Jan 2018 13:02)  HR: 80 (07 Jan 2018 13:02) (75 - 80)  BP: 126/71 (07 Jan 2018 13:02) (105/62 - 126/71)  BP(mean): --  RR: 16 (07 Jan 2018 13:02) (16 - 16)  SpO2: 94% (07 Jan 2018 13:02) (94% - 100%)    Last Menstrual Period      I&O's Summary        [PHYSICAL EXAM]  General: WN, WD, adult in NAD  HEENT: clear oropharynx, anicteric sclera, pink conjunctivae  Neck: supple, no thryoid mass  CV: normal S1S2 RRR, nomurmur, no rubs, no gallops  Lungs: clear to auscultation BL, no wheezes, no rales, no rhonchi  Abdomen: moderately distended with ulbilical and LLQ hernia, slight painful  Ext: no clubbing, no cyanosis, +edema  Skin: no rashes, no petichiae  Neuro: alert and oriented X3, no focal deficits      [LABS:]                        11.0   11.3  )-----------( 447      ( 07 Jan 2018 08:24 )             35.4     CBC Full  -  ( 07 Jan 2018 08:24 )  WBC Count : 11.3 K/uL  Hemoglobin : 11.0 g/dL  Hematocrit : 35.4 %  Platelet Count - Automated : 447 K/uL  Mean Cell Volume : 81.8 fl  Mean Cell Hemoglobin : 25.3 pg  Mean Cell Hemoglobin Concentration : 31.0 gm/dL  Auto Neutrophil # : 9.8 K/uL  Auto Lymphocyte # : 0.5 K/uL  Auto Monocyte # : 1.0 K/uL  Auto Eosinophil # : 0.0 K/uL  Auto Basophil # : 0.1 K/uL  Auto Neutrophil % : 86.1 %  Auto Lymphocyte % : 4.0 %  Auto Monocyte % : 8.8 %  Auto Eosinophil % : 0.0 %  Auto Basophil % : 1.1 %    01-07  138  |  103  |  28<H>  ----------------------------<  145<H>  4.6   |  26  |  1.20  Ca    8.2<L>      07 Jan 2018 08:24  TPro  7.1  /  Alb  2.4<L>  /  TBili  0.6  /  DBili  x   /  AST  21  /  ALT  17  /  AlkPhos  95  01-07  PT/INR - ( 07 Jan 2018 08:24 )   PT: 19.8 sec;   INR: 1.79 ratio    PTT - ( 07 Jan 2018 08:24 )  PTT:33.3 sec  LIVER FUNCTIONS - ( 07 Jan 2018 08:24 )  Alb: 2.4 g/dL / Pro: 7.1 g/dL / ALK PHOS: 95 U/L / ALT: 17 U/L / AST: 21 U/L / GGT: x               WBC  TREND (5 Days)  WBC Count: 11.3 K/uL (01-07 @ 08:24)  WBC Count: 11.6 K/uL (01-05 @ 11:41)    HGB  TREND (5 Days)  Hemoglobin: 11.0 g/dL (01-07 @ 08:24)  Hemoglobin: 10.8 g/dL (01-05 @ 11:41)    HCT  TREND (5 Days)  Hematocrit: 35.4 % (01-07 @ 08:24)  Hematocrit: 35.2 % (01-05 @ 11:41)    PLT  TREND (5 Days)  Platelet Count - Automated: 447 K/uL (01-07 @ 08:24)  Platelet Count - Automated: 392 K/uL (01-05 @ 11:41)        [BLOOD SMEAR INTERPRETATION: ]  RBC:   WBC:   Plts:       [RADIOLOGY & ADDITIONAL STUDIES:]        I have discussed the above plan of care with patient/family in detail. They expressed understanding of the treatment plan . Risks, benefits and alternatives discussed in detail. I have asked if they have any questions or concerns and appropriately addressed them; all questions answered to their satisfactions and in lay terms.     > xxxxxx minutes spent in direct patient care, examining and counseling patient,  reviewing  the notes, lab data/ imaging , discussion with multidisciplinary team. Patient is a 89y old  Female who presents with a chief complaint of abdominal pain (07 Jan 2018 11:42)    HPI:  89 y/o F PMHX ovarian cancer w/ ascites, colon ca s/p colostomy, s/p incarcerated ventral hernia repair 10/2017, DM2, HTN, afib s/p PPM, hypothyroid, obesity, and overactive bladder presents with abdominal pain since this morning. Patient reports the abdominal pain is located over the umbilical hernia and near the scar site of the previous hernia. Worst when coughing. Started at 3am. Admits to nausea with 1 episode of NBNB emesis yesterday. Patient states she has had productive cough bringing up green sputum x2 day with increase fatigue. Patient was seen at [facility] ED on 1/5/17 for cough. At that time RVP negative. Currently, denies fever, chills, chest pain, headache, ear pain, nasal congestion, rhinorrhea, increased stool in colostomy bag, dysuria, or hematuria. No sick contacts. No recent travel.     In the ED, VS: afebrile T 97.8, HR 75, /76, RR 16, sat 99% on RA, WBC 11.3, H/H 11/35.4, plat 447, PT 19.8, INR 1.79, PTT 33.3, BUN 28, Cr 1.20, Glu 145, Ca 8.2, lactate 0.9, BNP 5506, Type screen O pos, blood cx/ urine cx/rvp neg from 1/5/17, Received NS @ 200cc/hr, duoneb x2, morphine, zofran, and O2. (07 Jan 2018 11:42)    Heme-Onc asked to see pt emergently to assist to anticoagulant issued for planned emergency surgery. Pt is followed by Dr Bright for heme onc outpt. I rvcd call from Dr Raygoza, as pt to undergo emergency surgery. Awaiting call back from Dr Butler, per Dr Raygoza. I was asked to see pt as I was in hospital at time, events unfolding, required emergent evaluation and assistance.      Pt with metastatic ovarian cancer off treatment for last 1+ year, on q4wk paracentesis performed by interventional radiology. Pt developed sx which after eval by ER and surgeon are consistent with incarcerated hernia. Pt on Xarelto daily for cardiac reasons, for Afib. Last dose was 5PM. No bleeding otherwise.      I discussed with Dr Mckinney pt case, needs to bring pt to OR ASAP, cannot wait for medication to prevent bleeding, as had similiar episode in 10/2017 and was rx Kcentra. Will need to be given en-route, or intra-op.   per surgeon pt needs immediate repair of the hernia to prevent gangrene of bowel even though she had xarelto last night.  Risk of strangulation outweighs risk of bleeding; risk of bleeding becomes much higher if bowel resection becomes necessary. Blood bank director was notified as per usual protocol. Pharmacy notified, medication needed, and use. Rx placed stat and pharmacy called as rx placed.       I discussed with pt 2 dtr, indication for use, off label use, as no antidote for Xarelto. Discussed that there is risk for thrombosis with K-Centra, with MI, CVA, VTE, PE, and other thrombotic/cardiovascualr complications. Dtrs understood, agreed to have administered as alternative of possible bleeding, and pt has had prior in 10/2017, per them. All questions about medication answered to their satisfaction, and in lay terms. Adequate time was given to ask questions.     PAST MEDICAL & SURGICAL HISTORY:  Afib: s/p PPM  Ascites, malignant: from advanced ovarian cancer  Colon cancer  Obese  Hypothyroidism  HTN (Hypertension)  Overactive Bladder  DM Type 2 (Diabetes Mellitus, Type 2)  Asthma  H/O hernia repair  Artificial pacemaker  S/P colon resection: with sigmoid colostomy for obstructing ovarian cancer  S/P Cataract Surgery: CASI  History of Tonsillectomy  History of Appendectomy  Status Post Total Knee Replacement: Left        HEALTH ISSUES - PROBLEM Dx:  Prophylactic measure: Prophylactic measure  DM Type 2 (Diabetes Mellitus, Type 2): DM Type 2 (Diabetes Mellitus, Type 2)  Ascites, malignant: Ascites, malignant  Hypothyroidism: Hypothyroidism  HTN (Hypertension): HTN (Hypertension)  Afib: Afib  Shortness of breath: Shortness of breath  Incarcerated hernia of abdominal cavity: Incarcerated hernia of abdominal cavity            FAMILY HISTORY:  No pertinent family history in first degree relatives        [SOCIAL HISTORY: ]  smoking: denies  EtOH: denies  illicit drugs: denies  occupation: retired  marital status:         [ALLERGIES/INTOLERANCES:]  Allergies     No Known Allergies  Intolerances          MEDICATIONS  (STANDING):  cholecalciferol 2000 Unit(s) Oral daily  folic acid 400 milliGRAM(s) Oral daily  furosemide    Tablet 40 milliGRAM(s) Oral daily  gabapentin 300 milliGRAM(s) Oral three times a day  levothyroxine 225 MICROGram(s) Oral daily  lisinopril 10 milliGRAM(s) Oral daily  montelukast 10 milliGRAM(s) Oral daily  multivitamin 1 Tablet(s) Oral daily  omega-3-Acid Ethyl Esters 1 Gram(s) Oral daily  prothrombin complex concentrate IVPB (KCENTRA) 4800 International Unit(s) IV Intermittent once  pyridoxine 100 milliGRAM(s) Oral daily  sodium chloride 0.9%. 1000 milliLiter(s) (200 mL/Hr) IV Continuous <Continuous>    MEDICATIONS  (PRN):        [REVIEW OF SYSTEMS: ]  CONSTITUTIONAL: normal   EYES: No eye pain, no visual disturbances, no discharge  ENMT:  no discharge  NECK: No pain, no stiffness  BREASTS: No pain, no masses, no nipple discharge  RESPIRATORY: No cough, no wheezing, no chills, no hemoptysis; No shortness of breath  CARDIOVASCULAR: No chest pain, no palpitations, no dizziness, no leg swelling  GASTROINTESTINAL: +abdominal or epigastric pain. +nausea, +vomiting, no hematemesis; No diarrhea , no constipation. No melena, no hematochezia.  GENITOURINARY: No dysuria, no frequency, no hematuria, no incontinence  NEUROLOGICAL: No headaches, no memory loss, no loss of strength, no numbness, no tremors  SKIN: No itching, no burning, no rashes, no lesions   LYMPH NODES: No enlarged glands  ENDOCRINE: No heat or cold intolerance; No hair loss  MUSCULOSKELETAL: No joint pain or swelling; No muscle, no back, or extremity pain  PSYCHIATRIC: No depression, no anxiety, no mood swings, no difficulty sleeping  HEME/LYMPH: No easy bruising, no bleeding gums    Vital Signs Last 24 Hrs  T(C): 37.2 (07 Jan 2018 13:02), Max: 37.2 (07 Jan 2018 13:02)  T(F): 98.9 (07 Jan 2018 13:02), Max: 98.9 (07 Jan 2018 13:02)  HR: 80 (07 Jan 2018 13:02) (75 - 80)  BP: 126/71 (07 Jan 2018 13:02) (105/62 - 126/71)  BP(mean): --  RR: 16 (07 Jan 2018 13:02) (16 - 16)  SpO2: 94% (07 Jan 2018 13:02) (94% - 100%)      [PHYSICAL EXAM]  General: WN, WD, adult in NAD  HEENT: clear oropharynx, anicteric sclera, pink conjunctivae  Neck: supple, no thyroid mass  CV: normal S1S2 RRR, no murmur, no rubs, no gallops  Lungs: clear to auscultation BL, no wheezes, no rales, no rhonchi  Abdomen: markedly distended with umbilical and LLQ hernia, slight painful, hypoactive BS  Ext: no clubbing, no cyanosis, +edema  Skin: no rashes, no petechiae  Neuro: alert and oriented X3, no focal deficits      [LABS:]                        11.0   11.3  )-----------( 447      ( 07 Jan 2018 08:24 )             35.4     CBC Full  -  ( 07 Jan 2018 08:24 )  WBC Count : 11.3 K/uL  Hemoglobin : 11.0 g/dL  Hematocrit : 35.4 %  Platelet Count - Automated : 447 K/uL  Mean Cell Volume : 81.8 fl  Mean Cell Hemoglobin : 25.3 pg  Mean Cell Hemoglobin Concentration : 31.0 gm/dL  Auto Neutrophil # : 9.8 K/uL  Auto Lymphocyte # : 0.5 K/uL  Auto Monocyte # : 1.0 K/uL  Auto Eosinophil # : 0.0 K/uL  Auto Basophil # : 0.1 K/uL  Auto Neutrophil % : 86.1 %  Auto Lymphocyte % : 4.0 %  Auto Monocyte % : 8.8 %  Auto Eosinophil % : 0.0 %  Auto Basophil % : 1.1 %    01-07  138  |  103  |  28<H>  ----------------------------<  145<H>  4.6   |  26  |  1.20  Ca    8.2<L>      07 Jan 2018 08:24  TPro  7.1  /  Alb  2.4<L>  /  TBili  0.6  /  DBili  x   /  AST  21  /  ALT  17  /  AlkPhos  95  01-07  PT/INR - ( 07 Jan 2018 08:24 )   PT: 19.8 sec;   INR: 1.79 ratio    PTT - ( 07 Jan 2018 08:24 )  PTT:33.3 sec  LIVER FUNCTIONS - ( 07 Jan 2018 08:24 )  Alb: 2.4 g/dL / Pro: 7.1 g/dL / ALK PHOS: 95 U/L / ALT: 17 U/L / AST: 21 U/L / GGT: x               WBC  TREND (5 Days)  WBC Count: 11.3 K/uL (01-07 @ 08:24)  WBC Count: 11.6 K/uL (01-05 @ 11:41)    HGB  TREND (5 Days)  Hemoglobin: 11.0 g/dL (01-07 @ 08:24)  Hemoglobin: 10.8 g/dL (01-05 @ 11:41)    HCT  TREND (5 Days)  Hematocrit: 35.4 % (01-07 @ 08:24)  Hematocrit: 35.2 % (01-05 @ 11:41)    PLT  TREND (5 Days)  Platelet Count - Automated: 447 K/uL (01-07 @ 08:24)  Platelet Count - Automated: 392 K/uL (01-05 @ 11:41)        [RADIOLOGY & ADDITIONAL STUDIES:]  < from: CT Abdomen and Pelvis No Cont (01.07.18 @ 11:49) >  EXAM:  CT ABDOMEN AND PELVIS                        PROCEDURE DATE:  01/07/2018    INTERPRETATION:  CT ABDOMEN AND PELVIS    CLINICAL INFORMATION:  Abdominal pain. History of umbilical hernia and ovarian cancer.    PROCEDURE:  Using multislice helical CT, without intravenous or oral contrast 2.5 mm sections were obtained from the domes of the diaphragm to the ischial tuberosities.  Coronal reformatted images were performed.    COMPARISON: CT scan 10/9/2017.    FINDINGS: Evaluation of the solid organ parenchyma is limited by the lack of intravenous contrast.     There is artifact related to patient's body habitus degrading image quality.    The evaluation of the gastrointestinal tract is limited without distention.  There is left-sided colonic resection with Chavez's pouch and left lower quadrant colostomy with large parastomal hernia containing nonthickened colon and ascites.  There is a periumbilical hernia containing minimally distended small bowel loops and ascites.  The evaluation of the bowel wall is limited without intravenous contrast.  There is mild dilatation of the small bowel segment just proximal to the hernia, and collapsed of distal small bowel loops.  No pneumatosis or free intraperitoneal air is noted.    There is a moderate to large volume of abdominal and pelvic ascites.    Retroperitoneal lymphadenopathy is stable in appearance.    The evaluation of the solid organ parenchyma is limited without intravenous contrast.  No hydronephrosis is noted.  Cholelithiasis again noted.    There are patchy bibasilar airspace opacities which may reflect atelectasis and/or pneumonia.    The urinary bladder appears unremarkable.    There are multilevel degenerative changes of the spine.    Impression:  Periumbilical hernia containing small bowel and ascites with mild dilatation of proximal small bowel loops and collapse of distal small bowel loops; correlate for incarcerated hernia. There is possible partial small bowel obstruction.    Large left parastomal hernia containing colon and ascites.    Moderate to large volume of abdominal pelvic ascites.    Findings were reviewed with Dr. Mckinney at the time of interpretation on 1/7/2018.  < end of copied text >

## 2018-01-07 NOTE — ED PROVIDER NOTE - OBJECTIVE STATEMENT
seen 2 days ago "Pt is a 89 yo f who has hx of ovarian cancer with regular paracentesis due to abd ascites. she has colostomy sp strangulated hernia umbilical hernia (now painful due to frequent cough) a cardiac pace maker never smoker hx of asthma  she has been coughing for 2 days and nights with poor sleep. now here because she is sob and coughing" with now pain in abd distended painful hernias and sob again seen 2 days ago "Pt is a 89 yo f who has hx of ovarian cancer with regular paracentesis due to abd ascites. she has colostomy sp strangulated hernia umbilical hernia (now painful due to frequent cough) a cardiac pace maker never smoker hx of asthma  she has been coughing for 2 days and nights with poor sleep. now here because she is sob and coughing" with now pain in abd distended painful hernias and sob again she is now here to see dr jarrett as her belly pain is much worse

## 2018-01-07 NOTE — H&P ADULT - PROBLEM SELECTOR PLAN 9
Controlled - no home meds  Diet NPO, accuchecks q6h, hypoglycemic protocol, f/u HbA1C Controlled - no home meds  Diet NPO, accuchecks q6h, hypoglycemic protocol, f/u HbA1C  CKD - acute on chronic? Trend: F/U baseline with PMD

## 2018-01-07 NOTE — H&P ADULT - ATTENDING COMMENTS
The admission plan was discussed in detail with the patient and family members at the bedside. Their questions and concerns were addressed to the best of my ability. They are in agreement with the plan as detailed above. They demonstrated adequate understanding of the counseling which I have provided.      I personally conducted a physical examination of the patient. I personally gathered the patient's history. I edited the above listed findings which were prepared by the listed resident physician. I personally discussed the plan of care with the patient. The questions and concerns were addressed to the best of my ability. The patient is in agreement with the listed treatment plan.     A/P: 89yo F w/ multiple medical comorbidities, has had multiple abd hernias over the previous few months including recent surgical repair. She has afib on xarelto. She presnts w/ incarcerated hernia, set for OR today. [facility] for xarelto offered and accepted. Counseled extensively on risk of VTE and possible sequela, debility/cva/death. Pt aware and agrees to proceed. High risk for intermediate to high risk procedure and may proceed. May require ICU monitoring post-op depending on extent of surgical intervention. Cardio eval pending. I personally discussed the plan w/ pt, family, surgery attending/PA, residency team, RN.

## 2018-01-07 NOTE — H&P ADULT - PROBLEM SELECTOR PLAN 4
Controlled Controlled - continue with lisinopril Essential HTN  Controlled - continue with lisinopril

## 2018-01-07 NOTE — CONSULT NOTE ADULT - CONSULT REASON
Strangulated hernia, on Xarelto, needs emergent surgery
cardiology evaluation . Cardiac arrythmia . Post op
incarcerated umbilical hernia
CVA

## 2018-01-07 NOTE — H&P ADULT - ASSESSMENT
89 y/o F PMHX ovarian cancer w/ ascites, colon ca s/p colostomy, DM2, HTN, afib s/p PPM, hypothyroid, asthma, obesity, and overactive bladder admitted with incarcerated abdominal hernia and 89 y/o F PMHX ovarian cancer w/ ascites, colon ca s/p colostomy, DM2, HTN, afib s/p PPM, hypothyroid, asthma, obesity, and overactive bladder admitted with incarcerated umbilical hernia and shortness of breath likely 2/2 viral syndrome vs pleural effusion. 89 y/o F PMHX ovarian cancer w/ ascites, colon ca s/p colostomy, DM2, HTN, afib s/p PPM, hypothyroid, asthma, obesity, and overactive bladder admitted with incarcerated umbilical hernia and shortness of breath likely 2/2 viral URI vs pleural effusion. 89 y/o F PMHx ovarian cancer w/ recurrent ascites, colon ca s/p colostomy, DM2, HTN, afib s/p PPM, hypothyroid, asthma, obesity, and overactive bladder admitted with incarcerated umbilical hernia and shortness of breath likely 2/2 viral URI vs pleural effusion.

## 2018-01-07 NOTE — ED ADULT NURSE REASSESSMENT NOTE - NS ED NURSE REASSESS COMMENT FT1
family took yellow color bracelet off pt and kept it. pt requesting to keep dentures and hearing aides in until OR

## 2018-01-07 NOTE — H&P ADULT - NSHPPHYSICALEXAM_GEN_ALL_CORE
Physical Exam:  General: Well developed, well nourished, NAD  HEENT: NCAT, PERRLA, EOMI bl, moist mucous membranes   Neck: Supple, nontender, no mass  Neurology: A&Ox3, nonfocal, CN II-XII grossly intact, sensation intact, no gait abnormalities   Respiratory: CTA B/L, No W/R/R  CV: RRR, +S1/S2, no murmurs, rubs or gallops  Abdominal: Soft, NT, ND +BSx4  Extremities: No C/C/E, + peripheral pulses  MSK: Normal ROM, no joint erythema or warmth, no joint swelling   Skin: warm, dry, normal color, no rash or abnormal lesions Physical Exam:  General: NAD, lying comfortably   HEENT: NCAT, PERRLA, EOMI bl, dry mucous membranes   Neck: Supple, nontender, no mass  Neurology: A&Ox3, nonfocal, motor intact, sensation intact  Respiratory: rhonchi present b/l bases  CV: RRR, +S1/S2, no murmurs, rubs or gallops  Abdominal: mildly distended, decreased BS, umbilical hernia,   Extremities: b/l LE edema (+) 1 nonpitting, + peripheral pulses  MSK: Normal ROM, no joint erythema or warmth, no joint swelling   Skin: warm, dry, normal color, no rash or abnormal lesions Physical Exam:  General: NAD, lying comfortably   HEENT: NCAT, PERRLA, EOMI bl, dry mucous membranes   Neck: Supple, nontender, no mass  Neurology: A&Ox3, nonfocal, motor intact, sensation intact  Respiratory: rhonchi present b/l bases  CV: RRR, +S1/S2, no murmurs, rubs or gallops  Abdominal: mildly distended, decreased BS, 6-7 cm diameter incarcerated umbilical hernia with purple discoloration   Extremities: b/l LE edema (+) 1 nonpitting, + peripheral pulses  MSK: Normal ROM, no joint erythema or warmth, no joint swelling   Skin: warm, dry, normal color, no rash or abnormal lesions Vital Signs Last 24 Hrs  T(C): 36.7 (07 Jan 2018 17:44), Max: 37.2 (07 Jan 2018 13:02)  T(F): 98.1 (07 Jan 2018 17:44), Max: 98.9 (07 Jan 2018 13:02)  HR: 80 (07 Jan 2018 17:30) (75 - 80)  BP: 104/57 (07 Jan 2018 17:30) (104/57 - 133/74)  BP(mean): 76 (07 Jan 2018 17:30) (76 - 96)  RR: 27 (07 Jan 2018 17:30) (16 - 32)  SpO2: 95% (07 Jan 2018 17:30) (92% - 100%)    General: lying supine, lying still, appears uncomfortable, appropriate and appropriately verbal  HEENT: NCAT, PERRLA, EOMI bl, dry mucous membranes   Neck: Supple, nontender, no mass  Neurology: A&Ox3, nonfocal, motor intact, sensation intact  Respiratory: rhonchi present b/l bases  CV: RRR, soft audible S1/S2, no murmurs, rubs or gallops  Abdominal: mildly distended, decreased BS, 6-7 cm diameter firm umbilical hernia with purple discoloration of the underlying viscera  Extremities: b/l LE edema, plus 1 nonpitting which is symmetric and chronic as per pt, palpable peripheral pulses  MSK: Normal ROM, no joint erythema or warmth, no joint swelling   Skin: warm, dry, normal color, no rash or abnormal lesions

## 2018-01-07 NOTE — ED PROVIDER NOTE - PROGRESS NOTE DETAILS
dr kolby pendleton dr jarrett paged/aware seen by dr jarrett ct ordered ro strangulated hernia pt comfortable after meds pt has an acute incarcerated bowel to be admitted to dr jarrett

## 2018-01-07 NOTE — CONSULT NOTE ADULT - SUBJECTIVE AND OBJECTIVE BOX
[facility] [place]     Patient is a 89y old  Female who presents with a chief complaint of abdominal pain (07 Jan 2018 11:42)      HPI:  89 y/o F PMHx ovarian cancer w/ recurrent ascites, colon ca s/p colostomy, s/p incarcerated ventral hernia repair 10/2017, DM2, HTN, afib s/p PPM, hypothyroid, obesity, and overactive bladder presents with abdominal pain since this morning. Patient reports the abdominal pain is dull located over the umbilical hernia and near the scar site of the previous hernia. Worst when coughing. Started at 3am. Admits to abdominal distention and nausea. Reports 1 episode of NBNB emesis yesterday am, but tolerated food after episode. Patient has therapeutic paracentesis on regular basis. Next one scheduled for 1/23/18. Patient states she has had productive cough bringing up green sputum x2 day with increase fatigue. Patient was seen at [facility] ED on 1/5/17 for cough and diagnosed with URI. At that time, RVP negative, WBC 11.8, CXR showed left chronic pleural effusion. Us abd showed ascites. Dr. Jenkins did not tap abdomen at that time because ascites was small.     ROS: Currently, denies fever, chills, chest pain, headache, ear pain, nasal congestion, rhinorrhea, increased stool in colostomy bag, dysuria, or hematuria. No sick contacts. No recent travel.     In the ED, VS: afebrile T 97.8, HR 75, /76, RR 16, sat 99% on RA, WBC 11.3, H/H 11/35.4, plat 447, PT 19.8, INR 1.79, PTT 33.3, BUN 28, Cr 1.20, Glu 145, Ca 8.2, lactate 0.9, BNP 5506, Type screen O pos, blood cx/ urine cx/rvp neg from 1/5/17, Received NS @ 200cc/hr, duoneb x2, morphine, zofran, and O2. CT abd /pelvis found Periumbilical hernia containing small bowel and ascites with mild dilatation of proximal small bowel loops and collapse of distal small bowel loops; correlate for incarcerated hernia. There is possible partial small bowel obstruction. Large left parastomal hernia containing colon and ascites. Moderate to large volume of abdominal pelvic ascites. CXR noted is a 2-lead left-sided cardiac device. There is persistent blunting at the left costophrenic angle which may reflect pleural effusion and/or pleural thickening. No new lobar lung consolidation is noted. EKG Diagnosis Line Ventricular-paced rhythm @ 80BPM. (07 Jan 2018 11:42)      HPI:    89y Female for Cardiology Consult    PAST MEDICAL & SURGICAL HISTORY:  Afib: s/p PPM  Ascites, malignant: from advanced ovarian cancer  Colon cancer  Obese  Hypothyroidism  HTN (Hypertension)  Overactive Bladder  DM Type 2 (Diabetes Mellitus, Type 2)  Asthma  Incarcerated hernia: s/p repair 10/2017  H/O hernia repair  Artificial pacemaker  S/P colon resection: with sigmoid colostomy for obstructing ovarian cancer  S/P Cataract Surgery: CASI  History of Tonsillectomy  History of Appendectomy  Status Post Total Knee Replacement: Left      FAMILY HISTORY:  No pertinent family history in first degree relatives      SOCIAL HISTORY:   Alcohol:  Smoking:    Allergies    No Known Allergies    Intolerances        MEDICATIONS  (STANDING):  ALBUTerol    90 MICROgram(s) HFA Inhaler 1 Puff(s) Inhalation every 4 hours  ALBUTerol/ipratropium for Nebulization 3 milliLiter(s) Nebulizer every 6 hours  lactated ringers. 1000 milliLiter(s) (75 mL/Hr) IV Continuous <Continuous>  levothyroxine Injectable 112.5 MICROGram(s) IV Push at bedtime  tiotropium 18 MICROgram(s) Capsule 1 Capsule(s) Inhalation daily    MEDICATIONS  (PRN):  HYDROmorphone  Injectable 0.5 milliGRAM(s) IV Push every 10 minutes PRN Moderate Pain  ondansetron Injectable 4 milliGRAM(s) IV Push once PRN Nausea and/or Vomiting      REVIEW OF SYSTEMS:  CONSTITUTIONAL: No fever, weight loss, chills, shakes, or fat  RESPIRATORY: No cough, wheezing, hemoptysis, or shortness of breath  CARDIOVASCULAR: No chest pain, dyspnea, palpitations, dizziness, syncope, paroxysmal nocturnal dyspnea, orthopnea, or arm or leg swelling  GASTROINTESTINAL: No abdominal  or epigastric pain, nausea, vomiting, hematemesis, diarrhea, constipation, melena or bright red bloo  NEUROLOGICAL: No headaches, memory loss, slurred speech, limb weakness, loss of strength, numbness, or tremors  SKIN: No itching, burning, rashes, or lesions  ENDOCRINE: No heat or cold intolerance, or hair loss  MUSCULOSKELETAL: No joint pain or swelling, muscle, back, or extremity pain  HEME/LYMPH: No easy bruising or bleeding gums  ALLERY AND IMMUNOLOGIC: No hives or rash.    Vital Signs Last 24 Hrs  T(C): 36.6 (07 Jan 2018 20:53), Max: 37.2 (07 Jan 2018 13:02)  T(F): 97.9 (07 Jan 2018 20:53), Max: 98.9 (07 Jan 2018 13:02)  HR: 80 (07 Jan 2018 23:00) (75 - 80)  BP: 81/43 (07 Jan 2018 23:00) (81/43 - 133/74)  BP(mean): 57 (07 Jan 2018 23:00) (57 - 96)  RR: 25 (07 Jan 2018 23:00) (16 - 32)  SpO2: 97% (07 Jan 2018 23:00) (92% - 100%)    PHYSICAL EXAM:  HEAD:  Atraumatic, Normocephalic  EYES: EOMI, PERRLA, conjunctiva and sclera clear  NECK: Supple and normal thyroid.  No JVD or carotid bruit.   HEART: S1, S2 regular , 1/6 soft ejection systolic murmur in mitral area , no thrill and no gallops .  PULMONARY: Bilateral vesicular breathing , few scattered ronchi and few scattered rales are present .  ABDOMEN: Soft nontender and positive bowl sounds   EXTREMITIES:  No clubbing, cyanosis, or pedal  edema  NEUROLOGICAL: AAOX3 , no focal deficit .    LABS:                        11.0   11.3  )-----------( 447      ( 07 Jan 2018 08:24 )             35.4     01-07    138  |  103  |  28<H>  ----------------------------<  145<H>  4.6   |  26  |  1.20    Ca    8.2<L>      07 Jan 2018 08:24    TPro  7.1  /  Alb  2.4<L>  /  TBili  0.6  /  DBili  x   /  AST  21  /  ALT  17  /  AlkPhos  95  01-07        PT/INR - ( 07 Jan 2018 08:24 )   PT: 19.8 sec;   INR: 1.79 ratio         PTT - ( 07 Jan 2018 08:24 )  PTT:33.3 sec    BNPSerum Pro-Brain Natriuretic Peptide: 5506 pg/mL (01-07 @ 08:24)        EKG:  ECHO:  IMAGING:    Assessment and Plan :     Will continue to follow during hospital course and give further recommendations as needed . Thanks for your referral . if any questions please contact me at office ([phone])cell [phone]) [facility] [place]     Patient is a 89y old  Female who presents with a chief complaint of abdominal pain (07 Jan 2018 11:42)      HPI:  89 y/o F PMHx ovarian cancer w/ recurrent ascites, colon ca s/p colostomy, s/p incarcerated ventral hernia repair 10/2017, DM2, HTN, afib s/p PPM, hypothyroid, obesity, and overactive bladder presents with abdominal pain since this morning. Patient reports the abdominal pain is dull located over the umbilical hernia and near the scar site of the previous hernia. Worst when coughing. Started at 3am. Admits to abdominal distention and nausea. Reports 1 episode of NBNB emesis yesterday am, but tolerated food after episode. Patient has therapeutic paracentesis on regular basis. Next one scheduled for 1/23/18. Patient states she has had productive cough bringing up green sputum x2 day with increase fatigue. Patient was seen at [facility] ED on 1/5/17 for cough and diagnosed with URI. At that time, RVP negative, WBC 11.8, CXR showed left chronic pleural effusion. Us abd showed ascites. Dr. Jenkins did not tap abdomen at that time because ascites was small.     ROS: Currently, denies fever, chills, chest pain, headache, ear pain, nasal congestion, rhinorrhea, increased stool in colostomy bag, dysuria, or hematuria. No sick contacts. No recent travel.     In the ED, VS: afebrile T 97.8, HR 75, /76, RR 16, sat 99% on RA, WBC 11.3, H/H 11/35.4, plat 447, PT 19.8, INR 1.79, PTT 33.3, BUN 28, Cr 1.20, Glu 145, Ca 8.2, lactate 0.9, BNP 5506, Type screen O pos, blood cx/ urine cx/rvp neg from 1/5/17, Received NS @ 200cc/hr, duoneb x2, morphine, zofran, and O2. CT abd /pelvis found Periumbilical hernia containing small bowel and ascites with mild dilatation of proximal small bowel loops and collapse of distal small bowel loops; correlate for incarcerated hernia. There is possible partial small bowel obstruction. Large left parastomal hernia containing colon and ascites. Moderate to large volume of abdominal pelvic ascites. CXR noted is a 2-lead left-sided cardiac device. There is persistent blunting at the left costophrenic angle which may reflect pleural effusion and/or pleural thickening. No new lobar lung consolidation is noted. EKG Diagnosis Line Ventricular-paced rhythm @ 80BPM. (07 Jan 2018 11:42)      HPI:    89y Female for Cardiology Consult    PAST MEDICAL & SURGICAL HISTORY:  Afib: s/p PPM  Ascites, malignant: from advanced ovarian cancer  Colon cancer  Obese  Hypothyroidism  HTN (Hypertension)  Overactive Bladder  DM Type 2 (Diabetes Mellitus, Type 2)  Asthma  Incarcerated hernia: s/p repair 10/2017  H/O hernia repair  Artificial pacemaker  S/P colon resection: with sigmoid colostomy for obstructing ovarian cancer  S/P Cataract Surgery: CASI  History of Tonsillectomy  History of Appendectomy  Status Post Total Knee Replacement: Left      FAMILY HISTORY:  No pertinent family history in first degree relatives      SOCIAL HISTORY:   Alcohol:  Smoking:    Allergies    No Known Allergies    Intolerances        MEDICATIONS  (STANDING):  ALBUTerol    90 MICROgram(s) HFA Inhaler 1 Puff(s) Inhalation every 4 hours  ALBUTerol/ipratropium for Nebulization 3 milliLiter(s) Nebulizer every 6 hours  lactated ringers. 1000 milliLiter(s) (75 mL/Hr) IV Continuous <Continuous>  levothyroxine Injectable 112.5 MICROGram(s) IV Push at bedtime  tiotropium 18 MICROgram(s) Capsule 1 Capsule(s) Inhalation daily    MEDICATIONS  (PRN):  HYDROmorphone  Injectable 0.5 milliGRAM(s) IV Push every 10 minutes PRN Moderate Pain  ondansetron Injectable 4 milliGRAM(s) IV Push once PRN Nausea and/or Vomiting      REVIEW OF SYSTEMS:  CONSTITUTIONAL: No fever, weight loss, chills, shakes, or fat  RESPIRATORY: No cough, wheezing, hemoptysis, or shortness of breath  CARDIOVASCULAR: No chest pain, dyspnea, palpitations, dizziness, syncope, paroxysmal nocturnal dyspnea, orthopnea, or arm or leg swelling  GASTROINTESTINAL: No abdominal  or epigastric pain, nausea, vomiting, hematemesis, diarrhea, constipation, melena or bright red bloo  NEUROLOGICAL: No headaches, memory loss, slurred speech, limb weakness, loss of strength, numbness, or tremors  SKIN: No itching, burning, rashes, or lesions  ENDOCRINE: No heat or cold intolerance, or hair loss  MUSCULOSKELETAL: No joint pain or swelling, muscle, back, or extremity pain  HEME/LYMPH: No easy bruising or bleeding gums  ALLERY AND IMMUNOLOGIC: No hives or rash.    Vital Signs Last 24 Hrs  T(C): 36.6 (07 Jan 2018 20:53), Max: 37.2 (07 Jan 2018 13:02)  T(F): 97.9 (07 Jan 2018 20:53), Max: 98.9 (07 Jan 2018 13:02)  HR: 80 (07 Jan 2018 23:00) (75 - 80)  BP: 81/43 (07 Jan 2018 23:00) (81/43 - 133/74)  BP(mean): 57 (07 Jan 2018 23:00) (57 - 96)  RR: 25 (07 Jan 2018 23:00) (16 - 32)  SpO2: 97% (07 Jan 2018 23:00) (92% - 100%)    PHYSICAL EXAM:  HEAD:  Atraumatic, Normocephalic  EYES: EOMI, PERRLA, conjunctiva and sclera clear  NECK: Supple and normal thyroid.  No JVD or carotid bruit.   HEART: S1, S2 regular , 1/6 soft ejection systolic murmur in mitral area , no thrill and no gallops .  PULMONARY: Bilateral vesicular breathing , few scattered ronchi and few scattered rales are present .  ABDOMEN: Soft nontender and positive bowl sounds   EXTREMITIES:  No clubbing, cyanosis, or pedal  edema  NEUROLOGICAL: AAOX3 , no focal deficit .    LABS:                        11.0   11.3  )-----------( 447      ( 07 Jan 2018 08:24 )             35.4     01-07    138  |  103  |  28<H>  ----------------------------<  145<H>  4.6   |  26  |  1.20    Ca    8.2<L>      07 Jan 2018 08:24    TPro  7.1  /  Alb  2.4<L>  /  TBili  0.6  /  DBili  x   /  AST  21  /  ALT  17  /  AlkPhos  95  01-07        PT/INR - ( 07 Jan 2018 08:24 )   PT: 19.8 sec;   INR: 1.79 ratio         PTT - ( 07 Jan 2018 08:24 )  PTT:33.3 sec    BNPSerum Pro-Brain Natriuretic Peptide: 5506 pg/mL (01-07 @ 08:24)        EKG:  ECHO:  IMAGING:    Assessment and Plan :   FULL CONSULT DICTATED .  Patient with H/O CHF , hypertension , atrial fibrillation and PPM has abdominal pain and incarcerated Hernia . cardiac stable so far .  Will continue to follow during hospital course and give further recommendations as needed . Thanks for your referral . if any questions please contact me at office ([phone])cell [phone])

## 2018-01-07 NOTE — CONSULT NOTE ADULT - ATTENDING COMMENTS
89 yo F with Hx metastatic ovarian cancer with ascites, colon cancer with colostomy, POD 0 s/p exlap with reduction of incarcerated hernia.    --pain control  --stable from hemodynamic and resp standpoint  Afib controlled, off AC postop   --NPO for now, IVF  --mild BRITTANEY. improving supportive care  --no infectious issues

## 2018-01-07 NOTE — H&P ADULT - PROBLEM SELECTOR PLAN 8
DVT PPX - OR now - SCD  fall risk, ambulate with assistance, OOB with assistance Continue with montelukast

## 2018-01-07 NOTE — H&P ADULT - NEGATIVE ENMT SYMPTOMS
no throat pain/no ear pain/no vertigo/no sinus symptoms/no nasal congestion/no nasal discharge/no post-nasal discharge

## 2018-01-07 NOTE — ED PROVIDER NOTE - CARE PLAN
Principal Discharge DX:	Abdominal pain  Secondary Diagnosis:	Shortness of breath Principal Discharge DX:	Abdominal pain  Secondary Diagnosis:	Shortness of breath  Secondary Diagnosis:	Incarcerated hernia of abdominal cavity

## 2018-01-07 NOTE — CONSULT NOTE ADULT - ASSESSMENT
89 y/o F PMHX ovarian cancer w/ ascites, colon ca s/p colostomy, s/p incarcerated ventral hernia repair 10/2017, DM2, HTN, afib s/p PPM, hypothyroid, obesity, and overactive bladder presents with abdominal pain since this morning POD #0 of umbilical hernia repair with mesh with 6 L ascites fluid taken off.  Patient tolerated procedure well, resting in bed, pain controlled, only complains of pain when she coughs.         Neuro:   Cards: afib, patinet has ppm, ac held because of laparotomy, surgery following will restart when appropriate  Pulm: history of asthma on montelukast at home, 89 y/o F PMHX ovarian cancer w/ ascites, colon ca s/p colostomy, s/p incarcerated ventral hernia repair 10/2017, DM2, HTN, afib s/p PPM, hypothyroid, obesity, and overactive bladder presents with abdominal pain since this morning POD #0 of umbilical hernia repair with mesh with 6 L ascites fluid taken off.  Patient tolerated procedure well, resting in bed, pain controlled, only complains of pain when she coughs.         Neuro:   Cards: afib, patinet has ppm, ac held because of laparotomy, surgery following will restart when appropriate  Pulm: history of asthma on montelukast at home, will start on duonebs prn.   GI: NPO  Renal:   Endo:   Heme/ONC: chronic ascites with periodic paracentesis, 6L taken off during procedure today. Heme/onc following   ID: 89 y/o F PMHX ovarian cancer w/ ascites, colon ca s/p colostomy, s/p incarcerated ventral hernia repair 10/2017, DM2, HTN, afib s/p PPM, hypothyroid, obesity, and overactive bladder presents with abdominal pain since this morning POD #0 of umbilical hernia repair with mesh with 6 L ascites fluid taken off.  Patient tolerated procedure well, resting in bed, pain controlled, only complains of pain when she coughs.         Neuro:   Cards: afib, patinet has ppm, ac held because of laparotomy, surgery following will restart when appropriate  Pulm: history of asthma on montelukast at home, will start on duonebs prn.   GI: NPO  Renal: BRITTANEY, gentle hydration, hold nephrotoxic drugs, trend urine output, follow BUN/Cr, follow electrolytes replace as needed.    Endo: hx hypothyroidism, restart synthroid.   Heme/ONC: chronic ascites with periodic paracentesis, 6L taken off during procedure today. Heme/onc following

## 2018-01-07 NOTE — H&P ADULT - PROBLEM SELECTOR PLAN 5
Stable - Continue with synthroid chronic - stable   Continue with lasix with hold parameters b/l LE edema, chronic - stable   Continue with lasix with hold parameters

## 2018-01-07 NOTE — H&P ADULT - PROBLEM SELECTOR PLAN 1
Admit to F  Dr. Mckinney - taking patient to the OR now  type and screen  pain control  Medical optimization: Patient is high risk for high risk procedure Admit to F  Dr. Mckinney - taking patient to the OR now  type and screen  pain control, zofran, and IVF  Medical optimization: Patient is high risk for high risk procedure  diet npo Admit to F  Dr. Mckinney - taking patient to the OR now  type and screen  Kcentra - for reversal of INR  pain control, zofran, and IVF  Medical optimization: Patient is high risk for high risk procedure  diet npo Admit to [facility]  Dr. Mckinney - taking patient to the OR now  type and screen  plan for approval from blood bank of Kcentra - for reversal of INR which will be potentially available intra-op  pain control, zofran, and IVF  Medical optimization: she has no h/o complications from anesthesia and no active cardiovascular symptoms. There is urgency for procedure due to incarcerated nature of hernia and risk of threatened perfusion of gut if edema worsens so there are no contraindications for urgent surgical intervention, however pt is high risk for intermediate to high risk procedure depending on the extent of the procedure.   Long discussion was had at bedside discussing risks of surgical intervention with therapeutic levels of xarelto in serum. Pt received last dose 5pm on 1/6/17 and she is headed to OR on afternoon of 1/7/17. I discussed risks/benefits of administration of Kcentra w/ the patient and the pt's family, they request that the medication be administered pre-op. I called hem/onc attending, Dr. Bright's answering service but was unable to get through. I called once and I asked [place] clerk to wait but she waited on hold for >10 minutes and nobody came to the phone. I called Dr. Hon Larson as he was in the building and he consulted on this patient today. Him and I discussed the management plan and coordinated the administration of Kcentra intra-op.   Diet: npo

## 2018-01-07 NOTE — CONSULT NOTE ADULT - SUBJECTIVE AND OBJECTIVE BOX
Pt well known to me from prior Gavin procedure for colonic obstruction caused by advanced gyn cancer in pelvis.  I repaired a stangulated right side ventral hernia in October (bowel was still viable).  She has a separate chronic umbilical hernia which became painful at around 3 AM today with nausea.  CT shows bowel and ascites in the hernia.  She has chronic malignant ascites which is periodically paracentesed by IR here.  Pt took her Xarelto at 5 PM yest.    PE: Afeb  VS wnl  6-7 cm diameter incarcerated umbilical hernia with purple discoloration within (not skin)    PMH and labs reviewed    Pt needs immediate repair of the hernia to prevent gangrene of bowel even though she had xarelto last night.  Risk of strangulation outweighs risk of bleeding; risk of bleeding becomes much higher if bowel resection becomes necessary

## 2018-01-07 NOTE — CONSULT NOTE ADULT - ASSESSMENT
metastatic ovarian cancer on palliative paracentesis.   Afib on dose reduced Xarelto.   Presents with strangulated bowel /incarcerated hernia and requiring emergency surgery.   Possible risk of bleeding from Xarelto.     RECOMMEND:   As discussed with surgeon, to help prevent risk of bleeding, can give dose of K-centra to prevent possbile bleeding during and after srugery. Kcentra Dose 4800 units IV once over 30min.     Watch CBC, eval for bleeding.     With renal dysfx, may have slow clearance If any bleeding or concern, can consider re-dosing    Further mgmt of hernia per surgery.     ADDENDUM:   I spoke and discussed case with Dr LAVONNE Bright, who arrived to hospital shortly after pt went to OR. I updated her on the case, and planned treatment so far. She will take over hermatology-oncology care of patient going forward as pt well known to her. I discussed this also with pt dtr Palmira Pittman, and saira Patel who I saw were in waiting room later in day.

## 2018-01-07 NOTE — H&P ADULT - HISTORY OF PRESENT ILLNESS
89 y/o F PMHX ovarian cancer w/ ascites, DM2, HTN, s/p PPM, hypothyroid, obesity, overactive bladder. she has colostomy sp strangulated hernia umbilical hernia (now painful due to frequent cough  she has been coughing for 2 days and nights with poor sleep. now here because she is sob and coughing" with now pain in abd distended painful hernias and sob again she is now here to see dr jarrett as her belly pain is much worse    In the ED, VS: afebrile T 97.8, HR 75, /76, RR 16, sat 99% on RA, WBC 11.3, H/H 11/35.4, plat 447, PT 19.8, INR 1.79, PTT 33.3, BUN 28, Cr 1.20, Glu 145, Ca 8.2, lactate 0.9, BNP 5506, Type screen O pos, blood cx/ urine cx/rvp neg from 1/5/17, Received NS @ 200cc/hr, duoneb x2, morphine, zofran, and O2. 89 y/o F PMHX ovarian cancer w/ ascites, colon ca s/p colostomy, DM2, HTN, afib s/p PPM, hypothyroid, obesity, and overactive bladder presents with abdominal pain since this morning. Patient reports the abdominal pain is located over the umbilical hernia and near the scar site of the previous hernia. Worst when coughing. Started at 3am. Admits to nausea with 1 episode of NBNB emesis yesterday. Patient states she has had productive cough bringing up green sputum x2 day with increase fatigue. Patient was seen at [place] ED on 1/5/17 for cough. At that time RVP negative. Currently, denies fever, chills, chest pain, headache, ear pain, nasal congestion, rhinorrhea, increased stool in colostomy bag, dysuria, or hematuria. No sick contacts. No recent travel.     In the ED, VS: afebrile T 97.8, HR 75, /76, RR 16, sat 99% on RA, WBC 11.3, H/H 11/35.4, plat 447, PT 19.8, INR 1.79, PTT 33.3, BUN 28, Cr 1.20, Glu 145, Ca 8.2, lactate 0.9, BNP 5506, Type screen O pos, blood cx/ urine cx/rvp neg from 1/5/17, Received NS @ 200cc/hr, duoneb x2, morphine, zofran, and O2. 89 y/o F PMHX ovarian cancer w/ ascites, colon ca s/p colostomy, s/p incarcerated ventral hernia repair 10/2017, DM2, HTN, afib s/p PPM, hypothyroid, obesity, and overactive bladder presents with abdominal pain since this morning. Patient reports the abdominal pain is located over the umbilical hernia and near the scar site of the previous hernia. Worst when coughing. Started at 3am. Admits to nausea with 1 episode of NBNB emesis yesterday. Patient states she has had productive cough bringing up green sputum x2 day with increase fatigue. Patient was seen at [place] ED on 1/5/17 for cough. At that time RVP negative. Currently, denies fever, chills, chest pain, headache, ear pain, nasal congestion, rhinorrhea, increased stool in colostomy bag, dysuria, or hematuria. No sick contacts. No recent travel.     In the ED, VS: afebrile T 97.8, HR 75, /76, RR 16, sat 99% on RA, WBC 11.3, H/H 11/35.4, plat 447, PT 19.8, INR 1.79, PTT 33.3, BUN 28, Cr 1.20, Glu 145, Ca 8.2, lactate 0.9, BNP 5506, Type screen O pos, blood cx/ urine cx/rvp neg from 1/5/17, Received NS @ 200cc/hr, duoneb x2, morphine, zofran, and O2. 89 y/o F PMHX ovarian cancer w/ ascites, colon ca s/p colostomy, s/p incarcerated ventral hernia repair 10/2017, DM2, HTN, afib s/p PPM, hypothyroid, obesity, and overactive bladder presents with abdominal pain since this morning. Patient reports the abdominal pain is located over the umbilical hernia and near the scar site of the previous hernia. Worst when coughing. Started at 3am. Admits to nausea with 1 episode of NBNB emesis yesterday. Patient states she has had productive cough bringing up green sputum x2 day with increase fatigue. Patient was seen at [place] ED on 1/5/17 for cough. At that time RVP negative. Currently, denies fever, chills, chest pain, headache, ear pain, nasal congestion, rhinorrhea, increased stool in colostomy bag, dysuria, or hematuria. No sick contacts. No recent travel.     In the ED, VS: afebrile T 97.8, HR 75, /76, RR 16, sat 99% on RA, WBC 11.3, H/H 11/35.4, plat 447, PT 19.8, INR 1.79, PTT 33.3, BUN 28, Cr 1.20, Glu 145, Ca 8.2, lactate 0.9, BNP 5506, Type screen O pos, blood cx/ urine cx/rvp neg from 1/5/17, Received NS @ 200cc/hr, duoneb x2, morphine, zofran, and O2. CT abd /pelvis found Periumbilical hernia containing small bowel and ascites with mild dilatation of proximal small bowel loops and collapse of distal small bowel loops; correlate for incarcerated hernia. There is possible partial small bowel obstruction. Large left parastomal hernia containing colon and ascites. Moderate to large volume of abdominal pelvic ascites. CXR noted is a 2-lead left-sided cardiac device. There is persistent blunting at the left costophrenic angle which may reflect pleural effusion and/or pleural thickening. No new lobar lung consolidation is noted. EKG Diagnosis Line Ventricular-paced rhythm @ 80BPM. 89 y/o F PMHX ovarian cancer w/ ascites, colon ca s/p colostomy, s/p incarcerated ventral hernia repair 10/2017, DM2, HTN, afib s/p PPM, hypothyroid, obesity, and overactive bladder presents with abdominal pain since this morning. Patient reports the abdominal pain is dull located over the umbilical hernia and near the scar site of the previous hernia. Worst when coughing. Started at 3am. Admits to abdominal distention and nausea. Reports 1 episode of NBNB emesis yesterday am, but tolerated food after episode. Patient has therapeutic paracentesis on regular basis. Next one scheduled for 1/23/18. Patient states she has had productive cough bringing up green sputum x2 day with increase fatigue. Patient was seen at [place] ED on 1/5/17 for cough and diagnosed with URI. At that time, RVP negative, WBC 11.8, CXR showed left chronic pleural effusion. Us abd showed ascites. Dr. Jenkins did not tap abdomen at that time because ascites was small. Currently, denies fever, chills, chest pain, headache, ear pain, nasal congestion, rhinorrhea, increased stool in colostomy bag, dysuria, or hematuria. No sick contacts. No recent travel.     In the ED, VS: afebrile T 97.8, HR 75, /76, RR 16, sat 99% on RA, WBC 11.3, H/H 11/35.4, plat 447, PT 19.8, INR 1.79, PTT 33.3, BUN 28, Cr 1.20, Glu 145, Ca 8.2, lactate 0.9, BNP 5506, Type screen O pos, blood cx/ urine cx/rvp neg from 1/5/17, Received NS @ 200cc/hr, duoneb x2, morphine, zofran, and O2. CT abd /pelvis found Periumbilical hernia containing small bowel and ascites with mild dilatation of proximal small bowel loops and collapse of distal small bowel loops; correlate for incarcerated hernia. There is possible partial small bowel obstruction. Large left parastomal hernia containing colon and ascites. Moderate to large volume of abdominal pelvic ascites. CXR noted is a 2-lead left-sided cardiac device. There is persistent blunting at the left costophrenic angle which may reflect pleural effusion and/or pleural thickening. No new lobar lung consolidation is noted. EKG Diagnosis Line Ventricular-paced rhythm @ 80BPM. 89 y/o F PMHx ovarian cancer w/ recurrent ascites, colon ca s/p colostomy, s/p incarcerated ventral hernia repair 10/2017, DM2, HTN, afib s/p PPM, hypothyroid, obesity, and overactive bladder presents with abdominal pain since this morning. Patient reports the abdominal pain is dull located over the umbilical hernia and near the scar site of the previous hernia. Worst when coughing. Started at 3am. Admits to abdominal distention and nausea. Reports 1 episode of NBNB emesis yesterday am, but tolerated food after episode. Patient has therapeutic paracentesis on regular basis. Next one scheduled for 1/23/18. Patient states she has had productive cough bringing up green sputum x2 day with increase fatigue. Patient was seen at [place] ED on 1/5/17 for cough and diagnosed with URI. At that time, RVP negative, WBC 11.8, CXR showed left chronic pleural effusion. Us abd showed ascites. Dr. Jenkins did not tap abdomen at that time because ascites was small.     ROS: Currently, denies fever, chills, chest pain, headache, ear pain, nasal congestion, rhinorrhea, increased stool in colostomy bag, dysuria, or hematuria. No sick contacts. No recent travel.     In the ED, VS: afebrile T 97.8, HR 75, /76, RR 16, sat 99% on RA, WBC 11.3, H/H 11/35.4, plat 447, PT 19.8, INR 1.79, PTT 33.3, BUN 28, Cr 1.20, Glu 145, Ca 8.2, lactate 0.9, BNP 5506, Type screen O pos, blood cx/ urine cx/rvp neg from 1/5/17, Received NS @ 200cc/hr, duoneb x2, morphine, zofran, and O2. CT abd /pelvis found Periumbilical hernia containing small bowel and ascites with mild dilatation of proximal small bowel loops and collapse of distal small bowel loops; correlate for incarcerated hernia. There is possible partial small bowel obstruction. Large left parastomal hernia containing colon and ascites. Moderate to large volume of abdominal pelvic ascites. CXR noted is a 2-lead left-sided cardiac device. There is persistent blunting at the left costophrenic angle which may reflect pleural effusion and/or pleural thickening. No new lobar lung consolidation is noted. EKG Diagnosis Line Ventricular-paced rhythm @ 80BPM.

## 2018-01-07 NOTE — H&P ADULT - PMH
Ascites, malignant  from advanced ovarian cancer  Asthma    Colon cancer    DM Type 2 (Diabetes Mellitus, Type 2)    HTN (Hypertension)    Hypothyroidism    Obese    Overactive Bladder Afib  s/p PPM  Ascites, malignant  from advanced ovarian cancer  Asthma    Colon cancer    DM Type 2 (Diabetes Mellitus, Type 2)    HTN (Hypertension)    Hypothyroidism    Obese    Overactive Bladder

## 2018-01-07 NOTE — H&P ADULT - PROBLEM SELECTOR PLAN 10
DVT PPX - OR now - SCD  fall risk, ambulate with assistance, OOB with assistance  continue with Vit D3, folic acid DVT PPX - OR now - SCD's followed by therapeutic  fall risk, ambulate with assistance, OOB with assistance  continue with Vit D3, folic acid

## 2018-01-07 NOTE — H&P ADULT - PROBLEM SELECTOR PLAN 3
PPM PPM - controlled  Last interrogation 12/6/17 - Hold vielka Licea cardio aware - patient stable to go to OR - will see patient post op PPM - controlled  Last interrogation 12/6/17 - Hold vielka for now  Dr. Licea cardio aware - patient to go to OR now - will see patient post op

## 2018-01-07 NOTE — BRIEF OPERATIVE NOTE - POST-OP DX
Incarcerated umbilical hernia  01/07/2018    Active  Edwardo Mckinney  Small bowel obstruction  01/07/2018    Active  Edwardo Mckinney

## 2018-01-08 DIAGNOSIS — I48.91 UNSPECIFIED ATRIAL FIBRILLATION: ICD-10-CM

## 2018-01-08 LAB
ANION GAP SERPL CALC-SCNC: 8 MMOL/L — SIGNIFICANT CHANGE UP (ref 5–17)
BASOPHILS # BLD AUTO: 0.1 K/UL — SIGNIFICANT CHANGE UP (ref 0–0.2)
BASOPHILS NFR BLD AUTO: 1.2 % — SIGNIFICANT CHANGE UP (ref 0–2)
BUN SERPL-MCNC: 28 MG/DL — HIGH (ref 7–23)
CALCIUM SERPL-MCNC: 7.5 MG/DL — LOW (ref 8.5–10.1)
CHLORIDE SERPL-SCNC: 107 MMOL/L — SIGNIFICANT CHANGE UP (ref 96–108)
CO2 SERPL-SCNC: 26 MMOL/L — SIGNIFICANT CHANGE UP (ref 22–31)
CREAT SERPL-MCNC: 0.99 MG/DL — SIGNIFICANT CHANGE UP (ref 0.5–1.3)
EOSINOPHIL # BLD AUTO: 0 K/UL — SIGNIFICANT CHANGE UP (ref 0–0.5)
EOSINOPHIL NFR BLD AUTO: 0.1 % — SIGNIFICANT CHANGE UP (ref 0–6)
GLUCOSE SERPL-MCNC: 86 MG/DL — SIGNIFICANT CHANGE UP (ref 70–99)
HBA1C BLD-MCNC: 5.8 % — HIGH (ref 4–5.6)
HCT VFR BLD CALC: 31.5 % — LOW (ref 34.5–45)
HGB BLD-MCNC: 9.4 G/DL — LOW (ref 11.5–15.5)
LACTATE SERPL-SCNC: 0.7 MMOL/L — SIGNIFICANT CHANGE UP (ref 0.7–2)
LYMPHOCYTES # BLD AUTO: 0.6 K/UL — LOW (ref 1–3.3)
LYMPHOCYTES # BLD AUTO: 6.3 % — LOW (ref 13–44)
MAGNESIUM SERPL-MCNC: 2.1 MG/DL — SIGNIFICANT CHANGE UP (ref 1.6–2.6)
MCHC RBC-ENTMCNC: 25 PG — LOW (ref 27–34)
MCHC RBC-ENTMCNC: 29.9 GM/DL — LOW (ref 32–36)
MCV RBC AUTO: 83.5 FL — SIGNIFICANT CHANGE UP (ref 80–100)
MONOCYTES # BLD AUTO: 0.9 K/UL — SIGNIFICANT CHANGE UP (ref 0–0.9)
MONOCYTES NFR BLD AUTO: 8.8 % — SIGNIFICANT CHANGE UP (ref 1–9)
NEUTROPHILS # BLD AUTO: 8.6 K/UL — HIGH (ref 1.8–7.4)
NEUTROPHILS NFR BLD AUTO: 83.6 % — HIGH (ref 43–77)
PHOSPHATE SERPL-MCNC: 3.3 MG/DL — SIGNIFICANT CHANGE UP (ref 2.5–4.5)
PLATELET # BLD AUTO: 368 K/UL — SIGNIFICANT CHANGE UP (ref 150–400)
POTASSIUM SERPL-MCNC: 4.1 MMOL/L — SIGNIFICANT CHANGE UP (ref 3.5–5.3)
POTASSIUM SERPL-SCNC: 4.1 MMOL/L — SIGNIFICANT CHANGE UP (ref 3.5–5.3)
RAPID RVP RESULT: SIGNIFICANT CHANGE UP
RBC # BLD: 3.78 M/UL — LOW (ref 3.8–5.2)
RBC # FLD: 15.3 % — HIGH (ref 10.3–14.5)
SODIUM SERPL-SCNC: 141 MMOL/L — SIGNIFICANT CHANGE UP (ref 135–145)
WBC # BLD: 10.2 K/UL — SIGNIFICANT CHANGE UP (ref 3.8–10.5)
WBC # FLD AUTO: 10.2 K/UL — SIGNIFICANT CHANGE UP (ref 3.8–10.5)

## 2018-01-08 PROCEDURE — 99233 SBSQ HOSP IP/OBS HIGH 50: CPT | Mod: GC

## 2018-01-08 RX ORDER — GABAPENTIN 400 MG/1
300 CAPSULE ORAL EVERY 8 HOURS
Qty: 0 | Refills: 0 | Status: DISCONTINUED | OUTPATIENT
Start: 2018-01-08 | End: 2018-01-11

## 2018-01-08 RX ORDER — PIPERACILLIN AND TAZOBACTAM 4; .5 G/20ML; G/20ML
3.38 INJECTION, POWDER, LYOPHILIZED, FOR SOLUTION INTRAVENOUS ONCE
Qty: 0 | Refills: 0 | Status: COMPLETED | OUTPATIENT
Start: 2018-01-08 | End: 2018-01-08

## 2018-01-08 RX ORDER — FUROSEMIDE 40 MG
40 TABLET ORAL DAILY
Qty: 0 | Refills: 0 | Status: DISCONTINUED | OUTPATIENT
Start: 2018-01-08 | End: 2018-01-08

## 2018-01-08 RX ORDER — ALBUMIN HUMAN 25 %
250 VIAL (ML) INTRAVENOUS ONCE
Qty: 0 | Refills: 0 | Status: COMPLETED | OUTPATIENT
Start: 2018-01-08 | End: 2018-01-08

## 2018-01-08 RX ORDER — FUROSEMIDE 40 MG
40 TABLET ORAL ONCE
Qty: 0 | Refills: 0 | Status: COMPLETED | OUTPATIENT
Start: 2018-01-08 | End: 2018-01-08

## 2018-01-08 RX ORDER — PHENYLEPHRINE HYDROCHLORIDE 10 MG/ML
1 INJECTION INTRAVENOUS
Qty: 80 | Refills: 0 | Status: DISCONTINUED | OUTPATIENT
Start: 2018-01-08 | End: 2018-01-08

## 2018-01-08 RX ORDER — LEVOTHYROXINE SODIUM 125 MCG
225 TABLET ORAL DAILY
Qty: 0 | Refills: 0 | Status: DISCONTINUED | OUTPATIENT
Start: 2018-01-08 | End: 2018-01-11

## 2018-01-08 RX ORDER — ALBUMIN HUMAN 25 %
50 VIAL (ML) INTRAVENOUS ONCE
Qty: 0 | Refills: 0 | Status: COMPLETED | OUTPATIENT
Start: 2018-01-08 | End: 2018-01-08

## 2018-01-08 RX ORDER — ENOXAPARIN SODIUM 100 MG/ML
40 INJECTION SUBCUTANEOUS EVERY 24 HOURS
Qty: 0 | Refills: 0 | Status: DISCONTINUED | OUTPATIENT
Start: 2018-01-08 | End: 2018-01-09

## 2018-01-08 RX ADMIN — Medication 3 MILLILITER(S): at 13:56

## 2018-01-08 RX ADMIN — Medication 3 MILLILITER(S): at 07:46

## 2018-01-08 RX ADMIN — Medication 225 MICROGRAM(S): at 13:20

## 2018-01-08 RX ADMIN — Medication 50 MILLILITER(S): at 02:59

## 2018-01-08 RX ADMIN — SODIUM CHLORIDE 75 MILLILITER(S): 9 INJECTION, SOLUTION INTRAVENOUS at 03:32

## 2018-01-08 RX ADMIN — ENOXAPARIN SODIUM 40 MILLIGRAM(S): 100 INJECTION SUBCUTANEOUS at 14:30

## 2018-01-08 RX ADMIN — Medication 125 MILLILITER(S): at 04:28

## 2018-01-08 RX ADMIN — Medication 40 MILLIGRAM(S): at 13:20

## 2018-01-08 RX ADMIN — PIPERACILLIN AND TAZOBACTAM 200 GRAM(S): 4; .5 INJECTION, POWDER, LYOPHILIZED, FOR SOLUTION INTRAVENOUS at 05:46

## 2018-01-08 RX ADMIN — GABAPENTIN 300 MILLIGRAM(S): 400 CAPSULE ORAL at 14:33

## 2018-01-08 RX ADMIN — GABAPENTIN 300 MILLIGRAM(S): 400 CAPSULE ORAL at 21:21

## 2018-01-08 RX ADMIN — HYDROMORPHONE HYDROCHLORIDE 0.5 MILLIGRAM(S): 2 INJECTION INTRAMUSCULAR; INTRAVENOUS; SUBCUTANEOUS at 02:00

## 2018-01-08 RX ADMIN — Medication 3 MILLILITER(S): at 20:14

## 2018-01-08 RX ADMIN — HYDROMORPHONE HYDROCHLORIDE 0.5 MILLIGRAM(S): 2 INJECTION INTRAMUSCULAR; INTRAVENOUS; SUBCUTANEOUS at 01:36

## 2018-01-08 RX ADMIN — Medication 3 MILLILITER(S): at 01:53

## 2018-01-08 NOTE — DIETITIAN INITIAL EVALUATION ADULT. - PROBLEM SELECTOR PLAN 1
Admit to [facility]  Dr. Mciknney - taking patient to the OR now  type and screen  plan for approval from blood bank of Kcentra - for reversal of INR which will be potentially available intra-op  pain control, zofran, and IVF  Medical optimization: she has no h/o complications from anesthesia and no active cardiovascular symptoms. There is urgency for procedure due to incarcerated nature of hernia and risk of threatened perfusion of gut if edema worsens so there are no contraindications for urgent surgical intervention, however pt is high risk for intermediate to high risk procedure depending on the extent of the procedure.   Long discussion was had at bedside discussing risks of surgical intervention with therapeutic levels of xarelto in serum. Pt received last dose 5pm on 1/6/17 and she is headed to OR on afternoon of 1/7/17. I discussed risks/benefits of administration of Kcentra w/ the patient and the pt's family, they request that the medication be administered pre-op. I called hem/onc attending, Dr. Bright's answering service but was unable to get through. I called once and I asked [place] clerk to wait but she waited on hold for >10 minutes and nobody came to the phone. I called Dr. Hon Larson as he was in the building and he consulted on this patient today. Him and I discussed the management plan and coordinated the administration of Kcentra intra-op.   Diet: npo

## 2018-01-08 NOTE — DIETITIAN INITIAL EVALUATION ADULT. - PROBLEM SELECTOR PLAN 2
likely 2/2 viral URI vs pleural effusions  Continue with duonebs and lasix  RVP was negative on 1/5/18, blood/urine cultures from that visit were negative as well  consider repeat RVP and pulm consult if symptoms persist

## 2018-01-08 NOTE — PROGRESS NOTE ADULT - SUBJECTIVE AND OBJECTIVE BOX
POD 1  Tmax 99.9    BP 81/42, now up to 95/51    HR 80  Little pain.  No nausea.  No output from colostomy yet  Abd: some reaccumulation of ascites;  dressing dry  WBC 10.2  Hb 9.4    creat 1.0  BUN  28  P: NPO except meds until tomorrow

## 2018-01-08 NOTE — DIETITIAN INITIAL EVALUATION ADULT. - PROBLEM SELECTOR PLAN 9
Controlled - no home meds  Diet NPO, accuchecks q6h, hypoglycemic protocol, f/u HbA1C  CKD - acute on chronic? Trend: F/U baseline with PMD

## 2018-01-08 NOTE — DIETITIAN INITIAL EVALUATION ADULT. - PROBLEM SELECTOR PLAN 3
PPM - controlled  Last interrogation 12/6/17 - Hold vielka for now  Dr. Licea cardio aware - patient to go to OR now - will see patient post op

## 2018-01-08 NOTE — PROGRESS NOTE ADULT - ASSESSMENT
89 y/o F PMHX ovarian cancer w/ ascites, colon ca s/p colostomy, s/p incarcerated ventral hernia repair 10/2017, DM2, HTN, afib s/p PPM, hypothyroid, obesity, and overactive bladder presented with abdominal pain, now POD #1 of umbilical hernia repair with mesh with 6 L ascites fluid taken off. Patient is clinically stable at this time.      Neuro:   - Continue post operative pain management- dilaudid prn.   - Continue Gabapentin   Cards:   - A.fib on Xarelto at home and s/p PPM. Holding Xarelto for now, will restart pending surgery recs.   - Hypotension: Discontinue phenylephrine gtt. Patient's BP has been stable in 90's/50's. Hold antihypertensives in setting of hypotension.   - Patient appears to be volume overloaded with pulmonary congestion. Will start IV Lasix 40.   - f/u TTE to evaluate for valvular dysfunction, and heart failure.   Pulm:  - History of asthma on montelukast at home. Cont duonebs prn. Cont supplemental O2 as needed.   - f/u Repeat RVP  GI:   - NPO except meds and ice chips   :   - No active renal issues   - Yusuf in place. Monitor I's and O's  Endo:   - hx hypothyroidism- Continue synthroid   MSK:  - PT eval  Heme/ONC:   - chronic ascites with periodic paracentesis, 6L taken off during procedure today. Heme/onc eval noted.     Dispo: Continue critical care.

## 2018-01-08 NOTE — PROGRESS NOTE ADULT - ASSESSMENT
Patient is a 89 years old woman with multiple medical problems including A.fib, Ovarian cancer with recurrent ascites on palliative paracentesis Patient presented yesterday with incarcerated umbilical hernia and had hernia repair and removal of 6 l of ascites done. Patient tolerated procedure well without bleeding complications despite being on Xarelto prior to admission.

## 2018-01-08 NOTE — DIETITIAN INITIAL EVALUATION ADULT. - PROBLEM SELECTOR PLAN 10
DVT PPX - OR now - SCD's followed by therapeutic  fall risk, ambulate with assistance, OOB with assistance  continue with Vit D3, folic acid

## 2018-01-08 NOTE — PROGRESS NOTE ADULT - SUBJECTIVE AND OBJECTIVE BOX
Patient is a 89y old  Female who presents with a chief complaint of abdominal pain (07 Jan 2018 11:42)    24 hour events: Patient is POD#1 for umbilical hernia repair and reduction of small bowel and had 6+ liters of clear ascites removed. Patient was seen and examined lying comfortably in bed this AM, stated she had mild pain over the surgical site but pain is controlled adequately on dilaudid.      REVIEW OF SYSTEMS:   CONSTITUTIONAL: denies fever, chills, fatigue, weakness  HEENT: admits to dry eyes and lips, denies blurred visions, sore throat  SKIN: denies new lesions, rash  CARDIOVASCULAR: admits to cough with sputum production. denies chest pain, chest pressure, palpitations  RESPIRATORY: denies shortness of breath   GASTROINTESTINAL: admits to mild abdominal pain at surgical site, denies nausea, vomiting, diarrhea  NEUROLOGICAL: denies numbness, headache, focal weakness  MUSCULOSKELETAL: denies new joint pain, muscle aches  HEMATOLOGIC: denies gross bleeding, bruising  LYMPHATICS: denies enlarged lymph nodes, extremity swelling    ICU Vital Signs Last 24 Hrs  T(C): 36.7 (08 Jan 2018 08:30), Max: 37.7 (07 Jan 2018 23:00)  T(F): 98 (08 Jan 2018 08:30), Max: 99.9 (07 Jan 2018 23:00)  HR: 80 (08 Jan 2018 08:00) (80 - 80)  BP: 97/57 (08 Jan 2018 08:00) (81/43 - 133/74)  BP(mean): 72 (08 Jan 2018 08:00) (57 - 96)  RR: 28 (08 Jan 2018 08:00) (16 - 33)  SpO2: 98% (08 Jan 2018 08:00) (92% - 100%)    I&O's Summary  01-07 @ 07:01 - 01-08 @ 07:00  --------------------------------------------------------  IN: 2025 mL / OUT: 610 mL / NET: 1415 mL    01-08 @ 07:01 - 01-08 @ 08:51  --------------------------------------------------------  IN: 150 mL / OUT: 25 mL / NET: 125 mL    PHYSICAL EXAM  T(C): 36.7 (01-08-18 @ 08:30), Max: 37.7 (01-07-18 @ 23:00)  HR: 80 (01-08-18 @ 08:00) (80 - 80)  BP: 97/57 (01-08-18 @ 08:00) (81/43 - 133/74)  RR: 28 (01-08-18 @ 08:00) (16 - 33)  SpO2: 98% (01-08-18 @ 08:00) (92% - 100%)    GENERAL: Elderly  female lying in bed comfortably, appears well, no acute distress, appropriate, pleasant  EYES: sclera clear, no exudates  ENMT: oropharynx clear without erythema, no exudates, moist mucous membranes  NECK: supple, soft, no thyromegaly noted  LUNGS: positive wheezing bilaterally, no rhonchi appreciated  HEART: soft S1/S2, regular rate and rhythm, no murmurs noted, no lower extremity edema  GASTROINTESTINAL: abdomen is soft, nontender, nondistended, normoactive bowel sounds, no palpable masses  INTEGUMENT: good skin turgor, no lesions noted  MUSCULOSKELETAL: no clubbing or cyanosis, no obvious deformity  NEUROLOGIC: awake, alert, oriented x3, good muscle tone in 4 extremities, no obvious sensory deficits  PSYCHIATRIC: mood is good, affect is congruent, linear and logical thought process  HEME/LYMPH: no palpable supraclavicular nodules, no obvious ecchymosis or petechiae      MEDICATIONS  (STANDING):  ALBUTerol    90 MICROgram(s) HFA Inhaler 1 Puff(s) Inhalation every 4 hours  ALBUTerol/ipratropium for Nebulization 3 milliLiter(s) Nebulizer every 6 hours  furosemide    Tablet 40 milliGRAM(s) Oral daily  gabapentin 300 milliGRAM(s) Oral every 8 hours  lactated ringers. 1000 milliLiter(s) (75 mL/Hr) IV Continuous <Continuous>  levothyroxine 225 MICROGram(s) Oral daily  phenylephrine    Infusion 1 MICROgram(s)/kG/Min (36.225 mL/Hr) IV Continuous <Continuous>  tiotropium 18 MICROgram(s) Capsule 1 Capsule(s) Inhalation daily    MEDICATIONS  (PRN):  HYDROmorphone  Injectable 0.5 milliGRAM(s) IV Push every 10 minutes PRN Moderate Pain  ondansetron Injectable 4 milliGRAM(s) IV Push once PRN Nausea and/or Vomiting                              9.4    10.2  )-----------( 368      ( 08 Jan 2018 06:07 )             31.5       01-08    141  |  107  |  28<H>  ----------------------------<  86  4.1   |  26  |  0.99    Ca    7.5<L>      08 Jan 2018 06:07  Phos  3.3     01-08  Mg     2.1     01-08    TPro  7.1  /  Alb  2.4<L>  /  TBili  0.6  /  DBili  x   /  AST  21  /  ALT  17  /  AlkPhos  95  01-07    Lactate 0.7           01-08 @ 06:07          PT/INR - ( 07 Jan 2018 08:24 )   PT: 19.8 sec;   INR: 1.79 ratio         PTT - ( 07 Jan 2018 08:24 )  PTT:33.3 sec    .Blood Blood   No growth to date. -- 01-05 @ 17:18  .Blood Blood   No growth to date. -- 01-05 @ 17:16  .Urine Clean Catch (Midstream)   10,000 - 49,000 CFU/mL Aerococcus species  There is no standardized, established, or validated  method for susceptibility testing of this organism.  "Aerococcus spp. are predictably susceptible to penicillin,  ampicillin, tetracycline, and vancomycin.  All isolates are  resistant to sulfonamides"  <10,000 CFU/ml Normal Urogenital yudith present -- 01-05 @ 16:33      Rapid RVP Result: NotDetec (01-05 @ 11:39)    Radiology: ***  Bedside lung ultrasound: ***  Bedside ECHO: ***    CENTRAL LINE: Y/N          DATE INSERTED:              REMOVE: Y/N  CULP: Y/N                        DATE INSERTED:              REMOVE: Y/N  A-LINE: Y/N                       DATE INSERTED:              REMOVE: Y/N    GLOBAL ISSUE/BEST PRACTICE  Analgesia:   Sedation:   CAM-ICU:   HOB elevation: yes  Stress ulcer prophylaxis:   VTE prophylaxis:   Glycemic control:   Nutrition:     CODE STATUS: ***  GO discussion: Y Patient is a 89y old  Female who presents with a chief complaint of abdominal pain (07 Jan 2018 11:42)    24 hour events: Patient is POD#1 for umbilical hernia repair and reduction of small bowel and had 6+ liters of clear ascites removed. Patient was seen and examined lying comfortably in bed this AM, stated she had mild pain over the surgical site but pain is controlled adequately on dilaudid.      REVIEW OF SYSTEMS:   CONSTITUTIONAL: denies fever, chills, fatigue, weakness  HEENT: admits to dry eyes and lips, denies blurred visions, sore throat  SKIN: denies new lesions, rash  CARDIOVASCULAR: denies chest pain, chest pressure, palpitations  RESPIRATORY: admits to cough with sputum production. denies shortness of breath   GASTROINTESTINAL: admits to mild abdominal pain at surgical site, denies nausea, vomiting, diarrhea  NEUROLOGICAL: denies numbness, headache, focal weakness  MUSCULOSKELETAL: denies new joint pain, muscle aches  HEMATOLOGIC: denies gross bleeding, bruising  LYMPHATICS: denies enlarged lymph nodes, extremity swelling    ICU Vital Signs Last 24 Hrs  T(C): 36.7 (08 Jan 2018 08:30), Max: 37.7 (07 Jan 2018 23:00)  T(F): 98 (08 Jan 2018 08:30), Max: 99.9 (07 Jan 2018 23:00)  HR: 80 (08 Jan 2018 08:00) (80 - 80)  BP: 97/57 (08 Jan 2018 08:00) (81/43 - 133/74)  BP(mean): 72 (08 Jan 2018 08:00) (57 - 96)  RR: 28 (08 Jan 2018 08:00) (16 - 33)  SpO2: 98% (08 Jan 2018 08:00) (92% - 100%)    I&O's Summary  01-07 @ 07:01 - 01-08 @ 07:00  --------------------------------------------------------  IN: 2025 mL / OUT: 610 mL / NET: 1415 mL    01-08 @ 07:01 - 01-08 @ 08:51  --------------------------------------------------------  IN: 150 mL / OUT: 25 mL / NET: 125 mL    PHYSICAL EXAM  T(C): 36.7 (01-08-18 @ 08:30), Max: 37.7 (01-07-18 @ 23:00)  HR: 80 (01-08-18 @ 08:00) (80 - 80)  BP: 97/57 (01-08-18 @ 08:00) (81/43 - 133/74)  RR: 28 (01-08-18 @ 08:00) (16 - 33)  SpO2: 98% (01-08-18 @ 08:00) (92% - 100%)    GENERAL: Elderly  female lying in bed comfortably, appears well, no acute distress, appropriate, pleasant  EYES: sclera clear, no exudates  ENMT: oropharynx clear without erythema, no exudates, moist mucous membranes  NECK: supple, soft, no thyromegaly noted  LUNGS: positive wheezing bilaterally, no rhonchi appreciated  HEART: soft S1/S2, regular rate and rhythm, no murmurs noted, no lower extremity edema  GASTROINTESTINAL: Surgical site on abdomen is covered with gauze and tape, clean/dry/intact. abdomen is soft, nontender, mildly distended (patient has chronic ascites), normoactive bowel sounds, no palpable masses. Colostomy site clean and dry.   INTEGUMENT: good skin turgor, no lesions noted  MUSCULOSKELETAL: no clubbing or cyanosis, no obvious deformity  NEUROLOGIC: awake, alert, oriented x3, good muscle tone in 4 extremities, no obvious sensory deficits  HEME/LYMPH: no palpable supraclavicular nodules, no obvious ecchymosis or petechiae      MEDICATIONS  (STANDING):  ALBUTerol    90 MICROgram(s) HFA Inhaler 1 Puff(s) Inhalation every 4 hours  ALBUTerol/ipratropium for Nebulization 3 milliLiter(s) Nebulizer every 6 hours  furosemide    Tablet 40 milliGRAM(s) Oral daily  gabapentin 300 milliGRAM(s) Oral every 8 hours  lactated ringers. 1000 milliLiter(s) (75 mL/Hr) IV Continuous <Continuous>  levothyroxine 225 MICROGram(s) Oral daily  phenylephrine    Infusion 1 MICROgram(s)/kG/Min (36.225 mL/Hr) IV Continuous <Continuous>  tiotropium 18 MICROgram(s) Capsule 1 Capsule(s) Inhalation daily    MEDICATIONS  (PRN):  HYDROmorphone  Injectable 0.5 milliGRAM(s) IV Push every 10 minutes PRN Moderate Pain  ondansetron Injectable 4 milliGRAM(s) IV Push once PRN Nausea and/or Vomiting                        9.4    10.2  )-----------( 368      ( 08 Jan 2018 06:07 )             31.5     01-08  141  |  107  |  28<H>  ----------------------------<  86  4.1   |  26  |  0.99    Ca    7.5<L>      08 Jan 2018 06:07  Phos  3.3     01-08  Mg     2.1     01-08  TPro  7.1  /  Alb  2.4<L>  /  TBili  0.6  /  DBili  x   /  AST  21  /  ALT  17  /  AlkPhos  95  01-07  Lactate 0.7           01-08 @ 06:07    PT/INR - ( 07 Jan 2018 08:24 )   PT: 19.8 sec;   INR: 1.79 ratio    PTT - ( 07 Jan 2018 08:24 )  PTT:33.3 sec    .Blood Blood   No growth to date. -- 01-05 @ 17:18  .Blood Blood   No growth to date. -- 01-05 @ 17:16  .Urine Clean Catch (Midstream)   10,000 - 49,000 CFU/mL Aerococcus species  There is no standardized, established, or validated  method for susceptibility testing of this organism.  "Aerococcus spp. are predictably susceptible to penicillin,  ampicillin, tetracycline, and vancomycin.  All isolates are  resistant to sulfonamides"  <10,000 CFU/ml Normal Urogenital yudith present -- 01-05 @ 16:33    Rapid RVP Result: NotDetec (01-05 @ 11:39)    Bedside lung ultrasound: B-lines bilaterally   Bedside ECHO: Performed, difficult to measure IVC due to poor patient positioning.      CENTRAL LINE: N          DATE INSERTED:              REMOVE: Y/N  CULP: Y                       DATE INSERTED:   1/8/18           REMOVE: N  A-LINE: N                      DATE INSERTED:              REMOVE: Y/N    GLOBAL ISSUE/BEST PRACTICE  Analgesia: n/a  Sedation: n/a  CAM-ICU: n/a  HOB elevation: yes  Stress ulcer prophylaxis: n/a   VTE prophylaxis: Lovenox 40   Glycemic control: n/a   Nutrition: NPO except medications and ice chips     CODE STATUS: Full Code  GOC discussion: Y Patient is a 89y old  Female who presents with a chief complaint of abdominal pain (07 Jan 2018 11:42)    24 hour events: Patient is POD#1 for umbilical hernia repair and reduction of small bowel and had 6+ liters of clear ascites removed. Patient was seen and examined lying comfortably in bed this AM, stated she had mild pain over the surgical site but pain is controlled adequately on dilaudid.    hypotension overnight, received albumin, now much improved    REVIEW OF SYSTEMS:   CONSTITUTIONAL: denies fever, chills, fatigue, weakness  HEENT: admits to dry eyes and lips, denies blurred visions, sore throat  SKIN: denies new lesions, rash  CARDIOVASCULAR: denies chest pain, chest pressure, palpitations  RESPIRATORY: admits to cough with sputum production. denies shortness of breath   GASTROINTESTINAL: admits to mild abdominal pain at surgical site, denies nausea, vomiting, diarrhea  NEUROLOGICAL: denies numbness, headache, focal weakness  MUSCULOSKELETAL: denies new joint pain, muscle aches  HEMATOLOGIC: denies gross bleeding, bruising  LYMPHATICS: denies enlarged lymph nodes, extremity swelling    ICU Vital Signs Last 24 Hrs  T(C): 36.7 (08 Jan 2018 08:30), Max: 37.7 (07 Jan 2018 23:00)  T(F): 98 (08 Jan 2018 08:30), Max: 99.9 (07 Jan 2018 23:00)  HR: 80 (08 Jan 2018 08:00) (80 - 80)  BP: 97/57 (08 Jan 2018 08:00) (81/43 - 133/74)  BP(mean): 72 (08 Jan 2018 08:00) (57 - 96)  RR: 28 (08 Jan 2018 08:00) (16 - 33)  SpO2: 98% (08 Jan 2018 08:00) (92% - 100%)    I&O's Summary  01-07 @ 07:01 - 01-08 @ 07:00  --------------------------------------------------------  IN: 2025 mL / OUT: 610 mL / NET: 1415 mL    01-08 @ 07:01 - 01-08 @ 08:51  --------------------------------------------------------  IN: 150 mL / OUT: 25 mL / NET: 125 mL    PHYSICAL EXAM  T(C): 36.7 (01-08-18 @ 08:30), Max: 37.7 (01-07-18 @ 23:00)  HR: 80 (01-08-18 @ 08:00) (80 - 80)  BP: 97/57 (01-08-18 @ 08:00) (81/43 - 133/74)  RR: 28 (01-08-18 @ 08:00) (16 - 33)  SpO2: 98% (01-08-18 @ 08:00) (92% - 100%)    GENERAL: Elderly  female lying in bed comfortably, appears well, no acute distress, appropriate, pleasant  EYES: sclera clear, no exudates  ENMT: oropharynx clear without erythema, no exudates, moist mucous membranes  NECK: supple, soft, no thyromegaly noted  LUNGS: positive wheezing bilaterally, no rhonchi appreciated  HEART: soft S1/S2, regular rate and rhythm, no murmurs noted, no lower extremity edema  GASTROINTESTINAL: Surgical site on abdomen is covered with gauze and tape, clean/dry/intact. abdomen is soft, nontender, mildly distended (patient has chronic ascites), normoactive bowel sounds, no palpable masses. Colostomy site clean and dry.   INTEGUMENT: good skin turgor, no lesions noted  MUSCULOSKELETAL: no clubbing or cyanosis, no obvious deformity  NEUROLOGIC: awake, alert, oriented x3, good muscle tone in 4 extremities, no obvious sensory deficits  HEME/LYMPH: no palpable supraclavicular nodules, no obvious ecchymosis or petechiae      MEDICATIONS  (STANDING):  ALBUTerol    90 MICROgram(s) HFA Inhaler 1 Puff(s) Inhalation every 4 hours  ALBUTerol/ipratropium for Nebulization 3 milliLiter(s) Nebulizer every 6 hours  furosemide    Tablet 40 milliGRAM(s) Oral daily  gabapentin 300 milliGRAM(s) Oral every 8 hours  lactated ringers. 1000 milliLiter(s) (75 mL/Hr) IV Continuous <Continuous>  levothyroxine 225 MICROGram(s) Oral daily  phenylephrine    Infusion 1 MICROgram(s)/kG/Min (36.225 mL/Hr) IV Continuous <Continuous>  tiotropium 18 MICROgram(s) Capsule 1 Capsule(s) Inhalation daily    MEDICATIONS  (PRN):  HYDROmorphone  Injectable 0.5 milliGRAM(s) IV Push every 10 minutes PRN Moderate Pain  ondansetron Injectable 4 milliGRAM(s) IV Push once PRN Nausea and/or Vomiting                        9.4    10.2  )-----------( 368      ( 08 Jan 2018 06:07 )             31.5     01-08  141  |  107  |  28<H>  ----------------------------<  86  4.1   |  26  |  0.99    Ca    7.5<L>      08 Jan 2018 06:07  Phos  3.3     01-08  Mg     2.1     01-08  TPro  7.1  /  Alb  2.4<L>  /  TBili  0.6  /  DBili  x   /  AST  21  /  ALT  17  /  AlkPhos  95  01-07  Lactate 0.7           01-08 @ 06:07    PT/INR - ( 07 Jan 2018 08:24 )   PT: 19.8 sec;   INR: 1.79 ratio    PTT - ( 07 Jan 2018 08:24 )  PTT:33.3 sec    .Blood Blood   No growth to date. -- 01-05 @ 17:18  .Blood Blood   No growth to date. -- 01-05 @ 17:16  .Urine Clean Catch (Midstream)   10,000 - 49,000 CFU/mL Aerococcus species  There is no standardized, established, or validated  method for susceptibility testing of this organism.  "Aerococcus spp. are predictably susceptible to penicillin,  ampicillin, tetracycline, and vancomycin.  All isolates are  resistant to sulfonamides"  <10,000 CFU/ml Normal Urogenital yudith present -- 01-05 @ 16:33    Rapid RVP Result: NotDetec (01-05 @ 11:39)    Bedside lung ultrasound: B-lines bilaterally   Bedside ECHO: Performed, difficult to measure IVC due to poor patient positioning.      CENTRAL LINE: N          DATE INSERTED:              REMOVE: Y/N  CULP: Y                       DATE INSERTED:   1/8/18           REMOVE: N  A-LINE: N                      DATE INSERTED:              REMOVE: Y/N    GLOBAL ISSUE/BEST PRACTICE  Analgesia: n/a  Sedation: n/a  CAM-ICU: n/a  HOB elevation: yes  Stress ulcer prophylaxis: n/a   VTE prophylaxis: Lovenox 40   Glycemic control: n/a   Nutrition: NPO except medications and ice chips     CODE STATUS: Full Code  GO discussion: Y

## 2018-01-08 NOTE — PROGRESS NOTE ADULT - SUBJECTIVE AND OBJECTIVE BOX
HPI:  89 y/o F PMHx ovarian cancer w/ recurrent ascites, colon ca s/p colostomy, s/p incarcerated ventral hernia repair 10/2017, DM2, HTN, afib s/p PPM, hypothyroid, obesity, and overactive bladder presents with abdominal pain since this morning. Patient reports the abdominal pain is dull located over the umbilical hernia and near the scar site of the previous hernia. Worst when coughing. Started at 3am. Admits to abdominal distention and nausea. Reports 1 episode of NBNB emesis yesterday am, but tolerated food after episode. Patient has therapeutic paracentesis on regular basis. Next one scheduled for 1/23/18. Patient states she has had productive cough bringing up green sputum x2 day with increase fatigue. Patient was seen at [facility] on 1/5/17 for cough and diagnosed with URI. At that time, RVP negative, WBC 11.8, CXR showed left chronic pleural effusion. Us abd showed ascites. Dr. Jenkins did not tap abdomen at that time because ascites was small.     ROS: Currently, denies fever, chills, chest pain, headache, ear pain, nasal congestion, rhinorrhea, increased stool in colostomy bag, dysuria, or hematuria. No sick contacts. No recent travel.     In the ED, VS: afebrile T 97.8, HR 75, /76, RR 16, sat 99% on RA, WBC 11.3, H/H 11/35.4, plat 447, PT 19.8, INR 1.79, PTT 33.3, BUN 28, Cr 1.20, Glu 145, Ca 8.2, lactate 0.9, BNP 5506, Type screen O pos, blood cx/ urine cx/rvp neg from 1/5/17, Received NS @ 200cc/hr, duoneb x2, morphine, zofran, and O2. CT abd /pelvis found Periumbilical hernia containing small bowel and ascites with mild dilatation of proximal small bowel loops and collapse of distal small bowel loops; correlate for incarcerated hernia. There is possible partial small bowel obstruction. Large left parastomal hernia containing colon and ascites. Moderate to large volume of abdominal pelvic ascites. CXR noted is a 2-lead left-sided cardiac device. There is persistent blunting at the left costophrenic angle which may reflect pleural effusion and/or pleural thickening. No new lobar lung consolidation is noted. EKG Diagnosis Line Ventricular-paced rhythm @ 80BPM. (07 Jan 2018 11:42)     Pt is seen and examined  pt is awake and lying in bed  pt seems comfortable and complains of some abdominal pain with cough    ROS:  Negative except for: cough and mild abdominal pain    MEDICATIONS  (STANDING):  ALBUTerol/ipratropium for Nebulization 3 milliLiter(s) Nebulizer every 6 hours  enoxaparin Injectable 40 milliGRAM(s) SubCutaneous every 24 hours  gabapentin 300 milliGRAM(s) Oral every 8 hours  levothyroxine 225 MICROGram(s) Oral daily    MEDICATIONS  (PRN):  HYDROmorphone  Injectable 0.5 milliGRAM(s) IV Push every 10 minutes PRN Moderate Pain  ondansetron Injectable 4 milliGRAM(s) IV Push once PRN Nausea and/or Vomiting      Allergies    No Known Allergies    Intolerances        Vital Signs Last 24 Hrs  T(C): 36.7 (08 Jan 2018 11:45), Max: 37.7 (07 Jan 2018 23:00)  T(F): 98 (08 Jan 2018 11:45), Max: 99.9 (07 Jan 2018 23:00)  HR: 80 (08 Jan 2018 14:00) (80 - 81)  BP: 96/51 (08 Jan 2018 14:00) (81/43 - 133/74)  BP(mean): 71 (08 Jan 2018 14:00) (57 - 96)  RR: 28 (08 Jan 2018 14:00) (16 - 43)  SpO2: 98% (08 Jan 2018 14:00) (80% - 100%)    PHYSICAL EXAM  General: adult in NAD  HEENT: clear oropharynx, anicteric sclera, pink conjunctiva  Neck: supple  CV: normal S1/S2 with no murmur rubs or gallops  Lungs: positive air movement b/l ant lungs,clear to auscultation, no wheezes, no rales  Abdomen: soft non-tender non-distended, no hepatosplenomegaly  Ext: no clubbing cyanosis or edema  Skin: no rashes and no petechiae  Neuro: alert and oriented X 4, no focal deficits  LABS:                          9.4    10.2  )-----------( 368      ( 08 Jan 2018 06:07 )             31.5         Mean Cell Volume : 83.5 fl  Mean Cell Hemoglobin : 25.0 pg  Mean Cell Hemoglobin Concentration : 29.9 gm/dL  Auto Neutrophil # : 8.6 K/uL  Auto Lymphocyte # : 0.6 K/uL  Auto Monocyte # : 0.9 K/uL  Auto Eosinophil # : 0.0 K/uL  Auto Basophil # : 0.1 K/uL  Auto Neutrophil % : 83.6 %  Auto Lymphocyte % : 6.3 %  Auto Monocyte % : 8.8 %  Auto Eosinophil % : 0.1 %  Auto Basophil % : 1.2 %    Serial CBC's  01-08 @ 06:07  Hct-31.5 / Hgb-9.4 / Plat-368 / RBC-3.78 / WBC-10.2          Serial CBC's  01-07 @ 08:24  Hct-35.4 / Hgb-11.0 / Plat-447 / RBC-4.33 / WBC-11.3          Serial CBC's  01-05 @ 11:41  Hct-35.2 / Hgb-10.8 / Plat-392 / RBC-4.26 / WBC-11.6            01-08    141  |  107  |  28<H>  ----------------------------<  86  4.1   |  26  |  0.99    Ca    7.5<L>      08 Jan 2018 06:07  Phos  3.3     01-08  Mg     2.1     01-08    TPro  7.1  /  Alb  2.4<L>  /  TBili  0.6  /  DBili  x   /  AST  21  /  ALT  17  /  AlkPhos  95  01-07      PT/INR - ( 07 Jan 2018 08:24 )   PT: 19.8 sec;   INR: 1.79 ratio         PTT - ( 07 Jan 2018 08:24 )  PTT:33.3 sec

## 2018-01-08 NOTE — PROGRESS NOTE ADULT - PROBLEM SELECTOR PLAN 3
Off Xarelto now. Rate seems to be controlled. Continue Lovenox 40 mg daily for DVT prophylaxis now and restart Xarelto when more stable and patient started on the diet.

## 2018-01-09 LAB
ALBUMIN SERPL ELPH-MCNC: 2.1 G/DL — LOW (ref 3.3–5)
ALP SERPL-CCNC: 75 U/L — SIGNIFICANT CHANGE UP (ref 40–120)
ALT FLD-CCNC: 10 U/L — LOW (ref 12–78)
ANION GAP SERPL CALC-SCNC: 8 MMOL/L — SIGNIFICANT CHANGE UP (ref 5–17)
AST SERPL-CCNC: 14 U/L — LOW (ref 15–37)
BASOPHILS # BLD AUTO: 0.1 K/UL — SIGNIFICANT CHANGE UP (ref 0–0.2)
BASOPHILS NFR BLD AUTO: 0.6 % — SIGNIFICANT CHANGE UP (ref 0–2)
BILIRUB SERPL-MCNC: 0.5 MG/DL — SIGNIFICANT CHANGE UP (ref 0.2–1.2)
BUN SERPL-MCNC: 30 MG/DL — HIGH (ref 7–23)
CALCIUM SERPL-MCNC: 7.6 MG/DL — LOW (ref 8.5–10.1)
CHLORIDE SERPL-SCNC: 106 MMOL/L — SIGNIFICANT CHANGE UP (ref 96–108)
CO2 SERPL-SCNC: 27 MMOL/L — SIGNIFICANT CHANGE UP (ref 22–31)
CREAT SERPL-MCNC: 1.2 MG/DL — SIGNIFICANT CHANGE UP (ref 0.5–1.3)
EOSINOPHIL # BLD AUTO: 0.1 K/UL — SIGNIFICANT CHANGE UP (ref 0–0.5)
EOSINOPHIL NFR BLD AUTO: 1.2 % — SIGNIFICANT CHANGE UP (ref 0–6)
GLUCOSE SERPL-MCNC: 74 MG/DL — SIGNIFICANT CHANGE UP (ref 70–99)
HCT VFR BLD CALC: 31.3 % — LOW (ref 34.5–45)
HGB BLD-MCNC: 9.6 G/DL — LOW (ref 11.5–15.5)
LYMPHOCYTES # BLD AUTO: 0.5 K/UL — LOW (ref 1–3.3)
LYMPHOCYTES # BLD AUTO: 3.8 % — LOW (ref 13–44)
MAGNESIUM SERPL-MCNC: 2.2 MG/DL — SIGNIFICANT CHANGE UP (ref 1.6–2.6)
MCHC RBC-ENTMCNC: 25.5 PG — LOW (ref 27–34)
MCHC RBC-ENTMCNC: 30.9 GM/DL — LOW (ref 32–36)
MCV RBC AUTO: 82.8 FL — SIGNIFICANT CHANGE UP (ref 80–100)
MONOCYTES # BLD AUTO: 1 K/UL — HIGH (ref 0–0.9)
MONOCYTES NFR BLD AUTO: 8.7 % — SIGNIFICANT CHANGE UP (ref 1–9)
NEUTROPHILS # BLD AUTO: 10.1 K/UL — HIGH (ref 1.8–7.4)
NEUTROPHILS NFR BLD AUTO: 85.7 % — HIGH (ref 43–77)
PHOSPHATE SERPL-MCNC: 2.9 MG/DL — SIGNIFICANT CHANGE UP (ref 2.5–4.5)
PLATELET # BLD AUTO: 425 K/UL — HIGH (ref 150–400)
POTASSIUM SERPL-MCNC: 3.8 MMOL/L — SIGNIFICANT CHANGE UP (ref 3.5–5.3)
POTASSIUM SERPL-SCNC: 3.8 MMOL/L — SIGNIFICANT CHANGE UP (ref 3.5–5.3)
PROT SERPL-MCNC: 5.5 G/DL — LOW (ref 6–8.3)
RBC # BLD: 3.78 M/UL — LOW (ref 3.8–5.2)
RBC # FLD: 14.7 % — HIGH (ref 10.3–14.5)
SODIUM SERPL-SCNC: 141 MMOL/L — SIGNIFICANT CHANGE UP (ref 135–145)
SURGICAL PATHOLOGY FINAL REPORT - CH: SIGNIFICANT CHANGE UP
VANCOMYCIN TROUGH SERPL-MCNC: <0.8 UG/ML — LOW (ref 10–20)
WBC # BLD: 11.8 K/UL — HIGH (ref 3.8–10.5)
WBC # FLD AUTO: 11.8 K/UL — HIGH (ref 3.8–10.5)

## 2018-01-09 PROCEDURE — 71045 X-RAY EXAM CHEST 1 VIEW: CPT | Mod: 26

## 2018-01-09 PROCEDURE — 99233 SBSQ HOSP IP/OBS HIGH 50: CPT | Mod: GC

## 2018-01-09 RX ORDER — RIVAROXABAN 15 MG-20MG
15 KIT ORAL EVERY 24 HOURS
Qty: 0 | Refills: 0 | Status: DISCONTINUED | OUTPATIENT
Start: 2018-01-09 | End: 2018-01-11

## 2018-01-09 RX ORDER — CHOLECALCIFEROL (VITAMIN D3) 125 MCG
1000 CAPSULE ORAL DAILY
Qty: 0 | Refills: 0 | Status: DISCONTINUED | OUTPATIENT
Start: 2018-01-09 | End: 2018-01-11

## 2018-01-09 RX ORDER — FUROSEMIDE 40 MG
40 TABLET ORAL ONCE
Qty: 0 | Refills: 0 | Status: COMPLETED | OUTPATIENT
Start: 2018-01-09 | End: 2018-01-09

## 2018-01-09 RX ADMIN — GABAPENTIN 300 MILLIGRAM(S): 400 CAPSULE ORAL at 23:10

## 2018-01-09 RX ADMIN — GABAPENTIN 300 MILLIGRAM(S): 400 CAPSULE ORAL at 06:10

## 2018-01-09 RX ADMIN — Medication 3 MILLILITER(S): at 20:15

## 2018-01-09 RX ADMIN — RIVAROXABAN 15 MILLIGRAM(S): KIT at 19:31

## 2018-01-09 RX ADMIN — Medication 3 MILLILITER(S): at 15:00

## 2018-01-09 RX ADMIN — GABAPENTIN 300 MILLIGRAM(S): 400 CAPSULE ORAL at 14:32

## 2018-01-09 RX ADMIN — Medication 225 MICROGRAM(S): at 06:10

## 2018-01-09 RX ADMIN — Medication 3 MILLILITER(S): at 07:36

## 2018-01-09 RX ADMIN — Medication 40 MILLIGRAM(S): at 23:10

## 2018-01-09 RX ADMIN — Medication 1000 UNIT(S): at 12:42

## 2018-01-09 RX ADMIN — Medication 3 MILLILITER(S): at 02:00

## 2018-01-09 NOTE — PHYSICAL THERAPY INITIAL EVALUATION ADULT - ADDITIONAL COMMENTS
Pt lives alone in a private home with 2 steps to enter and bilateral rails. Pt does not negotiate stairs indoors, pt has basement steps but does not use. Pt is independent with ambulation using RW and performing all ADLs independently. Daughter assists patient as needed. Pt does have a handicapped shower /c shower bench, commode (does not use), 2 single axis cane, rolling walker, raised toilet seat, wheelchair, grab bars and does not have home O2.

## 2018-01-09 NOTE — PHYSICAL THERAPY INITIAL EVALUATION ADULT - BALANCE TRAINING, PT EVAL
Pt will display Good static and dynamic standing & dynamic balance /c rolling walker in 3-5 sessions

## 2018-01-09 NOTE — PHYSICAL THERAPY INITIAL EVALUATION ADULT - CRITERIA FOR SKILLED THERAPEUTIC INTERVENTIONS
impairments found/predicted duration of therapy intervention/functional limitations in following categories/rehab potential/risk reduction/prevention/therapy frequency/anticipated discharge recommendation

## 2018-01-09 NOTE — PROGRESS NOTE ADULT - SUBJECTIVE AND OBJECTIVE BOX
POD 2  Afeb    HR 80  Hungry. Gas in colostomy bag.   Dressing dry.  Abd soft, nontender  WBC 11.8  P: clear liquids

## 2018-01-09 NOTE — PHYSICAL THERAPY INITIAL EVALUATION ADULT - GAIT TRAINING, PT EVAL
Pt will be supervision level of assistance or better for ambulation with RW >75' or as tolerated & O2 /c in 3-5 session

## 2018-01-09 NOTE — PROGRESS NOTE ADULT - SUBJECTIVE AND OBJECTIVE BOX
[facility] [place]       HPI:  89 y/o F PMHx ovarian cancer w/ recurrent ascites, colon ca s/p colostomy, s/p incarcerated ventral hernia repair 10/2017, DM2, HTN, afib s/p PPM, hypothyroid, obesity, and overactive bladder presents with abdominal pain since this morning. Patient reports the abdominal pain is dull located over the umbilical hernia and near the scar site of the previous hernia. Worst when coughing. Started at 3am. Admits to abdominal distention and nausea. Reports 1 episode of NBNB emesis yesterday am, but tolerated food after episode. Patient has therapeutic paracentesis on regular basis. Next one scheduled for 1/23/18. Patient states she has had productive cough bringing up green sputum x2 day with increase fatigue. Patient was seen at [place] ED on 1/5/17 for cough and diagnosed with URI. At that time, RVP negative, WBC 11.8, CXR showed left chronic pleural effusion. Us abd showed ascites. Dr. Jenkins did not tap abdomen at that time because ascites was small.     ROS: Currently, denies fever, chills, chest pain, headache, ear pain, nasal congestion, rhinorrhea, increased stool in colostomy bag, dysuria, or hematuria. No sick contacts. No recent travel.     In the ED, VS: afebrile T 97.8, HR 75, /76, RR 16, sat 99% on RA, WBC 11.3, H/H 11/35.4, plat 447, PT 19.8, INR 1.79, PTT 33.3, BUN 28, Cr 1.20, Glu 145, Ca 8.2, lactate 0.9, BNP 5506, Type screen O pos, blood cx/ urine cx/rvp neg from 1/5/17, Received NS @ 200cc/hr, duoneb x2, morphine, zofran, and O2. CT abd /pelvis found Periumbilical hernia containing small bowel and ascites with mild dilatation of proximal small bowel loops and collapse of distal small bowel loops; correlate for incarcerated hernia. There is possible partial small bowel obstruction. Large left parastomal hernia containing colon and ascites. Moderate to large volume of abdominal pelvic ascites. CXR noted is a 2-lead left-sided cardiac device. There is persistent blunting at the left costophrenic angle which may reflect pleural effusion and/or pleural thickening. No new lobar lung consolidation is noted. EKG Diagnosis Line Ventricular-paced rhythm @ 80BPM. (07 Jan 2018 11:42)        SUBJECTIVE:      ALLERGIES:  Allergies    No Known Allergies    Intolerances          MEDICATIONS  (STANDING):  ALBUTerol/ipratropium for Nebulization 3 milliLiter(s) Nebulizer every 6 hours  cholecalciferol 1000 Unit(s) Oral daily  gabapentin 300 milliGRAM(s) Oral every 8 hours  levothyroxine 225 MICROGram(s) Oral daily  rivaroxaban 15 milliGRAM(s) Oral every 24 hours    MEDICATIONS  (PRN):  ondansetron Injectable 4 milliGRAM(s) IV Push once PRN Nausea and/or Vomiting      REVIEW OF SYSTEMS:  CONSTITUTIONAL: No fever,  RESPIRATORY: No cough, wheezing, shortness of breath  CARDIOVASCULAR: No chest pain, dyspnea, palpitations, dizziness, syncope, paroxysmal nocturnal dyspnea, orthopnea, or arm or leg swelling  GASTROINTESTINAL: No abdominal  or epigastric pain, nausea, vomiting,  diarrhea  NEUROLOGICAL: No headaches,  loss of strength, numbness, or tremors    Vital Signs Last 24 Hrs  T(C): 37.1 (09 Jan 2018 19:58), Max: 37.4 (09 Jan 2018 03:41)  T(F): 98.7 (09 Jan 2018 19:58), Max: 99.4 (09 Jan 2018 03:41)  HR: 80 (09 Jan 2018 23:00) (76 - 80)  BP: 97/54 (09 Jan 2018 23:00) (88/50 - 122/57)  BP(mean): 72 (09 Jan 2018 23:00) (64 - 84)  RR: 28 (09 Jan 2018 23:00) (24 - 40)  SpO2: 99% (09 Jan 2018 23:00) (79% - 100%)    PHYSICAL EXAM:  HEAD:  Atraumatic, Normocephalic  NECK: Supple and normal thyroid.  No JVD or carotid bruit.   HEART: S1, S2 regular , 1/6 soft ejection systolic murmur in mitral area , no thrill and no gallops .  PULMONARY: Bilateral vesicular breathing , few scattered ronchi and few scattered rales are present .  ABDOMEN: Soft nontender and positive bowl sounds   EXTREMITIES:  No clubbing, cyanosis, or pedal  edema  NEUROLOGICAL: AAOX3 , no focal deficit .    LABS:                        9.6    11.8  )-----------( 425      ( 09 Jan 2018 06:03 )             31.3     01-09    141  |  106  |  30<H>  ----------------------------<  74  3.8   |  27  |  1.20    Ca    7.6<L>      09 Jan 2018 06:03  Phos  2.9     01-09  Mg     2.2     01-09    TPro  5.5<L>  /  Alb  2.1<L>  /  TBili  0.5  /  DBili  x   /  AST  14<L>  /  ALT  10<L>  /  AlkPhos  75  01-09            BNP      EKG:  ECHO:  IMAGING:    Assessment/Plan    Will continue to follow during hospital course and give further recommendations as needed . Thanks for your referral . if any questions please contact me at office [phone] [phone]) [facility] [place]       HPI:  89 y/o F PMHx ovarian cancer w/ recurrent ascites, colon ca s/p colostomy, s/p incarcerated ventral hernia repair 10/2017, DM2, HTN, afib s/p PPM, hypothyroid, obesity, and overactive bladder presents with abdominal pain since this morning. Patient reports the abdominal pain is dull located over the umbilical hernia and near the scar site of the previous hernia. Worst when coughing. Started at 3am. Admits to abdominal distention and nausea. Reports 1 episode of NBNB emesis yesterday am, but tolerated food after episode. Patient has therapeutic paracentesis on regular basis. Next one scheduled for 1/23/18. Patient states she has had productive cough bringing up green sputum x2 day with increase fatigue. Patient was seen at [place] ED on 1/5/17 for cough and diagnosed with URI. At that time, RVP negative, WBC 11.8, CXR showed left chronic pleural effusion. Us abd showed ascites. Dr. Jenkins did not tap abdomen at that time because ascites was small.     ROS: Currently, denies fever, chills, chest pain, headache, ear pain, nasal congestion, rhinorrhea, increased stool in colostomy bag, dysuria, or hematuria. No sick contacts. No recent travel.     In the ED, VS: afebrile T 97.8, HR 75, /76, RR 16, sat 99% on RA, WBC 11.3, H/H 11/35.4, plat 447, PT 19.8, INR 1.79, PTT 33.3, BUN 28, Cr 1.20, Glu 145, Ca 8.2, lactate 0.9, BNP 5506, Type screen O pos, blood cx/ urine cx/rvp neg from 1/5/17, Received NS @ 200cc/hr, duoneb x2, morphine, zofran, and O2. CT abd /pelvis found Periumbilical hernia containing small bowel and ascites with mild dilatation of proximal small bowel loops and collapse of distal small bowel loops; correlate for incarcerated hernia. There is possible partial small bowel obstruction. Large left parastomal hernia containing colon and ascites. Moderate to large volume of abdominal pelvic ascites. CXR noted is a 2-lead left-sided cardiac device. There is persistent blunting at the left costophrenic angle which may reflect pleural effusion and/or pleural thickening. No new lobar lung consolidation is noted. EKG Diagnosis Line Ventricular-paced rhythm @ 80BPM. (07 Jan 2018 11:42)        SUBJECTIVE: Patient lying comfortable .      ALLERGIES:  Allergies    No Known Allergies    Intolerances          MEDICATIONS  (STANDING):  ALBUTerol/ipratropium for Nebulization 3 milliLiter(s) Nebulizer every 6 hours  cholecalciferol 1000 Unit(s) Oral daily  gabapentin 300 milliGRAM(s) Oral every 8 hours  levothyroxine 225 MICROGram(s) Oral daily  rivaroxaban 15 milliGRAM(s) Oral every 24 hours    MEDICATIONS  (PRN):  ondansetron Injectable 4 milliGRAM(s) IV Push once PRN Nausea and/or Vomiting      REVIEW OF SYSTEMS:  CONSTITUTIONAL: No fever,  RESPIRATORY: No cough, wheezing, shortness of breath  CARDIOVASCULAR: No chest pain, dyspnea, palpitations, dizziness, syncope, paroxysmal nocturnal dyspnea, orthopnea, or arm or leg swelling  GASTROINTESTINAL: No abdominal  or epigastric pain, nausea, vomiting,  diarrhea  NEUROLOGICAL: No headaches,  loss of strength, numbness, or tremors    Vital Signs Last 24 Hrs  T(C): 37.1 (09 Jan 2018 19:58), Max: 37.4 (09 Jan 2018 03:41)  T(F): 98.7 (09 Jan 2018 19:58), Max: 99.4 (09 Jan 2018 03:41)  HR: 80 (09 Jan 2018 23:00) (76 - 80)  BP: 97/54 (09 Jan 2018 23:00) (88/50 - 122/57)  BP(mean): 72 (09 Jan 2018 23:00) (64 - 84)  RR: 28 (09 Jan 2018 23:00) (24 - 40)  SpO2: 99% (09 Jan 2018 23:00) (79% - 100%)    PHYSICAL EXAM:  HEAD:  Atraumatic, Normocephalic  NECK: Supple and normal thyroid.  No JVD or carotid bruit.   HEART: S1, S2 irregular , 1/6 soft ejection systolic murmur in mitral area , no thrill and no gallops .  PULMONARY: Bilateral vesicular breathing , few scattered ronchi and few scattered rales are present .  ABDOMEN: Soft nontender and positive bowl sounds   EXTREMITIES:  No clubbing, cyanosis, or pedal  edema  NEUROLOGICAL: AA    LABS:                        9.6    11.8  )-----------( 425      ( 09 Jan 2018 06:03 )             31.3     01-09    141  |  106  |  30<H>  ----------------------------<  74  3.8   |  27  |  1.20    Ca    7.6<L>      09 Jan 2018 06:03  Phos  2.9     01-09  Mg     2.2     01-09    TPro  5.5<L>  /  Alb  2.1<L>  /  TBili  0.5  /  DBili  x   /  AST  14<L>  /  ALT  10<L>  /  AlkPhos  75  01-09            BNP      EKG:  ECHO:  IMAGING:    Assessment/Plan  Patient has :  1) S/P Hernia surgery .  2) H/O CHF and stable .  3) Atrial fibrillation and stable .  4) H/O Hypertension and BP stable , rather lower limit of normal . Patient off antihypertensive medications .  Will continue to follow during hospital course and give further recommendations as needed . Thanks for your referral . if any questions please contact me at office ([phone] [phone])

## 2018-01-09 NOTE — PROGRESS NOTE ADULT - SUBJECTIVE AND OBJECTIVE BOX
Patient is a 89y old  Female who presents with a chief complaint of abdominal pain (07 Jan 2018 11:42)    24 hour events: No acute events overnight. She is now POD #2 from repair of umbilical hernia and removal of 6L of ascites.     REVIEW OF SYSTEMS  Constitutional: No fever, chills, fatigue  Neuro: No headache, numbness, weakness  Resp: No cough, wheezing, shortness of breath  CVS: No chest pain, palpitations, leg swelling  GI: No abdominal pain, nausea, vomiting, diarrhea   : No dysuria, frequency, incontinence  Skin: No itching, burning, rashes, or lesions   Msk: No joint pain or swelling  Psych: No depression, anxiety, mood swings    ICU Vital Signs Last 24 Hrs  T(C): 37.4 (09 Jan 2018 03:41), Max: 37.4 (09 Jan 2018 03:41)  T(F): 99.4 (09 Jan 2018 03:41), Max: 99.4 (09 Jan 2018 03:41)  HR: 80 (09 Jan 2018 07:00) (80 - 81)  BP: 91/50 (09 Jan 2018 07:00) (89/50 - 110/53)  BP(mean): 68 (09 Jan 2018 07:00) (64 - 77)  RR: 28 (09 Jan 2018 07:00) (16 - 43)  SpO2: 100% (09 Jan 2018 07:00) (80% - 100%)    I&O's Summary  01-08 @ 07:01  -  01-09 @ 07:00  --------------------------------------------------------  IN: 475 mL / OUT: 815 mL / NET: -340 mL    PHYSICAL EXAM  GENERAL: Elderly  female lying in bed comfortably, appears well, no acute distress, appropriate, pleasant  EYES: sclera clear, no exudates  ENMT: oropharynx clear without erythema, no exudates, moist mucous membranes  NECK: supple, soft, no thyromegaly noted  LUNGS: positive wheezing bilaterally, no rhonchi appreciated  HEART: soft S1/S2, regular rate and rhythm, no murmurs noted, no lower extremity edema  GASTROINTESTINAL: Surgical site on abdomen is covered with gauze and tape, clean/dry/intact. abdomen is soft, nontender, mildly distended (patient has chronic ascites), normoactive bowel sounds, no palpable masses. Colostomy site clean and dry.   INTEGUMENT: good skin turgor, no lesions noted  MUSCULOSKELETAL: no clubbing or cyanosis, no obvious deformity  NEUROLOGIC: awake, alert, oriented x3    MEDICATIONS  (STANDING):  ALBUTerol/ipratropium for Nebulization 3 milliLiter(s) Nebulizer every 6 hours  enoxaparin Injectable 40 milliGRAM(s) SubCutaneous every 24 hours  gabapentin 300 milliGRAM(s) Oral every 8 hours  levothyroxine 225 MICROGram(s) Oral daily    MEDICATIONS  (PRN):  HYDROmorphone  Injectable 0.5 milliGRAM(s) IV Push every 10 minutes PRN Moderate Pain  ondansetron Injectable 4 milliGRAM(s) IV Push once PRN Nausea and/or Vomiting                        9.6    11.8  )-----------( 425      ( 09 Jan 2018 06:03 )             31.3     01-09  141  |  106  |  30<H>  ----------------------------<  74  3.8   |  27  |  1.20    Ca    7.6<L>      09 Jan 2018 06:03  Phos  2.9     01-09  Mg     2.2     01-09    TPro  5.5<L>  /  Alb  2.1<L>  /  TBili  0.5  /  DBili  x   /  AST  14<L>  /  ALT  10<L>  /  AlkPhos  75  01-09  PT/INR - ( 07 Jan 2018 08:24 )   PT: 19.8 sec;   INR: 1.79 ratio    PTT - ( 07 Jan 2018 08:24 )  PTT:33.3 sec    .Blood Blood   No growth to date. -- 01-07 @ 10:44  .Blood Blood   No growth to date. -- 01-05 @ 17:18  .Blood Blood   No growth to date. -- 01-05 @ 17:16  .Urine Clean Catch (Midstream)   10,000 - 49,000 CFU/mL Aerococcus species  There is no standardized, established, or validated  method for susceptibility testing of this organism.  "Aerococcus spp. are predictably susceptible to penicillin,  ampicillin, tetracycline, and vancomycin.  All isolates are  resistant to sulfonamides"  <10,000 CFU/ml Normal Urogenital yudith present -- 01-05 @ 16:33    Rapid RVP Result: NotDetec (01-08 @ 11:44)  Rapid RVP Result: NotDetec (01-05 @ 11:39)    Radiology: ***  Bedside lung ultrasound: ***  Bedside ECHO: ***    CENTRAL LINE: N            CULP: Y                        DATE INSERTED: 1/7/18             REMOVE: N  A-LINE: N                           GLOBAL ISSUE/BEST PRACTICE  Analgesia: Dilaudid prn post operatively   Sedation: No  HOB elevation: yes  VTE prophylaxis: Lovenox daily   Glycemic control: yes  Nutrition: NPO except meds and ice chips for now, will likely advance    CODE STATUS: Full Code Patient is a 89y old  Female who presents with a chief complaint of abdominal pain (07 Jan 2018 11:42)    24 hour events: No acute events overnight. She is now POD #2 from repair of umbilical hernia and removal of 6L of ascites.     REVIEW OF SYSTEMS  Constitutional: No fever, chills  Neuro: No headache, numbness, weakness  Resp: Positive cough, denies shortness of breath  CVS: No chest pain, palpitations, leg swelling  GI: Tolerating mild abdominal pain, denies nausea, vomiting, diarrhea   Skin: No new itching, burning, rashes, or lesions   Msk: No joint pain or swelling    ICU Vital Signs Last 24 Hrs  T(C): 37.4 (09 Jan 2018 03:41), Max: 37.4 (09 Jan 2018 03:41)  T(F): 99.4 (09 Jan 2018 03:41), Max: 99.4 (09 Jan 2018 03:41)  HR: 80 (09 Jan 2018 07:00) (80 - 81)  BP: 91/50 (09 Jan 2018 07:00) (89/50 - 110/53)  BP(mean): 68 (09 Jan 2018 07:00) (64 - 77)  RR: 28 (09 Jan 2018 07:00) (16 - 43)  SpO2: 100% (09 Jan 2018 07:00) (80% - 100%)    I&O's Summary  01-08 @ 07:01  -  01-09 @ 07:00  --------------------------------------------------------  IN: 475 mL / OUT: 815 mL / NET: -340 mL    PHYSICAL EXAM  GENERAL: Elderly  female lying in bed comfortably, appears well, no acute distress, appropriate, pleasant  EYES: sclera clear, no exudates  ENMT: oropharynx clear without erythema, no exudates, moist mucous membranes  NECK: supple, soft, no thyromegaly noted  LUNGS: positive wheezing bilaterally, no rhonchi appreciated  HEART: soft S1/S2, regular rate and rhythm, no murmurs noted, no lower extremity edema  GASTROINTESTINAL: Surgical site on abdomen is covered with gauze and tape, clean/dry/intact. abdomen is soft, nontender, mildly distended (patient has chronic ascites), normoactive bowel sounds, no palpable masses. Colostomy site clean and dry.   INTEGUMENT: good skin turgor, no lesions noted  MUSCULOSKELETAL: no clubbing or cyanosis, no obvious deformity  NEUROLOGIC: awake, alert, oriented x3    MEDICATIONS  (STANDING):  ALBUTerol/ipratropium for Nebulization 3 milliLiter(s) Nebulizer every 6 hours  enoxaparin Injectable 40 milliGRAM(s) SubCutaneous every 24 hours  gabapentin 300 milliGRAM(s) Oral every 8 hours  levothyroxine 225 MICROGram(s) Oral daily    MEDICATIONS  (PRN):  HYDROmorphone  Injectable 0.5 milliGRAM(s) IV Push every 10 minutes PRN Moderate Pain  ondansetron Injectable 4 milliGRAM(s) IV Push once PRN Nausea and/or Vomiting                        9.6    11.8  )-----------( 425      ( 09 Jan 2018 06:03 )             31.3     01-09  141  |  106  |  30<H>  ----------------------------<  74  3.8   |  27  |  1.20    Ca    7.6<L>      09 Jan 2018 06:03  Phos  2.9     01-09  Mg     2.2     01-09    TPro  5.5<L>  /  Alb  2.1<L>  /  TBili  0.5  /  DBili  x   /  AST  14<L>  /  ALT  10<L>  /  AlkPhos  75  01-09  PT/INR - ( 07 Jan 2018 08:24 )   PT: 19.8 sec;   INR: 1.79 ratio    PTT - ( 07 Jan 2018 08:24 )  PTT:33.3 sec    .Blood Blood   No growth to date. -- 01-07 @ 10:44  .Blood Blood   No growth to date. -- 01-05 @ 17:18  .Blood Blood   No growth to date. -- 01-05 @ 17:16  .Urine Clean Catch (Midstream)   10,000 - 49,000 CFU/mL Aerococcus species  There is no standardized, established, or validated  method for susceptibility testing of this organism.  "Aerococcus spp. are predictably susceptible to penicillin,  ampicillin, tetracycline, and vancomycin.  All isolates are  resistant to sulfonamides"  <10,000 CFU/ml Normal Urogenital yudith present -- 01-05 @ 16:33    Rapid RVP Result: NotDetec (01-08 @ 11:44)  Rapid RVP Result: NotDetec (01-05 @ 11:39)    Radiology: ***  Bedside lung ultrasound: ***  Bedside ECHO: ***    CENTRAL LINE: N            CULP: Y                        DATE INSERTED: 1/7/18             REMOVE: N  A-LINE: N                           GLOBAL ISSUE/BEST PRACTICE  Analgesia: Dilaudid prn post operatively   Sedation: No  HOB elevation: yes  VTE prophylaxis: Lovenox daily   Glycemic control: yes  Nutrition: NPO except meds and ice chips for now, will likely advance    CODE STATUS: Full Code Patient is a 89y old  Female who presents with a chief complaint of abdominal pain (07 Jan 2018 11:42)    24 hour events: No acute events overnight. She is now POD #2 from repair of umbilical hernia and removal of 6L of ascites. Patient complained     REVIEW OF SYSTEMS  Constitutional: No fever, chills  Neuro: No headache, numbness, weakness  Resp: Positive cough, denies shortness of breath  CVS: No chest pain, palpitations, leg swelling  GI: Tolerating mild abdominal pain, denies nausea, vomiting, diarrhea   Skin: No new itching, burning, rashes, or lesions   Msk: No joint pain or swelling    ICU Vital Signs Last 24 Hrs  T(C): 37.4 (09 Jan 2018 03:41), Max: 37.4 (09 Jan 2018 03:41)  T(F): 99.4 (09 Jan 2018 03:41), Max: 99.4 (09 Jan 2018 03:41)  HR: 80 (09 Jan 2018 07:00) (80 - 81)  BP: 91/50 (09 Jan 2018 07:00) (89/50 - 110/53)  BP(mean): 68 (09 Jan 2018 07:00) (64 - 77)  RR: 28 (09 Jan 2018 07:00) (16 - 43)  SpO2: 100% (09 Jan 2018 07:00) (80% - 100%)    I&O's Summary  01-08 @ 07:01  -  01-09 @ 07:00  --------------------------------------------------------  IN: 475 mL / OUT: 815 mL / NET: -340 mL    PHYSICAL EXAM  GENERAL: Elderly  female lying in bed comfortably, appears well, no acute distress, appropriate, pleasant  EYES: sclera clear, no exudates  ENMT: oropharynx clear without erythema, no exudates, moist mucous membranes  NECK: supple, soft, no thyromegaly noted  LUNGS: positive wheezing bilaterally, no rhonchi appreciated  HEART: soft S1/S2, regular rate and rhythm, no murmurs noted, no lower extremity edema  GASTROINTESTINAL: Surgical site on abdomen is covered with gauze and tape, clean/dry/intact. abdomen is soft, nontender, mildly distended (patient has chronic ascites), normoactive bowel sounds, no palpable masses. Colostomy site clean and dry.   INTEGUMENT: good skin turgor, no lesions noted  MUSCULOSKELETAL: no clubbing or cyanosis, no obvious deformity  NEUROLOGIC: awake, alert, oriented x3    MEDICATIONS  (STANDING):  ALBUTerol/ipratropium for Nebulization 3 milliLiter(s) Nebulizer every 6 hours  enoxaparin Injectable 40 milliGRAM(s) SubCutaneous every 24 hours  gabapentin 300 milliGRAM(s) Oral every 8 hours  levothyroxine 225 MICROGram(s) Oral daily    MEDICATIONS  (PRN):  HYDROmorphone  Injectable 0.5 milliGRAM(s) IV Push every 10 minutes PRN Moderate Pain  ondansetron Injectable 4 milliGRAM(s) IV Push once PRN Nausea and/or Vomiting                        9.6    11.8  )-----------( 425      ( 09 Jan 2018 06:03 )             31.3     01-09  141  |  106  |  30<H>  ----------------------------<  74  3.8   |  27  |  1.20    Ca    7.6<L>      09 Jan 2018 06:03  Phos  2.9     01-09  Mg     2.2     01-09    TPro  5.5<L>  /  Alb  2.1<L>  /  TBili  0.5  /  DBili  x   /  AST  14<L>  /  ALT  10<L>  /  AlkPhos  75  01-09  PT/INR - ( 07 Jan 2018 08:24 )   PT: 19.8 sec;   INR: 1.79 ratio    PTT - ( 07 Jan 2018 08:24 )  PTT:33.3 sec    .Blood Blood   No growth to date. -- 01-07 @ 10:44  .Blood Blood   No growth to date. -- 01-05 @ 17:18  .Blood Blood   No growth to date. -- 01-05 @ 17:16  .Urine Clean Catch (Midstream)   10,000 - 49,000 CFU/mL Aerococcus species  There is no standardized, established, or validated  method for susceptibility testing of this organism.  "Aerococcus spp. are predictably susceptible to penicillin,  ampicillin, tetracycline, and vancomycin.  All isolates are  resistant to sulfonamides"  <10,000 CFU/ml Normal Urogenital yudith present -- 01-05 @ 16:33    Rapid RVP Result: NotDetec (01-08 @ 11:44)  Rapid RVP Result: NotDetec (01-05 @ 11:39)    Radiology: ***  Bedside lung ultrasound: ***  Bedside ECHO: ***    CENTRAL LINE: N            CULP: Y                        DATE INSERTED: 1/7/18             REMOVE: N  A-LINE: N                           GLOBAL ISSUE/BEST PRACTICE  Analgesia: Dilaudid prn post operatively   Sedation: No  HOB elevation: yes  VTE prophylaxis: Lovenox daily   Glycemic control: yes  Nutrition: NPO except meds and ice chips for now, will likely advance    CODE STATUS: Full Code Patient is a 89y old  Female who presents with a chief complaint of abdominal pain (07 Jan 2018 11:42)    24 hour events: No acute events overnight. She is now POD #2 from repair of umbilical hernia and removal of 6L of ascites. Patient complained of mild abdominal pain but states that she can tolerate the pain without medications.     REVIEW OF SYSTEMS  Constitutional: No fever, chills  Neuro: No headache, numbness, weakness  Resp: Positive cough, denies shortness of breath  CVS: No chest pain, palpitations, leg swelling  GI: Tolerating mild abdominal pain, denies nausea, vomiting, diarrhea   Skin: No new itching, burning, rashes, or lesions   Msk: No joint pain or swelling    ICU Vital Signs Last 24 Hrs  T(C): 37.4 (09 Jan 2018 03:41), Max: 37.4 (09 Jan 2018 03:41)  T(F): 99.4 (09 Jan 2018 03:41), Max: 99.4 (09 Jan 2018 03:41)  HR: 80 (09 Jan 2018 07:00) (80 - 81)  BP: 91/50 (09 Jan 2018 07:00) (89/50 - 110/53)  BP(mean): 68 (09 Jan 2018 07:00) (64 - 77)  RR: 28 (09 Jan 2018 07:00) (16 - 43)  SpO2: 100% (09 Jan 2018 07:00) (80% - 100%)    I&O's Summary  01-08 @ 07:01  -  01-09 @ 07:00  --------------------------------------------------------  IN: 475 mL / OUT: 815 mL / NET: -340 mL    PHYSICAL EXAM  GENERAL: Elderly  female lying in bed comfortably, appears well, no acute distress, appropriate, pleasant  EYES: sclera clear, no exudates  ENMT: oropharynx clear without erythema, no exudates, moist mucous membranes  NECK: supple, soft, no thyromegaly noted  LUNGS: positive wheezing bilaterally, no rhonchi appreciated  HEART: soft S1/S2, regular rate and rhythm, no murmurs noted, no lower extremity edema  GASTROINTESTINAL: Surgical site on abdomen is covered with gauze and tape, clean/dry/intact. abdomen is soft, nontender, mildly distended (patient has chronic ascites), normoactive bowel sounds, no palpable masses. Colostomy site clean and dry.   INTEGUMENT: good skin turgor, no lesions noted  MUSCULOSKELETAL: no clubbing or cyanosis, no obvious deformity  NEUROLOGIC: awake, alert, oriented x3    MEDICATIONS  (STANDING):  ALBUTerol/ipratropium for Nebulization 3 milliLiter(s) Nebulizer every 6 hours  enoxaparin Injectable 40 milliGRAM(s) SubCutaneous every 24 hours  gabapentin 300 milliGRAM(s) Oral every 8 hours  levothyroxine 225 MICROGram(s) Oral daily    MEDICATIONS  (PRN):  HYDROmorphone  Injectable 0.5 milliGRAM(s) IV Push every 10 minutes PRN Moderate Pain  ondansetron Injectable 4 milliGRAM(s) IV Push once PRN Nausea and/or Vomiting                        9.6    11.8  )-----------( 425      ( 09 Jan 2018 06:03 )             31.3     01-09  141  |  106  |  30<H>  ----------------------------<  74  3.8   |  27  |  1.20    Ca    7.6<L>      09 Jan 2018 06:03  Phos  2.9     01-09  Mg     2.2     01-09    TPro  5.5<L>  /  Alb  2.1<L>  /  TBili  0.5  /  DBili  x   /  AST  14<L>  /  ALT  10<L>  /  AlkPhos  75  01-09  PT/INR - ( 07 Jan 2018 08:24 )   PT: 19.8 sec;   INR: 1.79 ratio    PTT - ( 07 Jan 2018 08:24 )  PTT:33.3 sec    .Blood Blood   No growth to date. -- 01-07 @ 10:44  .Blood Blood   No growth to date. -- 01-05 @ 17:18  .Blood Blood   No growth to date. -- 01-05 @ 17:16  .Urine Clean Catch (Midstream)   10,000 - 49,000 CFU/mL Aerococcus species  There is no standardized, established, or validated  method for susceptibility testing of this organism.  "Aerococcus spp. are predictably susceptible to penicillin,  ampicillin, tetracycline, and vancomycin.  All isolates are  resistant to sulfonamides"  <10,000 CFU/ml Normal Urogenital yudith present -- 01-05 @ 16:33    Rapid RVP Result: NotDetec (01-08 @ 11:44)  Rapid RVP Result: NotDetec (01-05 @ 11:39)    CENTRAL LINE: N            CULP: Y                        DATE INSERTED: 1/7/18             REMOVE: N  A-LINE: N                           GLOBAL ISSUE/BEST PRACTICE  Analgesia: Dilaudid discontinued   Sedation: No  HOB elevation: yes  VTE prophylaxis: Xarelto 15mg for A.fib, will cover dvt ppx    Glycemic control: yes  Nutrition: NPO except meds and ice chips for now, will likely advance    CODE STATUS: Full Code Patient is a 89y old  Female who presents with a chief complaint of abdominal pain (07 Jan 2018 11:42)    24 hour events: No acute events overnight. She is now POD #2 from repair of umbilical hernia and removal of 6L of ascites. Patient complained of mild abdominal pain but states that she can tolerate the pain without medications.     REVIEW OF SYSTEMS  Constitutional: No fever, chills  Neuro: No headache, numbness, weakness  Resp: Positive cough, denies shortness of breath  CVS: No chest pain, palpitations, leg swelling  GI: Tolerating mild abdominal pain, denies nausea, vomiting, diarrhea   Skin: No new itching, burning, rashes, or lesions   Msk: No joint pain or swelling    ICU Vital Signs Last 24 Hrs  T(C): 37.4 (09 Jan 2018 03:41), Max: 37.4 (09 Jan 2018 03:41)  T(F): 99.4 (09 Jan 2018 03:41), Max: 99.4 (09 Jan 2018 03:41)  HR: 80 (09 Jan 2018 07:00) (80 - 81)  BP: 91/50 (09 Jan 2018 07:00) (89/50 - 110/53)  BP(mean): 68 (09 Jan 2018 07:00) (64 - 77)  RR: 28 (09 Jan 2018 07:00) (16 - 43)  SpO2: 100% (09 Jan 2018 07:00) (80% - 100%)    I&O's Summary  01-08 @ 07:01  -  01-09 @ 07:00  --------------------------------------------------------  IN: 475 mL / OUT: 815 mL / NET: -340 mL    PHYSICAL EXAM  GENERAL: Elderly  female lying in bed comfortably, appears well, no acute distress, appropriate, pleasant  EYES: sclera clear, no exudates  ENMT: oropharynx clear without erythema, no exudates, moist mucous membranes  NECK: supple, soft, no thyromegaly noted  LUNGS: positive wheezing bilaterally, no rhonchi appreciated  HEART: soft S1/S2, regular rate and rhythm, no murmurs noted, no lower extremity edema  GASTROINTESTINAL: Surgical site on abdomen is covered with gauze and tape, clean/dry/intact. Abdomen is soft, nontender, mildly distended (patient has chronic ascites), hypoactive bowel sounds, no palpable masses. Colostomy site clean and dry.   INTEGUMENT: good skin turgor, no lesions noted  MUSCULOSKELETAL: no clubbing or cyanosis, no obvious deformity  NEUROLOGIC: awake, alert, oriented x3    MEDICATIONS  (STANDING):  ALBUTerol/ipratropium for Nebulization 3 milliLiter(s) Nebulizer every 6 hours  enoxaparin Injectable 40 milliGRAM(s) SubCutaneous every 24 hours  gabapentin 300 milliGRAM(s) Oral every 8 hours  levothyroxine 225 MICROGram(s) Oral daily    MEDICATIONS  (PRN):  HYDROmorphone  Injectable 0.5 milliGRAM(s) IV Push every 10 minutes PRN Moderate Pain  ondansetron Injectable 4 milliGRAM(s) IV Push once PRN Nausea and/or Vomiting                        9.6    11.8  )-----------( 425      ( 09 Jan 2018 06:03 )             31.3     01-09  141  |  106  |  30<H>  ----------------------------<  74  3.8   |  27  |  1.20    Ca    7.6<L>      09 Jan 2018 06:03  Phos  2.9     01-09  Mg     2.2     01-09    TPro  5.5<L>  /  Alb  2.1<L>  /  TBili  0.5  /  DBili  x   /  AST  14<L>  /  ALT  10<L>  /  AlkPhos  75  01-09  PT/INR - ( 07 Jan 2018 08:24 )   PT: 19.8 sec;   INR: 1.79 ratio    PTT - ( 07 Jan 2018 08:24 )  PTT:33.3 sec    .Blood Blood   No growth to date. -- 01-07 @ 10:44  .Blood Blood   No growth to date. -- 01-05 @ 17:18  .Blood Blood   No growth to date. -- 01-05 @ 17:16  .Urine Clean Catch (Midstream)   10,000 - 49,000 CFU/mL Aerococcus species  There is no standardized, established, or validated  method for susceptibility testing of this organism.  "Aerococcus spp. are predictably susceptible to penicillin,  ampicillin, tetracycline, and vancomycin.  All isolates are  resistant to sulfonamides"  <10,000 CFU/ml Normal Urogenital yudith present -- 01-05 @ 16:33    Rapid RVP Result: NotDetec (01-08 @ 11:44)  Rapid RVP Result: NotDetec (01-05 @ 11:39)    CENTRAL LINE: N            CULP: Y                        DATE INSERTED: 1/7/18             REMOVE: N  A-LINE: N                           GLOBAL ISSUE/BEST PRACTICE  Analgesia: Dilaudid discontinued   Sedation: No  HOB elevation: yes  VTE prophylaxis: Xarelto 15mg for A.fib, will cover dvt ppx    Glycemic control: yes  Nutrition: NPO except meds and ice chips for now, will likely advance    CODE STATUS: Full Code

## 2018-01-09 NOTE — PHYSICAL THERAPY INITIAL EVALUATION ADULT - GENERAL OBSERVATIONS, REHAB EVAL
Pt received in recliner in ICU /c Nsg staff in room & pt just assisted OOB to recliner /c Nsg staff. Pt is agreeable /c PT session.

## 2018-01-09 NOTE — PHYSICAL THERAPY INITIAL EVALUATION ADULT - IMPAIRMENTS FOUND, PT EVAL
muscle strength/gait, locomotion, and balance/joint integrity and mobility/aerobic capacity/endurance

## 2018-01-09 NOTE — PHYSICAL THERAPY INITIAL EVALUATION ADULT - MD ORDER
PT order for ambulate as tolerated and PT consult. Initial Ventral Hernia Repair on 10/9/2017; CT Abd/pelvis: Periumbilical hernia containing small bowel /c ascites /c mild dilation of proximal small bowel loops & collapse of distal small bowel loops. 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 Moderate to large pelvic ascites; US abdomen: Large ascites; A1c: 5.8, WBC 11.8

## 2018-01-09 NOTE — PROGRESS NOTE ADULT - ASSESSMENT
89 y/o F PMHX ovarian cancer w/ ascites, colon ca s/p colostomy, s/p incarcerated ventral hernia repair 10/2017, DM2, HTN, afib s/p PPM, hypothyroid, obesity, and overactive bladder presented with abdominal pain, now POD #2 of umbilical hernia repair with mesh with 6 L ascites fluid taken off. Patient is clinically stable at this time.      Neuro:   - Continue post operative pain management- dilaudid prn.   - Continue Gabapentin   Cards:   - A.fib on Xarelto at home and s/p PPM. Holding Xarelto for now, will restart pending surgery recs.   - Hypotension: Discontinue phenylephrine gtt. Patient's BP has been stable in 90's/50's. Hold antihypertensives in setting of hypotension.   - Patient appears to be volume overloaded with pulmonary congestion. Will start IV Lasix 40.   - f/u TTE to evaluate for valvular dysfunction, and heart failure.   Pulm:  - History of asthma on montelukast at home. Cont duonebs prn. Cont supplemental O2 as needed.   - f/u Repeat RVP  GI:   - NPO except meds and ice chips   :   - No active renal issues   - Yusuf in place. Monitor I's and O's  Endo:   - hx hypothyroidism- Continue synthroid   MSK:  - PT eval  Heme/ONC:   - chronic ascites with periodic paracentesis, 6L taken off during procedure today. Heme/onc eval noted.     Dispo: Continue critical care. 89 y/o F PMHX ovarian cancer w/ ascites, colon ca s/p colostomy, s/p incarcerated ventral hernia repair 10/2017, DM2, HTN, afib s/p PPM, hypothyroid, obesity, and overactive bladder presented with abdominal pain, now POD #2 of umbilical hernia repair with mesh with 6 L ascites fluid taken off. Patient is clinically stable at this time.      Neuro:   - discontinue post operative pain management- dilaudid prn.   - Continue Gabapentin   Cards:   - A.fib on Xarelto at home and s/p PPM. Holding Xarelto for now, will restart pending surgery recs.   - Hypotension: Discontinue phenylephrine gtt. Patient's BP has been stable in 90's/50's. Hold antihypertensives in setting of hypotension.   - Patient appears to be volume overloaded with pulmonary congestion. Will start IV Lasix 40.   - f/u TTE to evaluate for valvular dysfunction, and heart failure.   Pulm:  - History of asthma on montelukast at home. Cont duonebs prn. Cont supplemental O2 as needed.   - f/u Repeat RVP  GI:   - NPO except meds and ice chips   :   - No active renal issues   - Yusuf in place. Monitor I's and O's  Endo:   - hx hypothyroidism- Continue synthroid   MSK:  - PT eval  Heme/ONC:   - chronic ascites with periodic paracentesis, 6L taken off during procedure today. Heme/onc eval noted.     Dispo: Continue critical care. 89 y/o F PMHX ovarian cancer w/ ascites, colon ca s/p colostomy, s/p incarcerated ventral hernia repair 10/2017, DM2, HTN, afib s/p PPM, hypothyroid, obesity, and overactive bladder presented with abdominal pain, now POD #2 of umbilical hernia repair with mesh with 6 L ascites fluid taken off. Patient is clinically stable at this time.      Neuro:   - discontinue post operative pain management- dilaudid prn.   - Continue Gabapentin   Cards:   - A.fib on Xarelto at home and s/p PPM. Restart home dose of Xarelto.    - Hypotension: Patient's BP has been stable in 90's/50's. Hold antihypertensives in setting of hypotension.   - f/u TTE to evaluate for valvular dysfunction, and heart failure.   Pulm:  - History of asthma on montelukast at home. Cont duonebs prn. Cont supplemental O2 as needed.   - Repeat RVP negative   GI:   - Advance diet to FLD   :   - Mild BRITTANEY, likely 2/2 dehydration. Continue to monitor   - Yusuf in place. Monitor I's and O's  Endo:   - hx hypothyroidism- Continue synthroid   MSK:  - PT eval  Heme/ONC:   - chronic ascites with periodic paracentesis. Heme/onc eval noted.     Dispo: Continue critical care. 89 y/o F PMHX ovarian cancer w/ ascites, colon ca s/p colostomy, s/p incarcerated ventral hernia repair 10/2017, DM2, HTN, afib s/p PPM, hypothyroid, obesity, and overactive bladder presented with abdominal pain, now POD #2 of umbilical hernia repair with mesh with 6 L ascites fluid taken off. Patient is clinically stable at this time.      Neuro:   - discontinue post operative pain management- dilaudid prn.   - Continue Gabapentin   Cards:   - A.fib on Xarelto at home and s/p PPM. Restart home dose of Xarelto.    - Hypotension: Patient's BP has been stable in 90's/50's. Hold antihypertensives in setting of hypotension.   - f/u TTE to evaluate for valvular dysfunction, and heart failure.   Pulm:  - History of asthma on montelukast at home. Cont duonebs prn. Cont supplemental O2 as needed.   - Repeat RVP negative   GI:   - Advance diet to FLD   :   - Mild BRITTANEY, likely 2/2 dehydration. Continue to monitor   - Yusuf in place. Monitor I's and O's  Endo:   - hx hypothyroidism- Continue synthroid   ID:  - Postoperative leukocytosis. Patient remains afebrile. Will cont to monitor off abx for now.   MSK:  - PT eval  Heme/ONC:   - chronic ascites with periodic paracentesis. Heme/onc eval noted.     Dispo: Continue critical care.

## 2018-01-09 NOTE — PHYSICAL THERAPY INITIAL EVALUATION ADULT - PERTINENT HX OF CURRENT PROBLEM, REHAB EVAL
As per H&P:"89 y/o F PMHx ovarian cancer w/ recurrent ascites, colon ca s/p colostomy, s/p incarcerated ventral hernia repair 10/2017, DM2, HTN, afib s/p PPM, hypothyroid, obesity, and overactive bladder presents with abdominal pain since this morning. Patient reports the abdominal pain is dull located over the umbilical hernia and near the scar site of the previous hernia. Worst when coughing. Started at 3am. Admits to abdominal distention and nausea."

## 2018-01-09 NOTE — PHYSICAL THERAPY INITIAL EVALUATION ADULT - TRANSFER TRAINING, PT EVAL
Pt will be supervision level of assistance or better for sit to stand/ stand to sit transfers with RW in 2-3 sessions

## 2018-01-09 NOTE — PHYSICAL THERAPY INITIAL EVALUATION ADULT - GAIT DEVIATIONS NOTED, PT EVAL
decreased weight-shifting ability/some postural sway and unsteadiness but over-all fairly steady gait /c rolling walker/decreased velocity of limb motion/decreased stride length/decreased step length

## 2018-01-10 LAB
ALBUMIN SERPL ELPH-MCNC: 1.9 G/DL — LOW (ref 3.3–5)
ALP SERPL-CCNC: 88 U/L — SIGNIFICANT CHANGE UP (ref 40–120)
ALT FLD-CCNC: 12 U/L — SIGNIFICANT CHANGE UP (ref 12–78)
ANION GAP SERPL CALC-SCNC: 6 MMOL/L — SIGNIFICANT CHANGE UP (ref 5–17)
AST SERPL-CCNC: 18 U/L — SIGNIFICANT CHANGE UP (ref 15–37)
BASOPHILS # BLD AUTO: 0.1 K/UL — SIGNIFICANT CHANGE UP (ref 0–0.2)
BASOPHILS NFR BLD AUTO: 1.1 % — SIGNIFICANT CHANGE UP (ref 0–2)
BILIRUB SERPL-MCNC: 0.3 MG/DL — SIGNIFICANT CHANGE UP (ref 0.2–1.2)
BUN SERPL-MCNC: 28 MG/DL — HIGH (ref 7–23)
CALCIUM SERPL-MCNC: 7.3 MG/DL — LOW (ref 8.5–10.1)
CHLORIDE SERPL-SCNC: 103 MMOL/L — SIGNIFICANT CHANGE UP (ref 96–108)
CO2 SERPL-SCNC: 30 MMOL/L — SIGNIFICANT CHANGE UP (ref 22–31)
CREAT SERPL-MCNC: 1 MG/DL — SIGNIFICANT CHANGE UP (ref 0.5–1.3)
CULTURE RESULTS: SIGNIFICANT CHANGE UP
CULTURE RESULTS: SIGNIFICANT CHANGE UP
EOSINOPHIL # BLD AUTO: 0.5 K/UL — SIGNIFICANT CHANGE UP (ref 0–0.5)
EOSINOPHIL NFR BLD AUTO: 5.2 % — SIGNIFICANT CHANGE UP (ref 0–6)
GLUCOSE SERPL-MCNC: 108 MG/DL — HIGH (ref 70–99)
HCT VFR BLD CALC: 31.1 % — LOW (ref 34.5–45)
HGB BLD-MCNC: 9.7 G/DL — LOW (ref 11.5–15.5)
LYMPHOCYTES # BLD AUTO: 0.6 K/UL — LOW (ref 1–3.3)
LYMPHOCYTES # BLD AUTO: 6.1 % — LOW (ref 13–44)
MAGNESIUM SERPL-MCNC: 2.1 MG/DL — SIGNIFICANT CHANGE UP (ref 1.6–2.6)
MCHC RBC-ENTMCNC: 25.6 PG — LOW (ref 27–34)
MCHC RBC-ENTMCNC: 31.2 GM/DL — LOW (ref 32–36)
MCV RBC AUTO: 82.2 FL — SIGNIFICANT CHANGE UP (ref 80–100)
MONOCYTES # BLD AUTO: 0.8 K/UL — SIGNIFICANT CHANGE UP (ref 0–0.9)
MONOCYTES NFR BLD AUTO: 8.5 % — SIGNIFICANT CHANGE UP (ref 1–9)
NEUTROPHILS # BLD AUTO: 7.7 K/UL — HIGH (ref 1.8–7.4)
NEUTROPHILS NFR BLD AUTO: 79.1 % — HIGH (ref 43–77)
PHOSPHATE SERPL-MCNC: 2.3 MG/DL — LOW (ref 2.5–4.5)
PLATELET # BLD AUTO: 431 K/UL — HIGH (ref 150–400)
POTASSIUM SERPL-MCNC: 3.8 MMOL/L — SIGNIFICANT CHANGE UP (ref 3.5–5.3)
POTASSIUM SERPL-SCNC: 3.8 MMOL/L — SIGNIFICANT CHANGE UP (ref 3.5–5.3)
PROT SERPL-MCNC: 5.5 G/DL — LOW (ref 6–8.3)
RBC # BLD: 3.78 M/UL — LOW (ref 3.8–5.2)
RBC # FLD: 14.5 % — SIGNIFICANT CHANGE UP (ref 10.3–14.5)
SODIUM SERPL-SCNC: 139 MMOL/L — SIGNIFICANT CHANGE UP (ref 135–145)
SPECIMEN SOURCE: SIGNIFICANT CHANGE UP
SPECIMEN SOURCE: SIGNIFICANT CHANGE UP
WBC # BLD: 9.8 K/UL — SIGNIFICANT CHANGE UP (ref 3.8–10.5)
WBC # FLD AUTO: 9.8 K/UL — SIGNIFICANT CHANGE UP (ref 3.8–10.5)

## 2018-01-10 PROCEDURE — 99233 SBSQ HOSP IP/OBS HIGH 50: CPT | Mod: GC

## 2018-01-10 RX ORDER — FUROSEMIDE 40 MG
40 TABLET ORAL DAILY
Qty: 0 | Refills: 0 | Status: DISCONTINUED | OUTPATIENT
Start: 2018-01-10 | End: 2018-01-11

## 2018-01-10 RX ORDER — POTASSIUM PHOSPHATE, MONOBASIC POTASSIUM PHOSPHATE, DIBASIC 236; 224 MG/ML; MG/ML
15 INJECTION, SOLUTION INTRAVENOUS ONCE
Qty: 0 | Refills: 0 | Status: COMPLETED | OUTPATIENT
Start: 2018-01-10 | End: 2018-01-10

## 2018-01-10 RX ORDER — POTASSIUM CHLORIDE 20 MEQ
20 PACKET (EA) ORAL DAILY
Qty: 0 | Refills: 0 | Status: DISCONTINUED | OUTPATIENT
Start: 2018-01-10 | End: 2018-01-11

## 2018-01-10 RX ADMIN — GABAPENTIN 300 MILLIGRAM(S): 400 CAPSULE ORAL at 06:23

## 2018-01-10 RX ADMIN — GABAPENTIN 300 MILLIGRAM(S): 400 CAPSULE ORAL at 16:13

## 2018-01-10 RX ADMIN — GABAPENTIN 300 MILLIGRAM(S): 400 CAPSULE ORAL at 21:51

## 2018-01-10 RX ADMIN — Medication 1000 UNIT(S): at 14:23

## 2018-01-10 RX ADMIN — Medication 3 MILLILITER(S): at 07:58

## 2018-01-10 RX ADMIN — Medication 3 MILLILITER(S): at 03:11

## 2018-01-10 RX ADMIN — RIVAROXABAN 15 MILLIGRAM(S): KIT at 16:13

## 2018-01-10 RX ADMIN — Medication 225 MICROGRAM(S): at 06:23

## 2018-01-10 RX ADMIN — Medication 3 MILLILITER(S): at 19:53

## 2018-01-10 RX ADMIN — POTASSIUM PHOSPHATE, MONOBASIC POTASSIUM PHOSPHATE, DIBASIC 62.5 MILLIMOLE(S): 236; 224 INJECTION, SOLUTION INTRAVENOUS at 14:21

## 2018-01-10 NOTE — PROGRESS NOTE ADULT - SUBJECTIVE AND OBJECTIVE BOX
Patient is a 89y old  Female who presents with a chief complaint of abdominal pain (07 Jan 2018 11:42)    24 hour events: No acute events overnight. She is now POD #3 from repair of umbilical hernia and removal of 6L of ascites. Patient was seen and examined lying comfortably in bed c/o mild abdominal pain at surgical site but does not want pain medication.     REVIEW OF SYSTEMS  Constitutional: No fever, chills  Neuro: No headache, numbness, weakness  Resp: Wet cough, denies shortness of breath  CVS: No chest pain, palpitations, leg swelling  GI: mild abdominal pain, denies nausea, vomiting, diarrhea   : No dysuria, frequency, incontinence  Skin: No new itching, burning, rashes, or lesions     ICU Vital Signs Last 24 Hrs  T(C): 36.7 (10 Guevara 2018 11:28), Max: 37.1 (09 Jan 2018 19:58)  T(F): 98 (10 Guevara 2018 11:28), Max: 98.7 (09 Jan 2018 19:58)  HR: 79 (10 Guevara 2018 10:00) (76 - 82)  BP: 109/54 (10 Guevara 2018 10:00) (88/50 - 122/57)  BP(mean): 73 (10 Guevara 2018 10:00) (64 - 84)  RR: 32 (10 Guevara 2018 10:00) (26 - 40)  SpO2: 99% (10 Guevara 2018 10:00) (79% - 99%)    I&O's Summary  01-09 @ 07:01  -  01-10 @ 07:00  --------------------------------------------------------  IN: 1000 mL / OUT: 1165 mL / NET: -165 mL    PHYSICAL EXAM  GENERAL: Elderly  female lying in bed comfortably, appears well, no acute distress, appropriate, pleasant  EYES: sclera clear, no exudates  ENMT: oropharynx clear without erythema, no exudates, moist mucous membranes  NECK: supple, soft  LUNGS: positive wheezing bilaterally, no rhonchi appreciated  HEART: soft S1/S2, regular rate and rhythm, no murmurs noted, no lower extremity edema  GASTROINTESTINAL: Surgical site on abdomen is covered with gauze and tape, clean/dry/intact. Abdomen is soft, nontender, mildly distended (patient has chronic ascites), hypoactive bowel sounds, no palpable masses. Colostomy site clean and dry.   INTEGUMENT: good skin turgor, no lesions noted  MUSCULOSKELETAL: no clubbing or cyanosis, no obvious deformity  NEUROLOGIC: awake, alert, oriented x3    MEDICATIONS  (STANDING):  ALBUTerol/ipratropium for Nebulization 3 milliLiter(s) Nebulizer every 6 hours  cholecalciferol 1000 Unit(s) Oral daily  gabapentin 300 milliGRAM(s) Oral every 8 hours  levothyroxine 225 MICROGram(s) Oral daily  potassium phosphate IVPB 15 milliMole(s) IV Intermittent once  rivaroxaban 15 milliGRAM(s) Oral every 24 hours    MEDICATIONS  (PRN):  ondansetron Injectable 4 milliGRAM(s) IV Push once PRN Nausea and/or Vomiting                        9.7    9.8   )-----------( 431      ( 10 Guevara 2018 06:21 )             31.1     01-10  139  |  103  |  28<H>  ----------------------------<  108<H>  3.8   |  30  |  1.00    Ca    7.3<L>      10 Guevara 2018 06:21  Phos  2.3     01-10  Mg     2.1     01-10    TPro  5.5<L>  /  Alb  1.9<L>  /  TBili  0.3  /  DBili  x   /  AST  18  /  ALT  12  /  AlkPhos  88  01-10    .Blood Blood   No growth to date. -- 01-07 @ 10:44  .Blood Blood   No growth to date. -- 01-05 @ 17:18  .Blood Blood   No growth to date. -- 01-05 @ 17:16  .Urine Clean Catch (Midstream)   10,000 - 49,000 CFU/mL Aerococcus species  There is no standardized, established, or validated  method for susceptibility testing of this organism.  "Aerococcus spp. are predictably susceptible to penicillin,  ampicillin, tetracycline, and vancomycin.  All isolates are  resistant to sulfonamides"  <10,000 CFU/ml Normal Urogenital yudith present -- 01-05 @ 16:33    Rapid RVP Result: NotDetec (01-08 @ 11:44)    CENTRAL LINE: N            CULP: Y                        DATE INSERTED: 1/7/18             REMOVE: N  A-LINE: N                           GLOBAL ISSUE/BEST PRACTICE  Analgesia: Dilaudid discontinued   Sedation: No  HOB elevation: yes  VTE prophylaxis: Xarelto 15mg for A.fib, will cover dvt ppx    Glycemic control: yes  Nutrition: NPO except meds and ice chips for now, will likely advance    CODE STATUS: Full Code Patient is a 89y old  Female who presents with a chief complaint of abdominal pain (07 Jan 2018 11:42)    24 hour events: No acute events overnight. She is now POD #3 from repair of umbilical hernia and removal of 6L of ascites. Patient was seen and examined lying comfortably in bed c/o mild abdominal pain at surgical site but does not want pain medication.     REVIEW OF SYSTEMS  Constitutional: No fever, chills  Neuro: No headache, numbness, weakness  Resp: Wet cough, denies shortness of breath  CVS: No chest pain, palpitations, leg swelling  GI: mild abdominal pain, denies nausea, vomiting, diarrhea   : No dysuria  Skin: No new itching, burning, rashes, or lesions     ICU Vital Signs Last 24 Hrs  T(C): 36.7 (10 Guevara 2018 11:28), Max: 37.1 (09 Jan 2018 19:58)  T(F): 98 (10 Guevara 2018 11:28), Max: 98.7 (09 Jan 2018 19:58)  HR: 79 (10 Guevara 2018 10:00) (76 - 82)  BP: 109/54 (10 Guevara 2018 10:00) (88/50 - 122/57)  BP(mean): 73 (10 Guevara 2018 10:00) (64 - 84)  RR: 32 (10 Guevara 2018 10:00) (26 - 40)  SpO2: 99% (10 Guevara 2018 10:00) (79% - 99%)    I&O's Summary  01-09 @ 07:01  -  01-10 @ 07:00  --------------------------------------------------------  IN: 1000 mL / OUT: 1165 mL / NET: -165 mL    PHYSICAL EXAM  GENERAL: Elderly  female lying in bed comfortably, appears well, no acute distress, appropriate, pleasant  EYES: sclera clear, no exudates  ENMT: oropharynx clear without erythema, no exudates, moist mucous membranes  NECK: supple, soft  LUNGS: positive wheezing bilaterally, no rhonchi appreciated  HEART: soft S1/S2, regular rate and rhythm, no murmurs noted, no lower extremity edema  GASTROINTESTINAL: Surgical site on abdomen is covered with gauze and tape, clean/dry/intact. Abdomen is soft, nontender, mildly distended (patient has chronic ascites), hypoactive bowel sounds, no palpable masses. Colostomy site clean and dry.   INTEGUMENT: good skin turgor, no lesions noted  MUSCULOSKELETAL: no clubbing or cyanosis, no obvious deformity  NEUROLOGIC: awake, alert, oriented x3    MEDICATIONS  (STANDING):  ALBUTerol/ipratropium for Nebulization 3 milliLiter(s) Nebulizer every 6 hours  cholecalciferol 1000 Unit(s) Oral daily  gabapentin 300 milliGRAM(s) Oral every 8 hours  levothyroxine 225 MICROGram(s) Oral daily  potassium phosphate IVPB 15 milliMole(s) IV Intermittent once  rivaroxaban 15 milliGRAM(s) Oral every 24 hours    MEDICATIONS  (PRN):  ondansetron Injectable 4 milliGRAM(s) IV Push once PRN Nausea and/or Vomiting                        9.7    9.8   )-----------( 431      ( 10 Guevara 2018 06:21 )             31.1     01-10  139  |  103  |  28<H>  ----------------------------<  108<H>  3.8   |  30  |  1.00    Ca    7.3<L>      10 Guevara 2018 06:21  Phos  2.3     01-10  Mg     2.1     01-10    TPro  5.5<L>  /  Alb  1.9<L>  /  TBili  0.3  /  DBili  x   /  AST  18  /  ALT  12  /  AlkPhos  88  01-10    .Blood Blood   No growth to date. -- 01-07 @ 10:44  .Blood Blood   No growth to date. -- 01-05 @ 17:18  .Blood Blood   No growth to date. -- 01-05 @ 17:16  .Urine Clean Catch (Midstream)   10,000 - 49,000 CFU/mL Aerococcus species  There is no standardized, established, or validated  method for susceptibility testing of this organism.  "Aerococcus spp. are predictably susceptible to penicillin,  ampicillin, tetracycline, and vancomycin.  All isolates are  resistant to sulfonamides"  <10,000 CFU/ml Normal Urogenital yudith present -- 01-05 @ 16:33    Rapid RVP Result: NotDetec (01-08 @ 11:44)    CENTRAL LINE: N            CULP: Y                        DATE INSERTED: 1/7/18             REMOVE: N  A-LINE: N                           GLOBAL ISSUE/BEST PRACTICE  Analgesia: Dilaudid discontinued   Sedation: No  HOB elevation: yes  VTE prophylaxis: Xarelto 15mg for A.fib, will cover dvt ppx    Glycemic control: yes  Nutrition: Regular diet- DASH diet     CODE STATUS: Full Code Patient is a 89y old  Female who presents with a chief complaint of abdominal pain (07 Jan 2018 11:42)    24 hour events: No acute events overnight. She is now POD #3 from repair of umbilical hernia and removal of 6L of ascites. Patient was seen and examined lying comfortably in bed c/o mild abdominal pain at surgical site but does not want pain medication.     REVIEW OF SYSTEMS  Constitutional: No fever, chills  Neuro: No headache, numbness, weakness  Resp: Wet cough, denies shortness of breath  CVS: No chest pain, palpitations, leg swelling  GI: mild abdominal pain, denies nausea, vomiting, diarrhea   : No dysuria  Skin: No new itching, burning, rashes, or lesions     ICU Vital Signs Last 24 Hrs  T(C): 36.7 (10 Guevara 2018 11:28), Max: 37.1 (09 Jan 2018 19:58)  T(F): 98 (10 Guevara 2018 11:28), Max: 98.7 (09 Jan 2018 19:58)  HR: 79 (10 Guevara 2018 10:00) (76 - 82)  BP: 109/54 (10 Guevara 2018 10:00) (88/50 - 122/57)  BP(mean): 73 (10 Guevara 2018 10:00) (64 - 84)  RR: 32 (10 Guevara 2018 10:00) (26 - 40)  SpO2: 99% (10 Guevara 2018 10:00) (79% - 99%)    I&O's Summary  01-09 @ 07:01  -  01-10 @ 07:00  --------------------------------------------------------  IN: 1000 mL / OUT: 1165 mL / NET: -165 mL    PHYSICAL EXAM  GENERAL: Elderly  female lying in bed comfortably, appears well, no acute distress, appropriate, pleasant  EYES: sclera clear, no exudates  ENMT: oropharynx clear without erythema, no exudates, moist mucous membranes  NECK: supple, soft  LUNGS: positive wheezing bilaterally, no rhonchi appreciated  HEART: soft S1/S2, regular rate and rhythm, no murmurs noted, no lower extremity edema  GASTROINTESTINAL: Surgical site on abdomen is covered with gauze and tape, clean/dry/intact. Abdomen is soft, nontender, mildly distended (patient has chronic ascites), hypoactive bowel sounds, no palpable masses. Colostomy site clean and dry.   INTEGUMENT: good skin turgor, no lesions noted  MUSCULOSKELETAL: no clubbing or cyanosis, no obvious deformity  NEUROLOGIC: awake, alert, oriented x3    MEDICATIONS  (STANDING):  ALBUTerol/ipratropium for Nebulization 3 milliLiter(s) Nebulizer every 6 hours  cholecalciferol 1000 Unit(s) Oral daily  gabapentin 300 milliGRAM(s) Oral every 8 hours  levothyroxine 225 MICROGram(s) Oral daily  potassium phosphate IVPB 15 milliMole(s) IV Intermittent once  rivaroxaban 15 milliGRAM(s) Oral every 24 hours    MEDICATIONS  (PRN):  ondansetron Injectable 4 milliGRAM(s) IV Push once PRN Nausea and/or Vomiting                        9.7    9.8   )-----------( 431      ( 10 Guevara 2018 06:21 )             31.1     01-10  139  |  103  |  28<H>  ----------------------------<  108<H>  3.8   |  30  |  1.00    Ca    7.3<L>      10 Guevara 2018 06:21  Phos  2.3     01-10  Mg     2.1     01-10    TPro  5.5<L>  /  Alb  1.9<L>  /  TBili  0.3  /  DBili  x   /  AST  18  /  ALT  12  /  AlkPhos  88  01-10    .Blood Blood   No growth to date. -- 01-07 @ 10:44    .Blood Blood   No growth to date. -- 01-05 @ 17:18    .Blood Blood   No growth to date. -- 01-05 @ 17:16    .Urine Clean Catch (Midstream)   10,000 - 49,000 CFU/mL Aerococcus species  There is no standardized, established, or validated  method for susceptibility testing of this organism.  "Aerococcus spp. are predictably susceptible to penicillin,  ampicillin, tetracycline, and vancomycin.  All isolates are  resistant to sulfonamides"  <10,000 CFU/ml Normal Urogenital yudith present -- 01-05 @ 16:33    Rapid RVP Result: NotDetec (01-08 @ 11:44)    CENTRAL LINE: N            CULP: Y                        DATE INSERTED: 1/7/18             REMOVE: N  A-LINE: N                           GLOBAL ISSUE/BEST PRACTICE  Analgesia: Dilaudid discontinued   Sedation: No  HOB elevation: yes  VTE prophylaxis: Xarelto 15mg for A.fib, will cover dvt ppx    Glycemic control: yes  Nutrition: Regular diet- DASH diet     CODE STATUS: Full Code

## 2018-01-10 NOTE — PROGRESS NOTE ADULT - ASSESSMENT
89 y/o F PMHX ovarian cancer w/ ascites, colon ca s/p colostomy, s/p incarcerated ventral hernia repair 10/2017, DM2, HTN, afib s/p PPM, hypothyroid, obesity, and overactive bladder presented with abdominal pain, now POD #3 of umbilical hernia repair with mesh with 6 L ascites fluid taken off. Patient is clinically stable at this time.      Neuro:   - Continue Gabapentin   Cards:   - A.fib on Xarelto at home and s/p PPM. Continue Xarelto.    - Hypotension: Patient's BP has been stable in 90's-100's/50's. Hold antihypertensives in setting of hypotension.   - f/u TTE to evaluate for valvular dysfunction, and heart failure.   Pulm:  - History of asthma on montelukast at home. Cont duonebs prn. Cont supplemental O2 as needed.   - Repeat RVP negative   GI:   - Advance diet to FLD   :   - Mild BRITTANEY, likely 2/2 dehydration. Continue to monitor   - Yusuf in place. Monitor I's and O's  Endo:   - hx hypothyroidism- Continue synthroid   ID:  - Postoperative leukocytosis. Patient remains afebrile. Will cont to monitor off abx for now.   MSK:  - PT eval  Heme/ONC:   - chronic ascites with periodic paracentesis. Heme/onc eval noted.     Dispo: Continue critical care. 89 y/o F PMHX ovarian cancer w/ ascites, colon ca s/p colostomy, s/p incarcerated ventral hernia repair 10/2017, DM2, HTN, afib s/p PPM, hypothyroid, obesity, and overactive bladder presented with abdominal pain, now POD #3 of umbilical hernia repair with mesh with 6 L ascites fluid taken off. Patient is clinically stable at this time.      Neuro:   - Continue Gabapentin   Cards:   - A.fib on Xarelto at home and s/p PPM. Continue Xarelto.    - Hypotension: Patient's BP has been stable in 90's-100's/50's. Hold antihypertensives in setting of hypotension.   - TTE demonstrates EF 55%  Pulm:  - History of asthma on montelukast at home. Cont duonebs prn. Cont supplemental O2 as needed.   - Repeat RVP negative   GI:   - Advance diet to FLD   :   - Mild BRITTANEY, likely 2/2 dehydration. Continue to monitor   - Yusuf in place. Monitor I's and O's  Endo:   - hx hypothyroidism- Continue synthroid   ID:  - Postoperative leukocytosis. Patient remains afebrile. Will cont to monitor off abx for now.   MSK:  - PT eval  Heme/ONC:   - chronic ascites with periodic paracentesis. Heme/onc eval noted.     Dispo: Continue critical care. 89 y/o F PMHX ovarian cancer w/ ascites, colon ca s/p colostomy, s/p incarcerated ventral hernia repair 10/2017, DM2, HTN, afib s/p PPM, hypothyroid, obesity, and overactive bladder presented with abdominal pain, now POD #3 of umbilical hernia repair with mesh with 6 L ascites fluid taken off. Patient is clinically stable at this time.      Neuro:   - Continue Gabapentin   Cards:   - A.fib on Xarelto at home and s/p PPM. Continue Xarelto.    - Hypotension: Patient's BP has been stable in 90's-100's/50's. Hold antihypertensives in setting of hypotension.   - TTE demonstrates EF 55%, moderate TR  Pulm:  - History of asthma on montelukast at home. Cont duonebs prn. Cont supplemental O2 as needed.   - Repeat CXR performed yesterday demonstrates new R. pleural effusion vs. atelectasis.   GI:   - Advance diet to regular as per surgery recommendations.   :   - Mild BRITTANEY, likely 2/2 dehydration. Continue to monitor   - Yusuf in place. Monitor I's and O's  Endo:   - hx hypothyroidism- Continue synthroid   ID:  - Postoperative leukocytosis. Patient remains afebrile. Will cont to monitor off abx for now.   MSK:  - PT eval  Heme/ONC:   - chronic ascites with periodic paracentesis. Heme/onc eval noted.     Dispo: Continue critical care. 89 y/o F PMHX ovarian cancer w/ ascites, colon ca s/p colostomy, s/p incarcerated ventral hernia repair 10/2017, DM2, HTN, afib s/p PPM, hypothyroid, obesity, and overactive bladder presented with abdominal pain, now POD #3 of umbilical hernia repair with mesh with 6 L ascites fluid taken off. Patient is clinically stable at this time.      Neuro:   - Continue Gabapentin   Cards:   - A.fib on Xarelto at home and s/p PPM. Continue Xarelto.    - Hypotension: Patient's BP has been stable in 90's-100's/50's. Hold antihypertensives in setting of hypotension.   - TTE demonstrates EF 55%, moderate TR  Pulm:  - History of asthma on montelukast at home. Cont duonebs prn. Cont supplemental O2 as needed.   - Repeat CXR performed yesterday demonstrates new R. pleural effusion vs. atelectasis.   GI:   - Advanced diet to regular as per surgery recommendations.   :   - Mild BRITTANEY, likely 2/2 dehydration, improving. Continue to monitor   - Yusuf in place. Monitor I's and O's  Endo:   - hx hypothyroidism- Continue synthroid   ID:  - Postoperative leukocytosis. Patient remains afebrile. Will cont to monitor off abx for now.   MSK:  - PT eval  Heme/ONC:   - chronic ascites with periodic paracentesis. Heme/onc eval noted.     Dispo: Continue critical care. 89 y/o F PMHX ovarian cancer w/ ascites, colon ca s/p colostomy, s/p incarcerated ventral hernia repair 10/2017, DM2, HTN, afib s/p PPM, hypothyroid, obesity, and overactive bladder presented with abdominal pain, now POD #3 of umbilical hernia repair with mesh with 6 L ascites fluid taken off. Patient is clinically stable at this time.      Neuro:   - Continue Gabapentin   Cards:   - A.fib on Xarelto at home and s/p PPM. Continue Xarelto.    - Hypotension: Patient's BP has been stable in 90's-100's/50's. Hold antihypertensives in setting of hypotension.   - TTE demonstrates EF 55%, moderate TR  Pulm:  - History of asthma on montelukast at home. Cont duonebs prn. Cont supplemental O2 as needed.   - Repeat CXR performed yesterday demonstrates new R. pleural effusion vs. atelectasis.   GI:   - Advanced diet to regular as per surgery recommendations.   :   - Mild BRITTANEY, likely 2/2 dehydration, improving. Continue to monitor   - Yusuf in place. Monitor I's and O's  Endo:   - hx hypothyroidism- Continue synthroid   ID:  - Postoperative leukocytosis. Patient remains afebrile. Will cont to monitor off abx for now.   MSK:  - PT eval  Heme/ONC:   - chronic ascites with periodic paracentesis. Heme/onc eval noted.     Dispo: Continue critical care and can discharge in 1-2 days if patient remains stable.

## 2018-01-10 NOTE — PROGRESS NOTE ADULT - SUBJECTIVE AND OBJECTIVE BOX
POD 3  Afeb VS wnl  Tolerating clears well Wants food  Stool and gas in colostomy bag  Abd nontender,  dressing dry  WBC 9.8  P: regular diet , discharge in 1-2 days if tolerates

## 2018-01-10 NOTE — PROGRESS NOTE ADULT - SUBJECTIVE AND OBJECTIVE BOX
[facility] [place]       HPI:  89 y/o F PMHx ovarian cancer w/ recurrent ascites, colon ca s/p colostomy, s/p incarcerated ventral hernia repair 10/2017, DM2, HTN, afib s/p PPM, hypothyroid, obesity, and overactive bladder presents with abdominal pain since this morning. Patient reports the abdominal pain is dull located over the umbilical hernia and near the scar site of the previous hernia. Worst when coughing. Started at 3am. Admits to abdominal distention and nausea. Reports 1 episode of NBNB emesis yesterday am, but tolerated food after episode. Patient has therapeutic paracentesis on regular basis. Next one scheduled for 1/23/18. Patient states she has had productive cough bringing up green sputum x2 day with increase fatigue. Patient was seen at [place] ED on 1/5/17 for cough and diagnosed with URI. At that time, RVP negative, WBC 11.8, CXR showed left chronic pleural effusion. Us abd showed ascites. Dr. Jenkins did not tap abdomen at that time because ascites was small.     ROS: Currently, denies fever, chills, chest pain, headache, ear pain, nasal congestion, rhinorrhea, increased stool in colostomy bag, dysuria, or hematuria. No sick contacts. No recent travel.     In the ED, VS: afebrile T 97.8, HR 75, /76, RR 16, sat 99% on RA, WBC 11.3, H/H 11/35.4, plat 447, PT 19.8, INR 1.79, PTT 33.3, BUN 28, Cr 1.20, Glu 145, Ca 8.2, lactate 0.9, BNP 5506, Type screen O pos, blood cx/ urine cx/rvp neg from 1/5/17, Received NS @ 200cc/hr, duoneb x2, morphine, zofran, and O2. CT abd /pelvis found Periumbilical hernia containing small bowel and ascites with mild dilatation of proximal small bowel loops and collapse of distal small bowel loops; correlate for incarcerated hernia. There is possible partial small bowel obstruction. Large left parastomal hernia containing colon and ascites. Moderate to large volume of abdominal pelvic ascites. CXR noted is a 2-lead left-sided cardiac device. There is persistent blunting at the left costophrenic angle which may reflect pleural effusion and/or pleural thickening. No new lobar lung consolidation is noted. EKG Diagnosis Line Ventricular-paced rhythm @ 80BPM. (07 Jan 2018 11:42)        SUBJECTIVE:      ALLERGIES:  Allergies    No Known Allergies    Intolerances          MEDICATIONS  (STANDING):  ALBUTerol/ipratropium for Nebulization 3 milliLiter(s) Nebulizer every 6 hours  cholecalciferol 1000 Unit(s) Oral daily  gabapentin 300 milliGRAM(s) Oral every 8 hours  levothyroxine 225 MICROGram(s) Oral daily  rivaroxaban 15 milliGRAM(s) Oral every 24 hours    MEDICATIONS  (PRN):  ondansetron Injectable 4 milliGRAM(s) IV Push once PRN Nausea and/or Vomiting      REVIEW OF SYSTEMS:  CONSTITUTIONAL: No fever,  RESPIRATORY: No cough, wheezing, shortness of breath  CARDIOVASCULAR: No chest pain, dyspnea, palpitations, dizziness, syncope, paroxysmal nocturnal dyspnea, orthopnea, or arm or leg swelling  GASTROINTESTINAL: No abdominal  or epigastric pain, nausea, vomiting,  diarrhea  NEUROLOGICAL: No headaches,  loss of strength, numbness, or tremors    Vital Signs Last 24 Hrs  T(C): 37.1 (10 Guevara 2018 20:15), Max: 37.1 (10 Guevara 2018 20:15)  T(F): 98.7 (10 Guevara 2018 20:15), Max: 98.7 (10 Guevara 2018 20:15)  HR: 80 (10 Guevara 2018 21:00) (79 - 82)  BP: 95/56 (10 Guevara 2018 21:00) (88/50 - 116/63)  BP(mean): 69 (10 Guevara 2018 21:00) (64 - 87)  RR: 38 (10 Guevara 2018 21:00) (27 - 44)  SpO2: 96% (10 Guevara 2018 21:00) (94% - 100%)    PHYSICAL EXAM:  HEAD:  Atraumatic, Normocephalic  NECK: Supple and normal thyroid.  No JVD or carotid bruit.   HEART: S1, S2 regular , 1/6 soft ejection systolic murmur in mitral area , no thrill and no gallops .  PULMONARY: Bilateral vesicular breathing , few scattered ronchi and few scattered rales are present .  ABDOMEN: Soft nontender and positive bowl sounds   EXTREMITIES:  No clubbing, cyanosis, or pedal  edema  NEUROLOGICAL: AAOX3 , no focal deficit .    LABS:                        9.7    9.8   )-----------( 431      ( 10 Guevara 2018 06:21 )             31.1     01-10    139  |  103  |  28<H>  ----------------------------<  108<H>  3.8   |  30  |  1.00    Ca    7.3<L>      10 Guevara 2018 06:21  Phos  2.3     01-10  Mg     2.1     01-10    TPro  5.5<L>  /  Alb  1.9<L>  /  TBili  0.3  /  DBili  x   /  AST  18  /  ALT  12  /  AlkPhos  88  01-10            BNP      EKG:  ECHO:  IMAGING:    Assessment/Plan    Will continue to follow during hospital course and give further recommendations as needed . Thanks for your referral . if any questions please contact me at office ([phone])cell [phone]) [facility] [place]       HPI:  89 y/o F PMHx ovarian cancer w/ recurrent ascites, colon ca s/p colostomy, s/p incarcerated ventral hernia repair 10/2017, DM2, HTN, afib s/p PPM, hypothyroid, obesity, and overactive bladder presents with abdominal pain since this morning. Patient reports the abdominal pain is dull located over the umbilical hernia and near the scar site of the previous hernia. Worst when coughing. Started at 3am. Admits to abdominal distention and nausea. Reports 1 episode of NBNB emesis yesterday am, but tolerated food after episode. Patient has therapeutic paracentesis on regular basis. Next one scheduled for 1/23/18. Patient states she has had productive cough bringing up green sputum x2 day with increase fatigue. Patient was seen at [place] ED on 1/5/17 for cough and diagnosed with URI. At that time, RVP negative, WBC 11.8, CXR showed left chronic pleural effusion. Us abd showed ascites. Dr. Jenkins did not tap abdomen at that time because ascites was small.     ROS: Currently, denies fever, chills, chest pain, headache, ear pain, nasal congestion, rhinorrhea, increased stool in colostomy bag, dysuria, or hematuria. No sick contacts. No recent travel.     In the ED, VS: afebrile T 97.8, HR 75, /76, RR 16, sat 99% on RA, WBC 11.3, H/H 11/35.4, plat 447, PT 19.8, INR 1.79, PTT 33.3, BUN 28, Cr 1.20, Glu 145, Ca 8.2, lactate 0.9, BNP 5506, Type screen O pos, blood cx/ urine cx/rvp neg from 1/5/17, Received NS @ 200cc/hr, duoneb x2, morphine, zofran, and O2. CT abd /pelvis found Periumbilical hernia containing small bowel and ascites with mild dilatation of proximal small bowel loops and collapse of distal small bowel loops; correlate for incarcerated hernia. There is possible partial small bowel obstruction. Large left parastomal hernia containing colon and ascites. Moderate to large volume of abdominal pelvic ascites. CXR noted is a 2-lead left-sided cardiac device. There is persistent blunting at the left costophrenic angle which may reflect pleural effusion and/or pleural thickening. No new lobar lung consolidation is noted. EKG Diagnosis Line Ventricular-paced rhythm @ 80BPM. (07 Jan 2018 11:42)        SUBJECTIVE: Patient feeling much better and tolerating meals . No chest pain .      ALLERGIES:  Allergies    No Known Allergies    Intolerances          MEDICATIONS  (STANDING):  ALBUTerol/ipratropium for Nebulization 3 milliLiter(s) Nebulizer every 6 hours  cholecalciferol 1000 Unit(s) Oral daily  gabapentin 300 milliGRAM(s) Oral every 8 hours  levothyroxine 225 MICROGram(s) Oral daily  rivaroxaban 15 milliGRAM(s) Oral every 24 hours    MEDICATIONS  (PRN):  ondansetron Injectable 4 milliGRAM(s) IV Push once PRN Nausea and/or Vomiting      REVIEW OF SYSTEMS:  CONSTITUTIONAL: No fever,  RESPIRATORY: No cough, wheezing, shortness of breath  CARDIOVASCULAR: No chest pain, dyspnea, palpitations, dizziness, syncope, paroxysmal nocturnal dyspnea, orthopnea, or arm or leg swelling  GASTROINTESTINAL: No abdominal  or epigastric pain, nausea, vomiting,  diarrhea  NEUROLOGICAL: No headaches,  loss of strength, numbness, or tremors    Vital Signs Last 24 Hrs  T(C): 37.1 (10 Guevara 2018 20:15), Max: 37.1 (10 Guevara 2018 20:15)  T(F): 98.7 (10 Guevara 2018 20:15), Max: 98.7 (10 Guevara 2018 20:15)  HR: 80 (10 Guevara 2018 21:00) (79 - 82)  BP: 95/56 (10 Guevara 2018 21:00) (88/50 - 116/63)  BP(mean): 69 (10 Guevara 2018 21:00) (64 - 87)  RR: 38 (10 Guevara 2018 21:00) (27 - 44)  SpO2: 96% (10 Guevara 2018 21:00) (94% - 100%)    PHYSICAL EXAM:  HEAD:  Atraumatic, Normocephalic  NECK: Supple and normal thyroid.  No JVD or carotid bruit.   HEART: S1, S2 irregular , 1/6 soft ejection systolic murmur in mitral area , no thrill and no gallops .  PULMONARY: Bilateral vesicular breathing , few scattered rhonchi and few scattered rales are present .  ABDOMEN: Soft nontender and positive bowl sounds   EXTREMITIES:  No clubbing, cyanosis, or pedal  edema  NEUROLOGICAL: AAOX3 , no focal deficit .    LABS:                        9.7    9.8   )-----------( 431      ( 10 Guevara 2018 06:21 )             31.1     01-10    139  |  103  |  28<H>  ----------------------------<  108<H>  3.8   |  30  |  1.00    Ca    7.3<L>      10 Guevara 2018 06:21  Phos  2.3     01-10  Mg     2.1     01-10    TPro  5.5<L>  /  Alb  1.9<L>  /  TBili  0.3  /  DBili  x   /  AST  18  /  ALT  12  /  AlkPhos  88  01-10            BNP      EKG:  ECHO:  IMAGING:    Assessment/Plan  Patient has :  1) S/P abdominal hernia surgery .  2) CHF and stable .  3) Atrial fibrillation and stable .  Plan : Cardiac stable so far . 2) Resume lasix .  Will continue to follow during hospital course and give further recommendations as needed . Thanks for your referral . if any questions please contact me at office ([phone])cell [phone])

## 2018-01-11 ENCOUNTER — TRANSCRIPTION ENCOUNTER (OUTPATIENT)
Age: 83
End: 2018-01-11

## 2018-01-11 VITALS — HEART RATE: 80 BPM | SYSTOLIC BLOOD PRESSURE: 132 MMHG | DIASTOLIC BLOOD PRESSURE: 67 MMHG

## 2018-01-11 LAB
ALBUMIN SERPL ELPH-MCNC: 1.9 G/DL — LOW (ref 3.3–5)
ALP SERPL-CCNC: 87 U/L — SIGNIFICANT CHANGE UP (ref 40–120)
ALT FLD-CCNC: 15 U/L — SIGNIFICANT CHANGE UP (ref 12–78)
ANION GAP SERPL CALC-SCNC: 7 MMOL/L — SIGNIFICANT CHANGE UP (ref 5–17)
AST SERPL-CCNC: 17 U/L — SIGNIFICANT CHANGE UP (ref 15–37)
BASOPHILS # BLD AUTO: 0.1 K/UL — SIGNIFICANT CHANGE UP (ref 0–0.2)
BASOPHILS NFR BLD AUTO: 0.6 % — SIGNIFICANT CHANGE UP (ref 0–2)
BILIRUB SERPL-MCNC: 0.4 MG/DL — SIGNIFICANT CHANGE UP (ref 0.2–1.2)
BUN SERPL-MCNC: 22 MG/DL — SIGNIFICANT CHANGE UP (ref 7–23)
CALCIUM SERPL-MCNC: 7.6 MG/DL — LOW (ref 8.5–10.1)
CHLORIDE SERPL-SCNC: 103 MMOL/L — SIGNIFICANT CHANGE UP (ref 96–108)
CO2 SERPL-SCNC: 28 MMOL/L — SIGNIFICANT CHANGE UP (ref 22–31)
CREAT SERPL-MCNC: 0.82 MG/DL — SIGNIFICANT CHANGE UP (ref 0.5–1.3)
EOSINOPHIL # BLD AUTO: 0.6 K/UL — HIGH (ref 0–0.5)
EOSINOPHIL NFR BLD AUTO: 6.2 % — HIGH (ref 0–6)
GLUCOSE SERPL-MCNC: 118 MG/DL — HIGH (ref 70–99)
HCT VFR BLD CALC: 31 % — LOW (ref 34.5–45)
HGB BLD-MCNC: 9.6 G/DL — LOW (ref 11.5–15.5)
LYMPHOCYTES # BLD AUTO: 0.7 K/UL — LOW (ref 1–3.3)
LYMPHOCYTES # BLD AUTO: 7.4 % — LOW (ref 13–44)
MAGNESIUM SERPL-MCNC: 2 MG/DL — SIGNIFICANT CHANGE UP (ref 1.6–2.6)
MCHC RBC-ENTMCNC: 25.3 PG — LOW (ref 27–34)
MCHC RBC-ENTMCNC: 31 GM/DL — LOW (ref 32–36)
MCV RBC AUTO: 81.4 FL — SIGNIFICANT CHANGE UP (ref 80–100)
MONOCYTES # BLD AUTO: 0.9 K/UL — SIGNIFICANT CHANGE UP (ref 0–0.9)
MONOCYTES NFR BLD AUTO: 8.7 % — SIGNIFICANT CHANGE UP (ref 1–9)
NEUTROPHILS # BLD AUTO: 7.7 K/UL — HIGH (ref 1.8–7.4)
NEUTROPHILS NFR BLD AUTO: 77 % — SIGNIFICANT CHANGE UP (ref 43–77)
PHOSPHATE SERPL-MCNC: 2.1 MG/DL — LOW (ref 2.5–4.5)
PLATELET # BLD AUTO: 439 K/UL — HIGH (ref 150–400)
POTASSIUM SERPL-MCNC: 3.8 MMOL/L — SIGNIFICANT CHANGE UP (ref 3.5–5.3)
POTASSIUM SERPL-SCNC: 3.8 MMOL/L — SIGNIFICANT CHANGE UP (ref 3.5–5.3)
PROT SERPL-MCNC: 5.5 G/DL — LOW (ref 6–8.3)
RBC # BLD: 3.81 M/UL — SIGNIFICANT CHANGE UP (ref 3.8–5.2)
RBC # FLD: 14.4 % — SIGNIFICANT CHANGE UP (ref 10.3–14.5)
SODIUM SERPL-SCNC: 138 MMOL/L — SIGNIFICANT CHANGE UP (ref 135–145)
WBC # BLD: 9.9 K/UL — SIGNIFICANT CHANGE UP (ref 3.8–10.5)
WBC # FLD AUTO: 9.9 K/UL — SIGNIFICANT CHANGE UP (ref 3.8–10.5)

## 2018-01-11 PROCEDURE — 99233 SBSQ HOSP IP/OBS HIGH 50: CPT | Mod: GC

## 2018-01-11 PROCEDURE — 93005 ELECTROCARDIOGRAM TRACING: CPT

## 2018-01-11 PROCEDURE — 86922 COMPATIBILITY TEST ANTIGLOB: CPT

## 2018-01-11 PROCEDURE — 84100 ASSAY OF PHOSPHORUS: CPT

## 2018-01-11 PROCEDURE — 85027 COMPLETE CBC AUTOMATED: CPT

## 2018-01-11 PROCEDURE — 96374 THER/PROPH/DIAG INJ IV PUSH: CPT

## 2018-01-11 PROCEDURE — 83605 ASSAY OF LACTIC ACID: CPT

## 2018-01-11 PROCEDURE — P9047: CPT

## 2018-01-11 PROCEDURE — 97162 PT EVAL MOD COMPLEX 30 MIN: CPT

## 2018-01-11 PROCEDURE — 83036 HEMOGLOBIN GLYCOSYLATED A1C: CPT

## 2018-01-11 PROCEDURE — 97116 GAIT TRAINING THERAPY: CPT

## 2018-01-11 PROCEDURE — 86900 BLOOD TYPING SEROLOGIC ABO: CPT

## 2018-01-11 PROCEDURE — 88302 TISSUE EXAM BY PATHOLOGIST: CPT

## 2018-01-11 PROCEDURE — 80053 COMPREHEN METABOLIC PANEL: CPT

## 2018-01-11 PROCEDURE — 71045 X-RAY EXAM CHEST 1 VIEW: CPT

## 2018-01-11 PROCEDURE — 86850 RBC ANTIBODY SCREEN: CPT

## 2018-01-11 PROCEDURE — 94640 AIRWAY INHALATION TREATMENT: CPT

## 2018-01-11 PROCEDURE — 83735 ASSAY OF MAGNESIUM: CPT

## 2018-01-11 PROCEDURE — 80202 ASSAY OF VANCOMYCIN: CPT

## 2018-01-11 PROCEDURE — 80048 BASIC METABOLIC PNL TOTAL CA: CPT

## 2018-01-11 PROCEDURE — P9045: CPT

## 2018-01-11 PROCEDURE — 74176 CT ABD & PELVIS W/O CONTRAST: CPT

## 2018-01-11 PROCEDURE — 94760 N-INVAS EAR/PLS OXIMETRY 1: CPT

## 2018-01-11 PROCEDURE — 74019 RADEX ABDOMEN 2 VIEWS: CPT

## 2018-01-11 PROCEDURE — 85610 PROTHROMBIN TIME: CPT

## 2018-01-11 PROCEDURE — 87040 BLOOD CULTURE FOR BACTERIA: CPT

## 2018-01-11 PROCEDURE — 87486 CHLMYD PNEUM DNA AMP PROBE: CPT

## 2018-01-11 PROCEDURE — 87581 M.PNEUMON DNA AMP PROBE: CPT

## 2018-01-11 PROCEDURE — C1781: CPT

## 2018-01-11 PROCEDURE — 87633 RESP VIRUS 12-25 TARGETS: CPT

## 2018-01-11 PROCEDURE — 99285 EMERGENCY DEPT VISIT HI MDM: CPT | Mod: 25

## 2018-01-11 PROCEDURE — 96375 TX/PRO/DX INJ NEW DRUG ADDON: CPT

## 2018-01-11 PROCEDURE — 83880 ASSAY OF NATRIURETIC PEPTIDE: CPT

## 2018-01-11 PROCEDURE — 86901 BLOOD TYPING SEROLOGIC RH(D): CPT

## 2018-01-11 PROCEDURE — 93306 TTE W/DOPPLER COMPLETE: CPT

## 2018-01-11 PROCEDURE — 87798 DETECT AGENT NOS DNA AMP: CPT

## 2018-01-11 PROCEDURE — 85730 THROMBOPLASTIN TIME PARTIAL: CPT

## 2018-01-11 RX ORDER — FUROSEMIDE 40 MG
1 TABLET ORAL
Qty: 0 | Refills: 0 | COMMUNITY
Start: 2018-01-11

## 2018-01-11 RX ORDER — BUDESONIDE AND FORMOTEROL FUMARATE DIHYDRATE 160; 4.5 UG/1; UG/1
2 AEROSOL RESPIRATORY (INHALATION)
Qty: 0 | Refills: 0 | Status: DISCONTINUED | OUTPATIENT
Start: 2018-01-11 | End: 2018-01-11

## 2018-01-11 RX ORDER — FUROSEMIDE 40 MG
1 TABLET ORAL
Qty: 0 | Refills: 0 | COMMUNITY

## 2018-01-11 RX ORDER — LISINOPRIL 2.5 MG/1
1 TABLET ORAL
Qty: 0 | Refills: 0 | COMMUNITY

## 2018-01-11 RX ORDER — IPRATROPIUM/ALBUTEROL SULFATE 18-103MCG
3 AEROSOL WITH ADAPTER (GRAM) INHALATION EVERY 6 HOURS
Qty: 0 | Refills: 0 | Status: DISCONTINUED | OUTPATIENT
Start: 2018-01-11 | End: 2018-01-11

## 2018-01-11 RX ORDER — POTASSIUM PHOSPHATE, MONOBASIC POTASSIUM PHOSPHATE, DIBASIC 236; 224 MG/ML; MG/ML
15 INJECTION, SOLUTION INTRAVENOUS ONCE
Qty: 0 | Refills: 0 | Status: COMPLETED | OUTPATIENT
Start: 2018-01-11 | End: 2018-01-11

## 2018-01-11 RX ADMIN — Medication 40 MILLIGRAM(S): at 06:44

## 2018-01-11 RX ADMIN — Medication 225 MICROGRAM(S): at 06:14

## 2018-01-11 RX ADMIN — Medication 1000 UNIT(S): at 12:12

## 2018-01-11 RX ADMIN — POTASSIUM PHOSPHATE, MONOBASIC POTASSIUM PHOSPHATE, DIBASIC 62.5 MILLIMOLE(S): 236; 224 INJECTION, SOLUTION INTRAVENOUS at 07:58

## 2018-01-11 RX ADMIN — Medication 3 MILLILITER(S): at 02:26

## 2018-01-11 RX ADMIN — Medication 3 MILLILITER(S): at 07:58

## 2018-01-11 RX ADMIN — Medication 20 MILLIEQUIVALENT(S): at 12:12

## 2018-01-11 RX ADMIN — GABAPENTIN 300 MILLIGRAM(S): 400 CAPSULE ORAL at 15:33

## 2018-01-11 RX ADMIN — Medication 100 MILLIGRAM(S): at 07:17

## 2018-01-11 RX ADMIN — GABAPENTIN 300 MILLIGRAM(S): 400 CAPSULE ORAL at 06:14

## 2018-01-11 NOTE — PROGRESS NOTE ADULT - SUBJECTIVE AND OBJECTIVE BOX
Afeb  VS wnl  Only c/o mild cough  Abd: soft, nontender, dressing dry, no redness around dressing  Not ambulating much yet  P: discharge home when ambulating better

## 2018-01-11 NOTE — DISCHARGE NOTE ADULT - MEDICATION SUMMARY - MEDICATIONS TO TAKE
I will START or STAY ON the medications listed below when I get home from the hospital:    wheelchair lightweight folding  -- 1 each between cheek and gums once a day   -- Indication: For .    walker  -- 1 each between cheek and gums once a day   -- Indication: For .    lisinopril 10 mg oral tablet  -- 1 tab(s) by mouth once a day  -- Indication: For .    Xarelto 15 mg oral tablet  -- 1 tab(s) by mouth once a day (in the evening)  -- Indication: For .    gabapentin 300 mg oral capsule  -- 1 cap(s) by mouth 3 times a day  -- Indication: For .    montelukast 10 mg oral tablet  -- 1 tab(s) by mouth once a day (in the evening)  -- Indication: For .    magnesium gluconate 500 mg oral tablet  --  by mouth once a day  -- Indication: For .    potassium chloride 20 mEq oral granule, extended release  -- 1 tab(s) by mouth 2 times a day  -- Indication: For .    Fish Oil 1000 mg oral capsule  -- 1 cap(s) by mouth once a day  -- Indication: For .    levothyroxine  -- 225 microgram(s) by mouth once a day  -- Indication: For .    Nephro-Tommy oral tablet  -- 1 tab(s) by mouth once a day  -- Indication: For .    PreserVision oral capsule  -- 1 cap(s) by mouth 2 times a day  -- Indication: For .    folic acid  -- 400 milligram(s) by mouth once a day  -- Indication: For .    Vitamin B6 100 mg oral tablet  -- 1 tab(s) by mouth once a day  -- Indication: For .    Vitamin D3 2000 intl units oral capsule  -- 1 cap(s) by mouth once a day  -- Indication: For . I will START or STAY ON the medications listed below when I get home from the hospital:    wheelchair lightweight folding  -- 1 each between cheek and gums once a day   -- Indication: For mobility    walker  -- 1 each between cheek and gums once a day   -- Indication: For moblity    Xarelto 15 mg oral tablet  -- 1 tab(s) by mouth once a day (in the evening)  -- Indication: For Afib    gabapentin 300 mg oral capsule  -- 1 cap(s) by mouth 3 times a day  -- Indication: For neuropathic pain    furosemide 40 mg oral tablet  -- 1 tab(s) by mouth once a day  -- Indication: For Ascites, malignant    montelukast 10 mg oral tablet  -- 1 tab(s) by mouth once a day (in the evening)  -- Indication: For Asthma    magnesium gluconate 500 mg oral tablet  --  by mouth once a day  -- Indication: For Supplement    potassium chloride 20 mEq oral granule, extended release  -- 1 tab(s) by mouth 2 times a day  -- Indication: For Supplement    Fish Oil 1000 mg oral capsule  -- 1 cap(s) by mouth once a day  -- Indication: For Supplement    levothyroxine  -- 225 microgram(s) by mouth once a day  -- Indication: For Hypothyroidism    Nephro-Tommy oral tablet  -- 1 tab(s) by mouth once a day  -- Indication: For Supplement    PreserVision oral capsule  -- 1 cap(s) by mouth 2 times a day  -- Indication: For Supplement    folic acid  -- 400 milligram(s) by mouth once a day  -- Indication: For Supplement    Vitamin B6 100 mg oral tablet  -- 1 tab(s) by mouth once a day  -- Indication: For Supplement    Vitamin D3 2000 intl units oral capsule  -- 1 cap(s) by mouth once a day  -- Indication: For Supplement

## 2018-01-11 NOTE — DISCHARGE NOTE ADULT - CARE PROVIDER_API CALL
Edwardo Mckinney), [employer] [facility]  [address] [place] [address]  Phone: [phone]  Fax: [phone]

## 2018-01-11 NOTE — PROGRESS NOTE ADULT - SUBJECTIVE AND OBJECTIVE BOX
Patient is a 89y old  Female who presents with a chief complaint of abdominal pain; small bowel obstruction from incarcerated umbilical hernia (11 Jan 2018 10:53)    24 hour events: No acute events overnight. She is now POD #4 of umbilical hernia repair with mesh with 6 L ascites fluid taken off. She had no complaints this morning.    REVIEW OF SYSTEMS  Constitutional: No fever, chills, fatigue  Neuro: No headache, numbness, weakness  Resp: Improving cough, denies wheezing, shortness of breath  CVS: No chest pain, palpitations, leg swelling  GI: mild abdominal pain, no nausea, vomiting, diarrhea   : No dysuria, frequency, incontinence  Skin: No new itching, burning, rashes, or lesions   Msk: No joint pain or swelling    ICU Vital Signs Last 24 Hrs  T(C): 36.8 (11 Jan 2018 12:42), Max: 37.1 (10 Guevara 2018 20:15)  T(F): 98.2 (11 Jan 2018 12:42), Max: 98.7 (10 Guevara 2018 20:15)  HR: 80 (11 Jan 2018 14:00) (80 - 81)  BP: 109/57 (11 Jan 2018 14:00) (88/52 - 134/11)  BP(mean): 77 (11 Jan 2018 14:00) (64 - 96)  RR: 48 (11 Jan 2018 14:00) (23 - 48)  SpO2: 95% (11 Jan 2018 14:00) (90% - 100%)    I&O's Summary  01-10 @ 07:01 - 01-11 @ 07:00  --------------------------------------------------------  IN: 960 mL / OUT: 802 mL / NET: 158 mL    01-11 @ 07:01 - 01-11 @ 14:52  --------------------------------------------------------  IN: 0 mL / OUT: 1 mL / NET: -1 mL    PHYSICAL EXAM  GENERAL: Elderly  female lying in bed comfortably, appears well, no acute distress, appropriate, pleasant  EYES: sclera clear, no exudates  ENMT: oropharynx clear without erythema, no exudates, moist mucous membranes  NECK: supple, soft  LUNGS: improved breath sounds but expiratory wheezing on RLL, no rhonchi appreciated  HEART: soft S1/S2, regular rate and rhythm, no murmurs noted, no lower extremity edema  GASTROINTESTINAL: Surgical site on abdomen is covered with gauze and tape, clean/dry/intact. Abdomen is soft, nontender, mildly distended (patient has chronic ascites), hypoactive bowel sounds, no palpable masses. Colostomy site clean and dry.   INTEGUMENT: good skin turgor, no lesions noted  MUSCULOSKELETAL: no clubbing or cyanosis, no obvious deformity  NEUROLOGIC: awake, alert, oriented x3    MEDICATIONS  (STANDING):  buDESOnide 160 MICROgram(s)/formoterol 4.5 MICROgram(s) Inhaler 2 Puff(s) Inhalation two times a day  cholecalciferol 1000 Unit(s) Oral daily  furosemide    Tablet 40 milliGRAM(s) Oral daily  gabapentin 300 milliGRAM(s) Oral every 8 hours  levothyroxine 225 MICROGram(s) Oral daily  potassium chloride    Tablet ER 20 milliEquivalent(s) Oral daily  rivaroxaban 15 milliGRAM(s) Oral every 24 hours    MEDICATIONS  (PRN):  ALBUTerol/ipratropium for Nebulization 3 milliLiter(s) Nebulizer every 6 hours PRN Shortness of Breath and/or Wheezing  guaiFENesin    Syrup 100 milliGRAM(s) Oral every 6 hours PRN Cough  ondansetron Injectable 4 milliGRAM(s) IV Push once PRN Nausea and/or Vomiting                        9.6    9.9   )-----------( 439      ( 11 Jan 2018 06:28 )             31.0     01-11  138  |  103  |  22  ----------------------------<  118<H>  3.8   |  28  |  0.82    Ca    7.6<L>      11 Jan 2018 06:28  Phos  2.1     01-11  Mg     2.0     01-11    TPro  5.5<L>  /  Alb  1.9<L>  /  TBili  0.4  /  DBili  x   /  AST  17  /  ALT  15  /  AlkPhos  87  01-11  .Blood Blood   No growth to date. -- 01-07 @ 10:44  Rapid RVP Result: NotDetec (01-08 @ 11:44)    CENTRAL LINE: N            CULP: N                         A-LINE: N                           GLOBAL ISSUE/BEST PRACTICE  Analgesia: no  Sedation: no  HOB elevation: yes  Stress ulcer prophylaxis: no   VTE prophylaxis: On a/c with Xarelto for A.fib   Glycemic control: yes  Nutrition: Regular diet, DASH/TLC    CODE STATUS: Full Code Patient is a 89y old  Female who presents with a chief complaint of abdominal pain; small bowel obstruction from incarcerated umbilical hernia (11 Jan 2018 10:53)    24 hour events: No acute events overnight. She is now POD #4 of umbilical hernia repair with mesh with 6 L ascites fluid taken off. She had no complaints this morning.    REVIEW OF SYSTEMS  Constitutional: No fever, chills, fatigue  Neuro: No headache, numbness, weakness  Resp: Improving cough, denies wheezing, shortness of breath  CVS: No chest pain, palpitations, leg swelling  GI: mild abdominal pain, no nausea, vomiting, diarrhea   : No dysuria, frequency, incontinence  Skin: No new itching, burning, rashes, or lesions   Msk: No joint pain or swelling    ICU Vital Signs Last 24 Hrs  T(C): 36.8 (11 Jan 2018 12:42), Max: 37.1 (10 Guevara 2018 20:15)  T(F): 98.2 (11 Jan 2018 12:42), Max: 98.7 (10 Guevara 2018 20:15)  HR: 80 (11 Jan 2018 14:00) (80 - 81)  BP: 109/57 (11 Jan 2018 14:00) (88/52 - 134/11)  BP(mean): 77 (11 Jan 2018 14:00) (64 - 96)  RR: 48 (11 Jan 2018 14:00) (23 - 48)  SpO2: 95% (11 Jan 2018 14:00) (90% - 100%)    I&O's Summary  01-10 @ 07:01  -  01-11 @ 07:00  --------------------------------------------------------  IN: 960 mL / OUT: 802 mL / NET: 158 mL      PHYSICAL EXAM  GENERAL: Elderly  female lying in bed comfortably, appears well, no acute distress, appropriate, pleasant  EYES: sclera clear, no exudates  ENMT: oropharynx clear without erythema, no exudates, moist mucous membranes  NECK: supple, soft  LUNGS: improved breath sounds but expiratory wheezing on RLL, no rhonchi appreciated  HEART: soft S1/S2, regular rate and rhythm, no murmurs noted, no lower extremity edema  GASTROINTESTINAL: Surgical site on abdomen is covered with gauze and tape, clean/dry/intact. Abdomen is soft, nontender, mildly distended (patient has chronic ascites), hypoactive bowel sounds, no palpable masses. Colostomy site clean and dry.   INTEGUMENT: good skin turgor, no lesions noted  MUSCULOSKELETAL: no clubbing or cyanosis, no obvious deformity  NEUROLOGIC: awake, alert, oriented x3    MEDICATIONS  (STANDING):  buDESOnide 160 MICROgram(s)/formoterol 4.5 MICROgram(s) Inhaler 2 Puff(s) Inhalation two times a day  cholecalciferol 1000 Unit(s) Oral daily  furosemide    Tablet 40 milliGRAM(s) Oral daily  gabapentin 300 milliGRAM(s) Oral every 8 hours  levothyroxine 225 MICROGram(s) Oral daily  potassium chloride    Tablet ER 20 milliEquivalent(s) Oral daily  rivaroxaban 15 milliGRAM(s) Oral every 24 hours    MEDICATIONS  (PRN):  ALBUTerol/ipratropium for Nebulization 3 milliLiter(s) Nebulizer every 6 hours PRN Shortness of Breath and/or Wheezing  guaiFENesin    Syrup 100 milliGRAM(s) Oral every 6 hours PRN Cough  ondansetron Injectable 4 milliGRAM(s) IV Push once PRN Nausea and/or Vomiting                        9.6    9.9   )-----------( 439      ( 11 Jan 2018 06:28 )             31.0     01-11  138  |  103  |  22  ----------------------------<  118<H>  3.8   |  28  |  0.82    Ca    7.6<L>      11 Jan 2018 06:28  Phos  2.1     01-11  Mg     2.0     01-11    TPro  5.5<L>  /  Alb  1.9<L>  /  TBili  0.4  /  DBili  x   /  AST  17  /  ALT  15  /  AlkPhos  87  01-11  .Blood Blood   No growth to date. -- 01-07 @ 10:44  Rapid RVP Result: NotDetec (01-08 @ 11:44)    CENTRAL LINE: N            CULP: N                         A-LINE: N                           GLOBAL ISSUE/BEST PRACTICE  Analgesia: no  Sedation: no  HOB elevation: yes  Stress ulcer prophylaxis: no   VTE prophylaxis: On a/c with Xarelto for A.fib   Glycemic control: yes  Nutrition: Regular diet, DASH/TLC    CODE STATUS: Full Code

## 2018-01-11 NOTE — PROGRESS NOTE ADULT - ASSESSMENT
89 y/o F PMHX ovarian cancer w/ ascites, colon ca s/p colostomy, s/p incarcerated ventral hernia repair 10/2017, DM2, HTN, afib s/p PPM, hypothyroid, obesity, and overactive bladder presented with abdominal pain, now POD #4 of umbilical hernia repair with mesh with 6 L ascites fluid taken off. Patient is clinically stable at this time.      Neuro:   - Continue Gabapentin   Cards:   - A.fib on Xarelto at home and s/p PPM. Continue Xarelto.    - Hypotension, improving. In setting of hypotension, hold home Lisinopril upon discharge.   Pulm:  - History of asthma. Cont duonebs prn. Add Symbicort. Discontinue supplemental O2 as patient saturating well on RA.   GI:   - Tolerating regular PO diet.   :   - Mild BRITTANEY, likely 2/2 dehydration, improving. Continue to monitor. Yusuf discontinued.   - Phosphorus repleted.   Endo:   - hx hypothyroidism- Continue synthroid   ID:  - No active ID issues at this time. Will cont to monitor off abx for now.   MSK:  - PT eval  Heme/ONC:   - chronic ascites with periodic paracentesis.     Dispo: Stable for discharge.

## 2018-01-11 NOTE — PROGRESS NOTE ADULT - PROVIDER SPECIALTY LIST ADULT
Anesthesia
Cardiology
Cardiology
Critical Care
Surgery
Heme/Onc

## 2018-01-11 NOTE — DISCHARGE NOTE ADULT - NS AS ACTIVITY OBS
Walking-Indoors allowed/Walking-Outdoors allowed/Do not drive or operate machinery/No Heavy lifting/straining/Bathing allowed/Showering allowed

## 2018-01-11 NOTE — DISCHARGE NOTE ADULT - MEDICATION SUMMARY - MEDICATIONS TO STOP TAKING
I will STOP taking the medications listed below when I get home from the hospital:  None I will STOP taking the medications listed below when I get home from the hospital:    lisinopril 10 mg oral tablet  -- 1 tab(s) by mouth once a day

## 2018-01-11 NOTE — PROGRESS NOTE ADULT - ATTENDING COMMENTS
89 yo F with Afib, htn, DM, PPM, Hx metastatic ovarian cancer with ascites, colon cancer with colostomy, POD 4 s/p exlap with reduction of incarcerated hernia.    --pain well controlled  --Afib controlled, AC with xarelto  htn, continue home lasix  --asthma, continue bronchodilators prn and advair  --tolerating regular diet   --CKD stable  --no infectious issues  --hypothyroid, cont synthroid  --ambulated with PT off O2 without difficulty  --stable for floor or home today
89 yo F with Hx metastatic ovarian cancer with ascites, colon cancer with colostomy, POD 1 s/p exlap with reduction of incarcerated hernia.    --pain controlled with dilaudid  --Afib controlled, off AC postop   hypotension resolved this am  bedside US suggestive of volume overload and pulm edema, give lasix 40 IV  check echo  --cough and wheeze this am  check RVP, standing bronchdilators  --NPO except meds/sips/chips per surgery  --mild BRITTANEY. improving supportive care  --no infectious issues  --hypothyroid, cont synthroid
89 yo F with Hx metastatic ovarian cancer with ascites, colon cancer with colostomy, POD 3 s/p exlap with reduction of incarcerated hernia.    --pain well controlled  --Afib controlled, AC with xarelto  hypotenstion improved  --asthma, continue bronchodilators  --regular diet per surgery  --CKD stable  --no infectious issues  --hypothyroid, cont synthroid  --stable for floor
89 yo F with Hx metastatic ovarian cancer with ascites, colon cancer with colostomy, POD 2 s/p exlap with reduction of incarcerated hernia.    --pain well controlled  --Afib controlled  mild asymptomatic hypotension,   check echo  ok to restart AC per Dr. Mckinney  --asthma, continue bronchodilators  --clear diet per surgery  --CKD stable  --no infectious issues  --hypothyroid, cont synthroid

## 2018-01-11 NOTE — DISCHARGE NOTE ADULT - PATIENT PORTAL LINK FT
“You can access the [facility] Patient Portal, offered by [facility], by registering with the following website: [email or web address]”

## 2018-01-11 NOTE — DISCHARGE NOTE ADULT - CARE PLAN
Principal Discharge DX:	Umbilical hernia with obstruction  Goal:	na  Instructions for follow-up, activity and diet:	as before  Secondary Diagnosis:	Small bowel obstruction Principal Discharge DX:	Umbilical hernia with obstruction  Goal:	na  Instructions for follow-up, activity and diet:	as before  Secondary Diagnosis:	Small bowel obstruction  Secondary Diagnosis:	HTN (Hypertension)  Goal:	stable  Instructions for follow-up, activity and diet:	Discontinue Lisinopril, please f/u with PMD/cardiologist within 1 week.

## 2018-01-12 LAB
CULTURE RESULTS: SIGNIFICANT CHANGE UP
CULTURE RESULTS: SIGNIFICANT CHANGE UP
SPECIMEN SOURCE: SIGNIFICANT CHANGE UP
SPECIMEN SOURCE: SIGNIFICANT CHANGE UP

## 2018-01-22 ENCOUNTER — OUTPATIENT (OUTPATIENT)
Dept: PREADMISSION | Facility: HOSPITAL | Age: 83
LOS: 1 days | End: 2018-01-22

## 2018-01-22 DIAGNOSIS — K46.0 UNSPECIFIED ABDOMINAL HERNIA WITH OBSTRUCTION, WITHOUT GANGRENE: Chronic | ICD-10-CM

## 2018-01-22 DIAGNOSIS — Z98.890 OTHER SPECIFIED POSTPROCEDURAL STATES: Chronic | ICD-10-CM

## 2018-01-22 DIAGNOSIS — C56.9 MALIGNANT NEOPLASM OF UNSPECIFIED OVARY: ICD-10-CM

## 2018-01-22 DIAGNOSIS — R18.0 MALIGNANT ASCITES: ICD-10-CM

## 2018-01-25 ENCOUNTER — OUTPATIENT (OUTPATIENT)
Dept: OUTPATIENT SERVICES | Facility: HOSPITAL | Age: 83
LOS: 1 days | End: 2018-01-25
Payer: COMMERCIAL

## 2018-01-25 DIAGNOSIS — Z98.890 OTHER SPECIFIED POSTPROCEDURAL STATES: Chronic | ICD-10-CM

## 2018-01-25 DIAGNOSIS — K46.0 UNSPECIFIED ABDOMINAL HERNIA WITH OBSTRUCTION, WITHOUT GANGRENE: Chronic | ICD-10-CM

## 2018-01-25 DIAGNOSIS — R18.0 MALIGNANT ASCITES: ICD-10-CM

## 2018-01-25 DIAGNOSIS — C56.9 MALIGNANT NEOPLASM OF UNSPECIFIED OVARY: ICD-10-CM

## 2018-01-25 PROCEDURE — 49083 ABD PARACENTESIS W/IMAGING: CPT

## 2018-02-12 ENCOUNTER — RESULT REVIEW (OUTPATIENT)
Age: 83
End: 2018-02-12

## 2018-02-12 ENCOUNTER — OUTPATIENT (OUTPATIENT)
Dept: OUTPATIENT SERVICES | Facility: HOSPITAL | Age: 83
LOS: 1 days | End: 2018-02-12
Payer: COMMERCIAL

## 2018-02-12 DIAGNOSIS — Z98.890 OTHER SPECIFIED POSTPROCEDURAL STATES: Chronic | ICD-10-CM

## 2018-02-12 DIAGNOSIS — K46.0 UNSPECIFIED ABDOMINAL HERNIA WITH OBSTRUCTION, WITHOUT GANGRENE: Chronic | ICD-10-CM

## 2018-02-12 DIAGNOSIS — R18.0 MALIGNANT ASCITES: ICD-10-CM

## 2018-02-12 LAB
ALBUMIN FLD-MCNC: 1.7 G/DL — SIGNIFICANT CHANGE UP
B PERT IGG+IGM PNL SER: ABNORMAL
COLOR FLD: YELLOW — SIGNIFICANT CHANGE UP
FLUID INTAKE SUBSTANCE CLASS: SIGNIFICANT CHANGE UP
FLUID SEGMENTED GRANULOCYTES: 26 % — SIGNIFICANT CHANGE UP
GLUCOSE FLD-MCNC: 127 MG/DL — SIGNIFICANT CHANGE UP
GRAM STN FLD: SIGNIFICANT CHANGE UP
LDH SERPL L TO P-CCNC: 82 U/L — SIGNIFICANT CHANGE UP
LYMPHOCYTES # FLD: 36 % — SIGNIFICANT CHANGE UP
MONOS+MACROS # FLD: 38 % — SIGNIFICANT CHANGE UP
PROT FLD-MCNC: 3.4 G/DL — SIGNIFICANT CHANGE UP
RCV VOL RI: 2000 /UL — HIGH (ref 0–5)
SPECIMEN SOURCE: SIGNIFICANT CHANGE UP
TOTAL NUCLEATED CELL COUNT, BODY FLUID: 227 /UL — HIGH (ref 0–5)
TUBE TYPE: SIGNIFICANT CHANGE UP

## 2018-02-12 PROCEDURE — 87075 CULTR BACTERIA EXCEPT BLOOD: CPT

## 2018-02-12 PROCEDURE — 49083 ABD PARACENTESIS W/IMAGING: CPT

## 2018-02-12 PROCEDURE — 88108 CYTOPATH CONCENTRATE TECH: CPT

## 2018-02-12 PROCEDURE — 82042 OTHER SOURCE ALBUMIN QUAN EA: CPT

## 2018-02-12 PROCEDURE — 88305 TISSUE EXAM BY PATHOLOGIST: CPT

## 2018-02-12 PROCEDURE — 84157 ASSAY OF PROTEIN OTHER: CPT

## 2018-02-12 PROCEDURE — 83615 LACTATE (LD) (LDH) ENZYME: CPT

## 2018-02-12 PROCEDURE — 88305 TISSUE EXAM BY PATHOLOGIST: CPT | Mod: 26

## 2018-02-12 PROCEDURE — 82945 GLUCOSE OTHER FLUID: CPT

## 2018-02-12 PROCEDURE — 89051 BODY FLUID CELL COUNT: CPT

## 2018-02-12 PROCEDURE — 87205 SMEAR GRAM STAIN: CPT

## 2018-02-12 PROCEDURE — 87070 CULTURE OTHR SPECIMN AEROBIC: CPT

## 2018-02-12 PROCEDURE — 88108 CYTOPATH CONCENTRATE TECH: CPT | Mod: 26

## 2018-02-13 LAB — NON-GYN CYTOLOGY SPEC: SIGNIFICANT CHANGE UP

## 2018-02-17 LAB
CULTURE RESULTS: SIGNIFICANT CHANGE UP
CULTURE RESULTS: SIGNIFICANT CHANGE UP
SPECIMEN SOURCE: SIGNIFICANT CHANGE UP

## 2018-03-14 ENCOUNTER — OUTPATIENT (OUTPATIENT)
Dept: OUTPATIENT SERVICES | Facility: HOSPITAL | Age: 83
LOS: 1 days | End: 2018-03-14
Payer: COMMERCIAL

## 2018-03-14 DIAGNOSIS — K46.0 UNSPECIFIED ABDOMINAL HERNIA WITH OBSTRUCTION, WITHOUT GANGRENE: Chronic | ICD-10-CM

## 2018-03-14 DIAGNOSIS — R18.0 MALIGNANT ASCITES: ICD-10-CM

## 2018-03-14 DIAGNOSIS — C56.9 MALIGNANT NEOPLASM OF UNSPECIFIED OVARY: ICD-10-CM

## 2018-03-14 DIAGNOSIS — Z98.890 OTHER SPECIFIED POSTPROCEDURAL STATES: Chronic | ICD-10-CM

## 2018-03-14 PROCEDURE — 49083 ABD PARACENTESIS W/IMAGING: CPT

## 2018-04-09 ENCOUNTER — OUTPATIENT (OUTPATIENT)
Dept: OUTPATIENT SERVICES | Facility: HOSPITAL | Age: 83
LOS: 1 days | End: 2018-04-09
Payer: COMMERCIAL

## 2018-04-09 DIAGNOSIS — C56.9 MALIGNANT NEOPLASM OF UNSPECIFIED OVARY: ICD-10-CM

## 2018-04-09 DIAGNOSIS — R18.0 MALIGNANT ASCITES: ICD-10-CM

## 2018-04-09 DIAGNOSIS — K46.0 UNSPECIFIED ABDOMINAL HERNIA WITH OBSTRUCTION, WITHOUT GANGRENE: Chronic | ICD-10-CM

## 2018-04-09 DIAGNOSIS — Z98.890 OTHER SPECIFIED POSTPROCEDURAL STATES: Chronic | ICD-10-CM

## 2018-04-09 PROCEDURE — 49083 ABD PARACENTESIS W/IMAGING: CPT

## 2018-04-30 ENCOUNTER — OUTPATIENT (OUTPATIENT)
Dept: OUTPATIENT SERVICES | Facility: HOSPITAL | Age: 83
LOS: 1 days | End: 2018-04-30
Payer: COMMERCIAL

## 2018-04-30 DIAGNOSIS — K46.0 UNSPECIFIED ABDOMINAL HERNIA WITH OBSTRUCTION, WITHOUT GANGRENE: Chronic | ICD-10-CM

## 2018-04-30 DIAGNOSIS — C56.9 MALIGNANT NEOPLASM OF UNSPECIFIED OVARY: ICD-10-CM

## 2018-04-30 DIAGNOSIS — R18.0 MALIGNANT ASCITES: ICD-10-CM

## 2018-04-30 DIAGNOSIS — Z98.890 OTHER SPECIFIED POSTPROCEDURAL STATES: Chronic | ICD-10-CM

## 2018-04-30 PROCEDURE — 49083 ABD PARACENTESIS W/IMAGING: CPT

## 2018-05-18 ENCOUNTER — OUTPATIENT (OUTPATIENT)
Dept: OUTPATIENT SERVICES | Facility: HOSPITAL | Age: 83
LOS: 1 days | End: 2018-05-18
Payer: COMMERCIAL

## 2018-05-18 DIAGNOSIS — Z00.00 ENCOUNTER FOR GENERAL ADULT MEDICAL EXAMINATION WITHOUT ABNORMAL FINDINGS: ICD-10-CM

## 2018-05-18 DIAGNOSIS — Z98.890 OTHER SPECIFIED POSTPROCEDURAL STATES: Chronic | ICD-10-CM

## 2018-05-18 DIAGNOSIS — K46.0 UNSPECIFIED ABDOMINAL HERNIA WITH OBSTRUCTION, WITHOUT GANGRENE: Chronic | ICD-10-CM

## 2018-05-18 PROCEDURE — 85730 THROMBOPLASTIN TIME PARTIAL: CPT

## 2018-05-18 PROCEDURE — 85027 COMPLETE CBC AUTOMATED: CPT

## 2018-05-18 PROCEDURE — 85610 PROTHROMBIN TIME: CPT

## 2018-05-21 ENCOUNTER — OUTPATIENT (OUTPATIENT)
Dept: OUTPATIENT SERVICES | Facility: HOSPITAL | Age: 83
LOS: 1 days | End: 2018-05-21
Payer: COMMERCIAL

## 2018-05-21 DIAGNOSIS — Z98.890 OTHER SPECIFIED POSTPROCEDURAL STATES: Chronic | ICD-10-CM

## 2018-05-21 DIAGNOSIS — C56.9 MALIGNANT NEOPLASM OF UNSPECIFIED OVARY: ICD-10-CM

## 2018-05-21 DIAGNOSIS — K46.0 UNSPECIFIED ABDOMINAL HERNIA WITH OBSTRUCTION, WITHOUT GANGRENE: Chronic | ICD-10-CM

## 2018-05-21 PROCEDURE — 49083 ABD PARACENTESIS W/IMAGING: CPT

## 2018-06-15 ENCOUNTER — OUTPATIENT (OUTPATIENT)
Dept: OUTPATIENT SERVICES | Facility: HOSPITAL | Age: 83
LOS: 1 days | End: 2018-06-15
Payer: COMMERCIAL

## 2018-06-15 DIAGNOSIS — Z98.890 OTHER SPECIFIED POSTPROCEDURAL STATES: Chronic | ICD-10-CM

## 2018-06-15 DIAGNOSIS — R18.0 MALIGNANT ASCITES: ICD-10-CM

## 2018-06-15 DIAGNOSIS — C56.9 MALIGNANT NEOPLASM OF UNSPECIFIED OVARY: ICD-10-CM

## 2018-06-15 DIAGNOSIS — K46.0 UNSPECIFIED ABDOMINAL HERNIA WITH OBSTRUCTION, WITHOUT GANGRENE: Chronic | ICD-10-CM

## 2018-06-15 LAB
ANION GAP SERPL CALC-SCNC: 7 MMOL/L — SIGNIFICANT CHANGE UP (ref 5–17)
APTT BLD: 28.4 SEC — SIGNIFICANT CHANGE UP (ref 27.5–37.4)
BUN SERPL-MCNC: 54 MG/DL — HIGH (ref 7–23)
CALCIUM SERPL-MCNC: 8.6 MG/DL — SIGNIFICANT CHANGE UP (ref 8.5–10.1)
CHLORIDE SERPL-SCNC: 103 MMOL/L — SIGNIFICANT CHANGE UP (ref 96–108)
CO2 SERPL-SCNC: 30 MMOL/L — SIGNIFICANT CHANGE UP (ref 22–31)
CREAT SERPL-MCNC: 1.4 MG/DL — HIGH (ref 0.5–1.3)
GLUCOSE SERPL-MCNC: 106 MG/DL — HIGH (ref 70–99)
HCT VFR BLD CALC: 32.3 % — LOW (ref 34.5–45)
HGB BLD-MCNC: 10.1 G/DL — LOW (ref 11.5–15.5)
INR BLD: 1.17 RATIO — HIGH (ref 0.88–1.16)
MCHC RBC-ENTMCNC: 25 PG — LOW (ref 27–34)
MCHC RBC-ENTMCNC: 31.3 GM/DL — LOW (ref 32–36)
MCV RBC AUTO: 80 FL — SIGNIFICANT CHANGE UP (ref 80–100)
NRBC # BLD: 0 /100 WBCS — SIGNIFICANT CHANGE UP (ref 0–0)
PLATELET # BLD AUTO: 225 K/UL — SIGNIFICANT CHANGE UP (ref 150–400)
POTASSIUM SERPL-MCNC: 3.9 MMOL/L — SIGNIFICANT CHANGE UP (ref 3.5–5.3)
POTASSIUM SERPL-SCNC: 3.9 MMOL/L — SIGNIFICANT CHANGE UP (ref 3.5–5.3)
PROTHROM AB SERPL-ACNC: 12.8 SEC — HIGH (ref 9.8–12.7)
RBC # BLD: 4.04 M/UL — SIGNIFICANT CHANGE UP (ref 3.8–5.2)
RBC # FLD: 17.2 % — HIGH (ref 10.3–14.5)
SODIUM SERPL-SCNC: 140 MMOL/L — SIGNIFICANT CHANGE UP (ref 135–145)
WBC # BLD: 6.08 K/UL — SIGNIFICANT CHANGE UP (ref 3.8–10.5)
WBC # FLD AUTO: 6.08 K/UL — SIGNIFICANT CHANGE UP (ref 3.8–10.5)

## 2018-06-15 PROCEDURE — 49083 ABD PARACENTESIS W/IMAGING: CPT

## 2018-06-15 PROCEDURE — 85610 PROTHROMBIN TIME: CPT

## 2018-06-15 PROCEDURE — 80048 BASIC METABOLIC PNL TOTAL CA: CPT

## 2018-06-15 PROCEDURE — 85027 COMPLETE CBC AUTOMATED: CPT

## 2018-06-15 PROCEDURE — 85730 THROMBOPLASTIN TIME PARTIAL: CPT

## 2018-06-20 ENCOUNTER — TRANSCRIPTION ENCOUNTER (OUTPATIENT)
Age: 83
End: 2018-06-20

## 2018-06-20 ENCOUNTER — INPATIENT (INPATIENT)
Facility: HOSPITAL | Age: 83
LOS: 3 days | Discharge: HOME | DRG: 354 | End: 2018-06-24
Attending: INTERNAL MEDICINE | Admitting: FAMILY MEDICINE
Payer: COMMERCIAL

## 2018-06-20 VITALS
DIASTOLIC BLOOD PRESSURE: 70 MMHG | OXYGEN SATURATION: 98 % | RESPIRATION RATE: 16 BRPM | HEART RATE: 70 BPM | HEIGHT: 68 IN | WEIGHT: 197.98 LBS | TEMPERATURE: 97 F | SYSTOLIC BLOOD PRESSURE: 111 MMHG

## 2018-06-20 DIAGNOSIS — E11.9 TYPE 2 DIABETES MELLITUS WITHOUT COMPLICATIONS: ICD-10-CM

## 2018-06-20 DIAGNOSIS — K46.9 UNSPECIFIED ABDOMINAL HERNIA WITHOUT OBSTRUCTION OR GANGRENE: ICD-10-CM

## 2018-06-20 DIAGNOSIS — R10.84 GENERALIZED ABDOMINAL PAIN: ICD-10-CM

## 2018-06-20 DIAGNOSIS — Z98.890 OTHER SPECIFIED POSTPROCEDURAL STATES: Chronic | ICD-10-CM

## 2018-06-20 DIAGNOSIS — E03.9 HYPOTHYROIDISM, UNSPECIFIED: ICD-10-CM

## 2018-06-20 DIAGNOSIS — C18.9 MALIGNANT NEOPLASM OF COLON, UNSPECIFIED: ICD-10-CM

## 2018-06-20 DIAGNOSIS — I48.91 UNSPECIFIED ATRIAL FIBRILLATION: ICD-10-CM

## 2018-06-20 DIAGNOSIS — C56.9 MALIGNANT NEOPLASM OF UNSPECIFIED OVARY: ICD-10-CM

## 2018-06-20 DIAGNOSIS — I10 ESSENTIAL (PRIMARY) HYPERTENSION: ICD-10-CM

## 2018-06-20 DIAGNOSIS — R18.0 MALIGNANT ASCITES: ICD-10-CM

## 2018-06-20 DIAGNOSIS — K46.0 UNSPECIFIED ABDOMINAL HERNIA WITH OBSTRUCTION, WITHOUT GANGRENE: Chronic | ICD-10-CM

## 2018-06-20 DIAGNOSIS — Z29.9 ENCOUNTER FOR PROPHYLACTIC MEASURES, UNSPECIFIED: ICD-10-CM

## 2018-06-20 LAB
ALBUMIN SERPL ELPH-MCNC: 2.7 G/DL — LOW (ref 3.3–5)
ALP SERPL-CCNC: 86 U/L — SIGNIFICANT CHANGE UP (ref 40–120)
ALT FLD-CCNC: 18 U/L — SIGNIFICANT CHANGE UP (ref 12–78)
ANION GAP SERPL CALC-SCNC: 7 MMOL/L — SIGNIFICANT CHANGE UP (ref 5–17)
APPEARANCE UR: CLEAR — SIGNIFICANT CHANGE UP
APTT BLD: 32.2 SEC — SIGNIFICANT CHANGE UP (ref 27.5–37.4)
AST SERPL-CCNC: 20 U/L — SIGNIFICANT CHANGE UP (ref 15–37)
BACTERIA # UR AUTO: ABNORMAL
BASOPHILS # BLD AUTO: 0.09 K/UL — SIGNIFICANT CHANGE UP (ref 0–0.2)
BASOPHILS NFR BLD AUTO: 1.2 % — SIGNIFICANT CHANGE UP (ref 0–2)
BILIRUB SERPL-MCNC: 0.5 MG/DL — SIGNIFICANT CHANGE UP (ref 0.2–1.2)
BILIRUB UR-MCNC: NEGATIVE — SIGNIFICANT CHANGE UP
BLD GP AB SCN SERPL QL: SIGNIFICANT CHANGE UP
BUN SERPL-MCNC: 36 MG/DL — HIGH (ref 7–23)
CALCIUM SERPL-MCNC: 8.6 MG/DL — SIGNIFICANT CHANGE UP (ref 8.5–10.1)
CHLORIDE SERPL-SCNC: 105 MMOL/L — SIGNIFICANT CHANGE UP (ref 96–108)
CO2 SERPL-SCNC: 29 MMOL/L — SIGNIFICANT CHANGE UP (ref 22–31)
COLOR SPEC: SIGNIFICANT CHANGE UP
CREAT SERPL-MCNC: 1.1 MG/DL — SIGNIFICANT CHANGE UP (ref 0.5–1.3)
DIFF PNL FLD: ABNORMAL
EOSINOPHIL # BLD AUTO: 0.18 K/UL — SIGNIFICANT CHANGE UP (ref 0–0.5)
EOSINOPHIL NFR BLD AUTO: 2.5 % — SIGNIFICANT CHANGE UP (ref 0–6)
EPI CELLS # UR: SIGNIFICANT CHANGE UP
GLUCOSE SERPL-MCNC: 107 MG/DL — HIGH (ref 70–99)
GLUCOSE UR QL: NEGATIVE — SIGNIFICANT CHANGE UP
HCT VFR BLD CALC: 35.7 % — SIGNIFICANT CHANGE UP (ref 34.5–45)
HGB BLD-MCNC: 11.4 G/DL — LOW (ref 11.5–15.5)
IMM GRANULOCYTES NFR BLD AUTO: 0.5 % — SIGNIFICANT CHANGE UP (ref 0–1.5)
INR BLD: 1.23 RATIO — HIGH (ref 0.88–1.16)
KETONES UR-MCNC: NEGATIVE — SIGNIFICANT CHANGE UP
LACTATE SERPL-SCNC: 1.3 MMOL/L — SIGNIFICANT CHANGE UP (ref 0.7–2)
LEUKOCYTE ESTERASE UR-ACNC: ABNORMAL
LIDOCAIN IGE QN: 174 U/L — SIGNIFICANT CHANGE UP (ref 73–393)
LYMPHOCYTES # BLD AUTO: 0.86 K/UL — LOW (ref 1–3.3)
LYMPHOCYTES # BLD AUTO: 11.8 % — LOW (ref 13–44)
MCHC RBC-ENTMCNC: 25.5 PG — LOW (ref 27–34)
MCHC RBC-ENTMCNC: 31.9 GM/DL — LOW (ref 32–36)
MCV RBC AUTO: 79.9 FL — LOW (ref 80–100)
MONOCYTES # BLD AUTO: 0.49 K/UL — SIGNIFICANT CHANGE UP (ref 0–0.9)
MONOCYTES NFR BLD AUTO: 6.7 % — SIGNIFICANT CHANGE UP (ref 2–14)
NEUTROPHILS # BLD AUTO: 5.63 K/UL — SIGNIFICANT CHANGE UP (ref 1.8–7.4)
NEUTROPHILS NFR BLD AUTO: 77.3 % — HIGH (ref 43–77)
NITRITE UR-MCNC: POSITIVE
NRBC # BLD: 0 /100 WBCS — SIGNIFICANT CHANGE UP (ref 0–0)
PH UR: 6 — SIGNIFICANT CHANGE UP (ref 5–8)
PLATELET # BLD AUTO: 380 K/UL — SIGNIFICANT CHANGE UP (ref 150–400)
POTASSIUM SERPL-MCNC: 3.8 MMOL/L — SIGNIFICANT CHANGE UP (ref 3.5–5.3)
POTASSIUM SERPL-SCNC: 3.8 MMOL/L — SIGNIFICANT CHANGE UP (ref 3.5–5.3)
PROT SERPL-MCNC: 7.3 G/DL — SIGNIFICANT CHANGE UP (ref 6–8.3)
PROT UR-MCNC: NEGATIVE — SIGNIFICANT CHANGE UP
PROTHROM AB SERPL-ACNC: 13.5 SEC — HIGH (ref 9.8–12.7)
RBC # BLD: 4.47 M/UL — SIGNIFICANT CHANGE UP (ref 3.8–5.2)
RBC # FLD: 17.3 % — HIGH (ref 10.3–14.5)
RBC CASTS # UR COMP ASSIST: SIGNIFICANT CHANGE UP /HPF (ref 0–4)
SODIUM SERPL-SCNC: 141 MMOL/L — SIGNIFICANT CHANGE UP (ref 135–145)
SP GR SPEC: 1.01 — SIGNIFICANT CHANGE UP (ref 1.01–1.02)
UROBILINOGEN FLD QL: NEGATIVE — SIGNIFICANT CHANGE UP
WBC # BLD: 7.29 K/UL — SIGNIFICANT CHANGE UP (ref 3.8–10.5)
WBC # FLD AUTO: 7.29 K/UL — SIGNIFICANT CHANGE UP (ref 3.8–10.5)
WBC UR QL: >50

## 2018-06-20 PROCEDURE — 93010 ELECTROCARDIOGRAM REPORT: CPT

## 2018-06-20 PROCEDURE — 49561: CPT | Mod: AS,RT

## 2018-06-20 PROCEDURE — 99285 EMERGENCY DEPT VISIT HI MDM: CPT

## 2018-06-20 PROCEDURE — 74022 RADEX COMPL AQT ABD SERIES: CPT | Mod: 26

## 2018-06-20 PROCEDURE — 49568: CPT | Mod: AS,RT

## 2018-06-20 PROCEDURE — 74176 CT ABD & PELVIS W/O CONTRAST: CPT | Mod: 26

## 2018-06-20 PROCEDURE — 99223 1ST HOSP IP/OBS HIGH 75: CPT | Mod: AI,GC

## 2018-06-20 PROCEDURE — 71045 X-RAY EXAM CHEST 1 VIEW: CPT | Mod: 26

## 2018-06-20 PROCEDURE — 74019 RADEX ABDOMEN 2 VIEWS: CPT | Mod: 26

## 2018-06-20 RX ORDER — ACETAMINOPHEN 500 MG
650 TABLET ORAL EVERY 6 HOURS
Qty: 0 | Refills: 0 | Status: DISCONTINUED | OUTPATIENT
Start: 2018-06-20 | End: 2018-06-21

## 2018-06-20 RX ORDER — FUROSEMIDE 40 MG
40 TABLET ORAL DAILY
Qty: 0 | Refills: 0 | Status: DISCONTINUED | OUTPATIENT
Start: 2018-06-20 | End: 2018-06-21

## 2018-06-20 RX ORDER — DEXTROSE MONOHYDRATE, SODIUM CHLORIDE, AND POTASSIUM CHLORIDE 50; .745; 4.5 G/1000ML; G/1000ML; G/1000ML
1000 INJECTION, SOLUTION INTRAVENOUS
Qty: 0 | Refills: 0 | Status: DISCONTINUED | OUTPATIENT
Start: 2018-06-20 | End: 2018-06-21

## 2018-06-20 RX ORDER — ONDANSETRON 8 MG/1
4 TABLET, FILM COATED ORAL EVERY 6 HOURS
Qty: 0 | Refills: 0 | Status: DISCONTINUED | OUTPATIENT
Start: 2018-06-20 | End: 2018-06-21

## 2018-06-20 RX ORDER — GABAPENTIN 400 MG/1
300 CAPSULE ORAL THREE TIMES A DAY
Qty: 0 | Refills: 0 | Status: DISCONTINUED | OUTPATIENT
Start: 2018-06-20 | End: 2018-06-21

## 2018-06-20 RX ORDER — MORPHINE SULFATE 50 MG/1
2 CAPSULE, EXTENDED RELEASE ORAL ONCE
Qty: 0 | Refills: 0 | Status: DISCONTINUED | OUTPATIENT
Start: 2018-06-20 | End: 2018-06-20

## 2018-06-20 RX ORDER — MULTIVIT-MIN/FERROUS GLUCONATE 9 MG/15 ML
1 LIQUID (ML) ORAL
Qty: 0 | Refills: 0 | COMMUNITY

## 2018-06-20 RX ORDER — ONDANSETRON 8 MG/1
4 TABLET, FILM COATED ORAL ONCE
Qty: 0 | Refills: 0 | Status: COMPLETED | OUTPATIENT
Start: 2018-06-20 | End: 2018-06-20

## 2018-06-20 RX ORDER — LEVOTHYROXINE SODIUM 125 MCG
225 TABLET ORAL DAILY
Qty: 0 | Refills: 0 | Status: DISCONTINUED | OUTPATIENT
Start: 2018-06-20 | End: 2018-06-21

## 2018-06-20 RX ORDER — MONTELUKAST 4 MG/1
10 TABLET, CHEWABLE ORAL DAILY
Qty: 0 | Refills: 0 | Status: DISCONTINUED | OUTPATIENT
Start: 2018-06-20 | End: 2018-06-21

## 2018-06-20 RX ORDER — PYRIDOXINE HCL (VITAMIN B6) 100 MG
100 TABLET ORAL DAILY
Qty: 0 | Refills: 0 | Status: DISCONTINUED | OUTPATIENT
Start: 2018-06-20 | End: 2018-06-21

## 2018-06-20 RX ORDER — LISINOPRIL 2.5 MG/1
10 TABLET ORAL DAILY
Qty: 0 | Refills: 0 | Status: DISCONTINUED | OUTPATIENT
Start: 2018-06-20 | End: 2018-06-21

## 2018-06-20 RX ORDER — LISINOPRIL 2.5 MG/1
10 TABLET ORAL DAILY
Qty: 0 | Refills: 0 | Status: DISCONTINUED | OUTPATIENT
Start: 2018-06-20 | End: 2018-06-20

## 2018-06-20 RX ORDER — SODIUM CHLORIDE 9 MG/ML
1000 INJECTION INTRAMUSCULAR; INTRAVENOUS; SUBCUTANEOUS ONCE
Qty: 0 | Refills: 0 | Status: COMPLETED | OUTPATIENT
Start: 2018-06-20 | End: 2018-06-20

## 2018-06-20 RX ORDER — MORPHINE SULFATE 50 MG/1
2 CAPSULE, EXTENDED RELEASE ORAL EVERY 4 HOURS
Qty: 0 | Refills: 0 | Status: DISCONTINUED | OUTPATIENT
Start: 2018-06-20 | End: 2018-06-21

## 2018-06-20 RX ADMIN — ONDANSETRON 4 MILLIGRAM(S): 8 TABLET, FILM COATED ORAL at 10:04

## 2018-06-20 RX ADMIN — MORPHINE SULFATE 2 MILLIGRAM(S): 50 CAPSULE, EXTENDED RELEASE ORAL at 23:10

## 2018-06-20 RX ADMIN — MORPHINE SULFATE 2 MILLIGRAM(S): 50 CAPSULE, EXTENDED RELEASE ORAL at 23:25

## 2018-06-20 RX ADMIN — GABAPENTIN 300 MILLIGRAM(S): 400 CAPSULE ORAL at 21:32

## 2018-06-20 RX ADMIN — SODIUM CHLORIDE 1000 MILLILITER(S): 9 INJECTION INTRAMUSCULAR; INTRAVENOUS; SUBCUTANEOUS at 10:03

## 2018-06-20 RX ADMIN — GABAPENTIN 300 MILLIGRAM(S): 400 CAPSULE ORAL at 15:29

## 2018-06-20 RX ADMIN — MORPHINE SULFATE 2 MILLIGRAM(S): 50 CAPSULE, EXTENDED RELEASE ORAL at 10:15

## 2018-06-20 RX ADMIN — MORPHINE SULFATE 2 MILLIGRAM(S): 50 CAPSULE, EXTENDED RELEASE ORAL at 10:03

## 2018-06-20 RX ADMIN — MORPHINE SULFATE 2 MILLIGRAM(S): 50 CAPSULE, EXTENDED RELEASE ORAL at 18:59

## 2018-06-20 RX ADMIN — ONDANSETRON 4 MILLIGRAM(S): 8 TABLET, FILM COATED ORAL at 23:10

## 2018-06-20 NOTE — CONSULT NOTE ADULT - SUBJECTIVE AND OBJECTIVE BOX
89 year old woman well known to me from prior Gavin procedure for distal sigmoid obstruction secondary to advanced localized ovarian cancer.  Oncologist was Dr. LAVONNE Bright. I repaired 2 separate incarcerated ventral hernias in last 1 year, one with mesh, the other without.  One of those (which was repaired without mesh) recurred a few months ago and was easily reducible and painless until this AM.  Since this AM she had severe abdominal pain and retching.  I reduced the hernia with some difficulty in ER 2 hours ago and the pain and nausea have now resolved.  The hernia comes out again but is now easily reducible.      Pt has massive ascites chronically, attributed to her ovarian cancer (although the cancer is no longer seen on CT done today)  She had drainage of this ascites every 3-4 weeks by IR, and was last drained of almost 6 liters last week (here).      PMH: ovarian cancer, s/p Gavin procedure about 4 years ago, asthma, DM II, Htn, hypothyroidism, s/p appendectomy, PPM, left TKR, bilat catarract surgery    SH neg  FH nc    PE:  Afeb VSS  NAD after reduction of hernia  Fascial defect at hernia site barely 2 fingertips wide    P: hold elaquis, repair of ventral hernia tomorrow;  Hernia very likely to become incarcerated again and pt does not want to wait to see if this happens.  Despite ascites, the other hernia which was repaired with mesh healed well without recurrence. The indication for, alternatives to and possible complications of the procedure were discussed with the patient, and all questions were answered

## 2018-06-20 NOTE — CONSULT NOTE ADULT - SUBJECTIVE AND OBJECTIVE BOX
[facility] [place]     Patient is a 89y old  Female who presents with a chief complaint of abdominal pain (20 Jun 2018 17:32)      HPI:  89 y/o F PMHx ovarian cancer w/ recurrent ascites, colon ca s/p colostomy, s/p incarcerated ventral hernia repair 10/2017, DM2, HTN, afib s/p PPM, hypothyroid, obesity, and overactive bladder presents with abdominal pain since 6AM this morning. Pt describes it as a sharp 10/10 non radiating pain in her upper abdomen. She also had some nausea this morning along with dry heaving, but did not actually vomit. She states that she changed her colostomy bag this morning and she is passing feces in it since the onset of her symptoms. She describes her pain as being similar to when she had an incarcerated hernia in January of this year. Of note, pt had paracentesis done on Friday for abdominal ascites. She reports she felt uncomfortable then, but the pain didn't start until this morning. Denies any fevers/chills or alterations in mental status.     In the ED, VS: afebrile, HR 70, /70, RR 16, sat 98%, WBC 7.29, H/H 11.4/35.7, plat 380, PT 13.5, INR 1.23, PTT 32.2, Na 141, K 3.8, Bun 36, Cr 1.10, Glu 107, alb 2.7, lactate 1.3, lipase 174. Ct abd/pelvis showed No specific acute inflammatory or obstructive pathology and  Moderate to large amount of abdominal pelvic ascites. CXR showed Left cardiac pacer with no acute findings. EKG electronic PPM @ 70BPM. In ED, pt received 1 NS bolus, zofran, morphine. (20 Jun 2018 12:41)      HPI:    89y Female for Cardiology Consult    PAST MEDICAL & SURGICAL HISTORY:  Ovarian cancer  Afib: s/p PPM  Ascites, malignant: from advanced ovarian cancer  Colon cancer  Obese  Hypothyroidism  HTN (Hypertension)  Overactive Bladder  DM Type 2 (Diabetes Mellitus, Type 2)  Asthma  Incarcerated hernia: s/p repair 10/2017  H/O hernia repair  Artificial pacemaker  S/P colon resection: with sigmoid colostomy for obstructing ovarian cancer  S/P Cataract Surgery: CASI  History of Tonsillectomy  History of Appendectomy  Status Post Total Knee Replacement: Left      FAMILY HISTORY:  No pertinent family history in first degree relatives      SOCIAL HISTORY:   Alcohol:  Smoking:    Allergies    No Known Allergies    Intolerances        MEDICATIONS  (STANDING):  furosemide    Tablet 40 milliGRAM(s) Oral daily  gabapentin 300 milliGRAM(s) Oral three times a day  levothyroxine 225 MICROGram(s) Oral daily  lisinopril 10 milliGRAM(s) Oral daily  montelukast 10 milliGRAM(s) Oral daily  Nephrocaps 1 Capsule(s) Oral daily  pyridoxine 100 milliGRAM(s) Oral daily    MEDICATIONS  (PRN):  acetaminophen    Suspension. 650 milliGRAM(s) Oral every 6 hours PRN Mild Pain (1 - 3)  morphine  - Injectable 2 milliGRAM(s) IV Push every 4 hours PRN Severe Pain (7 - 10)  ondansetron Injectable 4 milliGRAM(s) IV Push every 6 hours PRN Nausea and/or Vomiting      REVIEW OF SYSTEMS:  CONSTITUTIONAL: No fever, weight loss, chills, shakes, or fat  RESPIRATORY: No cough, wheezing, hemoptysis, or shortness of breath  CARDIOVASCULAR: No chest pain, dyspnea, palpitations, dizziness, syncope, paroxysmal nocturnal dyspnea, orthopnea, or arm or leg swelling  GASTROINTESTINAL: No abdominal  or epigastric pain, nausea, vomiting, hematemesis, diarrhea, constipation, melena or bright red bloo  NEUROLOGICAL: No headaches, memory loss, slurred speech, limb weakness, loss of strength, numbness, or tremors  SKIN: No itching, burning, rashes, or lesions  ENDOCRINE: No heat or cold intolerance, or hair loss  MUSCULOSKELETAL: No joint pain or swelling, muscle, back, or extremity pain  HEME/LYMPH: No easy bruising or bleeding gums  ALLERY AND IMMUNOLOGIC: No hives or rash.    Vital Signs Last 24 Hrs  T(C): 36.7 (20 Jun 2018 17:22), Max: 36.7 (20 Jun 2018 17:22)  T(F): 98.1 (20 Jun 2018 17:22), Max: 98.1 (20 Jun 2018 17:22)  HR: 69 (20 Jun 2018 17:22) (69 - 70)  BP: 105/61 (20 Jun 2018 17:22) (105/61 - 111/70)  BP(mean): --  RR: 18 (20 Jun 2018 17:22) (16 - 18)  SpO2: 99% (20 Jun 2018 17:22) (98% - 99%)    PHYSICAL EXAM:  HEAD:  Atraumatic, Normocephalic  EYES: EOMI, PERRLA, conjunctiva and sclera clear  NECK: Supple and normal thyroid.  No JVD or carotid bruit.   HEART: S1, S2 regular , 1/6 soft ejection systolic murmur in mitral area , no thrill and no gallops .  PULMONARY: Bilateral vesicular breathing , few scattered ronchi and few scattered rales are present .  ABDOMEN: Soft nontender and positive bowl sounds   EXTREMITIES:  No clubbing, cyanosis, or pedal  edema  NEUROLOGICAL: AAOX3 , no focal deficit .    LABS:                        11.4   7.29  )-----------( 380      ( 20 Jun 2018 10:03 )             35.7     06-20    141  |  105  |  36<H>  ----------------------------<  107<H>  3.8   |  29  |  1.10    Ca    8.6      20 Jun 2018 10:03    TPro  7.3  /  Alb  2.7<L>  /  TBili  0.5  /  DBili  x   /  AST  20  /  ALT  18  /  AlkPhos  86  06-20        PT/INR - ( 20 Jun 2018 11:27 )   PT: 13.5 sec;   INR: 1.23 ratio         PTT - ( 20 Jun 2018 11:27 )  PTT:32.2 sec    BNP      EKG:  ECHO:  IMAGING:    Assessment and Plan :     Will continue to follow during hospital course and give further recommendations as needed . Thanks for your referral . if any questions please contact me at office ([phone])cell [phone]) [facility]     Patient is a 89y old  Female who presents with a chief complaint of abdominal pain (20 Jun 2018 17:32)      HPI:  89 y/o F PMHx ovarian cancer w/ recurrent ascites, colon ca s/p colostomy, s/p incarcerated ventral hernia repair 10/2017, DM2, HTN, afib s/p PPM, hypothyroid, obesity, and overactive bladder presents with abdominal pain since 6AM this morning. Pt describes it as a sharp 10/10 non radiating pain in her upper abdomen. She also had some nausea this morning along with dry heaving, but did not actually vomit. She states that she changed her colostomy bag this morning and she is passing feces in it since the onset of her symptoms. She describes her pain as being similar to when she had an incarcerated hernia in January of this year. Of note, pt had paracentesis done on Friday for abdominal ascites. She reports she felt uncomfortable then, but the pain didn't start until this morning. Denies any fevers/chills or alterations in mental status.     In the ED, VS: afebrile, HR 70, /70, RR 16, sat 98%, WBC 7.29, H/H 11.4/35.7, plat 380, PT 13.5, INR 1.23, PTT 32.2, Na 141, K 3.8, Bun 36, Cr 1.10, Glu 107, alb 2.7, lactate 1.3, lipase 174. Ct abd/pelvis showed No specific acute inflammatory or obstructive pathology and  Moderate to large amount of abdominal pelvic ascites. CXR showed Left cardiac pacer with no acute findings. EKG electronic PPM @ 70BPM. In ED, pt received 1 NS bolus, zofran, morphine. (20 Jun 2018 12:41)      HPI:    89y Female for Cardiology Consult    PAST MEDICAL & SURGICAL HISTORY:  Ovarian cancer  Afib: s/p PPM  Ascites, malignant: from advanced ovarian cancer  Colon cancer  Obese  Hypothyroidism  HTN (Hypertension)  Overactive Bladder  DM Type 2 (Diabetes Mellitus, Type 2)  Asthma  Incarcerated hernia: s/p repair 10/2017  H/O hernia repair  Artificial pacemaker  S/P colon resection: with sigmoid colostomy for obstructing ovarian cancer  S/P Cataract Surgery: CASI  History of Tonsillectomy  History of Appendectomy  Status Post Total Knee Replacement: Left      FAMILY HISTORY:  No pertinent family history in first degree relatives      SOCIAL HISTORY:   Alcohol:  Smoking:    Allergies    No Known Allergies    Intolerances        MEDICATIONS  (STANDING):  furosemide    Tablet 40 milliGRAM(s) Oral daily  gabapentin 300 milliGRAM(s) Oral three times a day  levothyroxine 225 MICROGram(s) Oral daily  lisinopril 10 milliGRAM(s) Oral daily  montelukast 10 milliGRAM(s) Oral daily  Nephrocaps 1 Capsule(s) Oral daily  pyridoxine 100 milliGRAM(s) Oral daily    MEDICATIONS  (PRN):  acetaminophen    Suspension. 650 milliGRAM(s) Oral every 6 hours PRN Mild Pain (1 - 3)  morphine  - Injectable 2 milliGRAM(s) IV Push every 4 hours PRN Severe Pain (7 - 10)  ondansetron Injectable 4 milliGRAM(s) IV Push every 6 hours PRN Nausea and/or Vomiting      REVIEW OF SYSTEMS:  CONSTITUTIONAL: No fever, weight loss, chills, shakes, or fat  RESPIRATORY: No cough, wheezing, hemoptysis, or shortness of breath  CARDIOVASCULAR: No chest pain, dyspnea, palpitations, dizziness, syncope, paroxysmal nocturnal dyspnea, orthopnea, or arm or leg swelling  GASTROINTESTINAL: No abdominal  or epigastric pain, nausea, vomiting, hematemesis, diarrhea, constipation, melena or bright red bloo  NEUROLOGICAL: No headaches, memory loss, slurred speech, limb weakness, loss of strength, numbness, or tremors  SKIN: No itching, burning, rashes, or lesions  ENDOCRINE: No heat or cold intolerance, or hair loss  MUSCULOSKELETAL: No joint pain or swelling, muscle, back, or extremity pain  HEME/LYMPH: No easy bruising or bleeding gums  ALLERY AND IMMUNOLOGIC: No hives or rash.    Vital Signs Last 24 Hrs  T(C): 36.7 (20 Jun 2018 17:22), Max: 36.7 (20 Jun 2018 17:22)  T(F): 98.1 (20 Jun 2018 17:22), Max: 98.1 (20 Jun 2018 17:22)  HR: 69 (20 Jun 2018 17:22) (69 - 70)  BP: 105/61 (20 Jun 2018 17:22) (105/61 - 111/70)  BP(mean): --  RR: 18 (20 Jun 2018 17:22) (16 - 18)  SpO2: 99% (20 Jun 2018 17:22) (98% - 99%)    PHYSICAL EXAM:  HEAD:  Atraumatic, Normocephalic  EYES: EOMI, PERRLA, conjunctiva and sclera clear  NECK: Supple and normal thyroid.  No JVD or carotid bruit.   HEART: S1, S2 regular , 1/6 soft ejection systolic murmur in mitral area , no thrill and no gallops .  PULMONARY: Bilateral vesicular breathing , few scattered ronchi and few scattered rales are present .  ABDOMEN: Soft nontender and positive bowl sounds   EXTREMITIES:  No clubbing, cyanosis, or pedal  edema  NEUROLOGICAL: AAOX3 , no focal deficit .    LABS:                        11.4   7.29  )-----------( 380      ( 20 Jun 2018 10:03 )             35.7     06-20    141  |  105  |  36<H>  ----------------------------<  107<H>  3.8   |  29  |  1.10    Ca    8.6      20 Jun 2018 10:03    TPro  7.3  /  Alb  2.7<L>  /  TBili  0.5  /  DBili  x   /  AST  20  /  ALT  18  /  AlkPhos  86  06-20        PT/INR - ( 20 Jun 2018 11:27 )   PT: 13.5 sec;   INR: 1.23 ratio         PTT - ( 20 Jun 2018 11:27 )  PTT:32.2 sec    BNP      EKG: < from: 12 Lead ECG (01.07.18 @ 09:28) >  Diagnosis Line Ventricular-paced rhythm  Abnormal ECG  When compared with ECG of 09-OCT-2017 16:05,  No significant change was found  Confirmed by Michael Underwood MD ([id number]) on 1/7/2018 12:33:26 PM    < end of copied text >    ECHO: < from: TTE Echo Doppler w/o Cont (01.09.18 @ 16:15) >  Normal LV size and  preserved LV systolic function.    Mild concentric left ventricular hypertrophy is present.    Mild mitral regurgitation is present.    Moderate  tricuspid regurgitation with mild pulmonary hypertension is   present.    Cannot evaluate LV diastolic dysfunction due to underlying atrial   fibrillation.    < end of copied text >    IMAGING:    Assessment and Plan :     Will continue to follow during hospital course and give further recommendations as needed . Thanks for your referral . if any questions please contact me at office ([phone])cell [phone]) [facility] [place]     Patient is a 89y old  Female who presents with a chief complaint of abdominal pain (20 Jun 2018 17:32)      HPI:  89 y/o F PMHx ovarian cancer w/ recurrent ascites, colon ca s/p colostomy, s/p incarcerated ventral hernia repair 10/2017, DM2, HTN, afib s/p PPM, hypothyroid, obesity, and overactive bladder presents with abdominal pain since 6AM this morning. Pt describes it as a sharp 10/10 non radiating pain in her upper abdomen. She also had some nausea this morning along with dry heaving, but did not actually vomit. She states that she changed her colostomy bag this morning and she is passing feces in it since the onset of her symptoms. She describes her pain as being similar to when she had an incarcerated hernia in January of this year. Of note, pt had paracentesis done on Friday for abdominal ascites. She reports she felt uncomfortable then, but the pain didn't start until this morning. Denies any fevers/chills or alterations in mental status.     In the ED, VS: afebrile, HR 70, /70, RR 16, sat 98%, WBC 7.29, H/H 11.4/35.7, plat 380, PT 13.5, INR 1.23, PTT 32.2, Na 141, K 3.8, Bun 36, Cr 1.10, Glu 107, alb 2.7, lactate 1.3, lipase 174. Ct abd/pelvis showed No specific acute inflammatory or obstructive pathology and  Moderate to large amount of abdominal pelvic ascites. CXR showed Left cardiac pacer with no acute findings. EKG electronic PPM @ 70BPM. In ED, pt received 1 NS bolus, zofran, morphine. (20 Jun 2018 12:41)      HPI:    89y Female for Cardiology Consult    PAST MEDICAL & SURGICAL HISTORY:  Ovarian cancer  Afib: s/p PPM  Ascites, malignant: from advanced ovarian cancer  Colon cancer  Obese  Hypothyroidism  HTN (Hypertension)  Overactive Bladder  DM Type 2 (Diabetes Mellitus, Type 2)  Asthma  Incarcerated hernia: s/p repair 10/2017  H/O hernia repair  Artificial pacemaker  S/P colon resection: with sigmoid colostomy for obstructing ovarian cancer  S/P Cataract Surgery: CASI  History of Tonsillectomy  History of Appendectomy  Status Post Total Knee Replacement: Left      FAMILY HISTORY:  No pertinent family history in first degree relatives      SOCIAL HISTORY:   Alcohol:  Smoking:    Allergies    No Known Allergies    Intolerances        MEDICATIONS  (STANDING):  furosemide    Tablet 40 milliGRAM(s) Oral daily  gabapentin 300 milliGRAM(s) Oral three times a day  levothyroxine 225 MICROGram(s) Oral daily  lisinopril 10 milliGRAM(s) Oral daily  montelukast 10 milliGRAM(s) Oral daily  Nephrocaps 1 Capsule(s) Oral daily  pyridoxine 100 milliGRAM(s) Oral daily    MEDICATIONS  (PRN):  acetaminophen    Suspension. 650 milliGRAM(s) Oral every 6 hours PRN Mild Pain (1 - 3)  morphine  - Injectable 2 milliGRAM(s) IV Push every 4 hours PRN Severe Pain (7 - 10)  ondansetron Injectable 4 milliGRAM(s) IV Push every 6 hours PRN Nausea and/or Vomiting    Vital Signs Last 24 Hrs  T(C): 36.7 (20 Jun 2018 17:22), Max: 36.7 (20 Jun 2018 17:22)  T(F): 98.1 (20 Jun 2018 17:22), Max: 98.1 (20 Jun 2018 17:22)  HR: 69 (20 Jun 2018 17:22) (69 - 70)  BP: 105/61 (20 Jun 2018 17:22) (105/61 - 111/70)  BP(mean): --  RR: 18 (20 Jun 2018 17:22) (16 - 18)  SpO2: 99% (20 Jun 2018 17:22) (98% - 99%)  LABS:                        11.4   7.29  )-----------( 380      ( 20 Jun 2018 10:03 )             35.7     06-20    141  |  105  |  36<H>  ----------------------------<  107<H>  3.8   |  29  |  1.10    Ca    8.6      20 Jun 2018 10:03    TPro  7.3  /  Alb  2.7<L>  /  TBili  0.5  /  DBili  x   /  AST  20  /  ALT  18  /  AlkPhos  86  06-20        PT/INR - ( 20 Jun 2018 11:27 )   PT: 13.5 sec;   INR: 1.23 ratio         PTT - ( 20 Jun 2018 11:27 )  PTT:32.2 sec    BNP      EKG: < from: 12 Lead ECG (01.07.18 @ 09:28) >  Diagnosis Line Ventricular-paced rhythm  Abnormal ECG  When compared with ECG of 09-OCT-2017 16:05,  No significant change was found  Confirmed by Michael Underwood MD ([id number]) on 1/7/2018 12:33:26 PM    < end of copied text >    ECHO: < from: TTE Echo Doppler w/o Cont (01.09.18 @ 16:15) >  Normal LV size and  preserved LV systolic function.    Mild concentric left ventricular hypertrophy is present.    Mild mitral regurgitation is present.    Moderate  tricuspid regurgitation with mild pulmonary hypertension is   present.    Cannot evaluate LV diastolic dysfunction due to underlying atrial   fibrillation.    < end of copied text >    IMAGING:    Assessment and Plan :   FULL CONSULT DICTATED .  Patient admitted with abdominal pain and has incarcerated abdominal hernia . Considering patient has no angina , stable CHF and no prior MI in last 6 months and stable cardiac status , Patient cardiac wise cleared for abdominal hernia surgery . Benefits of surgery outweigh the risks . Patient pacemaker is functioning well and checked in our office a few weeks ago .   Will continue to follow during hospital course and give further recommendations as needed . Thanks for your referral . if any questions please contact me at office ([phone])cell [phone])

## 2018-06-20 NOTE — H&P ADULT - PROBLEM SELECTOR PLAN 3
- large ascites seen on CT, secondary to ovarian cancer  - last paracentesis done on Friday by [facility]  - for OR tomorrow (Dr. Mckinney)

## 2018-06-20 NOTE — ED ADULT NURSE NOTE - PSH
Artificial pacemaker    H/O hernia repair    History of Appendectomy    History of Tonsillectomy    Incarcerated hernia  s/p repair 10/2017  S/P Cataract Surgery  CASI  S/P colon resection  with sigmoid colostomy for obstructing ovarian cancer  Status Post Total Knee Replacement  Left

## 2018-06-20 NOTE — ED PROVIDER NOTE - OBJECTIVE STATEMENT
89 female presents to ER c/o abdominal pain, started on Friday after abdominal 89 female presents to ER c/o abdominal pain, started on Friday after abdominal centesis, having nausea, dry heaving, abdominal pain worsened this morning and came to ER.

## 2018-06-20 NOTE — H&P ADULT - PROBLEM SELECTOR PLAN 1
Admit to [facility]  Diet NPO except meds  Zofran prn nausea  pain control - Pt as had 2 hernia surgeries in past year (1/2018, 10/2017) by Dr. Mckinney, not presenting with similar symptoms  - For OR tomorrow as per Dr. Mckinney for hernia repair  - NPO after midnight, hold all anticoagulation  - consult cardio (Vinayak) for cardiac clearance prior to procedure  - medical optimization, f/u AM CBC and BMP  - admit to [facility]

## 2018-06-20 NOTE — H&P ADULT - NSHPOUTPATIENTPROVIDERS_GEN_ALL_CORE
Dr. Joe London (PMD) HIP, Dr. Valdes (cardio), Dr. Jesica Bright (onc) Dr. Joe London (PMD) HIP  Dr. Valdes (cardio)  Dr. Guzmán (surgery)

## 2018-06-20 NOTE — H&P ADULT - PROBLEM SELECTOR PLAN 4
Controlled - - hold Xarelto, last dose was 2 days ago  - rates stable   - patient not on any rate controlling agents at this time  - cardio consult Dr. Licea

## 2018-06-20 NOTE — H&P ADULT - PROBLEM SELECTOR PLAN 2
- improved after pain meds and hernia reduction  - pain control with tylenol and morphine  - for OR tomorrow for hernia reduction  - f/u AM CBC, BMP

## 2018-06-20 NOTE — H&P ADULT - ASSESSMENT
89 y/o F PMHx ovarian cancer w/ recurrent ascites, colon ca s/p colostomy, s/p incarcerated ventral hernia repair 10/2017, DM2, HTN, afib s/p PPM, hypothyroid, obesity, and overactive bladder admitted with 89 y/o F PMHx ovarian cancer w/ recurrent ascites, colon ca s/p colostomy, s/p incarcerated ventral hernia repair 10/2017, DM2, HTN, afib s/p PPM, hypothyroid, obesity, and overactive bladder admitted with abdominal pain.

## 2018-06-20 NOTE — ED ADULT NURSE NOTE - PMH
Afib  s/p PPM  Ascites, malignant  from advanced ovarian cancer  Asthma    Colon cancer    DM Type 2 (Diabetes Mellitus, Type 2)    HTN (Hypertension)    Hypothyroidism    Obese    Overactive Bladder

## 2018-06-20 NOTE — H&P ADULT - PROBLEM SELECTOR PLAN 5
- LDISS, consistent carb diet  - accuchecks, hypoglycemia protocol  - questionable history of DM as per daughter, will d/c LDISS if A1c comes back normal

## 2018-06-20 NOTE — H&P ADULT - NSHPREVIEWOFSYSTEMS_GEN_ALL_CORE
REVIEW OF SYSTEMS:    CONSTITUTIONAL: No weakness, fevers or chills  EYES/ENT: No visual changes;  No vertigo or throat pain   NECK: No pain or stiffness  RESPIRATORY: No cough, wheezing, hemoptysis; No shortness of breath  CARDIOVASCULAR: No chest pain or palpitations  GASTROINTESTINAL: +abdominal or epigastric pain. + nausea, no vomiting, or hematemesis; No diarrhea or constipation. No melena or hematochezia.  GENITOURINARY: No dysuria, frequency or hematuria  NEUROLOGICAL: No numbness or weakness  SKIN: No itching, rashes

## 2018-06-20 NOTE — H&P ADULT - PMH
Afib  s/p PPM  Ascites, malignant  from advanced ovarian cancer  Asthma    Colon cancer    DM Type 2 (Diabetes Mellitus, Type 2)    HTN (Hypertension)    Hypothyroidism    Obese    Overactive Bladder Afib  s/p PPM  Ascites, malignant  from advanced ovarian cancer  Asthma    Colon cancer    DM Type 2 (Diabetes Mellitus, Type 2)    HTN (Hypertension)    Hypothyroidism    Obese    Ovarian cancer    Overactive Bladder

## 2018-06-20 NOTE — H&P ADULT - NSHPSOCIALHISTORY_GEN_ALL_CORE
Former smoker > 40years ago describes as social for < 1 year  Denies ETOH or illicit drugs  Lives at home by herself  Ambulate with walker  Flu shot 2017

## 2018-06-20 NOTE — H&P ADULT - HISTORY OF PRESENT ILLNESS
89 y/o F PMHx ovarian cancer w/ recurrent ascites, colon ca s/p colostomy, s/p incarcerated ventral hernia repair 10/2017, DM2, HTN, afib s/p PPM, hypothyroid, obesity, and overactive bladder presents with worsening abdominal pain x 5 days. Patient states abdominal pain began after pa having nausea, dry heaving, abdominal pain worsened this morning and came to E    In the ED, VS: afebrile, HR 70, /70, RR 16, sat 98%, WBC 7.29, H/H 11.4/35.7, plat 380, PT 13.5, INR 1.23, PTT 32.2, Na 141, K 3.8, Bun 36, Cr 1.10, Glu 107, alb 2.7, lactate 1.3, lipase 174, Ct abd/pelvis - No specific acute inflammatory or obstructive pathology on this noncontrast study. Distended gallbladder with calcified stones. Diverticulosis coli, no diverticulitis. Lower abdominal ventral hernias containing nonobstructive bowel as discussed. Moderate to large amount of abdominal pelvic ascites similar to prior, indeterminate etiology. Consider paracentesis .Additional findings as discussed. CXR Left cardiac pacer reidentified in situ. No change heart mediastinum. No focal infiltrates. Parenchymal scarring left base. Clearing of the prior bibasilar airspace disease. Loculated/chronic left basilar effusion similar to prior. Right costophrenic angle clear. EKG electronic PPM @ 70BPM. Received 1 NS bolus, zofran, morphine. 89 y/o F PMHx ovarian cancer w/ recurrent ascites, colon ca s/p colostomy, s/p incarcerated ventral hernia repair 10/2017, DM2, HTN, afib s/p PPM, hypothyroid, obesity, and overactive bladder presents with worsening abdominal pain x 5 days. Patient states abdominal pain began after pa having nausea, dry heaving, abdominal pain worsened this morning.    In the ED, VS: afebrile, HR 70, /70, RR 16, sat 98%, WBC 7.29, H/H 11.4/35.7, plat 380, PT 13.5, INR 1.23, PTT 32.2, Na 141, K 3.8, Bun 36, Cr 1.10, Glu 107, alb 2.7, lactate 1.3, lipase 174, Ct abd/pelvis - No specific acute inflammatory or obstructive pathology on this noncontrast study. Distended gallbladder with calcified stones. Diverticulosis coli, no diverticulitis. Lower abdominal ventral hernias containing nonobstructive bowel as discussed. Moderate to large amount of abdominal pelvic ascites similar to prior, indeterminate etiology. Consider paracentesis .Additional findings as discussed. CXR Left cardiac pacer reidentified in situ. No change heart mediastinum. No focal infiltrates. Parenchymal scarring left base. Clearing of the prior bibasilar airspace disease. Loculated/chronic left basilar effusion similar to prior. Right costophrenic angle clear. EKG electronic PPM @ 70BPM. Received 1 NS bolus, zofran, morphine. 89 y/o F PMHx ovarian cancer w/ recurrent ascites, colon ca s/p colostomy, s/p incarcerated ventral hernia repair 10/2017, DM2, HTN, afib s/p PPM, hypothyroid, obesity, and overactive bladder presents with abdominal pain since 6AM this morning. Pt describes it as a sharp 10/10 non radiating pain in her upper abdomen. She also had some nausea this morning along with dry heaving, but did not actually vomit. She states that she changed her colostomy bag this morning and she is passing feces in it since the onset of her symptoms. She describes her pain as being similar to when she had an incarcerated hernia in January of this year. Of note, pt had paracentesis done on Friday for abdominal ascites. She reports she felt uncomfortable then, but the pain didn't start until this morning. Denies any fevers/chills or alterations in mental status.     In the ED, VS: afebrile, HR 70, /70, RR 16, sat 98%, WBC 7.29, H/H 11.4/35.7, plat 380, PT 13.5, INR 1.23, PTT 32.2, Na 141, K 3.8, Bun 36, Cr 1.10, Glu 107, alb 2.7, lactate 1.3, lipase 174. Ct abd/pelvis showed No specific acute inflammatory or obstructive pathology and  Moderate to large amount of abdominal pelvic ascites. CXR showed Left cardiac pacer with no acute findings. EKG electronic PPM @ 70BPM. In ED, pt received 1 NS bolus, zofran, morphine.

## 2018-06-20 NOTE — H&P ADULT - PROBLEM SELECTOR PLAN 9
DVT PPX  IMPROVE VTE Individual Risk Assessment          RISK                                                          Points  [  ] Previous VTE                                                3  [  ] Thrombophilia                                             2  [  ] Lower limb paralysis                                   2        (unable to hold up >15 seconds)    [  ] Current Cancer                                             2         (within 6 months)  [  ] Immobilization > 24 hrs                              1  [  ] ICU/CCU stay > 24 hours                             1  [  ] Age > 60                                                         1  IMPROVE VTE Score: DVT PPX: no chemical anticoagulation as pt is going to OR, SCD's  DASH consistent carb diet, aspiration precautions, fall precautions    IMPROVE VTE Individual Risk Assessment          RISK                                                          Points  [  ] Previous VTE                                                3  [  ] Thrombophilia                                             2  [  ] Lower limb paralysis                                   2        (unable to hold up >15 seconds)    [  ] Current Cancer                                             2         (within 6 months)  [  ] Immobilization > 24 hrs                              1  [  ] ICU/CCU stay > 24 hours                             1  [ x ] Age > 60                                                         1  IMPROVE VTE Score: 1

## 2018-06-20 NOTE — H&P ADULT - NSHPPHYSICALEXAM_GEN_ALL_CORE
Vital Signs Last 24 Hrs  T(C): 36.3 (20 Jun 2018 09:15), Max: 36.3 (20 Jun 2018 09:15)  T(F): 97.3 (20 Jun 2018 09:15), Max: 97.3 (20 Jun 2018 09:15)  HR: 70 (20 Jun 2018 09:15) (70 - 70)  BP: 111/70 (20 Jun 2018 09:15) (111/70 - 111/70)  RR: 16 (20 Jun 2018 09:15) (16 - 16)  SpO2: 98% (20 Jun 2018 09:15) (98% - 98%)  Physical Exam:  General: Well developed, well nourished, NAD  HEENT: NCAT, PERRLA, EOMI bl, moist mucous membranes   Neck: Supple, nontender, no mass  Neurology: A&Ox3, nonfocal, CN II-XII grossly intact, sensation intact, no gait abnormalities   Respiratory: CTA B/L, No W/R/R  CV: RRR, +S1/S2, no murmurs, rubs or gallops  Abdominal: Soft, NT, ND +BSx4  Extremities: No C/C/E, + peripheral pulses  MSK: Normal ROM, no joint erythema or warmth, no joint swelling   Skin: warm, dry, normal color, no rash or abnormal lesions Physical Exam:  General: Well developed, well nourished, NAD  HEENT: NCAT, PERRLA, EOMI bl, moist mucous membranes   Neck: Supple, nontender, no mass  Neurology: A&Ox3, nonfocal, CN II-XII grossly intact, sensation intact   Respiratory: CTA B/L, No W/R/R  CV: RRR, +S1/S2, no murmurs, rubs or gallops  Abdominal: Soft, NT, ND; reducible hernia in midline abdomen, colostomy bag in LLQ, BS present  Extremities: No C/C/E, + peripheral pulses  MSK: Normal ROM, no joint erythema or warmth, no joint swelling   Skin: warm, dry, normal color, no rash or abnormal lesions

## 2018-06-21 ENCOUNTER — RESULT REVIEW (OUTPATIENT)
Age: 83
End: 2018-06-21

## 2018-06-21 DIAGNOSIS — K46.0 UNSPECIFIED ABDOMINAL HERNIA WITH OBSTRUCTION, WITHOUT GANGRENE: ICD-10-CM

## 2018-06-21 LAB
ANION GAP SERPL CALC-SCNC: 6 MMOL/L — SIGNIFICANT CHANGE UP (ref 5–17)
BUN SERPL-MCNC: 28 MG/DL — HIGH (ref 7–23)
CALCIUM SERPL-MCNC: 8.2 MG/DL — LOW (ref 8.5–10.1)
CHLORIDE SERPL-SCNC: 106 MMOL/L — SIGNIFICANT CHANGE UP (ref 96–108)
CO2 SERPL-SCNC: 29 MMOL/L — SIGNIFICANT CHANGE UP (ref 22–31)
CREAT SERPL-MCNC: 1.1 MG/DL — SIGNIFICANT CHANGE UP (ref 0.5–1.3)
GLUCOSE SERPL-MCNC: 157 MG/DL — HIGH (ref 70–99)
HCT VFR BLD CALC: 33.6 % — LOW (ref 34.5–45)
HGB BLD-MCNC: 10.5 G/DL — LOW (ref 11.5–15.5)
MCHC RBC-ENTMCNC: 25.5 PG — LOW (ref 27–34)
MCHC RBC-ENTMCNC: 31.3 GM/DL — LOW (ref 32–36)
MCV RBC AUTO: 81.8 FL — SIGNIFICANT CHANGE UP (ref 80–100)
NRBC # BLD: 0 /100 WBCS — SIGNIFICANT CHANGE UP (ref 0–0)
PLATELET # BLD AUTO: 468 K/UL — HIGH (ref 150–400)
POTASSIUM SERPL-MCNC: 4.6 MMOL/L — SIGNIFICANT CHANGE UP (ref 3.5–5.3)
POTASSIUM SERPL-SCNC: 4.6 MMOL/L — SIGNIFICANT CHANGE UP (ref 3.5–5.3)
RBC # BLD: 4.11 M/UL — SIGNIFICANT CHANGE UP (ref 3.8–5.2)
RBC # FLD: 17.2 % — HIGH (ref 10.3–14.5)
SODIUM SERPL-SCNC: 141 MMOL/L — SIGNIFICANT CHANGE UP (ref 135–145)
WBC # BLD: 8.86 K/UL — SIGNIFICANT CHANGE UP (ref 3.8–10.5)
WBC # FLD AUTO: 8.86 K/UL — SIGNIFICANT CHANGE UP (ref 3.8–10.5)

## 2018-06-21 PROCEDURE — 99233 SBSQ HOSP IP/OBS HIGH 50: CPT | Mod: GC

## 2018-06-21 PROCEDURE — 88302 TISSUE EXAM BY PATHOLOGIST: CPT | Mod: 26

## 2018-06-21 RX ORDER — HYDROMORPHONE HYDROCHLORIDE 2 MG/ML
0.5 INJECTION INTRAMUSCULAR; INTRAVENOUS; SUBCUTANEOUS
Qty: 0 | Refills: 0 | Status: DISCONTINUED | OUTPATIENT
Start: 2018-06-21 | End: 2018-06-21

## 2018-06-21 RX ORDER — MORPHINE SULFATE 50 MG/1
4 CAPSULE, EXTENDED RELEASE ORAL EVERY 4 HOURS
Qty: 0 | Refills: 0 | Status: DISCONTINUED | OUTPATIENT
Start: 2018-06-21 | End: 2018-06-23

## 2018-06-21 RX ORDER — SODIUM CHLORIDE 9 MG/ML
1000 INJECTION, SOLUTION INTRAVENOUS
Qty: 0 | Refills: 0 | Status: DISCONTINUED | OUTPATIENT
Start: 2018-06-21 | End: 2018-06-24

## 2018-06-21 RX ORDER — MORPHINE SULFATE 50 MG/1
2 CAPSULE, EXTENDED RELEASE ORAL EVERY 4 HOURS
Qty: 0 | Refills: 0 | Status: DISCONTINUED | OUTPATIENT
Start: 2018-06-21 | End: 2018-06-23

## 2018-06-21 RX ORDER — DEXTROSE 50 % IN WATER 50 %
25 SYRINGE (ML) INTRAVENOUS ONCE
Qty: 0 | Refills: 0 | Status: DISCONTINUED | OUTPATIENT
Start: 2018-06-21 | End: 2018-06-24

## 2018-06-21 RX ORDER — GLUCAGON INJECTION, SOLUTION 0.5 MG/.1ML
1 INJECTION, SOLUTION SUBCUTANEOUS ONCE
Qty: 0 | Refills: 0 | Status: DISCONTINUED | OUTPATIENT
Start: 2018-06-21 | End: 2018-06-24

## 2018-06-21 RX ORDER — LEVOTHYROXINE SODIUM 125 MCG
112 TABLET ORAL AT BEDTIME
Qty: 0 | Refills: 0 | Status: DISCONTINUED | OUTPATIENT
Start: 2018-06-21 | End: 2018-06-21

## 2018-06-21 RX ORDER — SODIUM CHLORIDE 9 MG/ML
1000 INJECTION, SOLUTION INTRAVENOUS
Qty: 0 | Refills: 0 | Status: DISCONTINUED | OUTPATIENT
Start: 2018-06-21 | End: 2018-06-22

## 2018-06-21 RX ORDER — METRONIDAZOLE 500 MG
500 TABLET ORAL ONCE
Qty: 0 | Refills: 0 | Status: DISCONTINUED | OUTPATIENT
Start: 2018-06-21 | End: 2018-06-21

## 2018-06-21 RX ORDER — ENOXAPARIN SODIUM 100 MG/ML
40 INJECTION SUBCUTANEOUS DAILY
Qty: 0 | Refills: 0 | Status: DISCONTINUED | OUTPATIENT
Start: 2018-06-22 | End: 2018-06-23

## 2018-06-21 RX ORDER — OXYCODONE HYDROCHLORIDE 5 MG/1
5 TABLET ORAL EVERY 4 HOURS
Qty: 0 | Refills: 0 | Status: DISCONTINUED | OUTPATIENT
Start: 2018-06-21 | End: 2018-06-21

## 2018-06-21 RX ORDER — CEFAZOLIN SODIUM 1 G
2000 VIAL (EA) INJECTION ONCE
Qty: 0 | Refills: 0 | Status: DISCONTINUED | OUTPATIENT
Start: 2018-06-21 | End: 2018-06-21

## 2018-06-21 RX ORDER — LEVOTHYROXINE SODIUM 125 MCG
112 TABLET ORAL
Qty: 0 | Refills: 0 | Status: DISCONTINUED | OUTPATIENT
Start: 2018-06-22 | End: 2018-06-23

## 2018-06-21 RX ORDER — DEXTROSE 50 % IN WATER 50 %
15 SYRINGE (ML) INTRAVENOUS ONCE
Qty: 0 | Refills: 0 | Status: DISCONTINUED | OUTPATIENT
Start: 2018-06-21 | End: 2018-06-24

## 2018-06-21 RX ORDER — METOCLOPRAMIDE HCL 10 MG
10 TABLET ORAL ONCE
Qty: 0 | Refills: 0 | Status: COMPLETED | OUTPATIENT
Start: 2018-06-21 | End: 2018-06-21

## 2018-06-21 RX ORDER — MORPHINE SULFATE 50 MG/1
2 CAPSULE, EXTENDED RELEASE ORAL ONCE
Qty: 0 | Refills: 0 | Status: DISCONTINUED | OUTPATIENT
Start: 2018-06-21 | End: 2018-06-21

## 2018-06-21 RX ORDER — DEXTROSE 50 % IN WATER 50 %
12.5 SYRINGE (ML) INTRAVENOUS ONCE
Qty: 0 | Refills: 0 | Status: DISCONTINUED | OUTPATIENT
Start: 2018-06-21 | End: 2018-06-24

## 2018-06-21 RX ORDER — ACETAMINOPHEN 500 MG
650 TABLET ORAL EVERY 6 HOURS
Qty: 0 | Refills: 0 | Status: DISCONTINUED | OUTPATIENT
Start: 2018-06-21 | End: 2018-06-23

## 2018-06-21 RX ORDER — FUROSEMIDE 40 MG
40 TABLET ORAL DAILY
Qty: 0 | Refills: 0 | Status: DISCONTINUED | OUTPATIENT
Start: 2018-06-21 | End: 2018-06-21

## 2018-06-21 RX ORDER — ONDANSETRON 8 MG/1
4 TABLET, FILM COATED ORAL ONCE
Qty: 0 | Refills: 0 | Status: DISCONTINUED | OUTPATIENT
Start: 2018-06-21 | End: 2018-06-21

## 2018-06-21 RX ORDER — SODIUM CHLORIDE 9 MG/ML
1000 INJECTION, SOLUTION INTRAVENOUS
Qty: 0 | Refills: 0 | Status: DISCONTINUED | OUTPATIENT
Start: 2018-06-21 | End: 2018-06-21

## 2018-06-21 RX ADMIN — Medication 10 MILLIGRAM(S): at 01:11

## 2018-06-21 RX ADMIN — HYDROMORPHONE HYDROCHLORIDE 0.5 MILLIGRAM(S): 2 INJECTION INTRAMUSCULAR; INTRAVENOUS; SUBCUTANEOUS at 09:48

## 2018-06-21 RX ADMIN — Medication 225 MICROGRAM(S): at 05:25

## 2018-06-21 RX ADMIN — GABAPENTIN 300 MILLIGRAM(S): 400 CAPSULE ORAL at 05:25

## 2018-06-21 RX ADMIN — HYDROMORPHONE HYDROCHLORIDE 0.5 MILLIGRAM(S): 2 INJECTION INTRAMUSCULAR; INTRAVENOUS; SUBCUTANEOUS at 10:17

## 2018-06-21 RX ADMIN — Medication 1 APPLICATION(S): at 18:43

## 2018-06-21 RX ADMIN — HYDROMORPHONE HYDROCHLORIDE 0.5 MILLIGRAM(S): 2 INJECTION INTRAMUSCULAR; INTRAVENOUS; SUBCUTANEOUS at 10:07

## 2018-06-21 RX ADMIN — SODIUM CHLORIDE 75 MILLILITER(S): 9 INJECTION, SOLUTION INTRAVENOUS at 13:46

## 2018-06-21 RX ADMIN — MORPHINE SULFATE 2 MILLIGRAM(S): 50 CAPSULE, EXTENDED RELEASE ORAL at 03:30

## 2018-06-21 RX ADMIN — MORPHINE SULFATE 2 MILLIGRAM(S): 50 CAPSULE, EXTENDED RELEASE ORAL at 03:45

## 2018-06-21 RX ADMIN — SODIUM CHLORIDE 100 MILLILITER(S): 9 INJECTION, SOLUTION INTRAVENOUS at 10:38

## 2018-06-21 RX ADMIN — DEXTROSE MONOHYDRATE, SODIUM CHLORIDE, AND POTASSIUM CHLORIDE 50 MILLILITER(S): 50; .745; 4.5 INJECTION, SOLUTION INTRAVENOUS at 01:11

## 2018-06-21 NOTE — PROGRESS NOTE ADULT - PROBLEM SELECTOR PLAN 1
- POD 0 of hernia repair, tolerated procedure well  - Pain control  - monitor in ICU  - Pt as had 2 hernia surgeries in past year (1/2018, 10/2017) by Dr. Mckinney, not presenting with similar symptoms - POD 0 of hernia repair, tolerated procedure well  - apprecaite surgery recs, NPO through [place], c/w IVF  - Pain control with IV morphine   - monitor in ICU  - Pt as had 2 hernia surgeries in past year (1/2018, 10/2017)

## 2018-06-21 NOTE — PROGRESS NOTE ADULT - PROBLEM SELECTOR PLAN 3
- restart Xarelto when okay with surgery   - rates stable   - patient not on any rate controlling agents at this time  - cardio consult Dr. Licea. - hold Xarelto for today, f/u with surgery when can restart  - rates stable   - patient not on any rate controlling agents at this time  - cardio consult Dr. Licea apprecaited

## 2018-06-21 NOTE — PROGRESS NOTE ADULT - PROBLEM SELECTOR PLAN 2
- large ascites seen on CT, secondary to ovarian cancer  - last paracentesis done on Friday by [facility]  - POD 0 of hernia repair - large ascites seen on CT, secondary to ovarian cancer  - s/p 2.2L paracentesis in OR today  - f/u for routine paracentesis [facility]

## 2018-06-21 NOTE — BRIEF OPERATIVE NOTE - PROCEDURE
<<-----Click on this checkbox to enter Procedure Ventral hernia repair with mesh  06/21/2018    Active  [email or web address]

## 2018-06-21 NOTE — PROGRESS NOTE ADULT - PROBLEM SELECTOR PLAN 7
Prophylactic measure.  Plan: DVT PPX: no chemical anticoagulation as pt is going to OR, SCD's  DASH consistent carb diet, aspiration precautions, fall precautions    IMPROVE VTE Individual Risk Assessment          RISK                                                          Points  [  ] Previous VTE                                                3  [  ] Thrombophilia                                             2  [  ] Lower limb paralysis                                   2        (unable to hold up >15 seconds)    [  ] Current Cancer                                             2         (within 6 months)  [  ] Immobilization > 24 hrs                              1  [  ] ICU/CCU stay > 24 hours                             1  [ x ] Age > 60                                                         1  IMPROVE VTE Score: 1. Prophylactic measure.  Plan: DVT PPX: lovenox, SCDS  DASH consistent carb diet, aspiration precautions, fall precautions    IMPROVE VTE Individual Risk Assessment          RISK                                                          Points  [  ] Previous VTE                                                3  [  ] Thrombophilia                                             2  [  ] Lower limb paralysis                                   2        (unable to hold up >15 seconds)    [  ] Current Cancer                                             2         (within 6 months)  [  ] Immobilization > 24 hrs                              1  [  ] ICU/CCU stay > 24 hours                             1  [ x ] Age > 60                                                         1  IMPROVE VTE Score: 1. outpatient oncology followup

## 2018-06-21 NOTE — BRIEF OPERATIVE NOTE - OPERATION/FINDINGS
incarcerated loop of viable small bowel in ventral hernia on right side of abdomen near midline, which had been previously repaired last year without mesh

## 2018-06-21 NOTE — PROGRESS NOTE ADULT - SUBJECTIVE AND OBJECTIVE BOX
Patient is a 89y old  Female who presents with a chief complaint of abdominal pain (2018 17:32)      INTERVAL HPI/OVERNIGHT EVENTS: Pt went for ventral hernia repair this AM. Tolerated procedure well without any complications. Now complains only of soreness at the surgical site. Notes some improvement of her pain.     MEDICATIONS  (STANDING):  dextrose 5%. 1000 milliLiter(s) (50 mL/Hr) IV Continuous <Continuous>  dextrose 50% Injectable 12.5 Gram(s) IV Push once  dextrose 50% Injectable 25 Gram(s) IV Push once  dextrose 50% Injectable 25 Gram(s) IV Push once  lactated ringers. 1000 milliLiter(s) (75 mL/Hr) IV Continuous <Continuous>    MEDICATIONS  (PRN):  acetaminophen  Suppository. 650 milliGRAM(s) Rectal every 6 hours PRN Mild Pain (1 - 3)  dextrose 40% Gel 15 Gram(s) Oral once PRN Blood Glucose LESS THAN 70 milliGRAM(s)/deciliter  glucagon  Injectable 1 milliGRAM(s) IntraMuscular once PRN Glucose LESS THAN 70 milligrams/deciliter  morphine  - Injectable 4 milliGRAM(s) IV Push every 4 hours PRN Severe Pain (7 - 10)  morphine  - Injectable 2 milliGRAM(s) IV Push every 4 hours PRN Moderate Pain (4 - 6)      Allergies    No Known Allergies    Intolerances        REVIEW OF SYSTEMS:  CONSTITUTIONAL: No fever or chills  HEENT:  No headache, no sore throat  RESPIRATORY: No cough, wheezing, or shortness of breath  CARDIOVASCULAR: No chest pain, palpitations, or leg swelling  GASTROINTESTINAL: + abd pain at surgical site; no nausea, vomiting, or diarrhea  GENITOURINARY: No dysuria, frequency, or hematuria  NEUROLOGICAL: no focal weakness or dizziness  MUSCULOSKELETAL: no myalgias     Vital Signs Last 24 Hrs  T(C): 36.7 (2018 11:11), Max: 37.1 (2018 09:26)  T(F): 98 (2018 11:11), Max: 98.8 (2018 09:26)  HR: 69 (2018 15:00) (69 - 95)  BP: 98/56 (2018 15:00) (94/50 - 123/61)  BP(mean): 71 (2018 15:00) (69 - 76)  RR: 30 (2018 15:00) (11 - 30)  SpO2: 99% (2018 14:00) (95% - 100%)    PHYSICAL EXAM:  GENERAL: NAD  HEENT:  EOMI, moist mucous membranes  CHEST/LUNG:  CTA b/l, no rales, wheezes, or rhonchi  HEART:  RRR, S1, S2  ABDOMEN:  soreness at surgical site, dressings c/d/i, colostomy bag noted with feces inside  EXTREMITIES: no edema, cyanosis, or calf tenderness  NERVOUS SYSTEM: AA&Ox3    LABS:                        10.5   8.86  )-----------( 468      ( 2018 07:14 )             33.6     CBC Full  -  ( 2018 07:14 )  WBC Count : 8.86 K/uL  Hemoglobin : 10.5 g/dL  Hematocrit : 33.6 %  Platelet Count - Automated : 468 K/uL  Mean Cell Volume : 81.8 fl  Mean Cell Hemoglobin : 25.5 pg  Mean Cell Hemoglobin Concentration : 31.3 gm/dL  Auto Neutrophil # : x  Auto Lymphocyte # : x  Auto Monocyte # : x  Auto Eosinophil # : x  Auto Basophil # : x  Auto Neutrophil % : x  Auto Lymphocyte % : x  Auto Monocyte % : x  Auto Eosinophil % : x  Auto Basophil % : x    2018 07:14    141    |  106    |  28     ----------------------------<  157    4.6     |  29     |  1.10     Ca    8.2        2018 07:14      PT/INR - ( 2018 11:27 )   PT: 13.5 sec;   INR: 1.23 ratio         PTT - ( 2018 11:27 )  PTT:32.2 sec  Urinalysis Basic - ( 2018 22:48 )    Color: Pale Yellow / Appearance: Clear / S.015 / pH: x  Gluc: x / Ketone: Negative  / Bili: Negative / Urobili: Negative   Blood: x / Protein: Negative / Nitrite: Positive   Leuk Esterase: Moderate / RBC: 0-2 /HPF / WBC >50   Sq Epi: x / Non Sq Epi: Few / Bacteria: TNTC      CAPILLARY BLOOD GLUCOSE      POCT Blood Glucose.: 113 mg/dL (2018 11:56)  POCT Blood Glucose.: 130 mg/dL (2018 09:36)  POCT Blood Glucose.: 156 mg/dL (2018 07:41)          RADIOLOGY & ADDITIONAL TESTS:    Personally reviewed.     Consultant(s) Notes Reviewed:  [x] YES  [ ] NO    Care Discussed with [x] Consultants  [x] Patient  [ ] Family  [ ]      [ ] Other; RN  DVT ppx

## 2018-06-21 NOTE — PROGRESS NOTE ADULT - ASSESSMENT
89 y/o F PMHx ovarian cancer w/ recurrent ascites, colon ca s/p colostomy, s/p incarcerated ventral hernia repair 10/2017, DM2, HTN, afib s/p PPM, hypothyroid, obesity, and overactive bladder admitted with abdominal pain. 89 y/o F PMHx ovarian cancer w/ recurrent ascites, colon ca s/p colostomy, s/p incarcerated ventral hernia repair 10/2017, DM2, HTN, afib s/p PPM, hypothyroid, obesity, and overactive bladder admitted with abdominal pain found to have incarcerated hernia and moderate ascites

## 2018-06-21 NOTE — CONSULT NOTE ADULT - ASSESSMENT
89y old female pmhx metastatic ovarian cancer with recurrent ascites (last paracentesis 6/15- removal of 5.8L), colon cancer s/p colostomy, Incarcerated ventral hernia s/p repair without mesh (01/18), DM2, HTN, Afib s/p ppm paced at 70, Hypothyroidism, Overactive bladder and obesity who presented on 6/20 with a chief complaint of abdominal pain and dry heaving now POD#0 from  Incarcerated Ventral hernia repair with viable bowel with mesh (same ventral hernia from 01/2018) and 2.2L paracentesis.  Patient examined in PACU, vitals/medications/chart reviewed.        NEURO:   CV:   RESP:   RENAL:   GI:   ENDO:   ID:   HEME:   DISPO: 89y old female pmhx metastatic ovarian cancer with recurrent ascites (last paracentesis 6/15- removal of 5.8L), colon cancer s/p colostomy, Incarcerated ventral hernia s/p repair without mesh (01/18), DM2, HTN, Afib s/p ppm paced at 70, Hypothyroidism, Overactive bladder and obesity who presented on 6/20 with a chief complaint of abdominal pain and dry heaving now POD#0 from  Incarcerated Ventral hernia repair with viable bowel with mesh (same ventral hernia from 01/2018) and 2.2L paracentesis.  Patient examined in PACU, vitals/medications/chart reviewed, case discussed with ICU attending Dr. Squires, patient transferred to ICU for further care.        NEURO: POD#0, pain control with dilaudid.  Neurontin to be restarted once patient taking PO intake.    CV: HD stable, afib on Xarelto.  Xarelto being held until discussed with surgery.  PPM in place, paced rhythm at 70.  Lisinopril ordered.    RESP: COPD on Montelukast.   RENAL: BRITTANEY improving, Avoid nephrotoxic medications, renally dose medications, trend urine output, BUN/Cr and electrolytes.  replace electrolytes as needed.  Pyridoxine to be restarted.    GI: NPO for now.  Colostomy with stool. Zofran prn for nausea.    ENDO: Hypothyroidism on synthyroid. DM ISS.    HEME: Metastatic Ovarian Cancer with recurrent ascites, will restart lasix.    ID: No active issues.  Received ancef and flagyl pre-operatively.   DISPO: Full Code. 89y old female pmhx metastatic ovarian cancer with recurrent ascites (last paracentesis 6/15- removal of 5.8L), colon cancer s/p colostomy, Incarcerated ventral hernia s/p repair without mesh (01/18), DM2, HTN, Afib s/p ppm paced at 70, Hypothyroidism, Overactive bladder and obesity who presented on 6/20 with a chief complaint of abdominal pain and dry heaving now POD#0 from  Incarcerated Ventral hernia repair with viable bowel with mesh (same ventral hernia from 01/2018) and 2.2L paracentesis.  Patient examined in PACU, vitals/medications/chart reviewed, case discussed with ICU attending Dr. Squires, patient transferred to ICU for further care.        NEURO: POD#0, pain control with dilaudid.  Neurontin to be restarted once patient taking PO intake.    CV: HD stable, afib on Xarelto.  Xarelto being held until discussed with surgery.  PPM in place, paced rhythm at 70.  Lisinopril ordered.    RESP: COPD on Montelukast.   RENAL: BRITTANEY improving, Avoid nephrotoxic medications, renally dose medications, trend urine output, BUN/Cr and electrolytes.  replace electrolytes as needed.  Pyridoxine to be restarted.    GI: NPO for now.  Colostomy with stool. Zofran prn for nausea.    ENDO: Hypothyroidism on synthyroid. DM ISS.    HEME: Metastatic Ovarian Cancer with recurrent ascites, will restart lasix.    ID: No active issues.  Received ancef and flagyl pre-operatively.   DISPO: Full Code.       I attempted to contact Palmira Pittman [phone] and Lorena Patel [phone] around 10:45am to give update on patient's condition, no answer at either numbers, messages left, awaiting call back.

## 2018-06-21 NOTE — PROGRESS NOTE ADULT - SUBJECTIVE AND OBJECTIVE BOX
[facility]       HPI:  89 y/o F PMHx ovarian cancer w/ recurrent ascites, colon ca s/p colostomy, s/p incarcerated ventral hernia repair 10/2017, DM2, HTN, afib s/p PPM, hypothyroid, obesity, and overactive bladder presents with abdominal pain since 6AM this morning. Pt describes it as a sharp 10/10 non radiating pain in her upper abdomen. She also had some nausea this morning along with dry heaving, but did not actually vomit. She states that she changed her colostomy bag this morning and she is passing feces in it since the onset of her symptoms. She describes her pain as being similar to when she had an incarcerated hernia in January of this year. Of note, pt had paracentesis done on Friday for abdominal ascites. She reports she felt uncomfortable then, but the pain didn't start until this morning. Denies any fevers/chills or alterations in mental status.     In the ED, VS: afebrile, HR 70, /70, RR 16, sat 98%, WBC 7.29, H/H 11.4/35.7, plat 380, PT 13.5, INR 1.23, PTT 32.2, Na 141, K 3.8, Bun 36, Cr 1.10, Glu 107, alb 2.7, lactate 1.3, lipase 174. Ct abd/pelvis showed No specific acute inflammatory or obstructive pathology and  Moderate to large amount of abdominal pelvic ascites. CXR showed Left cardiac pacer with no acute findings. EKG electronic PPM @ 70BPM. In ED, pt received 1 NS bolus, zofran, morphine. (2018 12:41)        SUBJECTIVE:      ALLERGIES:  Allergies    No Known Allergies    Intolerances          MEDICATIONS  (STANDING):    MEDICATIONS  (PRN):      REVIEW OF SYSTEMS:  CONSTITUTIONAL: No fever,  RESPIRATORY: No cough, wheezing, shortness of breath  CARDIOVASCULAR: No chest pain, dyspnea, palpitations, dizziness, syncope, paroxysmal nocturnal dyspnea, orthopnea, or arm or leg swelling  GASTROINTESTINAL: No abdominal  or epigastric pain, nausea, vomiting,  diarrhea  NEUROLOGICAL: No headaches,  loss of strength, numbness, or tremors    Vital Signs Last 24 Hrs  T(C): 36.8 (2018 10:07), Max: 37.1 (2018 09:26)  T(F): 98.2 (2018 10:07), Max: 98.8 (2018 09:26)  HR: 70 (2018 10:49) (69 - 95)  BP: 106/55 (2018 10:49) (95/48 - 123/61)  BP(mean): --  RR: 14 (2018 10:49) (14 - 18)  SpO2: 99% (2018 10:49) (95% - 99%)    PHYSICAL EXAM:  HEAD:  Atraumatic, Normocephalic  NECK: Supple and normal thyroid.  No JVD or carotid bruit.   HEART: S1, S2 regular , 1/6 soft ejection systolic murmur in mitral area , no thrill and no gallops .  PULMONARY: Bilateral vesicular breathing , few scattered ronchi and few scattered rales are present .  ABDOMEN: Soft nontender and positive bowl sounds   EXTREMITIES:  No clubbing, cyanosis, or pedal  edema  NEUROLOGICAL: AAOX3 , no focal deficit .    LABS:                        10.5   8.86  )-----------( 468      ( 2018 07:14 )             33.6     06-21    141  |  106  |  28<H>  ----------------------------<  157<H>  4.6   |  29  |  1.10    Ca    8.2<L>      2018 07:14    TPro  7.3  /  Alb  2.7<L>  /  TBili  0.5  /  DBili  x   /  AST  20  /  ALT  18  /  AlkPhos  86  06-20        PT/INR - ( 2018 11:27 )   PT: 13.5 sec;   INR: 1.23 ratio         PTT - ( 2018 11:27 )  PTT:32.2 sec  Urinalysis Basic - ( 2018 22:48 )    Color: Pale Yellow / Appearance: Clear / S.015 / pH: x  Gluc: x / Ketone: Negative  / Bili: Negative / Urobili: Negative   Blood: x / Protein: Negative / Nitrite: Positive   Leuk Esterase: Moderate / RBC: 0-2 /HPF / WBC >50   Sq Epi: x / Non Sq Epi: Few / Bacteria: TNTC      BNP      EKG:  ECHO:  IMAGING:    Assessment/Plan    Will continue to follow during hospital course and give further recommendations as needed . Thanks for your referral . if any questions please contact me at office [phone] [phone]) [facility]       HPI:  89 y/o F PMHx ovarian cancer w/ recurrent ascites, colon ca s/p colostomy, s/p incarcerated ventral hernia repair 10/2017, DM2, HTN, afib s/p PPM, hypothyroid, obesity, and overactive bladder presents with abdominal pain since 6AM this morning. Pt describes it as a sharp 10/10 non radiating pain in her upper abdomen. She also had some nausea this morning along with dry heaving, but did not actually vomit. She states that she changed her colostomy bag this morning and she is passing feces in it since the onset of her symptoms. She describes her pain as being similar to when she had an incarcerated hernia in January of this year. Of note, pt had paracentesis done on Friday for abdominal ascites. She reports she felt uncomfortable then, but the pain didn't start until this morning. Denies any fevers/chills or alterations in mental status.     In the ED, VS: afebrile, HR 70, /70, RR 16, sat 98%, WBC 7.29, H/H 11.4/35.7, plat 380, PT 13.5, INR 1.23, PTT 32.2, Na 141, K 3.8, Bun 36, Cr 1.10, Glu 107, alb 2.7, lactate 1.3, lipase 174. Ct abd/pelvis showed No specific acute inflammatory or obstructive pathology and  Moderate to large amount of abdominal pelvic ascites. CXR showed Left cardiac pacer with no acute findings. EKG electronic PPM @ 70BPM. In ED, pt received 1 NS bolus, zofran, morphine. (2018 12:41)        SUBJECTIVE: Patient lying comfortable . No chest pain and SOB .      ALLERGIES:  Allergies    No Known Allergies    Intolerances          MEDICATIONS  (STANDING):    MEDICATIONS  (PRN):      REVIEW OF SYSTEMS:  CONSTITUTIONAL: No fever,  RESPIRATORY: No cough, wheezing, shortness of breath  CARDIOVASCULAR: No chest pain, dyspnea, palpitations, dizziness, syncope, paroxysmal nocturnal dyspnea, orthopnea, or arm or leg swelling  GASTROINTESTINAL: No abdominal  or epigastric pain, nausea, vomiting,  diarrhea  NEUROLOGICAL: No headaches,  loss of strength, numbness, or tremors    Vital Signs Last 24 Hrs  T(C): 36.8 (2018 10:07), Max: 37.1 (2018 09:26)  T(F): 98.2 (2018 10:07), Max: 98.8 (2018 09:26)  HR: 70 (2018 10:49) (69 - 95)  BP: 106/55 (2018 10:49) (95/48 - 123/61)  BP(mean): --  RR: 14 (2018 10:49) (14 - 18)  SpO2: 99% (2018 10:49) (95% - 99%)    PHYSICAL EXAM:  HEAD:  Atraumatic, Normocephalic  NECK: Supple and normal thyroid.  No JVD or carotid bruit.   HEART: S1, S2 irregular , 1/6 soft ejection systolic murmur in mitral area , no thrill and no gallops .  PULMONARY: Bilateral vesicular breathing , few scattered ronchil and few scattered rales are present .  ABDOMEN: Soft nontender and positive bowl sounds   EXTREMITIES:  No clubbing, cyanosis, or pedal  edema  NEUROLOGICAL: AAOX3 , no focal deficit .    LABS:                        10.5   8.86  )-----------( 468      ( 2018 07:14 )             33.6     06-21    141  |  106  |  28<H>  ----------------------------<  157<H>  4.6   |  29  |  1.10    Ca    8.2<L>      2018 07:14    TPro  7.3  /  Alb  2.7<L>  /  TBili  0.5  /  DBili  x   /  AST  20  /  ALT  18  /  AlkPhos  86  06-20        PT/INR - ( 2018 11:27 )   PT: 13.5 sec;   INR: 1.23 ratio         PTT - ( 2018 11:27 )  PTT:32.2 sec  Urinalysis Basic - ( 2018 22:48 )    Color: Pale Yellow / Appearance: Clear / S.015 / pH: x  Gluc: x / Ketone: Negative  / Bili: Negative / Urobili: Negative   Blood: x / Protein: Negative / Nitrite: Positive   Leuk Esterase: Moderate / RBC: 0-2 /HPF / WBC >50   Sq Epi: x / Non Sq Epi: Few / Bacteria: TNTC      BNP      EKG:   ECHO:< from: 12 Lead ECG (18 @ 10:19) >  Diagnosis Line Electronic ventricular pacemaker  Abnormal ECG  Confirmed by Berlin Dodson ([id number]) on 2018 10:49:45 AM        Assessment/Plan  Patient has :  1) CHF and stable .  2) S/P Abdominal hernia surgery .  3) H/O atrial fibrillation and PPM and stable .  Plan : 1) Cardiac stable so far post op . 2) Continue present medications .  Will continue to follow during hospital course and give further recommendations as needed . Thanks for your referral . if any questions please contact me at office ([phone])cell [phone])

## 2018-06-21 NOTE — CONSULT NOTE ADULT - SUBJECTIVE AND OBJECTIVE BOX
Patient is a 89y old female pmhx metastatic ovarian cancer with recurrent ascites (last paracentesis 6/15- removal of 5.8L), colon cancer s/p colostomy, Incarcerated ventral hernia s/p repair without mesh (), DM2, HTN, Afib s/p ppm paced at 70, Hypothyroidism, Overactive bladder and obesity who presented on  with a chief complaint of abdominal pain and dry heaving.  Patient is POD#0 from a Incarcerated Ventral hernia repair with viable bowel with mesh (same ventral hernia from 2018) and 2.2L paracentesis.  Intraop patient received fentanyl, propofol and 1.1L IVF.  Patient tolerated procedure well, EBL ~30cc.  Patient examined in PACU, awake and alert, hemodynamically stable with moderate abdominal pain.        PAST MEDICAL & SURGICAL HISTORY:  Ovarian cancer  Afib: s/p PPM  Ascites, malignant: from advanced ovarian cancer  Colon cancer  Obese  Hypothyroidism  HTN (Hypertension)  Overactive Bladder  DM Type 2 (Diabetes Mellitus, Type 2)  Asthma  Incarcerated hernia: s/p repair 10/2017  H/O hernia repair  Artificial pacemaker  S/P colon resection: with sigmoid colostomy for obstructing ovarian cancer  S/P Cataract Surgery: CASI  History of Tonsillectomy  History of Appendectomy  Status Post Total Knee Replacement: Left    Allergies  No Known Allergies      FAMILY HISTORY:  No pertinent family history in first degree relatives      Social History:  Lives at home.     Review of Systems:  CONSTITUTIONAL: No fever, chills, or fatigue  EYES: No eye pain, visual disturbances, or discharge  ENMT:  No difficulty hearing, tinnitus, vertigo; No sinus or throat pain  NECK: No pain or stiffness  RESPIRATORY: No cough, wheezing, chills or hemoptysis; No shortness of breath  CARDIOVASCULAR: No chest pain, palpitations, dizziness, or leg swelling  GASTROINTESTINAL: +abdominal pain. No nausea, vomiting, or hematemesis; No diarrhea or constipation. No melena or hematochezia.  GENITOURINARY: No dysuria, frequency, hematuria, or incontinence  NEUROLOGICAL: No headaches, memory loss, loss of strength, numbness, or tremors  SKIN: No itching, burning, rashes, or lesions   MUSCULOSKELETAL: No joint pain or swelling; No muscle, back, or extremity pain      Vitals During Exam:   HR: 70, paced  BP: 107/55mmHg MAP 68  RR: 16  sPO2: 97% on 3L NC    Physical Examination:    General: Patient resting in bed, appears comfortable. Only endorses abdominal pain when asked.     HEENT: NC/AT, Pupils equal, reactive to light.  Symmetric. NC in place.     PULM: Symmetrical thorax movement upon respiration.  Clear to auscultation bilaterally, no significant sputum production appreciated.     CVS: Paced rate and rhythm, no murmurs, rubs, or gallops appreciated.     ABD: Soft, nondistended, nontender, normoactive bowel sounds, colostomy contained with stool, site clean.  Surgical incision covered with sterile, clean, dry and intact dressing.      EXT: Non-pitting edema, DP pulses symmetrical.     SKIN: Warm and well perfused, no rashes noted. Healed surgical incisions over bilateral knees.      NEURO: Alert, oriented, interactive, nonfocal, moving all 4 extremities.       Medications:  HYDROmorphone  Injectable 0.5 milliGRAM(s) IV Push every 10 minutes PRN  ondansetron Injectable 4 milliGRAM(s) IV Push once PRN  oxyCODONE    IR 5 milliGRAM(s) Oral every 4 hours PRN  lactated ringers. 1000 milliLiter(s) IV Continuous <Continuous>      ICU Vital Signs Last 24 Hrs  T(C): 37.1 (2018 09:26), Max: 37.1 (2018 09:26)  T(F): 98.8 (:26), Max: 98.8 (2018 09:26)  HR: 70 (2018 09:41) (69 - 95)  BP: 107/55 (2018 09:41) (95/57 - 123/61)  BP(mean): --  ABP: --  ABP(mean): --  RR: 16 (2018 09:41) (16 - 18)  SpO2: 96% (2018 09:41) (95% - 99%)    Vital Signs Last 24 Hrs  T(C): 37.1 (2018 09:26), Max: 37.1 (2018 09:26)  T(F): 98.8 (:26), Max: 98.8 (2018 09:26)  HR: 70 (2018 09:41) (69 - 95)  BP: 107/55 (2018 09:41) (95/57 - 123/61)  BP(mean): --  RR: 16 (2018 09:41) (16 - 18)  SpO2: 96% (2018 09:41) (95% - 99%)      LABS:                        10.5   8.86  )-----------( 468      ( 2018 07:14 )             33.6     06-21    141  |  106  |  28<H>  ----------------------------<  157<H>  4.6   |  29  |  1.10    Ca    8.2<L>      2018 07:14    TPro  7.3  /  Alb  2.7<L>  /  TBili  0.5  /  DBili  x   /  AST  20  /  ALT  18  /  AlkPhos  86  06-20      CAPILLARY BLOOD GLUCOSE  POCT Blood Glucose.: 130 mg/dL (2018 09:36)      PT/INR - ( 2018 11:27 )   PT: 13.5 sec;   INR: 1.23 ratio    PTT - ( 2018 11:27 )  PTT:32.2 sec      Urinalysis Basic - ( 2018 22:48 )  Color: Pale Yellow / Appearance: Clear / S.015 / pH: x  Gluc: x / Ketone: Negative  / Bili: Negative / Urobili: Negative   Blood: x / Protein: Negative / Nitrite: Positive   Leuk Esterase: Moderate / RBC: 0-2 /HPF / WBC >50   Sq Epi: x / Non Sq Epi: Few / Bacteria: TNTC      RADIOLOGY:   < from: Xray Chest 1 View AP/PA (18 @ 11:09) >  INTERPRETATION:  Abdominal pain, vomiting.  AP chest. Prior 2018.    Left cardiac pacer reidentified in situ. No change heart mediastinum. No   focal infiltrates. Parenchymal scarring left base. Clearing of the prior   bibasilar airspace disease. Loculated/chronic left basilar effusion   similar to prior. Right costophrenic angle clear.    Impression: As above    < end of copied text >    < from: Xray Abdomen 2 Views (18 @ 11:08) >  INTERPRETATION:  Abdominal pain.    AP supine upright. Prior 2018.    Nonspecific nonobstructive bowel gas pattern. No free air. Haziness   abdomen and pelvis with central displacement of bowel loops  implies   ascites. Cross-sectional imaging can be obtained to characterize the   nature and volume of ascites.      Impression: No obstruction or free air. Ascites.    < end of copied text >    < from: CT Abdomen and Pelvis No Cont (18 @ 10:52) >  INTERPRETATION:  History: Abdominal pain, vomiting, hernia.    CT abdomen and pelvis, no oral or IV contrast. Prior 2018.    Lack oral and intravenous contrast limits evaluation of the tubular and   solid viscera respectively.   In situ cardiac pacer. Cardiomegaly with coronary artery calcification.   Fibro atelectatic stranding lung bases. Calcified granuloma right lower   lobe. Small left pleural effusion.  Distended gallbladder with multiple calcified gallstones. No biliary   dilatation. Unenhanced liver pancreas spleen adrenals and kidneys not   remarkable.  Atherosclerotic nonaneurysmal aorta.  Stable nonspecific scattered subcentimeter mesenteric retroperitoneal   lymph nodes.  Status post left lower quadrant colostomy, and Chavez's pouch.  There is a large left lower quadrant paracolostomy hernia containing   nonobstructive large bowel and ascites. There is a small right   periumbilical hernia containing nonobstructive segment of colon. There is   no active bowel inflammation. Colonic diverticula without scan evidence   of acute diverticulitis.  Moderate to large amount of abdominal pelvic ascites similar to prior,   indeterminate etiology. Consider diagnostic/therapeutic paracentesis. No   organized fluid collections. Diffuse anasarca. No extraluminal  gas.  Atrophic postmenopausal uterus. No adnexal pathology. Bladder   unremarkable.  Stable osseous structures.      Impression:    No specific acute inflammatory or obstructive pathology on this   noncontrast study.  Distended gallbladder with calcified stones.  Diverticulosis coli, no diverticulitis  Lower abdominal ventral hernias containing nonobstructive bowel as   discussed.  Moderate to large amount of abdominal pelvic ascites similar to prior,   indeterminate etiology. Consider paracentesis.  Additional findings as discussed.    < end of copied text >    < from: US Paracentesis (06.15.18 @ 10:21) >  Findings:    Successful ultrasound-guided large volume paracentesis. Approximately   5800 cc yellow turbid fluid drained. Fluid sent for lab analysis.    Impression:    Ultrasound-guided  5.8 L ascites fluid drained.    No contrast utilized for the procedure.    < end of copied text >        SUPPLEMENTAL O2: 3L NC  LINES: Peripheral  CULP: N  PPx: N  CONTACT: N

## 2018-06-22 LAB
APPEARANCE UR: CLEAR — SIGNIFICANT CHANGE UP
BILIRUB UR-MCNC: NEGATIVE — SIGNIFICANT CHANGE UP
CHOLEST SERPL-MCNC: 99 MG/DL — SIGNIFICANT CHANGE UP (ref 10–199)
COLOR SPEC: YELLOW — SIGNIFICANT CHANGE UP
DIFF PNL FLD: NEGATIVE — SIGNIFICANT CHANGE UP
GLUCOSE UR QL: NEGATIVE — SIGNIFICANT CHANGE UP
HBA1C BLD-MCNC: 6.2 % — HIGH (ref 4–5.6)
HDLC SERPL-MCNC: 53 MG/DL — SIGNIFICANT CHANGE UP (ref 40–125)
KETONES UR-MCNC: NEGATIVE — SIGNIFICANT CHANGE UP
LEUKOCYTE ESTERASE UR-ACNC: ABNORMAL
LIPID PNL WITH DIRECT LDL SERPL: 37 MG/DL — SIGNIFICANT CHANGE UP
MAGNESIUM SERPL-MCNC: 2.2 MG/DL — SIGNIFICANT CHANGE UP (ref 1.6–2.6)
NITRITE UR-MCNC: NEGATIVE — SIGNIFICANT CHANGE UP
PH UR: 5 — SIGNIFICANT CHANGE UP (ref 5–8)
PROT UR-MCNC: NEGATIVE — SIGNIFICANT CHANGE UP
SP GR SPEC: 1.02 — SIGNIFICANT CHANGE UP (ref 1.01–1.02)
SURGICAL PATHOLOGY FINAL REPORT - CH: SIGNIFICANT CHANGE UP
TOTAL CHOLESTEROL/HDL RATIO MEASUREMENT: 1.9 RATIO — LOW (ref 3.3–7.1)
TRIGL SERPL-MCNC: 44 MG/DL — SIGNIFICANT CHANGE UP (ref 10–149)
TSH SERPL-MCNC: 2.8 UIU/ML — SIGNIFICANT CHANGE UP (ref 0.36–3.74)
UROBILINOGEN FLD QL: NEGATIVE — SIGNIFICANT CHANGE UP

## 2018-06-22 PROCEDURE — 99233 SBSQ HOSP IP/OBS HIGH 50: CPT | Mod: GC

## 2018-06-22 PROCEDURE — 74018 RADEX ABDOMEN 1 VIEW: CPT | Mod: 26

## 2018-06-22 PROCEDURE — 99233 SBSQ HOSP IP/OBS HIGH 50: CPT

## 2018-06-22 RX ADMIN — ENOXAPARIN SODIUM 40 MILLIGRAM(S): 100 INJECTION SUBCUTANEOUS at 07:26

## 2018-06-22 RX ADMIN — Medication 1 APPLICATION(S): at 17:47

## 2018-06-22 RX ADMIN — MORPHINE SULFATE 2 MILLIGRAM(S): 50 CAPSULE, EXTENDED RELEASE ORAL at 07:26

## 2018-06-22 RX ADMIN — SODIUM CHLORIDE 75 MILLILITER(S): 9 INJECTION, SOLUTION INTRAVENOUS at 05:46

## 2018-06-22 RX ADMIN — MORPHINE SULFATE 2 MILLIGRAM(S): 50 CAPSULE, EXTENDED RELEASE ORAL at 07:30

## 2018-06-22 RX ADMIN — Medication 112 MICROGRAM(S): at 05:45

## 2018-06-22 RX ADMIN — Medication 1 APPLICATION(S): at 05:45

## 2018-06-22 NOTE — DIETITIAN INITIAL EVALUATION ADULT. - PROBLEM SELECTOR PLAN 1
- Pt as had 2 hernia surgeries in past year (1/2018, 10/2017) by Dr. Mckinney, not presenting with similar symptoms  - For OR tomorrow as per Dr. Mckinney for hernia repair  - NPO after midnight, hold all anticoagulation  - consult cardio (Vinayak) for cardiac clearance prior to procedure  - medical optimization, f/u AM CBC and BMP  - admit to [facility]

## 2018-06-22 NOTE — PHYSICAL THERAPY INITIAL EVALUATION ADULT - GAIT TRAINING, PT EVAL
Pt will be supervision level of assistance or better for ambulation with RW 100ft or as tolerated in 3-5 session.

## 2018-06-22 NOTE — PROGRESS NOTE ADULT - SUBJECTIVE AND OBJECTIVE BOX
[facility] [place]       HPI:  89 y/o F PMHx ovarian cancer w/ recurrent ascites, colon ca s/p colostomy, s/p incarcerated ventral hernia repair 10/2017, DM2, HTN, afib s/p PPM, hypothyroid, obesity, and overactive bladder presents with abdominal pain since 6AM this morning. Pt describes it as a sharp 10/10 non radiating pain in her upper abdomen. She also had some nausea this morning along with dry heaving, but did not actually vomit. She states that she changed her colostomy bag this morning and she is passing feces in it since the onset of her symptoms. She describes her pain as being similar to when she had an incarcerated hernia in January of this year. Of note, pt had paracentesis done on Friday for abdominal ascites. She reports she felt uncomfortable then, but the pain didn't start until this morning. Denies any fevers/chills or alterations in mental status.     In the ED, VS: afebrile, HR 70, /70, RR 16, sat 98%, WBC 7.29, H/H 11.4/35.7, plat 380, PT 13.5, INR 1.23, PTT 32.2, Na 141, K 3.8, Bun 36, Cr 1.10, Glu 107, alb 2.7, lactate 1.3, lipase 174. Ct abd/pelvis showed No specific acute inflammatory or obstructive pathology and  Moderate to large amount of abdominal pelvic ascites. CXR showed Left cardiac pacer with no acute findings. EKG electronic PPM @ 70BPM. In ED, pt received 1 NS bolus, zofran, morphine. (2018 12:41)        SUBJECTIVE:      ALLERGIES:  Allergies    No Known Allergies    Intolerances          MEDICATIONS  (STANDING):  clotrimazole 1% Cream 1 Application(s) Topical two times a day  dextrose 5%. 1000 milliLiter(s) (50 mL/Hr) IV Continuous <Continuous>  dextrose 50% Injectable 12.5 Gram(s) IV Push once  dextrose 50% Injectable 25 Gram(s) IV Push once  dextrose 50% Injectable 25 Gram(s) IV Push once  enoxaparin Injectable 40 milliGRAM(s) SubCutaneous daily  levothyroxine Injectable 112 MICROGram(s) IV Push <User Schedule>    MEDICATIONS  (PRN):  acetaminophen  Suppository. 650 milliGRAM(s) Rectal every 6 hours PRN Mild Pain (1 - 3)  dextrose 40% Gel 15 Gram(s) Oral once PRN Blood Glucose LESS THAN 70 milliGRAM(s)/deciliter  glucagon  Injectable 1 milliGRAM(s) IntraMuscular once PRN Glucose LESS THAN 70 milligrams/deciliter  morphine  - Injectable 4 milliGRAM(s) IV Push every 4 hours PRN Severe Pain (7 - 10)  morphine  - Injectable 2 milliGRAM(s) IV Push every 4 hours PRN Moderate Pain (4 - 6)      REVIEW OF SYSTEMS:  CONSTITUTIONAL: No fever,  RESPIRATORY: No cough, wheezing, shortness of breath  CARDIOVASCULAR: No chest pain, dyspnea, palpitations, dizziness, syncope, paroxysmal nocturnal dyspnea, orthopnea, or arm or leg swelling  GASTROINTESTINAL: No abdominal  or epigastric pain, nausea, vomiting,  diarrhea  NEUROLOGICAL: No headaches,  loss of strength, numbness, or tremors    Vital Signs Last 24 Hrs  T(C): 37.1 (2018 16:50), Max: 37.1 (2018 16:50)  T(F): 98.7 (2018 16:50), Max: 98.7 (2018 16:50)  HR: 70 (2018 16:50) (69 - 80)  BP: 118/70 (2018 16:50) (88/51 - 130/58)  BP(mean): 83 (2018 15:00) (64 - 94)  RR: 16 (2018 16:50) (16 - 33)  SpO2: 95% (2018 16:50) (95% - 100%)    PHYSICAL EXAM:  HEAD:  Atraumatic, Normocephalic  NECK: Supple and normal thyroid.  No JVD or carotid bruit.   HEART: S1, S2 regular , 1/6 soft ejection systolic murmur in mitral area , no thrill and no gallops .  PULMONARY: Bilateral vesicular breathing , few scattered ronchi and few scattered rales are present .  ABDOMEN: Soft nontender and positive bowl sounds   EXTREMITIES:  No clubbing, cyanosis, or pedal  edema  NEUROLOGICAL: AAOX3 , no focal deficit .    LABS:                        10.0   7.43  )-----------( 359      ( 2018 08:03 )             32.7     06-22    143  |  108  |  22  ----------------------------<  94  4.7   |  29  |  1.00    Ca    8.0<L>      2018 06:05  Phos  3.5       Mg     2.2                 Urinalysis Basic - ( 2018 12:25 )    Color: Yellow / Appearance: Clear / S.020 / pH: x  Gluc: x / Ketone: Negative  / Bili: Negative / Urobili: Negative   Blood: x / Protein: Negative / Nitrite: Negative   Leuk Esterase: Small / RBC: Negative /HPF / WBC 11-25   Sq Epi: x / Non Sq Epi: Few / Bacteria: Few      BNP      EKG:  ECHO:  IMAGING:    Assessment/Plan    Will continue to follow during hospital course and give further recommendations as needed . Thanks for your referral . if any questions please contact me at office ([phone] [phone]) [facility]       HPI:  89 y/o F PMHx ovarian cancer w/ recurrent ascites, colon ca s/p colostomy, s/p incarcerated ventral hernia repair 10/2017, DM2, HTN, afib s/p PPM, hypothyroid, obesity, and overactive bladder presents with abdominal pain since 6AM this morning. Pt describes it as a sharp 10/10 non radiating pain in her upper abdomen. She also had some nausea this morning along with dry heaving, but did not actually vomit. She states that she changed her colostomy bag this morning and she is passing feces in it since the onset of her symptoms. She describes her pain as being similar to when she had an incarcerated hernia in January of this year. Of note, pt had paracentesis done on Friday for abdominal ascites. She reports she felt uncomfortable then, but the pain didn't start until this morning. Denies any fevers/chills or alterations in mental status.     In the ED, VS: afebrile, HR 70, /70, RR 16, sat 98%, WBC 7.29, H/H 11.4/35.7, plat 380, PT 13.5, INR 1.23, PTT 32.2, Na 141, K 3.8, Bun 36, Cr 1.10, Glu 107, alb 2.7, lactate 1.3, lipase 174. Ct abd/pelvis showed No specific acute inflammatory or obstructive pathology and  Moderate to large amount of abdominal pelvic ascites. CXR showed Left cardiac pacer with no acute findings. EKG electronic PPM @ 70BPM. In ED, pt received 1 NS bolus, zofran, morphine. (2018 12:41)        SUBJECTIVE: Patient lying comfortable , no  chest pain and no SOB .      ALLERGIES:  Allergies    No Known Allergies    Intolerances          MEDICATIONS  (STANDING):  clotrimazole 1% Cream 1 Application(s) Topical two times a day  dextrose 5%. 1000 milliLiter(s) (50 mL/Hr) IV Continuous <Continuous>  dextrose 50% Injectable 12.5 Gram(s) IV Push once  dextrose 50% Injectable 25 Gram(s) IV Push once  dextrose 50% Injectable 25 Gram(s) IV Push once  enoxaparin Injectable 40 milliGRAM(s) SubCutaneous daily  levothyroxine Injectable 112 MICROGram(s) IV Push <User Schedule>    MEDICATIONS  (PRN):  acetaminophen  Suppository. 650 milliGRAM(s) Rectal every 6 hours PRN Mild Pain (1 - 3)  dextrose 40% Gel 15 Gram(s) Oral once PRN Blood Glucose LESS THAN 70 milliGRAM(s)/deciliter  glucagon  Injectable 1 milliGRAM(s) IntraMuscular once PRN Glucose LESS THAN 70 milligrams/deciliter  morphine  - Injectable 4 milliGRAM(s) IV Push every 4 hours PRN Severe Pain (7 - 10)  morphine  - Injectable 2 milliGRAM(s) IV Push every 4 hours PRN Moderate Pain (4 - 6)      REVIEW OF SYSTEMS:  CONSTITUTIONAL: No fever,  RESPIRATORY: No cough, wheezing, shortness of breath  CARDIOVASCULAR: No chest pain, dyspnea, palpitations, dizziness, syncope, paroxysmal nocturnal dyspnea, orthopnea, or arm or leg swelling  GASTROINTESTINAL: No abdominal  or epigastric pain, nausea, vomiting,  diarrhea  NEUROLOGICAL: No headaches,  loss of strength, numbness, or tremors    Vital Signs Last 24 Hrs  T(C): 37.1 (2018 16:50), Max: 37.1 (2018 16:50)  T(F): 98.7 (2018 16:50), Max: 98.7 (2018 16:50)  HR: 70 (2018 16:50) (69 - 80)  BP: 118/70 (2018 16:50) (88/51 - 130/58)  BP(mean): 83 (2018 15:00) (64 - 94)  RR: 16 (2018 16:50) (16 - 33)  SpO2: 95% (2018 16:50) (95% - 100%)    PHYSICAL EXAM:  HEAD:  Atraumatic, Normocephalic  NECK: Supple and normal thyroid.  No JVD or carotid bruit.   HEART: S1, S2 regular , 1/6 soft ejection systolic murmur in mitral area , no thrill and no gallops .  PULMONARY: Bilateral vesicular breathing , few scattered ronchi and few scattered rales are present .  ABDOMEN: Soft  and positive bowl sounds and post operative wound present .  EXTREMITIES:  No clubbing, cyanosis, or pedal  edema  NEUROLOGICAL: AAOX3 , no focal deficit .    LABS:                        10.0   7.43  )-----------( 359      ( 2018 08:03 )             32.7     06-    143  |  108  |  22  ----------------------------<  94  4.7   |  29  |  1.00    Ca    8.0<L>      2018 06:05  Phos  3.5       Mg     2.2     -            Urinalysis Basic - ( 2018 12:25 )    Color: Yellow / Appearance: Clear / S.020 / pH: x  Gluc: x / Ketone: Negative  / Bili: Negative / Urobili: Negative   Blood: x / Protein: Negative / Nitrite: Negative   Leuk Esterase: Small / RBC: Negative /HPF / WBC 11-25   Sq Epi: x / Non Sq Epi: Few / Bacteria: Few    Assessment/Plan  Patient has :  1) S/P abdominal hernia surgery .  2) H/O CHF and stable .  3) Atrial fibrillation and stable .  4) PPM and functioning well .  Plan : 1) cardiac stable so far . 2) When OK with surgeon lily Rodriguez   Will continue to follow during hospital course and give further recommendations as needed . Thanks for your referral . if any questions please contact me at office ([phone] [phone])

## 2018-06-22 NOTE — PHYSICAL THERAPY INITIAL EVALUATION ADULT - IMPAIRMENTS FOUND, PT EVAL
joint integrity and mobility/muscle strength/gait, locomotion, and balance/aerobic capacity/endurance

## 2018-06-22 NOTE — PROGRESS NOTE ADULT - SUBJECTIVE AND OBJECTIVE BOX
Patient is a 89y old  Female who presents with a chief complaint of abdominal pain (2018 17:32)        HPI:  89 y/o F PMHx ovarian cancer w/ recurrent ascites, colon ca s/p colostomy, s/p incarcerated ventral hernia repair 10/2017, DM2, HTN, afib s/p PPM, hypothyroid, obesity, and overactive bladder presents with abdominal pain since 6AM this morning. Pt describes it as a sharp 10/10 non radiating pain in her upper abdomen. She also had some nausea this morning along with dry heaving, but did not actually vomit. She states that she changed her colostomy bag this morning and she is passing feces in it since the onset of her symptoms. She describes her pain as being similar to when she had an incarcerated hernia in January of this year. Of note, pt had paracentesis done on Friday for abdominal ascites. She reports she felt uncomfortable then, but the pain didn't start until this morning. Denies any fevers/chills or alterations in mental status.     In the ED, VS: afebrile, HR 70, /70, RR 16, sat 98%, WBC 7.29, H/H 11.4/35.7, plat 380, PT 13.5, INR 1.23, PTT 32.2, Na 141, K 3.8, Bun 36, Cr 1.10, Glu 107, alb 2.7, lactate 1.3, lipase 174. Ct abd/pelvis showed No specific acute inflammatory or obstructive pathology and  Moderate to large amount of abdominal pelvic ascites. CXR showed Left cardiac pacer with no acute findings. EKG electronic PPM @ 70BPM. In ED, pt received 1 NS bolus, zofran, morphine. (2018 12:41)      SUBJECTIVE & OBJECTIVE: Pt seen and examined at bedside, no acute complaints     PHYSICAL EXAM:  T(C): 36.3 (18 @ 12:00), Max: 36.7 (18 @ 16:01)  HR: 69 (18 @ 13:00) (69 - 152)  BP: 102/57 (18 @ 13:00) (85/58 - 103/55)  RR: 23 (18 @ 13:00) (15 - 38)  SpO2: 99% (18 @ 13:00) (75% - 100%)  Wt(kg): --   GENERAL: NAD, well-groomed, well-developed  HEAD:  Atraumatic, Normocephalic  EYES: EOMI, PERRLA, conjunctiva and sclera clear  ENMT: Moist mucous membranes  NECK: Supple, No JVD  NERVOUS SYSTEM:  Alert & Oriented X3,   CHEST/LUNG: Clear to auscultation bilaterally; No rales, rhonchi, wheezing, or rubs  HEART: Regular rate and rhythm; No murmurs, rubs, or gallops  ABDOMEN: Soft, Nontender, Nondistended; Bowel sounds present  EXTREMITIES:  2+ Peripheral Pulses, No clubbing, cyanosis, or edema        MEDICATIONS  (STANDING):  clotrimazole 1% Cream 1 Application(s) Topical two times a day  dextrose 5%. 1000 milliLiter(s) (50 mL/Hr) IV Continuous <Continuous>  dextrose 50% Injectable 12.5 Gram(s) IV Push once  dextrose 50% Injectable 25 Gram(s) IV Push once  dextrose 50% Injectable 25 Gram(s) IV Push once  enoxaparin Injectable 40 milliGRAM(s) SubCutaneous daily  levothyroxine Injectable 112 MICROGram(s) IV Push <User Schedule>    MEDICATIONS  (PRN):  acetaminophen  Suppository. 650 milliGRAM(s) Rectal every 6 hours PRN Mild Pain (1 - 3)  dextrose 40% Gel 15 Gram(s) Oral once PRN Blood Glucose LESS THAN 70 milliGRAM(s)/deciliter  glucagon  Injectable 1 milliGRAM(s) IntraMuscular once PRN Glucose LESS THAN 70 milligrams/deciliter  morphine  - Injectable 4 milliGRAM(s) IV Push every 4 hours PRN Severe Pain (7 - 10)  morphine  - Injectable 2 milliGRAM(s) IV Push every 4 hours PRN Moderate Pain (4 - 6)      LABS:                        10.0   7.43  )-----------( 359      ( 2018 08:03 )             32.7     06-    143  |  108  |  22  ----------------------------<  94  4.7   |  29  |  1.00    Ca    8.0<L>      2018 06:05  Phos  3.5     06-  Mg     2.2     -        Urinalysis Basic - ( 2018 12:25 )    Color: Yellow / Appearance: Clear / S.020 / pH: x  Gluc: x / Ketone: Negative  / Bili: Negative / Urobili: Negative   Blood: x / Protein: Negative / Nitrite: Negative   Leuk Esterase: Small / RBC: Negative /HPF / WBC 11-25   Sq Epi: x / Non Sq Epi: Few / Bacteria: Few      Magnesium, Serum: 2.2 mg/dL ( @ 06:05)    CAPILLARY BLOOD GLUCOSE      POCT Blood Glucose.: 95 mg/dL (2018 05:36)  POCT Blood Glucose.: 117 mg/dL (2018 23:34)  POCT Blood Glucose.: 120 mg/dL (2018 17:39)      CAPILLARY BLOOD GLUCOSE      POCT Blood Glucose.: 95 mg/dL (2018 05:36)  POCT Blood Glucose.: 117 mg/dL (2018 23:34)  POCT Blood Glucose.: 120 mg/dL (2018 17:39)    CAPILLARY BLOOD GLUCOSE      POCT Blood Glucose.: 95 mg/dL (2018 05:36)            RECENT CULTURES:      RADIOLOGY & ADDITIONAL TESTS:                        DVT/GI ppx  Discussed with pt @ bedside

## 2018-06-22 NOTE — DIETITIAN INITIAL EVALUATION ADULT. - PROBLEM SELECTOR PLAN 4
- hold Xarelto, last dose was 2 days ago  - rates stable   - patient not on any rate controlling agents at this time  - cardio consult Dr. Licea

## 2018-06-22 NOTE — PROGRESS NOTE ADULT - ASSESSMENT
89 y/o F PMHx ovarian cancer w/ recurrent ascites, colon ca s/p colostomy, s/p incarcerated ventral hernia repair 10/2017, DM2, HTN, afib s/p PPM, hypothyroid, obesity, and overactive bladder admitted with abdominal pain found to have incarcerated hernia and moderate ascites

## 2018-06-22 NOTE — PHYSICAL THERAPY INITIAL EVALUATION ADULT - PERTINENT HX OF CURRENT PROBLEM, REHAB EVAL
As per H&P:" 89 y/o female presents with abdominal pain since 6AM this morning. Pt describes it as a sharp 10/10 non radiating pain in her upper abdomen. She also had some nausea this morning along with dry heaving, but did not actually vomit. She states that she changed her colostomy bag this morning and she is passing feces in it since the onset of her symptoms. She describes her pain as being similar to when she had an incarcerated hernia in January of this year."

## 2018-06-22 NOTE — DIETITIAN INITIAL EVALUATION ADULT. - PROBLEM SELECTOR PLAN 3
- large ascites seen on CT, secondary to ovarian cancer  - last paracentesis done on Friday by [facility]  - for OR tomorrow (Dr. Mckinney)

## 2018-06-22 NOTE — PROGRESS NOTE ADULT - PROBLEM SELECTOR PLAN 1
- POD 1 of hernia repair, tolerated procedure well  -- Pt as had 2 hernia surgeries in past year (1/2018, 10/2017)  s/p OR, and repair  continue post op care per surgery   pain control  incentive spiromter

## 2018-06-22 NOTE — PROGRESS NOTE ADULT - SUBJECTIVE AND OBJECTIVE BOX
The patient was interviewed and evaluated  89y Female    Vital Signs Last 24 Hrs  T(C): 36.7 (22 Jun 2018 04:01), Max: 37.1 (21 Jun 2018 09:26)  T(F): 98.1 (22 Jun 2018 04:01), Max: 98.8 (21 Jun 2018 09:26)  HR: 69 (22 Jun 2018 07:00) (69 - 152)  BP: 91/55 (22 Jun 2018 07:00) (85/58 - 123/61)  BP(mean): 71 (22 Jun 2018 07:00) (64 - 76)  RR: 19 (22 Jun 2018 07:00) (11 - 38)  SpO2: 100% (22 Jun 2018 05:00) (75% - 100%)    Pt seen, doing well, no anesthesia complications or complaints noted or reported.   No Nausea    No additional recommendations.     Pain well controlled

## 2018-06-22 NOTE — PROGRESS NOTE ADULT - PROBLEM SELECTOR PLAN 3
- hold Xarelto , rate controlled  - rates stable   - patient not on any rate controlling agents at this time

## 2018-06-22 NOTE — PHYSICAL THERAPY INITIAL EVALUATION ADULT - ADDITIONAL COMMENTS
Pt lives alone in a private home with 2 steps to enter and bilateral rails. Pt does not negotiate stairs indoors, pt has basement steps but does not use. Pt is independent with ambulation using RW and performing all ADLs independently. Daughter assists patient as needed. Pt does have a handicapped shower /c shower bench & rolling walker, raised toilet seat, wheelchair, grab bars.

## 2018-06-22 NOTE — DIETITIAN INITIAL EVALUATION ADULT. - PROBLEM SELECTOR PLAN 9
DVT PPX: no chemical anticoagulation as pt is going to OR, SCD's  DASH consistent carb diet, aspiration precautions, fall precautions    IMPROVE VTE Individual Risk Assessment          RISK                                                          Points  [  ] Previous VTE                                                3  [  ] Thrombophilia                                             2  [  ] Lower limb paralysis                                   2        (unable to hold up >15 seconds)    [  ] Current Cancer                                             2         (within 6 months)  [  ] Immobilization > 24 hrs                              1  [  ] ICU/CCU stay > 24 hours                             1  [ x ] Age > 60                                                         1  IMPROVE VTE Score: 1

## 2018-06-22 NOTE — PHYSICAL THERAPY INITIAL EVALUATION ADULT - MANUAL MUSCLE TESTING RESULTS, REHAB EVAL
grossly assessed due to/BUE & BUE: 3+/5, slightly decreased strength noted in LLE as compared to right.

## 2018-06-22 NOTE — PROGRESS NOTE ADULT - SUBJECTIVE AND OBJECTIVE BOX
POD 1  Little pain; colostomy functioning, tolerating full liquids without nausea  Abd; nontender, dressing intact, no skin redness  P: regular diet in AM if no nausea

## 2018-06-22 NOTE — PROGRESS NOTE ADULT - SUBJECTIVE AND OBJECTIVE BOX
Interval events:     Review of Systems:  Constitutional: no fever, chills, fatigue  Neuro: no headache, numbness, weakness  Resp: no cough, wheezing, shortness of breath  CVS: no chest pain, palpitations, leg swelling  GI: no abdominal pain, nausea, vomiting, diarrhea   : no dysuria, frequency, incontinence  Skin: no itching, burning, rashes, or lesions   Msk: no joint pain or swelling    T(F): 97.4 (18 @ 12:00), Max: 98.1 (18 @ 04:01)  HR: 69 (18 @ 13:00) (69 - 152)  BP: 102/57 (18 @ 13:00) (85/58 - 103/55)  RR: 23 (18 @ 13:00) (15 - 38)  SpO2: 99% (18 @ 13:00) (75% - 100%)    CAPILLARY BLOOD GLUCOSE    POCT Blood Glucose.: 95 mg/dL (2018 05:36)    I&O's Summary    2018 07:01  -  2018 07:00  --------------------------------------------------------  IN: 1515 mL / OUT: 950 mL / NET: 565 mL    2018 07:01  -  2018 14:22  --------------------------------------------------------  IN: 690 mL / OUT: 550 mL / NET: 140 mL        Physical Exam:     Gen: Well appearing female NAD alert and responsive  Neuro: AOx3, PERRLA, EOMI  HEENT: NC/AT  CV: RRR, +S1S2, no M/R/G, no edema  Pulm: CTA B/L, no W/W/R  GI: Soft, slight TTP diffuse upper quadrants, Colostomy bag lower left quadrant collecting brown stool with minimal air, +BS  Ext: no edema LE B/L,     Meds:  enoxaparin Injectable 40 milliGRAM(s) SubCutaneous daily  dextrose 40% Gel 15 Gram(s) Oral once PRN  dextrose 50% Injectable 12.5 Gram(s) IV Push once  dextrose 50% Injectable 25 Gram(s) IV Push once  dextrose 50% Injectable 25 Gram(s) IV Push once  glucagon  Injectable 1 milliGRAM(s) IntraMuscular once PRN  levothyroxine Injectable 112 MICROGram(s) IV Push <User Schedule>  acetaminophen  Suppository. 650 milliGRAM(s) Rectal every 6 hours PRN  morphine  - Injectable 4 milliGRAM(s) IV Push every 4 hours PRN  morphine  - Injectable 2 milliGRAM(s) IV Push every 4 hours PRN  dextrose 5%. 1000 milliLiter(s) IV Continuous <Continuous>  clotrimazole 1% Cream 1 Application(s) Topical two times a day                          10.0   7.43  )-----------( 359      ( 2018 08:03 )             32.7       -    143  |  108  |  22  ----------------------------<  94  4.7   |  29  |  1.00    Ca    8.0<L>      2018 06:05  Phos  3.5       Mg     2.2             Urinalysis Basic - ( 2018 12:25 )    Color: Yellow / Appearance: Clear / S.020 / pH: x  Gluc: x / Ketone: Negative  / Bili: Negative / Urobili: Negative   Blood: x / Protein: Negative / Nitrite: Negative   Leuk Esterase: Small / RBC: Negative /HPF / WBC 11-25   Sq Epi: x / Non Sq Epi: Few / Bacteria: Few      Radiology:   < from: Xray Abdomen 1 View-Upright Only (18 @ 06:28) >    EXAM:  XR ABDOMEN UPRIGHT ONLY 1V                            PROCEDURE DATE:  2018          INTERPRETATION:  Clinical information: Incarcerated ventral hernia.   Follow-up.    Technique: Single upright AP view of the upper abdomen.    Comparison: Prior abdominal x-ray examination from 2018.    Findings: There is a nonspecific bowel gas pattern. The lower abdomen is   not imaged on this study. No free air is noted. Bilateral pleural   effusions are noted. There is a cardiac pacemaker overlying the left   chest wall. Multilevel degenerative changes are noted within the imaged   potions of the spine.    IMPRESSION: Limited evaluation. Nonspecific bowel gas pattern.      CHRYSTAL VITAL M.D., ATTENDING RADIOLOGIST  This document has been electronically signed. 2018  9:01AM    < end of copied text >    < from: Xray Abdomen 1 View-Upright Only (18 @ 09:10) >    EXAM:  XR ABDOMEN UPRIGHT ONLY 1V                            PROCEDURE DATE:  2018          INTERPRETATION:  Clinical information: Ventral hernia repair with small   bowel obstruction. Follow-up exam.    Technique: Frontal view of the chest.    Comparison: Prior abdominal x-ray examination from 2018.    Findings: Examination is limited as the lower abdomen is not included on   the field-of-view.    There is an unchanged bowel gas pattern which is nonspecific. No free air   is noted.    Small bilateral pleural effusions are again noted. There is a cardiac   pacemaker overlying the left chest wall.    Multilevel degenerative changes are noted within the imaged potions of   the spine.    IMPRESSION: Limited examination as the lower abdomen is not included on   the field-of-view.    Unchanged nonspecific bowel gas pattern.      CHRYSTAL VITAL M.D., ATTENDING RADIOLOGIST  This document has been electronically signed. 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    Sandro Muhammad M.D.  (Electronic Signature)  Reported on: 18    Clinical Information  Procedure: Repair of incarcerated ventral hernia with mesh    Specimen Description  Ventral hernia sac    Gross Description  The specimen is received in formalin and the specimen container  is labeled: Ventral hernia sac. It consists of multiple pieces of  pink-tan fibromembranous tissue admixed with white-tan indurated  tissue and yellow fatty tissue measuring in aggregate 8.0 x 7.5 x  1.0 cm. Multiple blue sutures are embedded within the soft  tissue. The specimen is sectioned and representaively submitted.  One cassette.    In addition to other data that may appear on the specimen  container, the label has been inspected to confirm the presence  of the patient's name and date of birth.  DN 2018 6:58 AM      Bedside Lung U/S: ***    Bedside Cardiac U/S: ***    CENTRAL LINE: Y/N   DATE INSERTED:   REMOVE: Y/N    CULP: Y/N      DATE INSERTED:        REMOVE: Y/N    A-LINE: Y/N     DATE INSERTED:              REMOVE: Y/N    GLOBAL ISSUE/BEST PRACTICE:  Analgesia: N/A  Sedation: N/A  HOB elevation: yes  Stress ulcer prophylaxis: N/A  VTE prophylaxis: SCDs  Glycemic control: N/A  Nutrition: Clear liquids DASH/TCL (evening snack)    CODE STATUS: Full code Interval events: no overnight events. s/p procedure for incarcerated ventral hernia repair with viable bowel with mesh, recovering well, diet advanced.     Review of Systems:  Constitutional: no fever, chills, fatigue  Neuro: no headache, numbness, weakness  Resp: no cough, wheezing, shortness of breath  CVS: no chest pain, palpitations, leg swelling  GI: no abdominal pain, nausea, vomiting, diarrhea   : no dysuria, frequency, incontinence  Skin: no itching, burning, rashes, or lesions   Msk: no joint pain or swelling    T(F): 97.4 (18 @ 12:00), Max: 98.1 (18 @ 04:01)  HR: 69 (18 @ 13:00) (69 - 152)  BP: 102/57 (18 @ 13:00) (85/58 - 103/55)  RR: 23 (18 @ 13:00) (15 - 38)  SpO2: 99% (18 @ 13:00) (75% - 100%)    CAPILLARY BLOOD GLUCOSE    POCT Blood Glucose.: 95 mg/dL (2018 05:36)    I&O's Summary    2018 07:  -  2018 07:00  --------------------------------------------------------  IN: 1515 mL / OUT: 950 mL / NET: 565 mL    2018 07:  -  2018 14:22  --------------------------------------------------------  IN: 690 mL / OUT: 550 mL / NET: 140 mL        Physical Exam:     Gen: Well appearing female NAD alert and responsive  Neuro: AOx3, PERRLA, EOMI  HEENT: NC/AT  CV: RRR, +S1S2, no M/R/G, no edema  Pulm: CTA B/L, no W/W/R  GI: Soft, slight TTP diffuse upper quadrants, Colostomy bag lower left quadrant collecting brown stool with minimal air, +BS, around the colostomy site clean  Ext: no edema LE B/L     Meds:  enoxaparin Injectable 40 milliGRAM(s) SubCutaneous daily  dextrose 40% Gel 15 Gram(s) Oral once PRN  dextrose 50% Injectable 12.5 Gram(s) IV Push once  dextrose 50% Injectable 25 Gram(s) IV Push once  dextrose 50% Injectable 25 Gram(s) IV Push once  glucagon  Injectable 1 milliGRAM(s) IntraMuscular once PRN  levothyroxine Injectable 112 MICROGram(s) IV Push <User Schedule>  acetaminophen  Suppository. 650 milliGRAM(s) Rectal every 6 hours PRN  morphine  - Injectable 4 milliGRAM(s) IV Push every 4 hours PRN  morphine  - Injectable 2 milliGRAM(s) IV Push every 4 hours PRN  dextrose 5%. 1000 milliLiter(s) IV Continuous <Continuous>  clotrimazole 1% Cream 1 Application(s) Topical two times a day                          10.0   7.43  )-----------( 359      ( 2018 08:03 )             32.7           143  |  108  |  22  ----------------------------<  94  4.7   |  29  |  1.00    Ca    8.0<L>      2018 06:05  Phos  3.5       Mg     2.2             Urinalysis Basic - ( 2018 12:25 )    Color: Yellow / Appearance: Clear / S.020 / pH: x  Gluc: x / Ketone: Negative  / Bili: Negative / Urobili: Negative   Blood: x / Protein: Negative / Nitrite: Negative   Leuk Esterase: Small / RBC: Negative /HPF / WBC 11-25   Sq Epi: x / Non Sq Epi: Few / Bacteria: Few      Radiology:   < from: Xray Abdomen 1 View-Upright Only (18 @ 06:28) >    EXAM:  XR ABDOMEN UPRIGHT ONLY 1V                            PROCEDURE DATE:  2018          INTERPRETATION:  Clinical information: Incarcerated ventral hernia.   Follow-up.    Technique: Single upright AP view of the upper abdomen.    Comparison: Prior abdominal x-ray examination from 2018.    Findings: There is a nonspecific bowel gas pattern. The lower abdomen is   not imaged on this study. No free air is noted. Bilateral pleural   effusions are noted. There is a cardiac pacemaker overlying the left   chest wall. Multilevel degenerative changes are noted within the imaged   potions of the spine.    IMPRESSION: Limited evaluation. Nonspecific bowel gas pattern.      CHRYSTAL VITAL M.D., ATTENDING RADIOLOGIST  This document has been electronically signed. 2018  9:01AM    < end of copied text >    < from: Xray Abdomen 1 View-Upright Only (18 @ 09:10) >    EXAM:  XR ABDOMEN UPRIGHT ONLY 1V                            PROCEDURE DATE:  2018          INTERPRETATION:  Clinical information: Ventral hernia repair with small   bowel obstruction. Follow-up exam.    Technique: Frontal view of the chest.    Comparison: Prior abdominal x-ray examination from 2018.    Findings: Examination is limited as the lower abdomen is not included on   the field-of-view.    There is an unchanged bowel gas pattern which is nonspecific. No free air   is noted.    Small bilateral pleural effusions are again noted. There is a cardiac   pacemaker overlying the left chest wall.    Multilevel degenerative changes are noted within the imaged potions of   the spine.    IMPRESSION: Limited examination as the lower abdomen is not included on   the field-of-view.    Unchanged nonspecific bowel gas pattern.      CHRYSTAL VITAL M.D., ATTENDING RADIOLOGIST  This document has been electronically signed. 2018  9:14AM      < end of copied text >    Surgical Pathology Final Report -  (18 @ 09:59)    Surgical Pathology Final Report - [facility]:   ACCESSION No:  30 [id number]    MICHELLE RAMÍREZ                  1        Surgical Final Report    Final Diagnosis  Chronically inflamed fibroadipose tissue and hernia sac (ventral  hernia repair).    Sandro Muhammad M.D.  (Electronic Signature)  Reported on: 18    Clinical Information  Procedure: Repair of incarcerated ventral hernia with mesh    Specimen Description  Ventral hernia sac    Gross Description  The specimen is received in formalin and the specimen container  is labeled: Ventral hernia sac. It consists of multiple pieces of  pink-tan fibromembranous tissue admixed with white-tan indurated  tissue and yellow fatty tissue measuring in aggregate 8.0 x 7.5 x  1.0 cm. Multiple blue sutures are embedded within the soft  tissue. The specimen is sectioned and representaively submitted.  One cassette.    In addition to other data that may appear on the specimen  container, the label has been inspected to confirm the presence  of the patient's name and date of birth.  DN 2018 6:58 AM    no gonzalez, no line    GLOBAL ISSUE/BEST PRACTICE:  Analgesia: N/A  Sedation: N/A  HOB elevation: yes  Stress ulcer prophylaxis: n/a  VTE prophylaxis: SCDs  Glycemic control: N/A  Nutrition: Clear liquids DASH/TCL (evening snack)    CODE STATUS: Full code Interval events: no overnight events. s/p procedure for incarcerated ventral hernia repair with viable bowel with mesh, recovering well, diet advanced.     Review of Systems:  Constitutional: no fever, chills, fatigue  Neuro: no headache, numbness, weakness  Resp: no cough, wheezing, shortness of breath  CVS: no chest pain, palpitations, leg swelling  GI: no abdominal pain, nausea, vomiting, diarrhea   : no dysuria, frequency, incontinence-- no urinary symptoms  Skin: no itching, burning, rashes, or lesions   Msk: no joint pain or swelling    T(F): 97.4 (18 @ 12:00), Max: 98.1 (18 @ 04:01)  HR: 69 (18 @ 13:00) (69 - 152)  BP: 102/57 (18 @ 13:00) (85/58 - 103/55)  RR: 23 (18 @ 13:00) (15 - 38)  SpO2: 99% (18 @ 13:00) (75% - 100%)    CAPILLARY BLOOD GLUCOSE    POCT Blood Glucose.: 95 mg/dL (2018 05:36)    I&O's Summary    2018 07:  -  2018 07:00  --------------------------------------------------------  IN: 1515 mL / OUT: 950 mL / NET: 565 mL    2018 07:  -  2018 14:22  --------------------------------------------------------  IN: 690 mL / OUT: 550 mL / NET: 140 mL        Physical Exam:     Gen: Well appearing female NAD alert and responsive  Neuro: AOx3, PERRLA, EOMI  HEENT: NC/AT  CV: RRR, +S1S2, no M/R/G, no edema  Pulm: CTA B/L, no W/W/R  GI: Soft, slight TTP diffuse upper quadrants, Colostomy bag lower left quadrant collecting brown stool with minimal air, +BS, around the colostomy site clean  Ext: no edema LE B/L     Meds:  enoxaparin Injectable 40 milliGRAM(s) SubCutaneous daily  dextrose 40% Gel 15 Gram(s) Oral once PRN  dextrose 50% Injectable 12.5 Gram(s) IV Push once  dextrose 50% Injectable 25 Gram(s) IV Push once  dextrose 50% Injectable 25 Gram(s) IV Push once  glucagon  Injectable 1 milliGRAM(s) IntraMuscular once PRN  levothyroxine Injectable 112 MICROGram(s) IV Push <User Schedule>  acetaminophen  Suppository. 650 milliGRAM(s) Rectal every 6 hours PRN  morphine  - Injectable 4 milliGRAM(s) IV Push every 4 hours PRN  morphine  - Injectable 2 milliGRAM(s) IV Push every 4 hours PRN  dextrose 5%. 1000 milliLiter(s) IV Continuous <Continuous>  clotrimazole 1% Cream 1 Application(s) Topical two times a day                          10.0   7.43  )-----------( 359      ( 2018 08:03 )             32.7       06-    143  |  108  |  22  ----------------------------<  94  4.7   |  29  |  1.00    Ca    8.0<L>      2018 06:05  Phos  3.5     -  Mg     2.2             Urinalysis Basic - ( 2018 12:25 )    Color: Yellow / Appearance: Clear / S.020 / pH: x  Gluc: x / Ketone: Negative  / Bili: Negative / Urobili: Negative   Blood: x / Protein: Negative / Nitrite: Negative   Leuk Esterase: Small / RBC: Negative /HPF / WBC 11-25   Sq Epi: x / Non Sq Epi: Few / Bacteria: Few      Radiology:   < from: Xray Abdomen 1 View-Upright Only (18 @ 06:28) >    EXAM:  XR ABDOMEN UPRIGHT ONLY 1V                            PROCEDURE DATE:  2018          INTERPRETATION:  Clinical information: Incarcerated ventral hernia.   Follow-up.    Technique: Single upright AP view of the upper abdomen.    Comparison: Prior abdominal x-ray examination from 2018.    Findings: There is a nonspecific bowel gas pattern. The lower abdomen is   not imaged on this study. No free air is noted. Bilateral pleural   effusions are noted. There is a cardiac pacemaker overlying the left   chest wall. Multilevel degenerative changes are noted within the imaged   potions of the spine.    IMPRESSION: Limited evaluation. Nonspecific bowel gas pattern.      CHRYSTAL VITAL M.D., ATTENDING RADIOLOGIST  This document has been electronically signed. 2018  9:01AM    < end of copied text >    < from: Xray Abdomen 1 View-Upright Only (18 @ 09:10) >    EXAM:  XR ABDOMEN UPRIGHT ONLY 1V                            PROCEDURE DATE:  2018          INTERPRETATION:  Clinical information: Ventral hernia repair with small   bowel obstruction. Follow-up exam.    Technique: Frontal view of the chest.    Comparison: Prior abdominal x-ray examination from 2018.    Findings: Examination is limited as the lower abdomen is not included on   the field-of-view.    There is an unchanged bowel gas pattern which is nonspecific. No free air   is noted.    Small bilateral pleural effusions are again noted. There is a cardiac   pacemaker overlying the left chest wall.    Multilevel degenerative changes are noted within the imaged potions of   the spine.    IMPRESSION: Limited examination as the lower abdomen is not included on   the field-of-view.    Unchanged nonspecific bowel gas pattern.      CHRYSTAL VITAL M.D., ATTENDING RADIOLOGIST  This document has been electronically signed. 2018  9:14AM      < end of copied text >    Surgical Pathology Final Report -  (18 @ 09:59)    Surgical Pathology Final Report - :   ACCESSION No:  30 [id number]    MICHELLE RAMÍREZ                  1        Surgical Final Report    Final Diagnosis  Chronically inflamed fibroadipose tissue and hernia sac (ventral  hernia repair).    Sandro Muhammad M.D.  (Electronic Signature)  Reported on: 18    Clinical Information  Procedure: Repair of incarcerated ventral hernia with mesh    Specimen Description  Ventral hernia sac    Gross Description  The specimen is received in formalin and the specimen container  is labeled: Ventral hernia sac. It consists of multiple pieces of  pink-tan fibromembranous tissue admixed with white-tan indurated  tissue and yellow fatty tissue measuring in aggregate 8.0 x 7.5 x  1.0 cm. Multiple blue sutures are embedded within the soft  tissue. The specimen is sectioned and representaively submitted.  One cassette.    In addition to other data that may appear on the specimen  container, the label has been inspected to confirm the presence  of the patient's name and date of birth.  DN 2018 6:58 AM    no gonzalez, no line    GLOBAL ISSUE/BEST PRACTICE:  Analgesia: N/A  Sedation: N/A  HOB elevation: yes  Stress ulcer prophylaxis: n/a  VTE prophylaxis: SCDs  Glycemic control: N/A  Nutrition: Clear liquids DASH/TCL (evening snack)    CODE STATUS: Full code Interval events: no overnight events. s/p procedure for incarcerated ventral hernia repair with viable bowel with mesh,       T(F): 97.4 (18 @ 12:00), Max: 98.1 (18 @ 04:01)  HR: 69 (18 @ 13:00) (69 - 152)  BP: 102/57 (18 @ 13:00) (85/58 - 103/55)  RR: 23 (18 @ 13:00) (15 - 38)  SpO2: 99% (18 @ 13:00) (75% - 100%)    CAPILLARY BLOOD GLUCOSE    POCT Blood Glucose.: 95 mg/dL (2018 05:36)    I&O's Summary    2018 07:01  -  2018 07:00  --------------------------------------------------------  IN: 1515 mL / OUT: 950 mL / NET: 565 mL    2018 07:01  -  2018 14:22  --------------------------------------------------------  IN: 690 mL / OUT: 550 mL / NET: 140 mL        Physical Exam:     Gen: Well appearing female NAD alert and responsive  Neuro: AOx3, PERRLA, EOMI  HEENT: NC/AT  CV: RRR, +S1S2, no M/R/G, no edema  Pulm: CTA B/L, no W/W/R  GI: Soft, slight TTP diffuse upper quadrants, Colostomy bag lower left quadrant collecting brown stool with minimal air, +BS, around the colostomy site clean  Ext: no edema LE B/L     Meds:  enoxaparin Injectable 40 milliGRAM(s) SubCutaneous daily  dextrose 40% Gel 15 Gram(s) Oral once PRN  dextrose 50% Injectable 12.5 Gram(s) IV Push once  dextrose 50% Injectable 25 Gram(s) IV Push once  dextrose 50% Injectable 25 Gram(s) IV Push once  glucagon  Injectable 1 milliGRAM(s) IntraMuscular once PRN  levothyroxine Injectable 112 MICROGram(s) IV Push <User Schedule>  acetaminophen  Suppository. 650 milliGRAM(s) Rectal every 6 hours PRN  morphine  - Injectable 4 milliGRAM(s) IV Push every 4 hours PRN  morphine  - Injectable 2 milliGRAM(s) IV Push every 4 hours PRN  dextrose 5%. 1000 milliLiter(s) IV Continuous <Continuous>  clotrimazole 1% Cream 1 Application(s) Topical two times a day                          10.0   7.43  )-----------( 359      ( 2018 08:03 )             32.7       -    143  |  108  |  22  ----------------------------<  94  4.7   |  29  |  1.00    Ca    8.0<L>      2018 06:05  Phos  3.5       Mg     2.2             Urinalysis Basic - ( 2018 12:25 )    Color: Yellow / Appearance: Clear / S.020 / pH: x  Gluc: x / Ketone: Negative  / Bili: Negative / Urobili: Negative   Blood: x / Protein: Negative / Nitrite: Negative   Leuk Esterase: Small / RBC: Negative /HPF / WBC 11-25   Sq Epi: x / Non Sq Epi: Few / Bacteria: Few      Radiology:   < from: Xray Abdomen 1 View-Upright Only (18 @ 06:28) >    EXAM:  XR ABDOMEN UPRIGHT ONLY 1V                            PROCEDURE DATE:  2018          INTERPRETATION:  Clinical information: Incarcerated ventral hernia.   Follow-up.    Technique: Single upright AP view of the upper abdomen.    Comparison: Prior abdominal x-ray examination from 2018.    Findings: There is a nonspecific bowel gas pattern. The lower abdomen is   not imaged on this study. No free air is noted. Bilateral pleural   effusions are noted. There is a cardiac pacemaker overlying the left   chest wall. Multilevel degenerative changes are noted within the imaged   potions of the spine.    IMPRESSION: Limited evaluation. Nonspecific bowel gas pattern.      CHRYSTAL VITAL M.D., ATTENDING RADIOLOGIST  This document has been electronically signed. 2018  9:01AM    < end of copied text >    < from: Xray Abdomen 1 View-Upright Only (18 @ 09:10) >    EXAM:  XR ABDOMEN UPRIGHT ONLY 1V                            PROCEDURE DATE:  2018          INTERPRETATION:  Clinical information: Ventral hernia repair with small   bowel obstruction. Follow-up exam.    Technique: Frontal view of the chest.    Comparison: Prior abdominal x-ray examination from 2018.    Findings: Examination is limited as the lower abdomen is not included on   the field-of-view.    There is an unchanged bowel gas pattern which is nonspecific. No free air   is noted.    Small bilateral pleural effusions are again noted. There is a cardiac   pacemaker overlying the left chest wall.    Multilevel degenerative changes are noted within the imaged potions of   the spine.    IMPRESSION: Limited examination as the lower abdomen is not included on   the field-of-view.    Unchanged nonspecific bowel gas pattern.      CHRYSTAL VITAL M.D., ATTENDING RADIOLOGIST  This document has been electronically signed. 2018  9:14AM      < end of copied text >    Surgical Pathology Final Report -  (18 @ 09:59)    Surgical Pathology Final Report - :   ACCESSION No:  30 [id number]    MICHELLE RAMÍREZ                  1        Surgical Final Report    Final Diagnosis  Chronically inflamed fibroadipose tissue and hernia sac (ventral  hernia repair).    Sandro Muhammad M.D.  (Electronic Signature)  Reported on: 18    Clinical Information  Procedure: Repair of incarcerated ventral hernia with mesh    Specimen Description  Ventral hernia sac    Gross Description  The specimen is received in formalin and the specimen container  is labeled: Ventral hernia sac. It consists of multiple pieces of  pink-tan fibromembranous tissue admixed with white-tan indurated  tissue and yellow fatty tissue measuring in aggregate 8.0 x 7.5 x  1.0 cm. Multiple blue sutures are embedded within the soft  tissue. The specimen is sectioned and representaively submitted.  One cassette.    In addition to other data that may appear on the specimen  container, the label has been inspected to confirm the presence  of the patient's name and date of birth.  DN 2018 6:58 AM    no gonzalez, no line    GLOBAL ISSUE/BEST PRACTICE:  Analgesia: N/A  Sedation: N/A  HOB elevation: yes  Stress ulcer prophylaxis: n/a  VTE prophylaxis: SCDs  Glycemic control: N/A  Nutrition: Clear liquids DASH/TCL (evening snack)    CODE STATUS: Full code

## 2018-06-22 NOTE — PROGRESS NOTE ADULT - ASSESSMENT
89y old female pmhx metastatic ovarian cancer with recurrent ascites (last paracentesis 6/15- removal of 5.8L), colon cancer s/p colostomy, Incarcerated ventral hernia s/p repair without mesh (01/18), DM2, HTN, Afib s/p ppm paced at 70, Hypothyroidism, Overactive bladder and obesity who presented on 6/20 with a chief complaint of abdominal pain and dry heaving now POD#0 from  Incarcerated Ventral hernia repair with viable bowel with mesh (same ventral hernia from 01/2018) and 2.2L paracentesis.  Pt recovering well following procedure.       NEURO: pain control with morphine PRN.  Neurontin restart to be discussed    CV: HD stable, afib on Xarelto.  Xarelto being held until discussed with surgery.  PPM in place, paced rhythm at 70.  Lisinopril held.    RESP: COPD on Montelukast.   RENAL: BRITTANEY improving, Avoid nephrotoxic medications, renally dose medications, trend urine output, BUN/Cr and electrolytes.  replace electrolytes as needed.  cont. Pyridoxine    GI: diet advanced to clears, colostomy with stool. Zofran prn for nausea.  Abd xray shows   ENDO: Hypothyroidism on synthyroid. DM ISS.    HEME: Metastatic Ovarian Cancer with recurrent ascites  ID: No active issues. pending paracentesis cx  DISPO: Full Code. 89y old female pmhx metastatic ovarian cancer with recurrent ascites (last paracentesis 6/15- removal of 5.8L), colon cancer s/p colostomy, Incarcerated ventral hernia s/p repair without mesh (01/18), DM2, HTN, Afib s/p ppm paced at 70, Hypothyroidism, Overactive bladder and obesity who presented on 6/20 with a chief complaint of abdominal pain and dry heaving now POD#1 from Incarcerated Ventral hernia repair with viable bowel with mesh (same ventral hernia from 01/2018) and 2.2L paracentesis.  Pt recovering well following procedure.       NEURO: pain control with morphine and tylenol PRN.  Neurontin to be started once can take PO.  CV: HD stable, afib ( on Xarelto at home).  Xarelto being held until discussed with surgery.  PPM in place, paced rhythm at 70.  Lisinopril held until BP improves. cardio following.   RESP: hx of asthma on Montelukast (held until can take PO). sating well on RA  RENAL: BRITTANEY improving, Avoid nephrotoxic medications, renally dose medications, trend urine output, BUN/Cr and electrolytes. on ivf.  replace electrolytes as needed.  cont. Pyridoxine    GI: trial of diet advanced to clears, colostomy with stool. Zofran prn for nausea.  Abd xray shows nonspecific gas pattern. sx following.   ENDO: Hypothyroidism on synthyroid. DM ISS.    HEME: Metastatic Ovarian Cancer with recurrent ascites, lovenox dvt prophy  ID: pending paracentesis cx, f/u UA and Ucx  DISPO: Full Code, PT following, once tolerates PO consider transfer to floor 89y old female pmhx metastatic ovarian cancer with recurrent ascites (last paracentesis 6/15- removal of 5.8L), colon cancer s/p colostomy, Incarcerated ventral hernia s/p repair without mesh (01/18), DM2, HTN, Afib s/p ppm paced at 70, Hypothyroidism, Overactive bladder and obesity who presented on 6/20 with a chief complaint of abdominal pain and dry heaving now POD#1 from Incarcerated Ventral hernia repair with viable bowel with mesh (same ventral hernia from 01/2018) and 2.2L paracentesis.  Pt recovering well following procedure.       NEURO: pain control with morphine and tylenol PRN.  Neurontin to be started once can take PO.  CV: HD stable, afib ( on Xarelto at home).  Xarelto being held until discussed with surgery.  PPM in place, paced rhythm at 70.  Lisinopril held until BP improves. cardio following.   RESP: hx of asthma on Montelukast (held until can take PO). sating well on RA  RENAL: BRITTANEY improving, Avoid nephrotoxic medications, renally dose medications, trend urine output, BUN/Cr and electrolytes. on ivf.  replace electrolytes as needed.  cont. Pyridoxine    GI: trial of diet advanced to clears, colostomy with stool. Zofran prn for nausea.  Abd xray shows nonspecific gas pattern. sx following.   ENDO: Hypothyroidism on synthyroid. DM ISS.    HEME: Metastatic Ovarian Cancer with recurrent ascites, lovenox dvt prophy  ID: pending paracentesis cx, f/u UA and Ucx (no symptoms of UTI)  DISPO: Full Code, PT following, once tolerates PO consider transfer to floor

## 2018-06-22 NOTE — PROGRESS NOTE ADULT - ATTENDING COMMENTS
89y old female with Hx metastatic ovarian cancer with recurrent ascites (last paracentesis 6/15- removal of 5.8L), colon cancer s/p colostomy, multiple prior hernia repairs (last 1/18), DM2, HTN, Afib s/p PPM, POD 1 s/p ventral hernia repair with mesh with incarcerated bowel.      --pain controlled with morphine  --Afib, htn  hemodynamically stable  hold on home ACE for now  continue IVF until takeing PO  --asthma, restart singulair when taking PO  --renal function normal  --s/p hernia repair  has return of bowel function  clear diet per surgery  --+UA, no urinary symptoms  f/u UCx, hold Abx for now  --PT eval  --stable for surgical floor  --discussed with pt
covering patient today  seen post op doing well no complaints except for incisional pain, mild appropriate tenderness, no drainage  monitor and care per ICU  rest of note amended as above

## 2018-06-22 NOTE — DIETITIAN INITIAL EVALUATION ADULT. - OTHER INFO
Pt POD#1 post repair of incarcerated ventral hernia with mesh. Pt has regular IR drainage of ascites q3-4 weeks, last drainage few days ago.  Pt reports colostomy functioning well, no problems with it.   Only checks her BS on occasion, was told she doesn't really have diabetes.  A1c level pending collection today.

## 2018-06-22 NOTE — PHYSICAL THERAPY INITIAL EVALUATION ADULT - GAIT DEVIATIONS NOTED, PT EVAL
some postural sway/decreased weight-shifting ability/decreased velocity of limb motion/decreased step length/decreased stride length

## 2018-06-23 LAB
ALBUMIN SERPL ELPH-MCNC: 2 G/DL — LOW (ref 3.3–5)
ANION GAP SERPL CALC-SCNC: 5 MMOL/L — SIGNIFICANT CHANGE UP (ref 5–17)
BASOPHILS # BLD AUTO: 0.04 K/UL — SIGNIFICANT CHANGE UP (ref 0–0.2)
BASOPHILS NFR BLD AUTO: 0.6 % — SIGNIFICANT CHANGE UP (ref 0–2)
BUN SERPL-MCNC: 23 MG/DL — SIGNIFICANT CHANGE UP (ref 7–23)
CALCIUM SERPL-MCNC: 7.7 MG/DL — LOW (ref 8.5–10.1)
CHLORIDE SERPL-SCNC: 108 MMOL/L — SIGNIFICANT CHANGE UP (ref 96–108)
CO2 SERPL-SCNC: 27 MMOL/L — SIGNIFICANT CHANGE UP (ref 22–31)
CREAT SERPL-MCNC: 1.1 MG/DL — SIGNIFICANT CHANGE UP (ref 0.5–1.3)
EOSINOPHIL # BLD AUTO: 0.19 K/UL — SIGNIFICANT CHANGE UP (ref 0–0.5)
EOSINOPHIL NFR BLD AUTO: 2.8 % — SIGNIFICANT CHANGE UP (ref 0–6)
GLUCOSE SERPL-MCNC: 91 MG/DL — SIGNIFICANT CHANGE UP (ref 70–99)
HCT VFR BLD CALC: 32.8 % — LOW (ref 34.5–45)
HGB BLD-MCNC: 10.3 G/DL — LOW (ref 11.5–15.5)
IMM GRANULOCYTES NFR BLD AUTO: 0.4 % — SIGNIFICANT CHANGE UP (ref 0–1.5)
LYMPHOCYTES # BLD AUTO: 0.62 K/UL — LOW (ref 1–3.3)
LYMPHOCYTES # BLD AUTO: 9.1 % — LOW (ref 13–44)
MAGNESIUM SERPL-MCNC: 2.2 MG/DL — SIGNIFICANT CHANGE UP (ref 1.6–2.6)
MCHC RBC-ENTMCNC: 25.8 PG — LOW (ref 27–34)
MCHC RBC-ENTMCNC: 31.4 GM/DL — LOW (ref 32–36)
MCV RBC AUTO: 82 FL — SIGNIFICANT CHANGE UP (ref 80–100)
MONOCYTES # BLD AUTO: 0.71 K/UL — SIGNIFICANT CHANGE UP (ref 0–0.9)
MONOCYTES NFR BLD AUTO: 10.5 % — SIGNIFICANT CHANGE UP (ref 2–14)
NEUTROPHILS # BLD AUTO: 5.19 K/UL — SIGNIFICANT CHANGE UP (ref 1.8–7.4)
NEUTROPHILS NFR BLD AUTO: 76.6 % — SIGNIFICANT CHANGE UP (ref 43–77)
PHOSPHATE SERPL-MCNC: 2.5 MG/DL — SIGNIFICANT CHANGE UP (ref 2.5–4.5)
PLATELET # BLD AUTO: 231 K/UL — SIGNIFICANT CHANGE UP (ref 150–400)
POTASSIUM SERPL-MCNC: 4.7 MMOL/L — SIGNIFICANT CHANGE UP (ref 3.5–5.3)
POTASSIUM SERPL-SCNC: 4.7 MMOL/L — SIGNIFICANT CHANGE UP (ref 3.5–5.3)
RBC # BLD: 4 M/UL — SIGNIFICANT CHANGE UP (ref 3.8–5.2)
RBC # FLD: 17.4 % — HIGH (ref 10.3–14.5)
SODIUM SERPL-SCNC: 140 MMOL/L — SIGNIFICANT CHANGE UP (ref 135–145)
WBC # BLD: 6.78 K/UL — SIGNIFICANT CHANGE UP (ref 3.8–10.5)
WBC # FLD AUTO: 6.78 K/UL — SIGNIFICANT CHANGE UP (ref 3.8–10.5)

## 2018-06-23 PROCEDURE — 99233 SBSQ HOSP IP/OBS HIGH 50: CPT

## 2018-06-23 RX ORDER — ACETAMINOPHEN 500 MG
650 TABLET ORAL EVERY 6 HOURS
Qty: 0 | Refills: 0 | Status: DISCONTINUED | OUTPATIENT
Start: 2018-06-23 | End: 2018-06-24

## 2018-06-23 RX ORDER — LEVOTHYROXINE SODIUM 125 MCG
225 TABLET ORAL DAILY
Qty: 0 | Refills: 0 | Status: DISCONTINUED | OUTPATIENT
Start: 2018-06-23 | End: 2018-06-24

## 2018-06-23 RX ORDER — GABAPENTIN 400 MG/1
300 CAPSULE ORAL THREE TIMES A DAY
Qty: 0 | Refills: 0 | Status: DISCONTINUED | OUTPATIENT
Start: 2018-06-23 | End: 2018-06-24

## 2018-06-23 RX ORDER — FUROSEMIDE 40 MG
40 TABLET ORAL DAILY
Qty: 0 | Refills: 0 | Status: DISCONTINUED | OUTPATIENT
Start: 2018-06-23 | End: 2018-06-24

## 2018-06-23 RX ORDER — OXYCODONE AND ACETAMINOPHEN 5; 325 MG/1; MG/1
1 TABLET ORAL EVERY 6 HOURS
Qty: 0 | Refills: 0 | Status: DISCONTINUED | OUTPATIENT
Start: 2018-06-23 | End: 2018-06-24

## 2018-06-23 RX ORDER — POTASSIUM CHLORIDE 20 MEQ
20 PACKET (EA) ORAL DAILY
Qty: 0 | Refills: 0 | Status: DISCONTINUED | OUTPATIENT
Start: 2018-06-23 | End: 2018-06-24

## 2018-06-23 RX ORDER — RIVAROXABAN 15 MG-20MG
15 KIT ORAL EVERY 24 HOURS
Qty: 0 | Refills: 0 | Status: DISCONTINUED | OUTPATIENT
Start: 2018-06-23 | End: 2018-06-24

## 2018-06-23 RX ADMIN — GABAPENTIN 300 MILLIGRAM(S): 400 CAPSULE ORAL at 13:06

## 2018-06-23 RX ADMIN — Medication 112 MICROGRAM(S): at 05:55

## 2018-06-23 RX ADMIN — RIVAROXABAN 15 MILLIGRAM(S): KIT at 17:06

## 2018-06-23 RX ADMIN — Medication 20 MILLIEQUIVALENT(S): at 12:13

## 2018-06-23 RX ADMIN — GABAPENTIN 300 MILLIGRAM(S): 400 CAPSULE ORAL at 21:25

## 2018-06-23 RX ADMIN — Medication 1 APPLICATION(S): at 05:56

## 2018-06-23 RX ADMIN — Medication 1 APPLICATION(S): at 18:32

## 2018-06-23 NOTE — PROGRESS NOTE ADULT - SUBJECTIVE AND OBJECTIVE BOX
POD 2  Afeb VS wnl (BP 98/54)  No pain, no nausea, tolerating clears, colostomy functioning  Abd nontender, no wound redness or drainage or collection  WBC wnl alb 2.0  P: regular diet.  discharge likely tomorrow

## 2018-06-23 NOTE — PROGRESS NOTE ADULT - PROBLEM SELECTOR PLAN 8
Prophylactic measure.  Plan: DVT PPX: lovenox, SCDS  DASH consistent carb diet, aspiration precautions, fall precautions    IMPROVE VTE Individual Risk Assessment          RISK                                                          Points  [  ] Previous VTE                                                3  [  ] Thrombophilia                                             2  [  ] Lower limb paralysis                                   2        (unable to hold up >15 seconds)    [  ] Current Cancer                                             2         (within 6 months)  [  ] Immobilization > 24 hrs                              1  [  ] ICU/CCU stay > 24 hours                             1  [ x ] Age > 60                                                         1  IMPROVE VTE Score: 1.
Prophylactic measure.  Plan: DVT PPX: lovenox, SCDS  DASH consistent carb diet, aspiration precautions, fall precautions
Prophylactic measure.  Plan: DVT PPX: lovenox, SCDS  DASH consistent carb diet, aspiration precautions, fall precautions    IMPROVE VTE Individual Risk Assessment          RISK                                                          Points  [  ] Previous VTE                                                3  [  ] Thrombophilia                                             2  [  ] Lower limb paralysis                                   2        (unable to hold up >15 seconds)    [  ] Current Cancer                                             2         (within 6 months)  [  ] Immobilization > 24 hrs                              1  [  ] ICU/CCU stay > 24 hours                             1  [ x ] Age > 60                                                         1  IMPROVE VTE Score: 1.

## 2018-06-23 NOTE — PROGRESS NOTE ADULT - PROBLEM SELECTOR PLAN 4
- LDISS, consistent carb diet  - accuchecks, hypoglycemia protocol

## 2018-06-23 NOTE — PROGRESS NOTE ADULT - PROBLEM SELECTOR PROBLEM 4
DM Type 2 (Diabetes Mellitus, Type 2)

## 2018-06-23 NOTE — PROGRESS NOTE ADULT - PROBLEM SELECTOR PLAN 6
- continue IV levothroxine  - stable.
- continue synthroid  - stable.
- switch to po levothroxine  - stable.

## 2018-06-23 NOTE — PROGRESS NOTE ADULT - SUBJECTIVE AND OBJECTIVE BOX
[facility] [place]       HPI:  89 y/o F PMHx ovarian cancer w/ recurrent ascites, colon ca s/p colostomy, s/p incarcerated ventral hernia repair 10/2017, DM2, HTN, afib s/p PPM, hypothyroid, obesity, and overactive bladder presents with abdominal pain since 6AM this morning. Pt describes it as a sharp 10/10 non radiating pain in her upper abdomen. She also had some nausea this morning along with dry heaving, but did not actually vomit. She states that she changed her colostomy bag this morning and she is passing feces in it since the onset of her symptoms. She describes her pain as being similar to when she had an incarcerated hernia in January of this year. Of note, pt had paracentesis done on Friday for abdominal ascites. She reports she felt uncomfortable then, but the pain didn't start until this morning. Denies any fevers/chills or alterations in mental status.     In the ED, VS: afebrile, HR 70, /70, RR 16, sat 98%, WBC 7.29, H/H 11.4/35.7, plat 380, PT 13.5, INR 1.23, PTT 32.2, Na 141, K 3.8, Bun 36, Cr 1.10, Glu 107, alb 2.7, lactate 1.3, lipase 174. Ct abd/pelvis showed No specific acute inflammatory or obstructive pathology and  Moderate to large amount of abdominal pelvic ascites. CXR showed Left cardiac pacer with no acute findings. EKG electronic PPM @ 70BPM. In ED, pt received 1 NS bolus, zofran, morphine. (2018 12:41)        SUBJECTIVE: Patient lying comfortable . no chest pain and SOB .      ALLERGIES:  Allergies    No Known Allergies    Intolerances          MEDICATIONS  (STANDING):  clotrimazole 1% Cream 1 Application(s) Topical two times a day  dextrose 5%. 1000 milliLiter(s) (50 mL/Hr) IV Continuous <Continuous>  dextrose 50% Injectable 12.5 Gram(s) IV Push once  dextrose 50% Injectable 25 Gram(s) IV Push once  dextrose 50% Injectable 25 Gram(s) IV Push once  furosemide    Tablet 40 milliGRAM(s) Oral daily  gabapentin 300 milliGRAM(s) Oral three times a day  levothyroxine 225 MICROGram(s) Oral daily  potassium chloride    Tablet ER 20 milliEquivalent(s) Oral daily  rivaroxaban 15 milliGRAM(s) Oral every 24 hours    MEDICATIONS  (PRN):  acetaminophen   Tablet. 650 milliGRAM(s) Oral every 6 hours PRN mild to mod paon  dextrose 40% Gel 15 Gram(s) Oral once PRN Blood Glucose LESS THAN 70 milliGRAM(s)/deciliter  glucagon  Injectable 1 milliGRAM(s) IntraMuscular once PRN Glucose LESS THAN 70 milligrams/deciliter  oxyCODONE    5 mG/acetaminophen 325 mG 1 Tablet(s) Oral every 6 hours PRN Severe Pain (7 - 10)      REVIEW OF SYSTEMS:  CONSTITUTIONAL: No fever,  RESPIRATORY: No cough, wheezing, shortness of breath  CARDIOVASCULAR: No chest pain, dyspnea, palpitations, dizziness, syncope, paroxysmal nocturnal dyspnea, orthopnea, or arm or leg swelling  GASTROINTESTINAL: No abdominal  or epigastric pain, nausea, vomiting,  diarrhea  NEUROLOGICAL: No headaches,  loss of strength, numbness, or tremors    Vital Signs Last 24 Hrs  T(C): 37.1 (2018 15:41), Max: 37.1 (2018 15:41)  T(F): 98.8 (2018 15:41), Max: 98.8 (2018 15:41)  HR: 70 (2018 15:41) (70 - 72)  BP: 95/59 (2018 15:41) (91/55 - 98/54)  BP(mean): --  RR: 16 (2018 15:41) (16 - 20)  SpO2: 97% (2018 15:41) (92% - 97%)    PHYSICAL EXAM:  HEAD:  Atraumatic, Normocephalic  NECK: Supple and normal thyroid.  No JVD or carotid bruit.   HEART: S1, S2 irregular , 1/6 soft ejection systolic murmur in mitral area , no thrill and no gallops .  PULMONARY: Bilateral vesicular breathing , few scattered ronchi and few scattered rales are present .  ABDOMEN: Soft and post operative wound present . and positive bowl sounds   EXTREMITIES:  No clubbing, cyanosis, or pedal  edema  NEUROLOGICAL: AAOX3 , no focal deficit .    LABS:                        10.3   6.78  )-----------( 231      ( 2018 06:29 )             32.8         140  |  108  |  23  ----------------------------<  91  4.7   |  27  |  1.10    Ca    7.7<L>      2018 06:29  Phos  2.5       Mg     2.2         TPro  x   /  Alb  2.0<L>  /  TBili  x   /  DBili  x   /  AST  x   /  ALT  x   /  AlkPhos  x             Urinalysis Basic - ( 2018 12:25 )    Color: Yellow / Appearance: Clear / S.020 / pH: x  Gluc: x / Ketone: Negative  / Bili: Negative / Urobili: Negative   Blood: x / Protein: Negative / Nitrite: Negative   Leuk Esterase: Small / RBC: Negative /HPF / WBC 11-25   Sq Epi: x / Non Sq Epi: Few / Bacteria: Few    Assessment/Plan    Will continue to follow during hospital course and give further recommendations as needed . Thanks for your referral . if any questions please contact me at office [phone] [phone]) [facility] [place]       HPI:  89 y/o F PMHx ovarian cancer w/ recurrent ascites, colon ca s/p colostomy, s/p incarcerated ventral hernia repair 10/2017, DM2, HTN, afib s/p PPM, hypothyroid, obesity, and overactive bladder presents with abdominal pain since 6AM this morning. Pt describes it as a sharp 10/10 non radiating pain in her upper abdomen. She also had some nausea this morning along with dry heaving, but did not actually vomit. She states that she changed her colostomy bag this morning and she is passing feces in it since the onset of her symptoms. She describes her pain as being similar to when she had an incarcerated hernia in January of this year. Of note, pt had paracentesis done on Friday for abdominal ascites. She reports she felt uncomfortable then, but the pain didn't start until this morning. Denies any fevers/chills or alterations in mental status.     In the ED, VS: afebrile, HR 70, /70, RR 16, sat 98%, WBC 7.29, H/H 11.4/35.7, plat 380, PT 13.5, INR 1.23, PTT 32.2, Na 141, K 3.8, Bun 36, Cr 1.10, Glu 107, alb 2.7, lactate 1.3, lipase 174. Ct abd/pelvis showed No specific acute inflammatory or obstructive pathology and  Moderate to large amount of abdominal pelvic ascites. CXR showed Left cardiac pacer with no acute findings. EKG electronic PPM @ 70BPM. In ED, pt received 1 NS bolus, zofran, morphine. (2018 12:41)        SUBJECTIVE: Patient lying comfortable . no chest pain and SOB .      ALLERGIES:  Allergies    No Known Allergies    Intolerances          MEDICATIONS  (STANDING):  clotrimazole 1% Cream 1 Application(s) Topical two times a day  dextrose 5%. 1000 milliLiter(s) (50 mL/Hr) IV Continuous <Continuous>  dextrose 50% Injectable 12.5 Gram(s) IV Push once  dextrose 50% Injectable 25 Gram(s) IV Push once  dextrose 50% Injectable 25 Gram(s) IV Push once  furosemide    Tablet 40 milliGRAM(s) Oral daily  gabapentin 300 milliGRAM(s) Oral three times a day  levothyroxine 225 MICROGram(s) Oral daily  potassium chloride    Tablet ER 20 milliEquivalent(s) Oral daily  rivaroxaban 15 milliGRAM(s) Oral every 24 hours    MEDICATIONS  (PRN):  acetaminophen   Tablet. 650 milliGRAM(s) Oral every 6 hours PRN mild to mod paon  dextrose 40% Gel 15 Gram(s) Oral once PRN Blood Glucose LESS THAN 70 milliGRAM(s)/deciliter  glucagon  Injectable 1 milliGRAM(s) IntraMuscular once PRN Glucose LESS THAN 70 milligrams/deciliter  oxyCODONE    5 mG/acetaminophen 325 mG 1 Tablet(s) Oral every 6 hours PRN Severe Pain (7 - 10)      REVIEW OF SYSTEMS:  CONSTITUTIONAL: No fever,  RESPIRATORY: No cough, wheezing, shortness of breath  CARDIOVASCULAR: No chest pain, dyspnea, palpitations, dizziness, syncope, paroxysmal nocturnal dyspnea, orthopnea, or arm or leg swelling  GASTROINTESTINAL: No abdominal  or epigastric pain, nausea, vomiting,  diarrhea  NEUROLOGICAL: No headaches,  loss of strength, numbness, or tremors    Vital Signs Last 24 Hrs  T(C): 37.1 (2018 15:41), Max: 37.1 (2018 15:41)  T(F): 98.8 (2018 15:41), Max: 98.8 (2018 15:41)  HR: 70 (2018 15:41) (70 - 72)  BP: 95/59 (2018 15:41) (91/55 - 98/54)  BP(mean): --  RR: 16 (2018 15:41) (16 - 20)  SpO2: 97% (2018 15:41) (92% - 97%)    PHYSICAL EXAM:  HEAD:  Atraumatic, Normocephalic  NECK: Supple and normal thyroid.  No JVD or carotid bruit.   HEART: S1, S2 irregular , 1/6 soft ejection systolic murmur in mitral area , no thrill and no gallops .  PULMONARY: Bilateral vesicular breathing , few scattered ronchi and few scattered rales are present .  ABDOMEN: Soft and post operative wound present . and positive bowl sounds   EXTREMITIES:  No clubbing, cyanosis, or pedal  edema  NEUROLOGICAL: AAOX3 , no focal deficit .    LABS:                        10.3   6.78  )-----------( 231      ( 2018 06:29 )             32.8         140  |  108  |  23  ----------------------------<  91  4.7   |  27  |  1.10    Ca    7.7<L>      2018 06:29  Phos  2.5       Mg     2.2         TPro  x   /  Alb  2.0<L>  /  TBili  x   /  DBili  x   /  AST  x   /  ALT  x   /  AlkPhos  x             Urinalysis Basic - ( 2018 12:25 )    Color: Yellow / Appearance: Clear / S.020 / pH: x  Gluc: x / Ketone: Negative  / Bili: Negative / Urobili: Negative   Blood: x / Protein: Negative / Nitrite: Negative   Leuk Esterase: Small / RBC: Negative /HPF / WBC 11-25   Sq Epi: x / Non Sq Epi: Few / Bacteria: Few    Assessment/Plan  Patient has :  1) CHF and stable .  2) Atrial fibrillation and stable .  3) Post op abdominal hernia surgery .  Plan : cardiac stable so far . 2) Increase ambulation . 3) Xarelto started .  Will continue to follow during hospital course and give further recommendations as needed . Thanks for your referral . if any questions please contact me at office ([phone] [phone])

## 2018-06-23 NOTE — PROGRESS NOTE ADULT - PROBLEM SELECTOR PLAN 1
-  POD 2 of hernia repair, tolerated liq diet.  - Pt as had 2 hernia surgeries in past year (1/2018, 10/2017)  - continue post op care per surgery l  - incentive spirometry

## 2018-06-23 NOTE — PROGRESS NOTE ADULT - PROBLEM SELECTOR PLAN 5
- continue lisinopril with hold parameters  - stable.

## 2018-06-24 ENCOUNTER — TRANSCRIPTION ENCOUNTER (OUTPATIENT)
Age: 83
End: 2018-06-24

## 2018-06-24 VITALS
DIASTOLIC BLOOD PRESSURE: 69 MMHG | RESPIRATION RATE: 16 BRPM | OXYGEN SATURATION: 99 % | SYSTOLIC BLOOD PRESSURE: 103 MMHG | HEART RATE: 70 BPM | TEMPERATURE: 98 F

## 2018-06-24 PROCEDURE — 87086 URINE CULTURE/COLONY COUNT: CPT

## 2018-06-24 PROCEDURE — 81001 URINALYSIS AUTO W/SCOPE: CPT

## 2018-06-24 PROCEDURE — 86901 BLOOD TYPING SEROLOGIC RH(D): CPT

## 2018-06-24 PROCEDURE — 83036 HEMOGLOBIN GLYCOSYLATED A1C: CPT

## 2018-06-24 PROCEDURE — 82040 ASSAY OF SERUM ALBUMIN: CPT

## 2018-06-24 PROCEDURE — 74018 RADEX ABDOMEN 1 VIEW: CPT

## 2018-06-24 PROCEDURE — 84443 ASSAY THYROID STIM HORMONE: CPT

## 2018-06-24 PROCEDURE — 80053 COMPREHEN METABOLIC PANEL: CPT

## 2018-06-24 PROCEDURE — 84100 ASSAY OF PHOSPHORUS: CPT

## 2018-06-24 PROCEDURE — 83690 ASSAY OF LIPASE: CPT

## 2018-06-24 PROCEDURE — 83605 ASSAY OF LACTIC ACID: CPT

## 2018-06-24 PROCEDURE — C1781: CPT

## 2018-06-24 PROCEDURE — 85027 COMPLETE CBC AUTOMATED: CPT

## 2018-06-24 PROCEDURE — 74176 CT ABD & PELVIS W/O CONTRAST: CPT

## 2018-06-24 PROCEDURE — 80048 BASIC METABOLIC PNL TOTAL CA: CPT

## 2018-06-24 PROCEDURE — 99238 HOSP IP/OBS DSCHRG MGMT 30/<: CPT

## 2018-06-24 PROCEDURE — 80061 LIPID PANEL: CPT

## 2018-06-24 PROCEDURE — 85730 THROMBOPLASTIN TIME PARTIAL: CPT

## 2018-06-24 PROCEDURE — 86850 RBC ANTIBODY SCREEN: CPT

## 2018-06-24 PROCEDURE — 87186 SC STD MICRODIL/AGAR DIL: CPT

## 2018-06-24 PROCEDURE — 85610 PROTHROMBIN TIME: CPT

## 2018-06-24 PROCEDURE — 88302 TISSUE EXAM BY PATHOLOGIST: CPT

## 2018-06-24 PROCEDURE — 74019 RADEX ABDOMEN 2 VIEWS: CPT

## 2018-06-24 PROCEDURE — 86900 BLOOD TYPING SEROLOGIC ABO: CPT

## 2018-06-24 PROCEDURE — 99285 EMERGENCY DEPT VISIT HI MDM: CPT | Mod: 25

## 2018-06-24 PROCEDURE — 83735 ASSAY OF MAGNESIUM: CPT

## 2018-06-24 PROCEDURE — 97162 PT EVAL MOD COMPLEX 30 MIN: CPT

## 2018-06-24 PROCEDURE — 96375 TX/PRO/DX INJ NEW DRUG ADDON: CPT

## 2018-06-24 PROCEDURE — 93005 ELECTROCARDIOGRAM TRACING: CPT

## 2018-06-24 PROCEDURE — 96374 THER/PROPH/DIAG INJ IV PUSH: CPT

## 2018-06-24 PROCEDURE — 82962 GLUCOSE BLOOD TEST: CPT

## 2018-06-24 PROCEDURE — 71045 X-RAY EXAM CHEST 1 VIEW: CPT

## 2018-06-24 RX ORDER — ACETAMINOPHEN 500 MG
2 TABLET ORAL
Qty: 0 | Refills: 0 | COMMUNITY
Start: 2018-06-24

## 2018-06-24 RX ADMIN — Medication 1 APPLICATION(S): at 05:45

## 2018-06-24 RX ADMIN — Medication 225 MICROGRAM(S): at 05:45

## 2018-06-24 RX ADMIN — Medication 40 MILLIGRAM(S): at 05:45

## 2018-06-24 RX ADMIN — GABAPENTIN 300 MILLIGRAM(S): 400 CAPSULE ORAL at 05:45

## 2018-06-24 NOTE — DISCHARGE NOTE ADULT - PLAN OF CARE
resolved had hernia repair and tolerated procedure well. incentive spirometry.   C/W pain meds and follow with Dr Marshall as out pt alannah, C/w Xarelto.  Follow with PMD s/p 2.2L paracentesis.   Follow with Oncology and IR Stable, c/w home meds.   Follow with PMD Diet control. follow with PMD continue lisinopri.  Follow with PMD

## 2018-06-24 NOTE — PROGRESS NOTE ADULT - SUBJECTIVE AND OBJECTIVE BOX
POD 3  Afeb VS wnl  Feels well on regular diet. Colostomy functioning  Abd nontender, nondistended, minimal serosang drainage on dresssing  Ready for discharge  F/u my office 2 days

## 2018-06-24 NOTE — DISCHARGE NOTE ADULT - CARE PROVIDER_API CALL
Edwardo Mckinney), [place] [employer] [facility] Surgery  [address]  [address]  [place] [address]  Phone: [phone]  Fax: [phone]    Reyes Valdes), Cardiology; Internal Medicine; Nuclear Cardiology  [address] [address]  Phone: [phone]  Fax: [phone]    Yara Diaz), Internal Medicine  [address] [place] [address]  Phone: [phone]  Fax: [phone]

## 2018-06-24 NOTE — DISCHARGE NOTE ADULT - HOSPITAL COURSE
89 y/o F PMHx ovarian cancer w/ recurrent ascites, colon ca s/p colostomy, s/p incarcerated ventral hernia repair 10/2017, DM2, HTN, afib s/p PPM, hypothyroid, obesity, and overactive bladder presents with abdominal pain since 6AM this morning. Pt describes it as a sharp 10/10 non radiating pain in her upper abdomen. She also had some nausea this morning along with dry heaving, but did not actually vomit. She states that she changed her colostomy bag this morning and she is passing feces in it since the onset of her symptoms. She describes her pain as being similar to when she had an incarcerated hernia in January of this year. Of note, pt had paracentesis done on Friday for abdominal ascites. She reports she felt uncomfortable then, but the pain didn't start until this morning. Denies any fevers/chills or alterations in mental status.     In the ED, VS: afebrile, HR 70, /70, RR 16, sat 98%, WBC 7.29, H/H 11.4/35.7, plat 380, PT 13.5, INR 1.23, PTT 32.2, Na 141, K 3.8, Bun 36, Cr 1.10, Glu 107, alb 2.7, lactate 1.3, lipase 174. Ct abd/pelvis showed No specific acute inflammatory or obstructive pathology and  Moderate to large amount of abdominal pelvic ascites. CXR showed Left cardiac pacer with no acute findings. EKG electronic PPM @ 70BPM. In ED, pt received 1 NS bolus, zofran, morphine.    Admitted with abdominal pain found to have incarcerated hernia and moderate ascites. Pt went for ventral hernia repair on 6/21. Tolerated procedure well without any complications. Now complains only of soreness at the surgical site.  Pt tolerated po diet and ambulated. All po meds and Xarelto resumed.  Pt followed by Cardio and surgery. stable for d/c home.    Vital Signs Last 24 Hrs  T(C): 36.7 (24 Jun 2018 07:22), Max: 37.1 (23 Jun 2018 15:41)  T(F): 98 (24 Jun 2018 07:22), Max: 98.8 (23 Jun 2018 15:41)  HR: 70 (24 Jun 2018 07:22) (70 - 70)  BP: 103/69 (24 Jun 2018 07:22) (95/59 - 110/73)  BP(mean): --  RR: 16 (24 Jun 2018 07:22) (16 - 16)  SpO2: 99% (24 Jun 2018 07:22) (96% - 99%)    General: NAD,   Neurology: A&Ox3, nonfocal,  moving all extremities  Respiratory: CTA B/L  CV: RRR, S1S2, no murmurs, rubs or gallops  Abdominal: Soft, with scar tenderness, dressing on,   Extremities: + edema, + peripheral pulses    PMD office called and left message.  Dr lawson will follow her as out pt

## 2018-06-24 NOTE — DISCHARGE NOTE ADULT - SECONDARY DIAGNOSIS.
Afib Ascites, malignant Asthma DM Type 2 (Diabetes Mellitus, Type 2) HTN (Hypertension) Hypothyroidism

## 2018-06-24 NOTE — DISCHARGE NOTE ADULT - MEDICATION SUMMARY - MEDICATIONS TO TAKE
I will START or STAY ON the medications listed below when I get home from the hospital:    acetaminophen 325 mg oral tablet  -- 2 tab(s) by mouth every 6 hours, As needed, mild to mod paon  -- Indication: For Pain    lisinopril 10 mg oral tablet  -- 1 tab(s) by mouth once a day  -- Indication: For HTN (Hypertension)    Xarelto 15 mg oral tablet  -- 1 tab(s) by mouth once a day (in the evening)  -- Indication: For Afib    gabapentin 300 mg oral capsule  -- 1 cap(s) by mouth 3 times a day  -- Indication: For neuropathy    clotrimazole 1% topical cream  -- 1 application on skin 2 times a day  -- Indication: For fungal dermatitis    furosemide 40 mg oral tablet  -- 1 tab(s) by mouth once a day  -- Indication: For Ascites, malignant    montelukast 10 mg oral tablet  -- 1 tab(s) by mouth once a day (in the evening)  -- Indication: For Asthma    magnesium gluconate 500 mg oral tablet  --  by mouth once a day  -- Indication: For Prophylactic measure    potassium chloride 20 mEq oral granule, extended release  -- 1 tab(s) by mouth 2 times a day  -- Indication: For Ascites, malignant    Fish Oil 1000 mg oral capsule  -- 1 cap(s) by mouth once a day  -- Indication: For Prophylactic measure    levothyroxine  -- 225 microgram(s) by mouth once a day  -- Indication: For Hypothyroidism    Nephro-Tommy oral tablet  -- 1 tab(s) by mouth once a day  -- Indication: For Prophylactic measure    Vitamin B6 100 mg oral tablet  -- 1 tab(s) by mouth once a day  -- Indication: For Prophylactic measure    Vitamin D3 2000 intl units oral capsule  -- 1 cap(s) by mouth once a day  -- Indication: For Prophylactic measure    folic acid  -- 400 milligram(s) by mouth once a day  -- Indication: For Prophylactic measure I will START or STAY ON the medications listed below when I get home from the hospital:    acetaminophen 325 mg oral tablet  -- 2 tab(s) by mouth every 6 hours, As needed, mild to mod paon  -- Indication: For Pain    lisinopril 10 mg oral tablet  -- 1 tab(s) by mouth once a day  -- Indication: For HTN (Hypertension)    Xarelto 15 mg oral tablet  -- 1 tab(s) by mouth once a day (in the evening)  -- Indication: For Afib    gabapentin 300 mg oral capsule  -- 1 cap(s) by mouth 3 times a day  -- Indication: For neuropathy    clotrimazole 1% topical cream  -- 1 application on skin 2 times a day  -- Indication: For fingal infection    furosemide 40 mg oral tablet  -- 1 tab(s) by mouth once a day  -- Indication: For Ascites, malignant    montelukast 10 mg oral tablet  -- 1 tab(s) by mouth once a day (in the evening)  -- Indication: For Asthma    magnesium gluconate 500 mg oral tablet  --  by mouth once a day  -- Indication: For Ascites, malignant    potassium chloride 20 mEq oral granule, extended release  -- 1 tab(s) by mouth 2 times a day  -- Indication: For Ascites, malignant    Fish Oil 1000 mg oral capsule  -- 1 cap(s) by mouth once a day  -- Indication: For Prophylactic measure    levothyroxine  -- 225 microgram(s) by mouth once a day  -- Indication: For Hypothyroidism    Nephro-Tommy oral tablet  -- 1 tab(s) by mouth once a day  -- Indication: For Prophylactic measure    Vitamin B6 100 mg oral tablet  -- 1 tab(s) by mouth once a day  -- Indication: For Prophylactic measure    Vitamin D3 2000 intl units oral capsule  -- 1 cap(s) by mouth once a day  -- Indication: For Prophylactic measure    folic acid  -- 400 milligram(s) by mouth once a day  -- Indication: For Prophylactic measure

## 2018-06-24 NOTE — DISCHARGE NOTE ADULT - PATIENT PORTAL LINK FT
You can access the [facility] Patient Portal, offered by [facility], by registering with the following website: [email or web address]

## 2018-06-24 NOTE — DISCHARGE NOTE ADULT - CARE PLAN
Principal Discharge DX:	Incarcerated hernia  Goal:	resolved  Assessment and plan of treatment:	had hernia repair and tolerated procedure well. incentive spirometry.   C/W pain meds and follow with Dr Marshall as out pt  Secondary Diagnosis:	Afib  Assessment and plan of treatment:	stable, C/w Xarelto.  Follow with PMD  Secondary Diagnosis:	Ascites, malignant  Assessment and plan of treatment:	s/p 2.2L paracentesis.   Follow with Oncology and IR  Secondary Diagnosis:	Asthma  Assessment and plan of treatment:	Stable, c/w home meds.   Follow with PMD  Secondary Diagnosis:	DM Type 2 (Diabetes Mellitus, Type 2)  Assessment and plan of treatment:	Diet control. follow with PMD  Secondary Diagnosis:	HTN (Hypertension)  Assessment and plan of treatment:	continue lisinopri.  Follow with PMD  Secondary Diagnosis:	Hypothyroidism  Assessment and plan of treatment:	Stable, c/w home meds.   Follow with PMD

## 2018-07-09 ENCOUNTER — OUTPATIENT (OUTPATIENT)
Dept: OUTPATIENT SERVICES | Facility: HOSPITAL | Age: 83
LOS: 1 days | End: 2018-07-09
Payer: COMMERCIAL

## 2018-07-09 DIAGNOSIS — C56.9 MALIGNANT NEOPLASM OF UNSPECIFIED OVARY: ICD-10-CM

## 2018-07-09 DIAGNOSIS — R18.0 MALIGNANT ASCITES: ICD-10-CM

## 2018-07-09 DIAGNOSIS — K46.0 UNSPECIFIED ABDOMINAL HERNIA WITH OBSTRUCTION, WITHOUT GANGRENE: Chronic | ICD-10-CM

## 2018-07-09 DIAGNOSIS — Z98.890 OTHER SPECIFIED POSTPROCEDURAL STATES: Chronic | ICD-10-CM

## 2018-07-09 LAB
APTT BLD: 30.9 SEC — SIGNIFICANT CHANGE UP (ref 27.5–37.4)
HCT VFR BLD CALC: 33.9 % — LOW (ref 34.5–45)
HGB BLD-MCNC: 10.8 G/DL — LOW (ref 11.5–15.5)
INR BLD: 1.17 RATIO — HIGH (ref 0.88–1.16)
MCHC RBC-ENTMCNC: 26 PG — LOW (ref 27–34)
MCHC RBC-ENTMCNC: 31.9 GM/DL — LOW (ref 32–36)
MCV RBC AUTO: 81.5 FL — SIGNIFICANT CHANGE UP (ref 80–100)
NRBC # BLD: 0 /100 WBCS — SIGNIFICANT CHANGE UP (ref 0–0)
PLATELET # BLD AUTO: 407 K/UL — HIGH (ref 150–400)
PROTHROM AB SERPL-ACNC: 12.8 SEC — HIGH (ref 9.8–12.7)
RBC # BLD: 4.16 M/UL — SIGNIFICANT CHANGE UP (ref 3.8–5.2)
RBC # FLD: 16.9 % — HIGH (ref 10.3–14.5)
WBC # BLD: 5.79 K/UL — SIGNIFICANT CHANGE UP (ref 3.8–10.5)
WBC # FLD AUTO: 5.79 K/UL — SIGNIFICANT CHANGE UP (ref 3.8–10.5)

## 2018-07-09 PROCEDURE — 85610 PROTHROMBIN TIME: CPT

## 2018-07-09 PROCEDURE — 85027 COMPLETE CBC AUTOMATED: CPT

## 2018-07-09 PROCEDURE — 49083 ABD PARACENTESIS W/IMAGING: CPT

## 2018-07-09 PROCEDURE — 85730 THROMBOPLASTIN TIME PARTIAL: CPT

## 2018-07-21 ENCOUNTER — RESULT REVIEW (OUTPATIENT)
Age: 83
End: 2018-07-21

## 2018-07-21 ENCOUNTER — INPATIENT (INPATIENT)
Facility: HOSPITAL | Age: 83
LOS: 4 days | Discharge: NURSING, REHAB OR LONG-TERM CARE | DRG: 871 | End: 2018-07-26
Attending: FAMILY MEDICINE | Admitting: INTERNAL MEDICINE
Payer: COMMERCIAL

## 2018-07-21 VITALS
TEMPERATURE: 101 F | RESPIRATION RATE: 15 BRPM | DIASTOLIC BLOOD PRESSURE: 35 MMHG | SYSTOLIC BLOOD PRESSURE: 70 MMHG | HEART RATE: 83 BPM | WEIGHT: 203.05 LBS | OXYGEN SATURATION: 100 % | HEIGHT: 68 IN

## 2018-07-21 DIAGNOSIS — C56.9 MALIGNANT NEOPLASM OF UNSPECIFIED OVARY: ICD-10-CM

## 2018-07-21 DIAGNOSIS — K46.0 UNSPECIFIED ABDOMINAL HERNIA WITH OBSTRUCTION, WITHOUT GANGRENE: Chronic | ICD-10-CM

## 2018-07-21 DIAGNOSIS — A41.9 SEPSIS, UNSPECIFIED ORGANISM: ICD-10-CM

## 2018-07-21 DIAGNOSIS — R18.0 MALIGNANT ASCITES: ICD-10-CM

## 2018-07-21 DIAGNOSIS — Z98.890 OTHER SPECIFIED POSTPROCEDURAL STATES: Chronic | ICD-10-CM

## 2018-07-21 DIAGNOSIS — I48.2 CHRONIC ATRIAL FIBRILLATION: ICD-10-CM

## 2018-07-21 DIAGNOSIS — Z29.9 ENCOUNTER FOR PROPHYLACTIC MEASURES, UNSPECIFIED: ICD-10-CM

## 2018-07-21 DIAGNOSIS — N17.9 ACUTE KIDNEY FAILURE, UNSPECIFIED: ICD-10-CM

## 2018-07-21 DIAGNOSIS — E11.9 TYPE 2 DIABETES MELLITUS WITHOUT COMPLICATIONS: ICD-10-CM

## 2018-07-21 DIAGNOSIS — I10 ESSENTIAL (PRIMARY) HYPERTENSION: ICD-10-CM

## 2018-07-21 PROBLEM — I48.91 UNSPECIFIED ATRIAL FIBRILLATION: Chronic | Status: ACTIVE | Noted: 2018-01-07

## 2018-07-21 LAB
ALBUMIN SERPL ELPH-MCNC: 2.2 G/DL — LOW (ref 3.3–5)
ALP SERPL-CCNC: 81 U/L — SIGNIFICANT CHANGE UP (ref 40–120)
ALT FLD-CCNC: 15 U/L — SIGNIFICANT CHANGE UP (ref 12–78)
ANION GAP SERPL CALC-SCNC: 12 MMOL/L — SIGNIFICANT CHANGE UP (ref 5–17)
APPEARANCE UR: ABNORMAL
APTT BLD: 33.3 SEC — SIGNIFICANT CHANGE UP (ref 27.5–37.4)
AST SERPL-CCNC: 24 U/L — SIGNIFICANT CHANGE UP (ref 15–37)
B PERT IGG+IGM PNL SER: ABNORMAL
BASOPHILS # BLD AUTO: 0 K/UL — SIGNIFICANT CHANGE UP (ref 0–0.2)
BASOPHILS NFR BLD AUTO: 0 % — SIGNIFICANT CHANGE UP (ref 0–2)
BILIRUB SERPL-MCNC: 0.5 MG/DL — SIGNIFICANT CHANGE UP (ref 0.2–1.2)
BILIRUB UR-MCNC: ABNORMAL
BUN SERPL-MCNC: 49 MG/DL — HIGH (ref 7–23)
CALCIUM SERPL-MCNC: 8.2 MG/DL — LOW (ref 8.5–10.1)
CHLORIDE SERPL-SCNC: 101 MMOL/L — SIGNIFICANT CHANGE UP (ref 96–108)
CK MB CFR SERPL CALC: <1 NG/ML — SIGNIFICANT CHANGE UP (ref 0–3.6)
CO2 SERPL-SCNC: 26 MMOL/L — SIGNIFICANT CHANGE UP (ref 22–31)
COLOR FLD: YELLOW — SIGNIFICANT CHANGE UP
COLOR SPEC: YELLOW — SIGNIFICANT CHANGE UP
CREAT SERPL-MCNC: 2.5 MG/DL — HIGH (ref 0.5–1.3)
DIFF PNL FLD: NEGATIVE — SIGNIFICANT CHANGE UP
EOSINOPHIL # BLD AUTO: 0 K/UL — SIGNIFICANT CHANGE UP (ref 0–0.5)
EOSINOPHIL NFR BLD AUTO: 0 % — SIGNIFICANT CHANGE UP (ref 0–6)
FLUID INTAKE SUBSTANCE CLASS: SIGNIFICANT CHANGE UP
GLUCOSE SERPL-MCNC: 107 MG/DL — HIGH (ref 70–99)
GLUCOSE UR QL: NEGATIVE — SIGNIFICANT CHANGE UP
HCT VFR BLD CALC: 30.9 % — LOW (ref 34.5–45)
HGB BLD-MCNC: 9.7 G/DL — LOW (ref 11.5–15.5)
INR BLD: 1.86 RATIO — HIGH (ref 0.88–1.16)
KETONES UR-MCNC: NEGATIVE — SIGNIFICANT CHANGE UP
LACTATE SERPL-SCNC: 4.2 MMOL/L — CRITICAL HIGH (ref 0.7–2)
LACTATE SERPL-SCNC: 4.3 MMOL/L — CRITICAL HIGH (ref 0.7–2)
LEUKOCYTE ESTERASE UR-ACNC: ABNORMAL
LYMPHOCYTES # BLD AUTO: 0.42 K/UL — LOW (ref 1–3.3)
LYMPHOCYTES # BLD AUTO: 2 % — LOW (ref 13–44)
MAGNESIUM SERPL-MCNC: 2.3 MG/DL — SIGNIFICANT CHANGE UP (ref 1.6–2.6)
MCHC RBC-ENTMCNC: 25.7 PG — LOW (ref 27–34)
MCHC RBC-ENTMCNC: 31.4 GM/DL — LOW (ref 32–36)
MCV RBC AUTO: 81.7 FL — SIGNIFICANT CHANGE UP (ref 80–100)
MONOCYTES # BLD AUTO: 0 K/UL — SIGNIFICANT CHANGE UP (ref 0–0.9)
MONOCYTES NFR BLD AUTO: 0 % — LOW (ref 2–14)
NEUTROPHILS # BLD AUTO: 20.62 K/UL — HIGH (ref 1.8–7.4)
NEUTROPHILS NFR BLD AUTO: 65 % — SIGNIFICANT CHANGE UP (ref 43–77)
NITRITE UR-MCNC: NEGATIVE — SIGNIFICANT CHANGE UP
PH UR: 5 — SIGNIFICANT CHANGE UP (ref 5–8)
PLATELET # BLD AUTO: 441 K/UL — HIGH (ref 150–400)
POTASSIUM SERPL-MCNC: 5 MMOL/L — SIGNIFICANT CHANGE UP (ref 3.5–5.3)
POTASSIUM SERPL-SCNC: 5 MMOL/L — SIGNIFICANT CHANGE UP (ref 3.5–5.3)
PROT SERPL-MCNC: 6.9 G/DL — SIGNIFICANT CHANGE UP (ref 6–8.3)
PROT UR-MCNC: 25 MG/DL
PROTHROM AB SERPL-ACNC: 20.5 SEC — HIGH (ref 9.8–12.7)
RBC # BLD: 3.78 M/UL — LOW (ref 3.8–5.2)
RBC # FLD: 17.2 % — HIGH (ref 10.3–14.5)
RCV VOL RI: 5000 /UL — HIGH (ref 0–5)
SODIUM SERPL-SCNC: 139 MMOL/L — SIGNIFICANT CHANGE UP (ref 135–145)
SP GR SPEC: 1.02 — SIGNIFICANT CHANGE UP (ref 1.01–1.02)
TOTAL NUCLEATED CELL COUNT, BODY FLUID: 3727 /UL — HIGH (ref 0–5)
TROPONIN I SERPL-MCNC: 0.1 NG/ML — HIGH (ref 0.01–0.04)
TUBE TYPE: SIGNIFICANT CHANGE UP
UROBILINOGEN FLD QL: NEGATIVE — SIGNIFICANT CHANGE UP
WBC # BLD: 21.04 K/UL — HIGH (ref 3.8–10.5)
WBC # FLD AUTO: 21.04 K/UL — HIGH (ref 3.8–10.5)

## 2018-07-21 PROCEDURE — 99291 CRITICAL CARE FIRST HOUR: CPT

## 2018-07-21 PROCEDURE — 74176 CT ABD & PELVIS W/O CONTRAST: CPT | Mod: 26

## 2018-07-21 PROCEDURE — 93010 ELECTROCARDIOGRAM REPORT: CPT

## 2018-07-21 PROCEDURE — 88108 CYTOPATH CONCENTRATE TECH: CPT | Mod: 26

## 2018-07-21 PROCEDURE — 88305 TISSUE EXAM BY PATHOLOGIST: CPT | Mod: 26

## 2018-07-21 PROCEDURE — 99223 1ST HOSP IP/OBS HIGH 75: CPT | Mod: AI,GC

## 2018-07-21 PROCEDURE — 71045 X-RAY EXAM CHEST 1 VIEW: CPT | Mod: 26

## 2018-07-21 RX ORDER — VANCOMYCIN HCL 1 G
VIAL (EA) INTRAVENOUS
Qty: 0 | Refills: 0 | Status: DISCONTINUED | OUTPATIENT
Start: 2018-07-21 | End: 2018-07-24

## 2018-07-21 RX ORDER — HEPARIN SODIUM 5000 [USP'U]/ML
5000 INJECTION INTRAVENOUS; SUBCUTANEOUS EVERY 8 HOURS
Qty: 0 | Refills: 0 | Status: DISCONTINUED | OUTPATIENT
Start: 2018-07-21 | End: 2018-07-24

## 2018-07-21 RX ORDER — NOREPINEPHRINE BITARTRATE/D5W 8 MG/250ML
0.05 PLASTIC BAG, INJECTION (ML) INTRAVENOUS
Qty: 8 | Refills: 0 | Status: DISCONTINUED | OUTPATIENT
Start: 2018-07-21 | End: 2018-07-24

## 2018-07-21 RX ORDER — PIPERACILLIN AND TAZOBACTAM 4; .5 G/20ML; G/20ML
3.38 INJECTION, POWDER, LYOPHILIZED, FOR SOLUTION INTRAVENOUS ONCE
Qty: 0 | Refills: 0 | Status: COMPLETED | OUTPATIENT
Start: 2018-07-21 | End: 2018-07-21

## 2018-07-21 RX ORDER — SODIUM CHLORIDE 9 MG/ML
1000 INJECTION INTRAMUSCULAR; INTRAVENOUS; SUBCUTANEOUS
Qty: 0 | Refills: 0 | Status: DISCONTINUED | OUTPATIENT
Start: 2018-07-21 | End: 2018-07-21

## 2018-07-21 RX ORDER — PIPERACILLIN AND TAZOBACTAM 4; .5 G/20ML; G/20ML
3.38 INJECTION, POWDER, LYOPHILIZED, FOR SOLUTION INTRAVENOUS EVERY 12 HOURS
Qty: 0 | Refills: 0 | Status: DISCONTINUED | OUTPATIENT
Start: 2018-07-22 | End: 2018-07-25

## 2018-07-21 RX ORDER — VANCOMYCIN HCL 1 G
1250 VIAL (EA) INTRAVENOUS ONCE
Qty: 0 | Refills: 0 | Status: COMPLETED | OUTPATIENT
Start: 2018-07-21 | End: 2018-07-21

## 2018-07-21 RX ORDER — MIDODRINE HYDROCHLORIDE 2.5 MG/1
10 TABLET ORAL EVERY 8 HOURS
Qty: 0 | Refills: 0 | Status: DISCONTINUED | OUTPATIENT
Start: 2018-07-21 | End: 2018-07-22

## 2018-07-21 RX ORDER — SODIUM CHLORIDE 9 MG/ML
1000 INJECTION INTRAMUSCULAR; INTRAVENOUS; SUBCUTANEOUS ONCE
Qty: 0 | Refills: 0 | Status: COMPLETED | OUTPATIENT
Start: 2018-07-21 | End: 2018-07-21

## 2018-07-21 RX ORDER — PIPERACILLIN AND TAZOBACTAM 4; .5 G/20ML; G/20ML
3.38 INJECTION, POWDER, LYOPHILIZED, FOR SOLUTION INTRAVENOUS EVERY 12 HOURS
Qty: 0 | Refills: 0 | Status: DISCONTINUED | OUTPATIENT
Start: 2018-07-21 | End: 2018-07-21

## 2018-07-21 RX ORDER — VANCOMYCIN HCL 1 G
1000 VIAL (EA) INTRAVENOUS EVERY 12 HOURS
Qty: 0 | Refills: 0 | Status: DISCONTINUED | OUTPATIENT
Start: 2018-07-21 | End: 2018-07-21

## 2018-07-21 RX ORDER — MONTELUKAST 4 MG/1
10 TABLET, CHEWABLE ORAL DAILY
Qty: 0 | Refills: 0 | Status: DISCONTINUED | OUTPATIENT
Start: 2018-07-21 | End: 2018-07-26

## 2018-07-21 RX ORDER — ACETAMINOPHEN 500 MG
650 TABLET ORAL ONCE
Qty: 0 | Refills: 0 | Status: COMPLETED | OUTPATIENT
Start: 2018-07-21 | End: 2018-07-21

## 2018-07-21 RX ORDER — ALBUMIN HUMAN 25 %
100 VIAL (ML) INTRAVENOUS ONCE
Qty: 0 | Refills: 0 | Status: COMPLETED | OUTPATIENT
Start: 2018-07-21 | End: 2018-07-21

## 2018-07-21 RX ORDER — VANCOMYCIN HCL 1 G
1000 VIAL (EA) INTRAVENOUS ONCE
Qty: 0 | Refills: 0 | Status: DISCONTINUED | OUTPATIENT
Start: 2018-07-21 | End: 2018-07-21

## 2018-07-21 RX ORDER — VANCOMYCIN HCL 1 G
1250 VIAL (EA) INTRAVENOUS EVERY 24 HOURS
Qty: 0 | Refills: 0 | Status: DISCONTINUED | OUTPATIENT
Start: 2018-07-22 | End: 2018-07-24

## 2018-07-21 RX ORDER — LEVOTHYROXINE SODIUM 125 MCG
225 TABLET ORAL DAILY
Qty: 0 | Refills: 0 | Status: DISCONTINUED | OUTPATIENT
Start: 2018-07-21 | End: 2018-07-26

## 2018-07-21 RX ADMIN — HEPARIN SODIUM 5000 UNIT(S): 5000 INJECTION INTRAVENOUS; SUBCUTANEOUS at 22:02

## 2018-07-21 RX ADMIN — Medication 166.67 MILLIGRAM(S): at 18:05

## 2018-07-21 RX ADMIN — PIPERACILLIN AND TAZOBACTAM 200 GRAM(S): 4; .5 INJECTION, POWDER, LYOPHILIZED, FOR SOLUTION INTRAVENOUS at 12:04

## 2018-07-21 RX ADMIN — Medication 650 MILLIGRAM(S): at 12:04

## 2018-07-21 RX ADMIN — SODIUM CHLORIDE 1000 MILLILITER(S): 9 INJECTION INTRAMUSCULAR; INTRAVENOUS; SUBCUTANEOUS at 12:00

## 2018-07-21 RX ADMIN — PIPERACILLIN AND TAZOBACTAM 3.38 GRAM(S): 4; .5 INJECTION, POWDER, LYOPHILIZED, FOR SOLUTION INTRAVENOUS at 13:05

## 2018-07-21 RX ADMIN — Medication 50 MILLILITER(S): at 20:30

## 2018-07-21 RX ADMIN — PIPERACILLIN AND TAZOBACTAM 25 GRAM(S): 4; .5 INJECTION, POWDER, LYOPHILIZED, FOR SOLUTION INTRAVENOUS at 23:07

## 2018-07-21 RX ADMIN — SODIUM CHLORIDE 1000 MILLILITER(S): 9 INJECTION INTRAMUSCULAR; INTRAVENOUS; SUBCUTANEOUS at 16:37

## 2018-07-21 RX ADMIN — SODIUM CHLORIDE 60 MILLILITER(S): 9 INJECTION INTRAMUSCULAR; INTRAVENOUS; SUBCUTANEOUS at 18:02

## 2018-07-21 RX ADMIN — Medication 8.63 MICROGRAM(S)/KG/MIN: at 15:53

## 2018-07-21 RX ADMIN — HEPARIN SODIUM 5000 UNIT(S): 5000 INJECTION INTRAVENOUS; SUBCUTANEOUS at 18:04

## 2018-07-21 RX ADMIN — MIDODRINE HYDROCHLORIDE 10 MILLIGRAM(S): 2.5 TABLET ORAL at 22:02

## 2018-07-21 RX ADMIN — SODIUM CHLORIDE 1000 MILLILITER(S): 9 INJECTION INTRAMUSCULAR; INTRAVENOUS; SUBCUTANEOUS at 15:07

## 2018-07-21 NOTE — H&P ADULT - NSHPREVIEWOFSYSTEMS_GEN_ALL_CORE
Constitutional: + fever, chills, general malaise; Denies weight loss, weight gain, diaphoresis   HEENT: Denies sore throat, runny nose, photophobia, blurry vision, double vision, eye pain, difficulty hearing, dizziness, dysphagia, epistaxis  Respiratory: Denies shortness of breath, dyspnea on exertion, cough, sputum production, wheezing, hemoptysis  Cardiovascular: + edema; Denies chest pain, palpitations  Gastrointestinal: Denies nausea, vomiting, diarrhea, constipation, abdominal pain, melena, hematochezia   Genitourinary: Denies dysuria, hematuria, frequency, urgency, incontinence  Skin/Breast: Denies rash, hives, itching  Musculoskeletal: Denies muscle pains, muscle weakness, joint pain or swelling  Neurologic: + weakness, confusion; Denies syncope, loss of consciousness, headache,  dizziness, paresthesias, numbness, tingling,  dementia   Psychiatric: Denies feeling anxious, depressed, suicidal, or homicidal thoughts  Endocrine: Denies cold or heat intolerance, polydipsia, polyphagia   Hematology/Oncology: Denies abnormal bruising, tender or enlarged lymph nodes   ROS negative except as noted above

## 2018-07-21 NOTE — H&P ADULT - PROBLEM SELECTOR PLAN 8
IMPROVE VTE Individual Risk Assessment        RISK                                                          Points  [  ] Previous VTE                                                3  [  ] Thrombophilia                                             2  [  ] Lower limb paralysis                                   2        (unable to hold up >15 seconds)    [x  ] Current Cancer                                            2         (within 6 months)  [  ] Immobilization > 24 hrs                              1  [ x ] ICU/CCU stay > 24 hours                            1  [ x ] Age > 60                                                    1  IMPROVE VTE Score __4___    Heparin for DVT prophylaxis

## 2018-07-21 NOTE — ED PROVIDER NOTE - CRITICAL CARE INDICATION, MLM
patient was critically ill... Patient was critically ill with a high probability of imminent or life threatening deterioration. sepsis

## 2018-07-21 NOTE — ED PROVIDER NOTE - PROGRESS NOTE DETAILS
labs w/ marked leukocytosis, given ivf, iv abx, cxr neg, ua not significant, ct abd/pelvis unremarkable, ICu consulted, will admit, Sx consulted, no surgical intervention required at this time, pt admitted for further eval and mgmt

## 2018-07-21 NOTE — ED PROVIDER NOTE - PHYSICAL EXAMINATION
GEN: awake, alert, ill/weak appearing, febrile   HENT: atraumatic, normocephalic, BELIA, EOMI, no midline instability, oropharynx w/o erythema or exudates, no lymphadenopathy  CV: normal rate and rhythm, S1, S2, no MRG, equal pulses throughout, no JVD  RESP: no distress, no IWOB, no retraction, end exp wheezing   ABD: soft, nontender, distended, no rebound, no guarding, normoactive bowel sounds, no organomegally  MUSCULOSKELETAL: strenght 5/5 x 4, full range of motion, CMS intact   SKIN: normal color, no turgor, no wounds or rash   NEURO: Awake alert oriented x 3, no facial asymmetry, no slurred speech, no pronator drift, moving all extremities  PSYCH: no suicial ideation, no homicidal ideation, no depression, no anxiety, no hallucination

## 2018-07-21 NOTE — CONSULT NOTE ADULT - ASSESSMENT
89 y/o female with pmhx of ovarian cancer with abdominal metastasis and ascites, colon cancer s/p colostomy, CHF, afib s/p PPM, HTN, hypothyroidism, and asthma with recent repair of ventral hernia now with shock secondary to sepsis vs cancer and also BRITTANEY. At this time patient requires admission to ICU for hemodynamic monitoring and titration of pressors. Given her extensive medical history and current clinical condition she is at high risk for cardiovascular decompensation.    NEURO: given that patient had brief episode of confusion as per granddaughter, would have low threshold to order CT scan if neuro status deteriorates. avoid deleriogenic medications.   CVS: patient started on levophed. titrate to goal MAP >65. continue maintenance fluids.   PULM: continue 2 L NC but will try to wean off given SaO2 of 100%.   GI: consider paracentesis when patient is more stable.  RENAL: BRITTANEY (cr 2.5 from previous 1.1 a month ago). was straight cathed in ED with 100 cc output. continue gentle hydration and trend bun/cr. place roth for strict I&O. avoid nephrotoxic agents. trend lytes. no urgent need for HD at this time.  ID: received 2 liters in ED. continue gentle hydration given history of CHF. started on vancomycin and zosyn. trend lactate. no obvious source of infection at this time. Possible SBP? will consider tapping fluid when patient is more stable. She was next scheduled for paracentesis with Dr. Jenkins on 7/30. Vanco trough in AM. blood cultures and urine cultures pending.  PROPH: UFH 5000 units subq q8 hours for DVT prophylaxis. SCDs      Goals of care was discussed with patient, who deferred to her granddaughter. Granddaughter states she believes herself and her mom (who is away on vacation) are patient's health care proxy and she believes that patient had a DNR in her last hospital stay but she is not positive. Chart has no mention of it that I could find. 89 y/o female with pmhx of ovarian cancer with abdominal metastasis and ascites, colon cancer s/p colostomy, CHF, afib s/p PPM, HTN, hypothyroidism, and asthma with recent repair of ventral hernia now with shock secondary to sepsis vs cancer and also BRITTANEY. At this time patient requires admission to ICU for hemodynamic monitoring and titration of pressors. Given her extensive medical history and current clinical condition she is at high risk for cardiovascular decompensation.    NEURO: given that patient had brief episode of confusion as per granddaughter, would have low threshold to order CT scan if neuro status deteriorates. avoid deleriogenic medications.   CVS: patient started on levophed. titrate to goal MAP >65. continue maintenance fluids.   PULM: continue 2 L NC but will try to wean off given SaO2 of 100%.   GI: consider paracentesis when patient is more stable.  RENAL: BRITTANEY (cr 2.5 from previous 1.1 a month ago). was straight cathed in ED with 100 cc output. continue gentle hydration and trend bun/cr. place roth for strict I&O. avoid nephrotoxic agents. trend lytes. no urgent need for HD at this time.  ID: received 2 liters in ED. continue gentle hydration given history of CHF. started on vancomycin and zosyn. trend lactate. no obvious source of infection at this time. Possible SBP? will consider tapping fluid when patient is more stable. She was next scheduled for paracentesis with Dr. Jenkins on 7/30. Vanco trough in AM. blood cultures and urine cultures pending. ID consulted by ED  PROPH: UFH 5000 units subq q8 hours for DVT prophylaxis. SCDs      Goals of care was discussed with patient, who deferred to her granddaughter. Granddaughter states she believes herself and her mom (who is away on vacation) are patient's health care proxy and she believes that patient had a DNR in her last hospital stay but she is not positive. Chart has no mention of it that I could find.

## 2018-07-21 NOTE — H&P ADULT - ATTENDING COMMENTS
89 y/o F PMHx ovarian cancer w/ recurrent ascites, colon ca s/p colostomy, s/p incarcerated ventral hernia repair 10/2017, DM2, HTN, afib s/p PPM, hypothyroid, obesity, and overactive bladder presents with generalized weakness x 2 days; now with septic shock admitted to ICU.  Pt on levophed fro hypotension, c/w IVF, IV antibx, f/u with ID consult called .    PMD, notified.

## 2018-07-21 NOTE — ED ADULT NURSE NOTE - PMH
Afib  s/p PPM  Ascites, malignant  from advanced ovarian cancer  Asthma    Colon cancer    DM Type 2 (Diabetes Mellitus, Type 2)    HTN (Hypertension)    Hypothyroidism    Obese    Ovarian cancer    Overactive Bladder

## 2018-07-21 NOTE — H&P ADULT - HISTORY OF PRESENT ILLNESS
89 y/o F PMHx ovarian cancer w/ recurrent ascites, colon ca s/p colostomy, s/p incarcerated ventral hernia repair 10/2017, DM2, HTN, afib s/p PPM, hypothyroid, obesity, and overactive bladder presents with generalized weakness x 2 days.  Patient reports that she began having chills 2 days ago, and bundled up in a chair for a nap, and then was not strong enough to get out of the chair on her own.  Per granddaughter patient has been confused, and was weaker than normal the evening before admission, and decided to call EMS.    In ED patient is hypotensive (70/35), febrile (100.8), SpO2 100% on RA.  Labs significant for WBC 21.04, Hbg 9.7, Hct 30.9, Plt 447, Bands 33%, Lactate 4.2 (repeat 4.3), Crt 2.5, Gfr 16, Trop 0.101; CT ab/pelv shows moderate ascites similar to prior study.  Patient received Vanco/Zosyn, 2L NS bolus; tylenol 650mg x1.

## 2018-07-21 NOTE — H&P ADULT - NSHPPHYSICALEXAM_GEN_ALL_CORE
Physical Exam:  General: Well developed, well nourished, No Acute Distress  HEENT: Normocephallic Atraumatic, PERRLA, EOMI bl, dry mucous membranes   Neck: Supple, nontender, no mass  Neurology: AA&Ox3, CN II-XII grossly intact, sensation intact  Respiratory: Clear To Auscultation B/L, No Wheezes, rhonchi or rales  CV: Regular Rate and Rhythm, +S1/S2, no murmurs, rubs or gallops  Abdominal: Soft, Non-Tender, Distended +Bowel Soundsx4; colostomy in place  Extremities: chronic lymphedema bilaterally: + peripheral pulses  Musculoskeletal: Normal Range of motion, no joint erythema or warmth, no joint swelling   Skin: warm, dry, normal color, no rash or abnormal lesions

## 2018-07-21 NOTE — H&P ADULT - NSHPSOCIALHISTORY_GEN_ALL_CORE
Patient lives alone, ambulates unassisted, and performs ADL's.  She has a  that helps with chores.  She denies smoking, alcohol use, illicit substance use.

## 2018-07-21 NOTE — ED ADULT NURSE NOTE - OBJECTIVE STATEMENT
This is a 89 y/o female coming from home c/o weakness today associated with fever. respiration  even unlabored lung field clear bilaterally will monitor support and safety maintained.

## 2018-07-21 NOTE — H&P ADULT - ASSESSMENT
89 y/o F PMHx ovarian cancer w/ recurrent ascites, colon ca s/p colostomy, s/p incarcerated ventral hernia repair 10/2017, DM2, HTN, afib s/p PPM, hypothyroid, obesity, and overactive bladder presents with generalized weakness x 2 days; now with septic shock admitted to ICU.

## 2018-07-21 NOTE — ED PROVIDER NOTE - MEDICAL DECISION MAKING DETAILS
89yo F h/o ovarian CA malignant ascites, colon CA s/p colostomy, afib, htn, asthma, recent sx for incarcerated ventral hernia p/w generalized weakness x 1-2 days associated w/ decreased po intake. denies f/c/n/v/d/cp/sob/cough/abd pain.

## 2018-07-21 NOTE — PROCEDURE NOTE - ADDITIONAL PROCEDURE DETAILS
patient tolerated procedure well. 5 L of cloudy yellow, fluid aspirated with samples sent to lab for culture and cell count.       Procedure time is independent of any other documented critical care time.

## 2018-07-21 NOTE — PROCEDURE NOTE - NSPROCDETAILS_GEN_ALL_CORE
ultrasound utilization/location identified, sterile technique used, catheter introduced, fluid drained/sterile dressing applied

## 2018-07-21 NOTE — H&P ADULT - PROBLEM SELECTOR PLAN 1
- admit to ICU: unknown source for sepsis  - levophed titrate to MAP >65: management per ICU team  - f/u blood, urine and paracentesis cultures  - s/p 2L in ED  - continue vanco/zosyn  - trend lactate: f/u CBC/CMP

## 2018-07-21 NOTE — ED PROVIDER NOTE - NS ED ROS FT
GEN: no fever, no chills, +weakness  HENT: no eye pain, no visual changes, no ear pain, no visual or hearing changes, no sore throat, no swelling or neck pain  CV: no chest pain, no palpitations, no dizziness, no swelling  RESP: no coughing, no sob, no IWOB, no THOMAS  GI: no abd pain, no distension, no nausea, no vomiting, no diarrhea, no constipation  : no dysuria,  no frequency, no hematuria, no discharge, no flank pain  MUSCULOSKELETAL: no myalgia, no arthralgia, no joint swelling, no bruising   SKIN: no rash, no wounds, no itching  NEURO: no change in mentation, no visual changes, no HA, no focal weakness, no trouble speaking, no gait abnormalities, no dizziness  PSYCH: no suidical ideation, no homicidal ideation, no depression, no anxiety, no hallucinations

## 2018-07-21 NOTE — CONSULT NOTE ADULT - SUBJECTIVE AND OBJECTIVE BOX
Patient is a 89y old  Female who presents with a chief complaint of     BRIEF HOSPITAL COURSE: 89 y/o female with pmhx of ovarian cancer with abdominal metastasis and ascites, colon cancer s/p colostomy,         PAST MEDICAL & SURGICAL HISTORY:  Ovarian cancer  Afib: s/p PPM  Ascites, malignant: from advanced ovarian cancer  Colon cancer  Obese  Hypothyroidism  HTN (Hypertension)  Overactive Bladder  DM Type 2 (Diabetes Mellitus, Type 2)  Asthma  Incarcerated hernia: s/p repair 10/2017  H/O hernia repair  Artificial pacemaker  S/P colon resection: with sigmoid colostomy for obstructing ovarian cancer  S/P Cataract Surgery: CASI  History of Tonsillectomy  History of Appendectomy  Status Post Total Knee Replacement: Left    Allergies    No Known Allergies    Intolerances      FAMILY HISTORY:  No pertinent family history in first degree relatives      Family history otherwise noncontributory.      Review of Systems:  CONSTITUTIONAL: No fever, chills, or fatigue  EYES: No eye pain, visual disturbances, or discharge  ENMT:  No difficulty hearing, tinnitus, vertigo; No sinus or throat pain  NECK: No pain or stiffness  RESPIRATORY: No cough, wheezing, chills or hemoptysis; No shortness of breath  CARDIOVASCULAR: No chest pain, palpitations, dizziness, or leg swelling  GASTROINTESTINAL: No abdominal or epigastric pain. No nausea, vomiting, or hematemesis; No diarrhea or constipation. No melena or hematochezia.  GENITOURINARY: No dysuria, frequency, hematuria, or incontinence  NEUROLOGICAL: No headaches, memory loss, loss of strength, numbness, or tremors  SKIN: No itching, burning, rashes, or lesions   MUSCULOSKELETAL: No joint pain or swelling; No muscle, back, or extremity pain  PSYCHIATRIC: No depression, anxiety, mood swings, or difficulty sleeping    All other review of systems negative except as mentioned in HPI.      Social History: former social smoker quit about 40 years ago. Denies etoh abuse and illicit drug use. lives at home ambulates with walker. no nursing aide just family support.       Medications:  vancomycin  IVPB 1000 milliGRAM(s) IV Intermittent once    norepinephrine Infusion 0.05 MICROgram(s)/kG/Min IV Continuous <Continuous>          heparin  Injectable 5000 Unit(s) SubCutaneous every 8 hours          sodium chloride 0.9%. 1000 milliLiter(s) IV Continuous <Continuous>  sodium chloride 0.9%. 1000 milliLiter(s) IV Continuous <Continuous>                ICU Vital Signs Last 24 Hrs  T(C): 37.2 (2018 15:47), Max: 38.2 (2018 10:37)  T(F): 99 (2018 15:47), Max: 100.8 (2018 10:37)  HR: 78 (2018 13:59) (78 - 83)  BP: 90/37 (2018 15:47) (70/35 - 90/78)  BP(mean): --  ABP: --  ABP(mean): --  RR: 18 (2018 15:47) (15 - 18)  SpO2: 100% (2018 15:47) (100% - 100%)    Vital Signs Last 24 Hrs  T(C): 37.2 (2018 15:47), Max: 38.2 (2018 10:37)  T(F): 99 (2018 15:47), Max: 100.8 (2018 10:37)  HR: 78 (2018 13:59) (78 - 83)  BP: 90/37 (2018 15:47) (70/35 - 90/78)  BP(mean): --  RR: 18 (2018 15:47) (15 - 18)  SpO2: 100% (2018 15:47) (100% - 100%)        I&O's Detail        LABS:                        9.7    21.04 )-----------( 441      ( 2018 11:51 )             30.9     07-21    139  |  101  |  49<H>  ----------------------------<  107<H>  5.0   |  26  |  2.50<H>    Ca    8.2<L>      2018 11:51  Mg     2.3     07-21    TPro  6.9  /  Alb  2.2<L>  /  TBili  0.5  /  DBili  x   /  AST  24  /  ALT  15  /  AlkPhos  81  07-21      CARDIAC MARKERS ( 2018 11:51 )  .101 ng/mL / x     / x     / x     / <1.0 ng/mL      CAPILLARY BLOOD GLUCOSE        PT/INR - ( 2018 11:51 )   PT: 20.5 sec;   INR: 1.86 ratio         PTT - ( 2018 11:51 )  PTT:33.3 sec  Urinalysis Basic - ( 2018 12:02 )    Color: Yellow / Appearance: Slightly Turbid / S.025 / pH: x  Gluc: x / Ketone: Negative  / Bili: Small / Urobili: Negative   Blood: x / Protein: 25 mg/dL / Nitrite: Negative   Leuk Esterase: Trace / RBC: 0-2 /HPF / WBC 0-2   Sq Epi: x / Non Sq Epi: Occasional / Bacteria: Moderate      CULTURES:      Physical Examination:    GENERAL: No acute distress.      EYES: Pupils equal, reactive to light.  Symmetric.    EARS, NOSE, THROAT: Normal; supple neck, no lymphadenopathy; trachea midline    PULM: Clear to auscultation bilaterally, no significant sputum production. No use of accessory respiratory muscles.    CVS: Regular rate and rhythm, no murmurs, rubs, or gallops    GI: Soft, nondistended, nontender, normoactive bowel sounds, no masses, no guarding    EXTREMITIES: No edema. DP and radial pulses 2+ bilaterally.    SKIN: Warm and well perfused, no rashes noted.    NEURO: Alert, oriented, interactive, nonfocal    DEVICES:     RADIOLOGY: ***    CRITICAL CARE TIME SPENT: *** Patient is a 89y old  Female who presents with a chief complaint of     BRIEF HOSPITAL COURSE: 89 y/o female with pmhx of ovarian cancer with abdominal metastasis and ascites, colon cancer s/p colostomy, CHF, afib s/p PPM, HTN, hypothyroidism, and asthma with recent repair of ventral hernia presents to ED BIB EMS after granddaughter noticed her to be weaker than usual which she noticed last night around 9:30 pm. She also noticed patient to be a little confused and that was when she called EMS to bring her to ER. In ED, patient was hypotensive with lowest bp 70/35. She was given 2 liters of NS and had minimal improvement in BP. CT abdomen/pelvis, chest xray, and UA were all benign. Patient states she is feeling fine other than some fatigue and chills. Patient comes to [facility] approximately once every 3-4 weeks for a therapeutic paracentesis by Dr. Jenkins.    Denies chest pain, headache, SOB, cough, dysuria, fever, trauma, hematuria, abdominal pain.         PAST MEDICAL & SURGICAL HISTORY:  Ovarian cancer  Afib: s/p PPM  Ascites, malignant: from advanced ovarian cancer  Colon cancer  Obese  Hypothyroidism  HTN (Hypertension)  Overactive Bladder  DM Type 2 (Diabetes Mellitus, Type 2)  Asthma  Incarcerated hernia: s/p repair 10/2017  H/O hernia repair  Artificial pacemaker  S/P colon resection: with sigmoid colostomy for obstructing ovarian cancer  S/P Cataract Surgery: CASI  History of Tonsillectomy  History of Appendectomy  Status Post Total Knee Replacement: Left    Allergies    No Known Allergies    Intolerances      FAMILY HISTORY:  No pertinent family history in first degree relatives      Family history otherwise noncontributory.      Review of Systems:  CONSTITUTIONAL: + fever, chills. No fever.  EYES: No eye pain, visual disturbances, or discharge  ENMT:  No difficulty hearing, tinnitus, vertigo; No sinus or throat pain  NECK: No pain or stiffness  RESPIRATORY: No cough, wheezing, chills or hemoptysis; No shortness of breath  CARDIOVASCULAR: No chest pain, palpitations, dizziness, or leg swelling  GASTROINTESTINAL: No abdominal or epigastric pain. No nausea, vomiting, or hematemesis; No diarrhea or constipation. No melena or hematochezia.  GENITOURINARY: No dysuria, frequency, hematuria, or incontinence  NEUROLOGICAL: No headaches, memory loss, loss of strength, numbness, or tremors  SKIN: No itching, burning, rashes, or lesions   MUSCULOSKELETAL: No joint pain or swelling; No muscle, back, or extremity pain  PSYCHIATRIC: No depression, anxiety, mood swings, or difficulty sleeping    All other review of systems negative except as mentioned in HPI.      Social History: former social smoker quit about 40 years ago. Denies etoh abuse and illicit drug use. lives at home ambulates with walker. no nursing aide just family support.       Medications:  vancomycin  IVPB 1000 milliGRAM(s) IV Intermittent once    norepinephrine Infusion 0.05 MICROgram(s)/kG/Min IV Continuous <Continuous>          heparin  Injectable 5000 Unit(s) SubCutaneous every 8 hours          sodium chloride 0.9%. 1000 milliLiter(s) IV Continuous <Continuous>  sodium chloride 0.9%. 1000 milliLiter(s) IV Continuous <Continuous>                ICU Vital Signs Last 24 Hrs  T(C): 37.2 (2018 15:47), Max: 38.2 (2018 10:37)  T(F): 99 (2018 15:47), Max: 100.8 (2018 10:37)  HR: 78 (2018 13:59) (78 - 83)  BP: 90/37 (2018 15:47) (70/35 - 90/78)  BP(mean): --  ABP: --  ABP(mean): --  RR: 18 (2018 15:47) (15 - 18)  SpO2: 100% (2018 15:47) (100% - 100%)    Vital Signs Last 24 Hrs  T(C): 37.2 (2018 15:47), Max: 38.2 (2018 10:37)  T(F): 99 (2018 15:47), Max: 100.8 (2018 10:37)  HR: 78 (2018 13:59) (78 - 83)  BP: 90/37 (2018 15:47) (70/35 - 90/78)  BP(mean): --  RR: 18 (2018 15:47) (15 - 18)  SpO2: 100% (2018 15:47) (100% - 100%)        I&O's Detail        LABS:                        9.7    21.04 )-----------( 441      ( 2018 11:51 )             30.9     07-21    139  |  101  |  49<H>  ----------------------------<  107<H>  5.0   |  26  |  2.50<H>    Ca    8.2<L>      2018 11:51  Mg     2.3         TPro  6.9  /  Alb  2.2<L>  /  TBili  0.5  /  DBili  x   /  AST  24  /  ALT  15  /  AlkPhos  81        CARDIAC MARKERS ( 2018 11:51 )  .101 ng/mL / x     / x     / x     / <1.0 ng/mL      CAPILLARY BLOOD GLUCOSE        PT/INR - ( 2018 11:51 )   PT: 20.5 sec;   INR: 1.86 ratio         PTT - ( 2018 11:51 )  PTT:33.3 sec  Urinalysis Basic - ( 2018 12:02 )    Color: Yellow / Appearance: Slightly Turbid / S.025 / pH: x  Gluc: x / Ketone: Negative  / Bili: Small / Urobili: Negative   Blood: x / Protein: 25 mg/dL / Nitrite: Negative   Leuk Esterase: Trace / RBC: 0-2 /HPF / WBC 0-2   Sq Epi: x / Non Sq Epi: Occasional / Bacteria: Moderate      CULTURES:      Physical Examination:    GENERAL: No acute distress. resting comfortably in bed.    EYES: Pupils equal, reactive to light.  Symmetric. EOMs intact.    EARS, NOSE, THROAT: Normal; supple neck, no lymphadenopathy; trachea midline    PULM: Clear to auscultation bilaterally, no significant sputum production. No use of accessory respiratory muscles. SaO2 100% on 2 L NC    CVS: Regular rate and rhythm, no murmurs, rubs, or gallops    GI: significant ascites. colostomy stoma is pink and does not appear infected. brown stool in colostomy bag. Soft, nontender, normoactive bowel sounds, no masses, no guarding    EXTREMITIES: No edema. DP and radial pulses 2+ bilaterally.    SKIN: incision site from ventral hernia clean with no discharge and healing well. No discharge or erythema at incision site or site of paracentesis. Warm and well perfused, no rashes noted.    NEURO: Alert and oriented x 3. strength 5/5 in bilateral upper and lower extremities. no facial droop. equal sensation bilaterally. PERRLA. no tongue deviation.  strength 5/5 bilaterally.     DEVICES: 1 peripheral IV    RADIOLOGY:   EXAM:  CT ABDOMEN AND PELVIS                            PROCEDURE DATE:  2018          INTERPRETATION:  CT ABDOMEN AND PELVIS    CLINICAL INFORMATION:  Weakness. Decreased p.o. intake. History of   ovarian cancer and colon cancer. Prior ventral hernia repair.    PROCEDURE:  Using multislice helical CT, without intravenous or oral   contrast 2.5 mm sections were obtained from the domes of the diaphragm to   the ischial tuberosities.  Coronal reformatted images were performed.    COMPARISON: CT scan abdomen and pelvis 2018.    FINDINGS: Evaluation of the solid organ parenchyma is limited by the lack   of intravenous contrast.      There is a small partially loculated left-sided pleural effusion.    There is a heterogeneous appearance of the nonenhanced liver, evaluation   for metastatic disease is limited without intravenous contrast.    The spleen, adrenal glands and nonenhanced pancreas are unremarkable in   appearance.    Cholelithiasis with probable mild gallbladder wall thickening is again   noted.    There is a moderate volume of abdominal and pelvic ascites, similar to   prior study.  Nodularity along the peritoneal omental and mesenteric surfaces is   suggestive of peritoneal carcinomatosis.    No hydroureteronephrosis is noted.    There is arteriosclerotic calcification of the abdominal aorta.  There are enlarged retroperitoneal para-aortic and aortocaval lymph nodes.    The evaluation of the stomach and gastrointestinal tract is limited   without distention.  Again noted is segmental sigmoid resection with left-sided colostomy with   large parastomal hernia containing nondistended, nonobstructed colon and   ascites.  There is mild infiltration of the subcutaneous soft tissues along the   anterior abdominalwall, likely at the site of prior ventral repair.   There is a moderately distended fluid and particulate filled cecum, with   some moderately distended small bowel loops and probable segmental bowel   wall thickening. The evaluation of the bowel wall is limited without   intravenous contrast. Note discrete change of caliber is noted.    No free intraperitoneal air is noted.    The urinary bladder is nondistended.  No pelvic mass is noted.    Bilateral inguinal lymph nodes are noted.    There aremultilevel degenerative changes of the spine.    Impression:    Moderate volume of abdominal pelvic ascites, similar to prior study.   Peritoneal and mesenteric nodularity suggestive of carcinomatosis.    Large left-sided parastomal hernia, similar in appearance to prior study.    Mildly distended, probably subsegmental thickened small bowel, without   discrete change in caliber, no high-grade small bowel obstruction noted.    Other findings as discussed above.          YESSY MALIK M.D., ATTENDING RADIOLOGIST  This document has been electronically signed. 2018  2:16PM        EXAM:  XR CHEST PORTABLE URGENT 1V                            PROCEDURE DATE:  2018          INTERPRETATION:  CLINICAL INFORMATION: Weakness.    A single portable view of the chest was obtained.     Comparison: 2018.     The mediastinal cardiac silhouette is unremarkable. Cardiac pacemaker.    Small left pleural effusion.    No acute osseous finding. Scoliosis.    IMPRESSION:    Small left pleural effusion.          BLAYNE ABURTO M.D., ATTENDING RADIOLOGIST  This document has been electronically signed. 2018 11:07AM                  CRITICAL CARE TIME SPENT: 40 minutes of critical care time spent providing medical care for patient's acute illness/conditions that impairs at least one vital organ system and/or poses a high risk of imminent or life threatening deterioration in the patient's condition. It includes time spent evaluating and treating the patient's acute illness as well as time spent reviewing labs, radiology, discussing goals of care with patient and/or patient's family, and discussing the case with a multidisciplinary team in an effort to prevent further life threatening deterioration or end organ damage. This time is independent of any procedures performed.

## 2018-07-22 DIAGNOSIS — K65.2 SPONTANEOUS BACTERIAL PERITONITIS: ICD-10-CM

## 2018-07-22 DIAGNOSIS — C56.9 MALIGNANT NEOPLASM OF UNSPECIFIED OVARY: ICD-10-CM

## 2018-07-22 LAB
ALBUMIN FLD-MCNC: 1.9 G/DL — SIGNIFICANT CHANGE UP
ALBUMIN SERPL ELPH-MCNC: 2.1 G/DL — LOW (ref 3.3–5)
ALP SERPL-CCNC: 79 U/L — SIGNIFICANT CHANGE UP (ref 40–120)
ALT FLD-CCNC: 17 U/L — SIGNIFICANT CHANGE UP (ref 12–78)
ANION GAP SERPL CALC-SCNC: 8 MMOL/L — SIGNIFICANT CHANGE UP (ref 5–17)
ANION GAP SERPL CALC-SCNC: 8 MMOL/L — SIGNIFICANT CHANGE UP (ref 5–17)
AST SERPL-CCNC: 33 U/L — SIGNIFICANT CHANGE UP (ref 15–37)
BILIRUB SERPL-MCNC: 0.5 MG/DL — SIGNIFICANT CHANGE UP (ref 0.2–1.2)
BUN SERPL-MCNC: 51 MG/DL — HIGH (ref 7–23)
BUN SERPL-MCNC: 53 MG/DL — HIGH (ref 7–23)
CALCIUM SERPL-MCNC: 7.8 MG/DL — LOW (ref 8.5–10.1)
CALCIUM SERPL-MCNC: 7.9 MG/DL — LOW (ref 8.5–10.1)
CHLORIDE SERPL-SCNC: 105 MMOL/L — SIGNIFICANT CHANGE UP (ref 96–108)
CHLORIDE SERPL-SCNC: 105 MMOL/L — SIGNIFICANT CHANGE UP (ref 96–108)
CO2 SERPL-SCNC: 28 MMOL/L — SIGNIFICANT CHANGE UP (ref 22–31)
CO2 SERPL-SCNC: 28 MMOL/L — SIGNIFICANT CHANGE UP (ref 22–31)
CREAT SERPL-MCNC: 2.1 MG/DL — HIGH (ref 0.5–1.3)
CREAT SERPL-MCNC: 2.2 MG/DL — HIGH (ref 0.5–1.3)
FLUID SEGMENTED GRANULOCYTES: 78 % — SIGNIFICANT CHANGE UP
GLUCOSE FLD-MCNC: 101 MG/DL — SIGNIFICANT CHANGE UP
GLUCOSE SERPL-MCNC: 118 MG/DL — HIGH (ref 70–99)
GLUCOSE SERPL-MCNC: 170 MG/DL — HIGH (ref 70–99)
GRAM STN FLD: SIGNIFICANT CHANGE UP
HCT VFR BLD CALC: 33.9 % — LOW (ref 34.5–45)
HGB BLD-MCNC: 10.6 G/DL — LOW (ref 11.5–15.5)
LACTATE SERPL-SCNC: 1.6 MMOL/L — SIGNIFICANT CHANGE UP (ref 0.7–2)
LDH SERPL L TO P-CCNC: 104 U/L — SIGNIFICANT CHANGE UP
LYMPHOCYTES # FLD: 7 % — SIGNIFICANT CHANGE UP
MAGNESIUM SERPL-MCNC: 2.3 MG/DL — SIGNIFICANT CHANGE UP (ref 1.6–2.6)
MCHC RBC-ENTMCNC: 25.4 PG — LOW (ref 27–34)
MCHC RBC-ENTMCNC: 31.3 GM/DL — LOW (ref 32–36)
MCV RBC AUTO: 81.1 FL — SIGNIFICANT CHANGE UP (ref 80–100)
MONOS+MACROS # FLD: 15 % — SIGNIFICANT CHANGE UP
MRSA PCR RESULT.: SIGNIFICANT CHANGE UP
NIGHT BLUE STAIN TISS: SIGNIFICANT CHANGE UP
NRBC # BLD: 0 /100 WBCS — SIGNIFICANT CHANGE UP (ref 0–0)
PHOSPHATE SERPL-MCNC: 4.5 MG/DL — SIGNIFICANT CHANGE UP (ref 2.5–4.5)
PLATELET # BLD AUTO: 503 K/UL — HIGH (ref 150–400)
POTASSIUM SERPL-MCNC: 5.1 MMOL/L — SIGNIFICANT CHANGE UP (ref 3.5–5.3)
POTASSIUM SERPL-MCNC: 5.6 MMOL/L — HIGH (ref 3.5–5.3)
POTASSIUM SERPL-SCNC: 5.1 MMOL/L — SIGNIFICANT CHANGE UP (ref 3.5–5.3)
POTASSIUM SERPL-SCNC: 5.6 MMOL/L — HIGH (ref 3.5–5.3)
PROT FLD-MCNC: 3.8 G/DL — SIGNIFICANT CHANGE UP
PROT SERPL-MCNC: 6.3 G/DL — SIGNIFICANT CHANGE UP (ref 6–8.3)
RBC # BLD: 4.18 M/UL — SIGNIFICANT CHANGE UP (ref 3.8–5.2)
RBC # FLD: 17.6 % — HIGH (ref 10.3–14.5)
S AUREUS DNA NOSE QL NAA+PROBE: SIGNIFICANT CHANGE UP
SODIUM SERPL-SCNC: 141 MMOL/L — SIGNIFICANT CHANGE UP (ref 135–145)
SODIUM SERPL-SCNC: 141 MMOL/L — SIGNIFICANT CHANGE UP (ref 135–145)
SPECIMEN SOURCE: SIGNIFICANT CHANGE UP
SPECIMEN SOURCE: SIGNIFICANT CHANGE UP
VANCOMYCIN TROUGH SERPL-MCNC: 9.7 UG/ML — LOW (ref 10–20)
WBC # BLD: 26.48 K/UL — HIGH (ref 3.8–10.5)
WBC # FLD AUTO: 26.48 K/UL — HIGH (ref 3.8–10.5)

## 2018-07-22 PROCEDURE — 99291 CRITICAL CARE FIRST HOUR: CPT

## 2018-07-22 PROCEDURE — 99233 SBSQ HOSP IP/OBS HIGH 50: CPT

## 2018-07-22 RX ORDER — MIDODRINE HYDROCHLORIDE 2.5 MG/1
5 TABLET ORAL ONCE
Qty: 0 | Refills: 0 | Status: COMPLETED | OUTPATIENT
Start: 2018-07-22 | End: 2018-07-22

## 2018-07-22 RX ORDER — MIDODRINE HYDROCHLORIDE 2.5 MG/1
10 TABLET ORAL EVERY 8 HOURS
Qty: 0 | Refills: 0 | Status: DISCONTINUED | OUTPATIENT
Start: 2018-07-22 | End: 2018-07-23

## 2018-07-22 RX ORDER — NITROGLYCERIN 6.5 MG
3 CAPSULE, EXTENDED RELEASE ORAL ONCE
Qty: 0 | Refills: 0 | Status: COMPLETED | OUTPATIENT
Start: 2018-07-22 | End: 2018-07-22

## 2018-07-22 RX ADMIN — MONTELUKAST 10 MILLIGRAM(S): 4 TABLET, CHEWABLE ORAL at 12:24

## 2018-07-22 RX ADMIN — PIPERACILLIN AND TAZOBACTAM 25 GRAM(S): 4; .5 INJECTION, POWDER, LYOPHILIZED, FOR SOLUTION INTRAVENOUS at 23:18

## 2018-07-22 RX ADMIN — HEPARIN SODIUM 5000 UNIT(S): 5000 INJECTION INTRAVENOUS; SUBCUTANEOUS at 22:20

## 2018-07-22 RX ADMIN — Medication 225 MICROGRAM(S): at 05:02

## 2018-07-22 RX ADMIN — HEPARIN SODIUM 5000 UNIT(S): 5000 INJECTION INTRAVENOUS; SUBCUTANEOUS at 05:02

## 2018-07-22 RX ADMIN — HEPARIN SODIUM 5000 UNIT(S): 5000 INJECTION INTRAVENOUS; SUBCUTANEOUS at 13:59

## 2018-07-22 RX ADMIN — PIPERACILLIN AND TAZOBACTAM 25 GRAM(S): 4; .5 INJECTION, POWDER, LYOPHILIZED, FOR SOLUTION INTRAVENOUS at 12:24

## 2018-07-22 RX ADMIN — Medication 3 INCH(S): at 22:19

## 2018-07-22 RX ADMIN — Medication 166.67 MILLIGRAM(S): at 17:09

## 2018-07-22 RX ADMIN — MIDODRINE HYDROCHLORIDE 10 MILLIGRAM(S): 2.5 TABLET ORAL at 13:59

## 2018-07-22 RX ADMIN — MIDODRINE HYDROCHLORIDE 10 MILLIGRAM(S): 2.5 TABLET ORAL at 05:02

## 2018-07-22 RX ADMIN — MIDODRINE HYDROCHLORIDE 5 MILLIGRAM(S): 2.5 TABLET ORAL at 17:44

## 2018-07-22 RX ADMIN — Medication 8.63 MICROGRAM(S)/KG/MIN: at 05:01

## 2018-07-22 RX ADMIN — MIDODRINE HYDROCHLORIDE 10 MILLIGRAM(S): 2.5 TABLET ORAL at 22:20

## 2018-07-22 NOTE — CONSULT NOTE ADULT - PROBLEM SELECTOR RECOMMENDATION 5
per medicine.    Thank you for consulting us and involving us in the management of this most interesting and challenging case.     We will follow along in the care of this patient.    On Monday Dr. Brooke will be covering for our group. If you have any questions please reach out to them at [phone].

## 2018-07-22 NOTE — PROGRESS NOTE ADULT - ASSESSMENT
89F with PMHx as above, admitted with septic shock likely SBP vs RLE cellulitis, BRITTANEY    -Neuro checks  -Titrate Levophed as tolerated to MAP > 65. Monitor HD's  -May need central line  -Midodrine added  -IVF's DC'd  -Respiratory stable  -PO diet. PPI  -Monitor UOP/Lytes  -Albumin post paracentesis  -Continue empiric abx  -F/U fluid cultures, cytology  -Monitor wbc/temp  -DVT ppx  -Supportive care

## 2018-07-22 NOTE — PROGRESS NOTE ADULT - PROBLEM SELECTOR PLAN 1
- admit to ICU: sec to probable sbp, f/u paracentesis cx  -management per ICU team  - f/u blood, urine and paracentesis cultures  - continue vanco/zosyn  - apprec ID recs

## 2018-07-22 NOTE — PROGRESS NOTE ADULT - SUBJECTIVE AND OBJECTIVE BOX
Patient is a 89y old  Female who presents with a chief complaint of abdominal pain (2018 18:15)      INTERVAL HPI: Pt seen and examined. States she is feeling well, decreased appetite, still has some abd discomfort but improving. Deneis any other acute complaints at this time. Daughter at bedside.     OVERNIGHT EVENTS: bedside paracentesis 5L removed, cultures sent    T(F): 98.3 (18 @ 19:57), Max: 98.3 (18 @ 19:57)  HR: 69 (18 @ 19:00) (69 - 71)  BP: 142/69 (18 @ 19:00) (80/53 - 142/69)  RR: 23 (18 @ 19:00) (19 - 33)  SpO2: 98% (18 @ 16:00) (96% - 100%)  I&O's Summary    2018 07:  -  2018 07:00  --------------------------------------------------------  IN: 684.6 mL / OUT: 450 mL / NET: 234.6 mL    2018 07:  -  2018 20:45  --------------------------------------------------------  IN: 1178 mL / OUT: 150 mL / NET: 1028 mL        REVIEW OF SYSTEMS: 12 systems noncontributory other than that stated in hpi    PHYSICAL EXAM:  GENERAL: NAD, well-groomed, well-developed, elder  HEAD:  Atraumatic, Normocephalic  EYES: EOMI, PERRLA, conjunctiva and sclera clear  ENMT: No tonsillar erythema, exudates, or enlargement; Moist mucous membranes, Good dentition, No lesions  NECK: Supple, No JVD  NERVOUS SYSTEM:  Alert & Oriented X3, Good concentration; Motor Strength 5/5 B/L upper and lower extremities  CHEST/LUNG: Clear to percussion bilaterally; No rales, rhonchi, wheezing, or rubs  HEART: Regular rate and rhythm; No murmurs, rubs, or gallops  ABDOMEN: Soft, Nontender, + distended; Bowel sounds present  EXTREMITIES:  2+ Peripheral Pulses, No clubbing, cyanosis, or edema  SKIN: No rashes or lesions    LABS:                        10.6   26.48 )-----------( 503      ( 2018 07:38 )             33.9         141  |  105  |  51<H>  ----------------------------<  170<H>  5.1   |  28  |  2.10<H>    Ca    7.8<L>      2018 14:36  Phos  4.5       Mg     2.3         TPro  6.3  /  Alb  2.1<L>  /  TBili  0.5  /  DBili  x   /  AST  33  /  ALT  17  /  AlkPhos  79      PT/INR - ( 2018 11:51 )   PT: 20.5 sec;   INR: 1.86 ratio         PTT - ( 2018 11:51 )  PTT:33.3 sec  Urinalysis Basic - ( 2018 12:02 )    Color: Yellow / Appearance: Slightly Turbid / S.025 / pH: x  Gluc: x / Ketone: Negative  / Bili: Small / Urobili: Negative   Blood: x / Protein: 25 mg/dL / Nitrite: Negative   Leuk Esterase: Trace / RBC: 0-2 /HPF / WBC 0-2   Sq Epi: x / Non Sq Epi: Occasional / Bacteria: Moderate      CAPILLARY BLOOD GLUCOSE           @ 23:03   No growth  --  --   @ 16:58   No growth to date.  --  --   @ 16:38   10,000 - 49,000 CFU/mL Pseudomonas aeruginosa  --  --          MEDICATIONS  (STANDING):  heparin  Injectable 5000 Unit(s) SubCutaneous every 8 hours  levothyroxine 225 MICROGram(s) Oral daily  midodrine 10 milliGRAM(s) Oral every 8 hours  montelukast 10 milliGRAM(s) Oral daily  nitroglycerin    2% Ointment 3 Inch(s) Transdermal once  norepinephrine Infusion 0.05 MICROgram(s)/kG/Min (8.634 mL/Hr) IV Continuous <Continuous>  piperacillin/tazobactam IVPB. 3.375 Gram(s) IV Intermittent every 12 hours  vancomycin  IVPB      vancomycin  IVPB 1250 milliGRAM(s) IV Intermittent every 24 hours    MEDICATIONS  (PRN):

## 2018-07-22 NOTE — CONSULT NOTE ADULT - SUBJECTIVE AND OBJECTIVE BOX
HPI: 89 y/o F PMHx ovarian cancer w/ recurrent ascites, colon ca s/p colostomy, s/p incarcerated ventral hernia repair 10/2017, DM2, HTN, afib s/p PPM, hypothyroid, obesity, and overactive bladder presents with generalized weakness x 2 days.  Patient reports that she began having chills with rigors on Friday, and bundled up in a chair for a nap, and then was not strong enough to get out of the chair on her own.  Per granddaughter patient has been confused, and was weaker than normal the evening before admission, and decided to call EMS.    In ED patient is hypotensive (70/35), febrile (100.8), SpO2 100% on RA.  Labs significant for WBC 21.04, Hbg 9.7, Hct 30.9, Plt 447, Bands 33%, Lactate 4.2 (repeat 4.3), Crt 2.5, Gfr 16, Trop 0.101; CT ab/pelv shows moderate ascites similar to prior study.  Patient received Vanco/Zosyn, 2L NS bolus; tylenol 650mg x1. (2018 18:13)      PAST MEDICAL & SURGICAL HISTORY:  Ovarian cancer  Afib: s/p PPM  Ascites, malignant: from advanced ovarian cancer  Colon cancer  Obese  Hypothyroidism  HTN (Hypertension)  Overactive Bladder  DM Type 2 (Diabetes Mellitus, Type 2)  Asthma  Incarcerated hernia: s/p repair 10/2017  H/O hernia repair  Artificial pacemaker  S/P colon resection: with sigmoid colostomy for obstructing ovarian cancer  S/P Cataract Surgery: CASI  History of Tonsillectomy  History of Appendectomy  Status Post Total Knee Replacement: Left      Antimicrobials  piperacillin/tazobactam IVPB. 3.375 Gram(s) IV Intermittent every 12 hours  vancomycin  IVPB      vancomycin  IVPB 1250 milliGRAM(s) IV Intermittent every 24 hours      Immunological      Other  heparin  Injectable 5000 Unit(s) SubCutaneous every 8 hours  levothyroxine 225 MICROGram(s) Oral daily  midodrine 10 milliGRAM(s) Oral every 8 hours  montelukast 10 milliGRAM(s) Oral daily  norepinephrine Infusion 0.05 MICROgram(s)/kG/Min IV Continuous <Continuous>      Allergies    No Known Allergies    Intolerances      SOCIAL HISTORY: no toxic habits reported    FAMILY HISTORY:  No pertinent family history in first degree relatives      ROS:    EYES:  Negative  blurry vision or double vision  GASTROINTESTINAL:  Negative for nausea, vomiting, diarrhea  -otherwise negative except for subjective    Vital Signs Last 24 Hrs  T(C): 36.6 (2018 07:32), Max: 38.2 (2018 10:37)  T(F): 97.8 (2018 07:32), Max: 100.8 (2018 10:37)  HR: 69 (2018 10:00) (69 - 83)  BP: 108/59 (2018 10:00) (70/35 - 118/57)  BP(mean): 77 (2018 10:00) (56 - 85)  RR: 19 (2018 10:00) (15 - 33)  SpO2: 58% (2018 10:00) (58% - 100%)    PE:  WDWN in no distress  HEENT:  NC, PERRL, sclerae anicteric, conjunctivae clear, EOMI.  Sinuses nontender, no nasal exudate.  No buccal or pharyngeal lesions, erythema or exudate  Neck:  Supple, no adenopathy  Lungs:  No accessory muscle use, bilaterally clear to auscultation except for faint ronchi in right base  Cor:  RRR, S1, S2, no murmur appreciated  Abd:  Symmetric, normoactive BS.  distended, Soft, nontender, no masses, guarding or rebound.  Liver and spleen not enlarged  Extrem:  No cyanosis or edema  Skin:  some erythema of rt LE with some pitting edema  Neuro: grossly intact  Musc: moving all limbs freely, no focal deficits    LABS:                        10.6   26.48 )-----------( 503      ( 2018 07:38 )             33.9       WBC Count: 26.48 K/uL (18 @ 07:38)  WBC Count: 21.04 K/uL (18 @ 11:51)          141  |  105  |  53<H>  ----------------------------<  118<H>  5.6<H>   |  28  |  2.20<H>    Ca    7.9<L>      2018 07:38  Phos  4.5       Mg     2.3         TPro  6.3  /  Alb  2.1<L>  /  TBili  0.5  /  DBili  x   /  AST  33  /  ALT  17  /  AlkPhos  79  -      Creatinine, Serum: 2.20 mg/dL (18 @ 07:38)  Creatinine, Serum: 2.50 mg/dL (18 @ 11:51)      Urinalysis Basic - ( 2018 12:02 )    Color: Yellow / Appearance: Slightly Turbid / S.025 / pH: x  Gluc: x / Ketone: Negative  / Bili: Small / Urobili: Negative   Blood: x / Protein: 25 mg/dL / Nitrite: Negative   Leuk Esterase: Trace / RBC: 0-2 /HPF / WBC 0-2   Sq Epi: x / Non Sq Epi: Occasional / Bacteria: Moderate        Vancomycin Level, Trough: 9.7 ug/mL (18 @ 07:38)-not a true trough    Cell Count, Body Fluid (18 @ 22:54)    Fluid Type: Peritoneal fl    Body Fluid Appearance: Cloudy    Monocyte/Macrophage Count - Body Fluid: 15 %    Fluid Segmented Granulocytes: 78 %    BF Lymphocytes: 7 %    Tube Type: Lavender    Color - Body Fluid: Yellow    Total Nucleated Cell Count, Body Fluid: 3727 /uL    Total RBC Count: 5000 /uL          MICROBIOLOGY:      RADIOLOGY & ADDITIONAL STUDIES:    -- HPI: 89 y/o F PMHx ovarian cancer w/ recurrent ascites, colon ca s/p colostomy, s/p incarcerated ventral hernia repair 10/2017, DM2, HTN, afib s/p PPM, hypothyroid, obesity, and overactive bladder presents with generalized weakness x 2 days.  Patient reports that she began having chills with rigors on Friday, and bundled up in a chair for a nap, and then was not strong enough to get out of the chair on her own.  Per granddaughter patient has been confused, and was weaker than normal the evening before admission, and decided to call EMS.    In ED patient is hypotensive (70/35), febrile (100.8), SpO2 100% on RA.  Labs significant for WBC 21.04, Hbg 9.7, Hct 30.9, Plt 447, Bands 33%, Lactate 4.2 (repeat 4.3), Crt 2.5, Gfr 16, Trop 0.101; CT ab/pelv shows moderate ascites similar to prior study.  Patient received Vanco/Zosyn, 2L NS bolus; tylenol 650mg x1. (2018 18:13)      PAST MEDICAL & SURGICAL HISTORY:  Ovarian cancer  Afib: s/p PPM  Ascites, malignant: from advanced ovarian cancer  Colon cancer  Obese  Hypothyroidism  HTN (Hypertension)  Overactive Bladder  DM Type 2 (Diabetes Mellitus, Type 2)  Asthma  Incarcerated hernia: s/p repair 10/2017  H/O hernia repair  Artificial pacemaker  S/P colon resection: with sigmoid colostomy for obstructing ovarian cancer  S/P Cataract Surgery: CASI  History of Tonsillectomy  History of Appendectomy  Status Post Total Knee Replacement: Left      Antimicrobials  piperacillin/tazobactam IVPB. 3.375 Gram(s) IV Intermittent every 12 hours  vancomycin  IVPB      vancomycin  IVPB 1250 milliGRAM(s) IV Intermittent every 24 hours      Immunological      Other  heparin  Injectable 5000 Unit(s) SubCutaneous every 8 hours  levothyroxine 225 MICROGram(s) Oral daily  midodrine 10 milliGRAM(s) Oral every 8 hours  montelukast 10 milliGRAM(s) Oral daily  norepinephrine Infusion 0.05 MICROgram(s)/kG/Min IV Continuous <Continuous>      Allergies    No Known Allergies    Intolerances      SOCIAL HISTORY: no toxic habits reported    FAMILY HISTORY:  No pertinent family history in first degree relatives      ROS:    EYES:  Negative  blurry vision or double vision  GASTROINTESTINAL:  Negative for nausea, vomiting, diarrhea  -otherwise negative except for subjective    Vital Signs Last 24 Hrs  T(C): 36.6 (2018 07:32), Max: 38.2 (2018 10:37)  T(F): 97.8 (2018 07:32), Max: 100.8 (2018 10:37)  HR: 69 (2018 10:00) (69 - 83)  BP: 108/59 (2018 10:00) (70/35 - 118/57)  BP(mean): 77 (2018 10:00) (56 - 85)  RR: 19 (2018 10:00) (15 - 33)  SpO2: 58% (2018 10:00) (58% - 100%)    PE:  WDWN in no distress  HEENT:  NC, PERRL, sclerae anicteric, conjunctivae clear, EOMI.  Sinuses nontender, no nasal exudate.  No buccal or pharyngeal lesions, erythema or exudate  Neck:  Supple, no adenopathy  Lungs:  No accessory muscle use, bilaterally clear to auscultation except for faint ronchi in right base  Cor:  distant  Abd:  Symmetric, normoactive BS.  distended, Soft, nontender, no masses, guarding or rebound.  Liver and spleen not enlarged, ostomy bag LLQ  Extrem:  No cyanosis   Skin:  some erythema of rt LE with some pitting edema  Neuro: grossly intact  Musc: moving all limbs freely, no focal deficits    LABS:                        10.6   26.48 )-----------( 503      ( 2018 07:38 )             33.9       WBC Count: 26.48 K/uL (18 @ 07:38)  WBC Count: 21.04 K/uL (18 @ 11:51)          141  |  105  |  53<H>  ----------------------------<  118<H>  5.6<H>   |  28  |  2.20<H>    Ca    7.9<L>      2018 07:38  Phos  4.5       Mg     2.3         TPro  6.3  /  Alb  2.1<L>  /  TBili  0.5  /  DBili  x   /  AST  33  /  ALT  17  /  AlkPhos  79        Creatinine, Serum: 2.20 mg/dL (18 @ 07:38)  Creatinine, Serum: 2.50 mg/dL (18 @ 11:51)      Urinalysis Basic - ( 2018 12:02 )    Color: Yellow / Appearance: Slightly Turbid / S.025 / pH: x  Gluc: x / Ketone: Negative  / Bili: Small / Urobili: Negative   Blood: x / Protein: 25 mg/dL / Nitrite: Negative   Leuk Esterase: Trace / RBC: 0-2 /HPF / WBC 0-2   Sq Epi: x / Non Sq Epi: Occasional / Bacteria: Moderate        Vancomycin Level, Trough: 9.7 ug/mL (18 @ 07:38)-not a true trough    Cell Count, Body Fluid (18 @ 22:54)    Fluid Type: Peritoneal fl    Body Fluid Appearance: Cloudy    Monocyte/Macrophage Count - Body Fluid: 15 %    Fluid Segmented Granulocytes: 78 %    BF Lymphocytes: 7 %    Tube Type: Lavender    Color - Body Fluid: Yellow    Total Nucleated Cell Count, Body Fluid: 3727 /uL    Total RBC Count: 5000 /uL          MICROBIOLOGY:      Culture - Body Fluid with Gram Stain (collected 2018 23:03)  Source: .Body Fluid Peritoneal Fluid  Gram Stain (2018 01:23):    polymorphonuclear leukocytes    No organisms seen    by cytocentrifuge          RADIOLOGY & ADDITIONAL STUDIES:    --< from: CT Abdomen and Pelvis No Cont (18 @ 13:36) >    EXAM:  CT ABDOMEN AND PELVIS                            PROCEDURE DATE:  2018          INTERPRETATION:  CT ABDOMEN AND PELVIS    CLINICAL INFORMATION:  Weakness. Decreased p.o. intake. History of   ovarian cancer and colon cancer. Prior ventral hernia repair.    PROCEDURE:  Using multislice helical CT, without intravenous or oral   contrast 2.5 mm sections were obtained from the domes of the diaphragm to   the ischial tuberosities.  Coronal reformatted images were performed.    COMPARISON: CT scan abdomen and pelvis 2018.    FINDINGS: Evaluation of the solid organ parenchyma is limited by the lack   of intravenous contrast.      There is a small partially loculated left-sided pleural effusion.    There is a heterogeneous appearance of the nonenhanced liver, evaluation   for metastatic disease is limited without intravenous contrast.    The spleen, adrenal glands and nonenhanced pancreas are unremarkable in   appearance.    Cholelithiasis with probable mild gallbladder wall thickening is again   noted.    There is a moderate volume of abdominal and pelvic ascites, similar to   prior study.  Nodularity along the peritoneal omental and mesenteric surfaces is   suggestive of peritoneal carcinomatosis.    No hydroureteronephrosis is noted.    There is arteriosclerotic calcification of the abdominal aorta.  There are enlarged retroperitoneal para-aortic and aortocaval lymph nodes.    The evaluation of the stomach and gastrointestinal tract is limited   without distention.  Again noted is segmental sigmoid resection with left-sided colostomy with   large parastomal hernia containing nondistended, nonobstructed colon and   ascites.  There is mild infiltration of the subcutaneous soft tissues along the   anterior abdominalwall, likely at the site of prior ventral repair.   There is a moderately distended fluid and particulate filled cecum, with   some moderately distended small bowel loops and probable segmental bowel   wall thickening. The evaluation of the bowel wall is limited without   intravenous contrast. Note discrete change of caliber is noted.    No free intraperitoneal air is noted.    The urinary bladder is nondistended.  No pelvic mass is noted.    Bilateral inguinal lymph nodes are noted.    There aremultilevel degenerative changes of the spine.    Impression:    Moderate volume of abdominal pelvic ascites, similar to prior study.   Peritoneal and mesenteric nodularity suggestive of carcinomatosis.    Large left-sided parastomal hernia, similar in appearance to prior study.    Mildly distended, probably subsegmental thickened small bowel, without   discrete change in caliber, no high-grade small bowel obstruction noted.    Other findings as discussed above. HPI: 89 y/o F PMHx ovarian cancer w/ recurrent ascites, mets to colon s/p colostomy, s/p incarcerated ventral hernia repair 10/2017, DM2, HTN, afib s/p PPM, hypothyroid, obesity, and overactive bladder presents with generalized weakness x 2 days.  Patient reports that she began having chills with rigors on Friday, and bundled up in a chair for a nap, and then was not strong enough to get out of the chair on her own.  Per granddaughter patient has been confused, and was weaker than normal the evening before admission, and decided to call EMS.    In ED patient is hypotensive (70/35), febrile (100.8), SpO2 100% on RA.  Labs significant for WBC 21.04, Hbg 9.7, Hct 30.9, Plt 447, Bands 33%, Lactate 4.2 (repeat 4.3), Crt 2.5, Gfr 16, Trop 0.101; CT ab/pelv shows moderate ascites similar to prior study.  Patient received Vanco/Zosyn, 2L NS bolus; tylenol 650mg x1. (2018 18:13)      PAST MEDICAL & SURGICAL HISTORY:  Ovarian cancer  Afib: s/p PPM  Ascites, malignant: from advanced ovarian cancer  Colon cancer  Obese  Hypothyroidism  HTN (Hypertension)  Overactive Bladder  DM Type 2 (Diabetes Mellitus, Type 2)  Asthma  Incarcerated hernia: s/p repair 10/2017  H/O hernia repair  Artificial pacemaker  S/P colon resection: with sigmoid colostomy for obstructing ovarian cancer  S/P Cataract Surgery: CASI  History of Tonsillectomy  History of Appendectomy  Status Post Total Knee Replacement: Left      Antimicrobials  piperacillin/tazobactam IVPB. 3.375 Gram(s) IV Intermittent every 12 hours  vancomycin  IVPB      vancomycin  IVPB 1250 milliGRAM(s) IV Intermittent every 24 hours      Immunological      Other  heparin  Injectable 5000 Unit(s) SubCutaneous every 8 hours  levothyroxine 225 MICROGram(s) Oral daily  midodrine 10 milliGRAM(s) Oral every 8 hours  montelukast 10 milliGRAM(s) Oral daily  norepinephrine Infusion 0.05 MICROgram(s)/kG/Min IV Continuous <Continuous>      Allergies    No Known Allergies    Intolerances      SOCIAL HISTORY: no toxic habits reported    FAMILY HISTORY:  No pertinent family history in first degree relatives      ROS:    EYES:  Negative  blurry vision or double vision  GASTROINTESTINAL:  Negative for nausea, vomiting, diarrhea  -otherwise negative except for subjective    Vital Signs Last 24 Hrs  T(C): 36.6 (2018 07:32), Max: 38.2 (2018 10:37)  T(F): 97.8 (2018 07:32), Max: 100.8 (2018 10:37)  HR: 69 (2018 10:00) (69 - 83)  BP: 108/59 (2018 10:00) (70/35 - 118/57)  BP(mean): 77 (2018 10:00) (56 - 85)  RR: 19 (2018 10:00) (15 - 33)  SpO2: 58% (2018 10:00) (58% - 100%)    PE:  WDWN in no distress  HEENT:  NC, PERRL, sclerae anicteric, conjunctivae clear, EOMI.  Sinuses nontender, no nasal exudate.  No buccal or pharyngeal lesions, erythema or exudate  Neck:  Supple, no adenopathy  Lungs:  No accessory muscle use, bilaterally clear to auscultation except for faint ronchi in right base  Cor:  distant  Abd:  Symmetric, normoactive BS.  distended, Soft, nontender, no masses, guarding or rebound.  Liver and spleen not enlarged, ostomy bag LLQ  Extrem:  No cyanosis   Skin:  some erythema of rt LE with some pitting edema  Neuro: grossly intact  Musc: moving all limbs freely, no focal deficits    LABS:                        10.6   26.48 )-----------( 503      ( 2018 07:38 )             33.9       WBC Count: 26.48 K/uL (18 @ 07:38)  WBC Count: 21.04 K/uL (18 @ 11:51)          141  |  105  |  53<H>  ----------------------------<  118<H>  5.6<H>   |  28  |  2.20<H>    Ca    7.9<L>      2018 07:38  Phos  4.5       Mg     2.3         TPro  6.3  /  Alb  2.1<L>  /  TBili  0.5  /  DBili  x   /  AST  33  /  ALT  17  /  AlkPhos  79        Creatinine, Serum: 2.20 mg/dL (18 @ 07:38)  Creatinine, Serum: 2.50 mg/dL (18 @ 11:51)      Urinalysis Basic - ( 2018 12:02 )    Color: Yellow / Appearance: Slightly Turbid / S.025 / pH: x  Gluc: x / Ketone: Negative  / Bili: Small / Urobili: Negative   Blood: x / Protein: 25 mg/dL / Nitrite: Negative   Leuk Esterase: Trace / RBC: 0-2 /HPF / WBC 0-2   Sq Epi: x / Non Sq Epi: Occasional / Bacteria: Moderate        Vancomycin Level, Trough: 9.7 ug/mL (18 @ 07:38)-not a true trough    Cell Count, Body Fluid (18 @ 22:54)    Fluid Type: Peritoneal fl    Body Fluid Appearance: Cloudy    Monocyte/Macrophage Count - Body Fluid: 15 %    Fluid Segmented Granulocytes: 78 %    BF Lymphocytes: 7 %    Tube Type: Lavender    Color - Body Fluid: Yellow    Total Nucleated Cell Count, Body Fluid: 3727 /uL    Total RBC Count: 5000 /uL          MICROBIOLOGY:      Culture - Body Fluid with Gram Stain (collected 2018 23:03)  Source: .Body Fluid Peritoneal Fluid  Gram Stain (2018 01:23):    polymorphonuclear leukocytes    No organisms seen    by cytocentrifuge          RADIOLOGY & ADDITIONAL STUDIES:    --< from: CT Abdomen and Pelvis No Cont (18 @ 13:36) >    EXAM:  CT ABDOMEN AND PELVIS                            PROCEDURE DATE:  2018          INTERPRETATION:  CT ABDOMEN AND PELVIS    CLINICAL INFORMATION:  Weakness. Decreased p.o. intake. History of   ovarian cancer and colon cancer. Prior ventral hernia repair.    PROCEDURE:  Using multislice helical CT, without intravenous or oral   contrast 2.5 mm sections were obtained from the domes of the diaphragm to   the ischial tuberosities.  Coronal reformatted images were performed.    COMPARISON: CT scan abdomen and pelvis 2018.    FINDINGS: Evaluation of the solid organ parenchyma is limited by the lack   of intravenous contrast.      There is a small partially loculated left-sided pleural effusion.    There is a heterogeneous appearance of the nonenhanced liver, evaluation   for metastatic disease is limited without intravenous contrast.    The spleen, adrenal glands and nonenhanced pancreas are unremarkable in   appearance.    Cholelithiasis with probable mild gallbladder wall thickening is again   noted.    There is a moderate volume of abdominal and pelvic ascites, similar to   prior study.  Nodularity along the peritoneal omental and mesenteric surfaces is   suggestive of peritoneal carcinomatosis.    No hydroureteronephrosis is noted.    There is arteriosclerotic calcification of the abdominal aorta.  There are enlarged retroperitoneal para-aortic and aortocaval lymph nodes.    The evaluation of the stomach and gastrointestinal tract is limited   without distention.  Again noted is segmental sigmoid resection with left-sided colostomy with   large parastomal hernia containing nondistended, nonobstructed colon and   ascites.  There is mild infiltration of the subcutaneous soft tissues along the   anterior abdominalwall, likely at the site of prior ventral repair.   There is a moderately distended fluid and particulate filled cecum, with   some moderately distended small bowel loops and probable segmental bowel   wall thickening. The evaluation of the bowel wall is limited without   intravenous contrast. Note discrete change of caliber is noted.    No free intraperitoneal air is noted.    The urinary bladder is nondistended.  No pelvic mass is noted.    Bilateral inguinal lymph nodes are noted.    There aremultilevel degenerative changes of the spine.    Impression:    Moderate volume of abdominal pelvic ascites, similar to prior study.   Peritoneal and mesenteric nodularity suggestive of carcinomatosis.    Large left-sided parastomal hernia, similar in appearance to prior study.    Mildly distended, probably subsegmental thickened small bowel, without   discrete change in caliber, no high-grade small bowel obstruction noted.    Other findings as discussed above.

## 2018-07-22 NOTE — PROGRESS NOTE ADULT - ASSESSMENT
89 y/o F PMHx ovarian cancer w/ recurrent ascites, colon ca s/p colostomy, s/p incarcerated ventral hernia repair 10/2017, DM2, HTN, afib s/p PPM, hypothyroid, obesity, and overactive bladder presents with generalized weakness x 2 days prior to admission; now with septic shock admitted to ICU sec to probably SPB

## 2018-07-22 NOTE — PROGRESS NOTE ADULT - ATTENDING COMMENTS
89 y/o female presented with septic shock likely due to SBP and/or RLE cellulitis. She also has BRITTANEY likely prerenal vs ATN.     PMH includes ovarian cancer with peritoneal carcinomatosis, recurrent ascites with therapeutic paracentesis q6 weeks, colon cancer s/p colostomy, CHF, A.fib s/p PPM, HTN, hypothyroidism, and asthma    Recs:  Stable on Levo, no worsening  Continue midodrine  Continue vanc, zosyn, f/u peritoneal fluid cx, BCx  Monitor RLE   Holding xarelto - will resume once GFR > 30  BRITTANEY improving, does not have a roth  Hyperkalemia today - repeat BMP at 1400  PO diet  DVT ppx with sc heparin    CC time: 45mins 89 y/o female presented with septic shock likely due to SBP and/or RLE cellulitis. She also has BRITTANEY likely prerenal vs ATN.     PMH includes ovarian cancer with peritoneal carcinomatosis, recurrent ascites with therapeutic paracentesis q6 weeks, colon cancer s/p colostomy, CHF, A.fib s/p PPM, HTN, hypothyroidism, and asthma    Recs:  Stable on Levo, no worsening  Continue midodrine  Continue vanc, zosyn, f/u peritoneal fluid cx, BCx  Monitor RLE   Vanc level not a true trough, continue current dose  Holding xarelto - will resume once GFR > 30  BRITTANEY improving, does not have a roth  Hyperkalemia today - repeat BMP at 1400  PO diet  DVT ppx with sc heparin    CC time: 45mins

## 2018-07-22 NOTE — PROGRESS NOTE ADULT - SUBJECTIVE AND OBJECTIVE BOX
Events noted  Incision from recent hernia repair healing well with no sign of infection  No abdominal pain or tenderness

## 2018-07-22 NOTE — CONSULT NOTE ADULT - PROBLEM SELECTOR RECOMMENDATION 9
With this clinical presentation this is the most compelling clinical scenario and is supported by elevated cell count and parameter of ascitic fluid. Recommend continued broad spectrum abx including vanco pending further micro data.

## 2018-07-22 NOTE — PROGRESS NOTE ADULT - SUBJECTIVE AND OBJECTIVE BOX
Patient is a 89y old  Female who presents with a chief complaint of abdominal pain (2018 18:15)    24 hour events: ***  PAST MEDICAL & SURGICAL HISTORY:  Ovarian cancer  Afib: s/p PPM  Ascites, malignant: from advanced ovarian cancer  Colon cancer  Obese  Hypothyroidism  HTN (Hypertension)  Overactive Bladder  DM Type 2 (Diabetes Mellitus, Type 2)  Asthma  Incarcerated hernia: s/p repair 10/2017  H/O hernia repair  Artificial pacemaker  S/P colon resection: with sigmoid colostomy for obstructing ovarian cancer  S/P Cataract Surgery: CASI  History of Tonsillectomy  History of Appendectomy  Status Post Total Knee Replacement: Left      Review of Systems:  Constitutional: No fever, chills, fatigue  Neuro: No headache, numbness, weakness  Resp: No cough, wheezing, shortness of breath  CVS: No chest pain, palpitations, leg swelling  GI: No abdominal pain, nausea, vomiting, diarrhea   : No dysuria, frequency, incontinence  Skin: No itching, burning, rashes, or lesions   Msk: No joint pain or swelling  Psych: No depression, anxiety, mood swings    T(F): 98.3 (18 @ 19:57), Max: 98.3 (18 @ 19:57)  HR: 69 (18 @ 22:00) (69 - 71)  BP: 102/54 (18 @ 22:00) (91/53 - 142/69)  RR: 24 (18 @ 22:00) (19 - 33)  SpO2: 100% (18 @ 22:00) (96% - 100%)  Wt(kg): --        CAPILLARY BLOOD GLUCOSE          I&O's Summary    2018 07:  -  2018 07:00  --------------------------------------------------------  IN: 684.6 mL / OUT: 450 mL / NET: 234.6 mL    2018 07:  -  2018 22:46  --------------------------------------------------------  IN: 1328 mL / OUT: 150 mL / NET: 1178 mL        Physical Exam:     Gen:   Neuro: A&Ox3, non-focal  HEENT: NC/AT  Resp: CTA b/l  CVS: nl S1/S2, RRR  Abd: soft, nt, nd, +bs  Ext: no edema, +pulses  Skin: well perfused, warm    Meds:  piperacillin/tazobactam IVPB. IV Intermittent  vancomycin  IVPB   vancomycin  IVPB IV Intermittent    midodrine Oral  norepinephrine Infusion IV Continuous    levothyroxine Oral    montelukast Oral        heparin  Injectable SubCutaneous                                        10.6   26.48 )-----------( 503      ( 2018 07:38 )             33.9     Bands 15.0        141  |  105  |  51<H>  ----------------------------<  170<H>  5.1   |  28  |  2.10<H>    Ca    7.8<L>      2018 14:36  Phos  4.5       Mg     2.3         TPro  6.3  /  Alb  2.1<L>  /  TBili  0.5  /  DBili  x   /  AST  33  /  ALT  17  /  AlkPhos  79      Lactate 1.6            @ 07:38      CARDIAC MARKERS ( 2018 11:51 )  .101 ng/mL / x     / x     / x     / <1.0 ng/mL      PT/INR - ( 2018 11:51 )   PT: 20.5 sec;   INR: 1.86 ratio         PTT - ( 2018 11:51 )  PTT:33.3 sec  Urinalysis Basic - ( 2018 12:02 )    Color: Yellow / Appearance: Slightly Turbid / S.025 / pH: x  Gluc: x / Ketone: Negative  / Bili: Small / Urobili: Negative   Blood: x / Protein: 25 mg/dL / Nitrite: Negative   Leuk Esterase: Trace / RBC: 0-2 /HPF / WBC 0-2   Sq Epi: x / Non Sq Epi: Occasional / Bacteria: Moderate      .Body Fluid Peritoneal Fluid   No growth   polymorphonuclear leukocytes  No organisms seen  by cytocentrifuge  @ 23:03  .Blood Blood   No growth to date.   Upon re-evaluation of gram stain:  Growth in anaerobic bottle: Gram Positive Cocci in Clusters  Previously reported as:  Growth in anaerobic bottle: Gram Negative Rods  "Due to technical problems, Proteus sp. will Not be reported as part of  the BCID panel until further notice"  ***Blood Panel PCR results on this specimen are available  approximately 3 hours after the Gram stain result.***  Gram stain, PCR, and/or culture results may not always  correspond due to difference in methodologies.  ************************************************************  This PCR assay was performed using [employer].  The following targets are tested for: Enterococcus,  vancomycin resistant enterococci, Listeria monocytogenes,  coagulase negative staphylococci, S. aureus,  methicillin resistant S. aureus, Streptococcus agalactiae  (Group B), S. pneumoniae, S. pyogenes (Group A),  Acinetobacter baumannii, Enterobacter cloacae, E. coli,  Klebsiella oxytoca, K. pneumoniae, Proteus sp.,  Serratia marcescens, Haemophilus influenzae,  Neisseria meningitidis, Pseudomonas aeruginosa, Candida  albicans, C. glabrata, C krusei, C parapsilosis,  C. tropicalis and the KPC resistance gene.  @ 16:58  .Urine Catheterized   10,000 - 49,000 CFU/mL Pseudomonas aeruginosa --  @ 16:38          Radiology: ***    Bedside lung ultrasound: ***    Bedside ECHO: ***    CENTRAL LINE: Y/N          DATE INSERTED:              REMOVE: Y/N    CULP: Y/N                        DATE INSERTED:              REMOVE: Y/N    A-LINE: Y/N                       DATE INSERTED:              REMOVE: Y/N    GLOBAL ISSUE/BEST PRACTICE:  Analgesia:  Sedation:  HOB elevation: yes  Stress ulcer prophylaxis:  VTE prophylaxis:  Glycemic control:  Nutrition:      CODE STATUS: ***  GOC discussion: Y  Critical care time spent (mins): ***  (Reviewing data, imaging, discussing with multidisciplinary team, non inclusive of procedures, discussing goals of care with patient/family) 89F with PMHx ovarian cancer w/ recurrent ascites, colon ca s/p colostomy, s/p incarcerated ventral hernia repair 10/2017, DM2, HTN, afib s/p PPM, hypothyroid, obesity, and overactive bladder admitted with septic shock and BRITTANEY    24 hour events: Pt remains on pressor support, S/P paracentesis with ~ 5 liters drained    PAST MEDICAL & SURGICAL HISTORY:  Ovarian cancer  Afib: s/p PPM  Ascites, malignant: from advanced ovarian cancer  Colon cancer  Obese  Hypothyroidism  HTN (Hypertension)  Overactive Bladder  DM Type 2 (Diabetes Mellitus, Type 2)  Asthma  Incarcerated hernia: s/p repair 10/2017  H/O hernia repair  Artificial pacemaker  S/P colon resection: with sigmoid colostomy for obstructing ovarian cancer  S/P Cataract Surgery: CASI  History of Tonsillectomy  History of Appendectomy  Status Post Total Knee Replacement: Left      Review of Systems:  Constitutional: + fever, +chills, No fatigue  Neuro: No headache, numbness, weakness  Resp: No cough, wheezing, shortness of breath  CVS: No chest pain, palpitations, leg swelling  GI: No abdominal pain, nausea, vomiting, diarrhea   : No dysuria, frequency, incontinence  Skin: No itching, burning, rashes, or lesions   Msk: No joint pain or swelling  Psych: No depression, anxiety, mood swings    T(F): 98.3   HR: 69   BP: 102/54   RR: 24  SpO2: 100%         CAPILLARY BLOOD GLUCOSE          I&O's Summary    2018 07:01  -  2018 07:00  --------------------------------------------------------  IN: 684.6 mL / OUT: 450 mL / NET: 234.6 mL        Physical Exam:     Gen: No distress  Neuro: A&Ox3, non-focal  HEENT: NC/AT  Resp: CTA b/l  CVS: nl S1/S2, RRR  Abd: Significant ascitis, +colostomy, soft, nt, +bs  Ext: no edema, +pulses  Skin: well perfused, warm    Meds:    piperacillin/tazobactam IVPB. IV Intermittent  vancomycin  IVPB   vancomycin  IVPB IV Intermittent  midodrine Oral  norepinephrine Infusion IV Continuous  levothyroxine Oral  montelukast Oral  heparin  Injectable SubCutaneous                                10.6   26.48 )-----------( 503                   33.9     Bands 15.0    07-    141  |  105  |  51<H>  ----------------------------<  170<H>  5.1   |  28  |  2.10<H>    Ca    7.8<L>        Phos  4.5       Mg     2.3         TPro  6.3  /  Alb  2.1<L>  /  TBili  0.5  /  DBili  x   /  AST  33  /  ALT  17  /  AlkPhos  79      Lactate 1.6                 CARDIAC MARKERS ( 2018 11:51 )  .101 ng/mL / x     / x     / x     / <1.0 ng/mL      PT/INR - ( 2018 11:51 )   PT: 20.5 sec;   INR: 1.86 ratio         PTT - ( 2018 11:51 )  PTT:33.3 sec      Color: Yellow / Appearance: Slightly Turbid / S.025 / pH: x  Gluc: x / Ketone: Negative  / Bili: Small / Urobili: Negative   Blood: x / Protein: 25 mg/dL / Nitrite: Negative   Leuk Esterase: Trace / RBC: 0-2 /HPF / WBC 0-2   Sq Epi: x / Non Sq Epi: Occasional / Bacteria: Moderate      .Body Fluid Peritoneal Fluid   No growth   polymorphonuclear leukocytes  No organisms seen  by cytocentrifuge  @ 23:03  .Blood Blood   No growth to date.   Upon re-evaluation of gram stain:  Growth in anaerobic bottle: Gram Positive Cocci in Clusters  Previously reported as:  Growth in anaerobic bottle: Gram Negative Rods  "Due to technical problems, Proteus sp. will Not be reported as part of  the BCID panel until further notice"  ***Blood Panel PCR results on this specimen are available  approximately 3 hours after the Gram stain result.***  Gram stain, PCR, and/or culture results may not always  correspond due to difference in methodologies.  ************************************************************  This PCR assay was performed using [employer].  The following targets are tested for: Enterococcus,  vancomycin resistant enterococci, Listeria monocytogenes,  coagulase negative staphylococci, S. aureus,  methicillin resistant S. aureus, Streptococcus agalactiae  (Group B), S. pneumoniae, S. pyogenes (Group A),  Acinetobacter baumannii, Enterobacter cloacae, E. coli,  Klebsiella oxytoca, K. pneumoniae, Proteus sp.,  Serratia marcescens, Haemophilus influenzae,  Neisseria meningitidis, Pseudomonas aeruginosa, Candida  albicans, C. glabrata, C krusei, C parapsilosis,  C. tropicalis and the KPC resistance gene.  @ 16:58  .Urine Catheterized   10,000 - 49,000 CFU/mL Pseudomonas aeruginosa --  @ 16:38      CT Abd/P:  Impression:     Moderate volume of abdominal pelvic ascites, similar to prior study.   Peritoneal and mesenteric nodularity suggestive of carcinomatosis.     Large left-sided parastomal hernia, similar in appearance to prior study.     Mildly distended, probably subsegmental thickened small bowel, without   discrete change in caliber, no high-grade small bowel obstruction noted.     Other findings as discussed above.     CXR:  IMPRESSION:     Small left pleural effusion.     LE US:  pending    CENTRAL LINE: no    CULP: Yes    A-LINE: no    GLOBAL ISSUE/BEST PRACTICE:  Analgesia:  Sedation:  HOB elevation: yes  Stress ulcer prophylaxis:  VTE prophylaxis:  Glycemic control:  Nutrition:      CODE STATUS: Full  GOC discussion: Y  Critical care time spent (mins): 37  (Reviewing data, imaging, discussing with multidisciplinary team, non inclusive of procedures, discussing goals of care with patient/family)

## 2018-07-22 NOTE — PROGRESS NOTE ADULT - SUBJECTIVE AND OBJECTIVE BOX
Patient is a 89y old  Female who presents with a chief complaint of abdominal pain (2018 18:15)    24 hour events: s/p 5L paracentesis   started on midodrine - improving levo requirement  afebrile    REVIEW OF SYSTEMS  Constitutional: No fever, chills, fatigue  Neuro: No headache, numbness, weakness  Resp: No cough, wheezing, shortness of breath  CVS: No chest pain, palpitations, leg swelling  GI: No abdominal pain, nausea, vomiting, diarrhea   : No dysuria, frequency, incontinence  Skin: No itching, burning, rashes, or lesions   Msk: No joint pain or swelling  Psych: No depression, anxiety, mood swings    T(F): 97.8 (18 @ 07:32), Max: 100 (18 @ 13:04)  HR: 69 (18 @ 10:00) (69 - 82)  BP: 108/59 (18 @ 10:00) (78/42 - 118/57)  RR: 19 (18 @ 10:00) (16 - 33)  SpO2: 58% (18 @ 10:00) (58% - 100%)    I&O's Summary     @ :  -   @ 07:00  --------------------------------------------------------  IN: 684.6 mL / OUT: 450 mL / NET: 234.6 mL     @ 07:  -   @ 10:52  --------------------------------------------------------  IN: 282 mL / OUT: 0 mL / NET: 282 mL    PHYSICAL EXAM  General: NAD  CNS: AAO x 3, no focal deficits  HEENT: PERRL  Resp: clear  CVS: S1S2, regular  Abd: soft, mild tenderness, +BS, left lower colostomy with brown stool  Ext: +edema, RLE with erythema, warmth, tenderness, no blisters, no discoloration, no skin breakdown, no crepitus  Skin: as above    MEDICATIONS  piperacillin/tazobactam IVPB. IV Intermittent  vancomycin  IVPB IV Intermittent  midodrine Oral  norepinephrine Infusion IV Continuous  levothyroxine Oral  montelukast Oral  heparin  Injectable SubCutaneous                        10.6   26.48 )-----------( 503      ( 2018 07:38 )             33.9     Bands 33.0        141  |  105  |  53<H>  ----------------------------<  118<H>  5.6<H>   |  28  |  2.20<H>    Ca    7.9<L>      2018 07:38  Phos  4.5       Mg     2.3         TPro  6.3  /  Alb  2.1<L>  /  TBili  0.5  /  DBili  x   /  AST  33  /  ALT  17  /  AlkPhos  79      Lactate 1.6            @ 07:38    Lactate 4.3            @ 16:20    Lactate 4.2            @ 11:51    CARDIAC MARKERS ( 2018 11:51 )  .101 ng/mL / x     / x     / x     / <1.0 ng/mL    PT/INR - ( 2018 11:51 )   PT: 20.5 sec;   INR: 1.86 ratio       PTT - ( 2018 11:51 )  PTT:33.3 sec  Urinalysis Basic - ( 2018 12:02 )    Color: Yellow / Appearance: Slightly Turbid / S.025 / pH: x  Gluc: x / Ketone: Negative  / Bili: Small / Urobili: Negative   Blood: x / Protein: 25 mg/dL / Nitrite: Negative   Leuk Esterase: Trace / RBC: 0-2 /HPF / WBC 0-2   Sq Epi: x / Non Sq Epi: Occasional / Bacteria: Moderate    .Body Fluid Peritoneal Fluid --   polymorphonuclear leukocytes  No organisms seen  by cytocentrifuge  @ 23:03    CENTRAL LINE: N            CULP: N                       A-LINE: N                          GLOBAL ISSUE/BEST PRACTICE  Analgesia: NA  Sedation: NA  CAM-ICU: neg  HOB elevation: yes  Stress ulcer prophylaxis: NA  VTE prophylaxis: Y  Glycemic control: Y  Nutrition: Y    CODE STATUS: full  GOC discussion: Y

## 2018-07-23 DIAGNOSIS — R79.89 OTHER SPECIFIED ABNORMAL FINDINGS OF BLOOD CHEMISTRY: ICD-10-CM

## 2018-07-23 DIAGNOSIS — E03.9 HYPOTHYROIDISM, UNSPECIFIED: ICD-10-CM

## 2018-07-23 DIAGNOSIS — A41.9 SEPSIS, UNSPECIFIED ORGANISM: ICD-10-CM

## 2018-07-23 LAB
-  AMIKACIN: SIGNIFICANT CHANGE UP
-  AZTREONAM: SIGNIFICANT CHANGE UP
-  CEFEPIME: SIGNIFICANT CHANGE UP
-  CEFTAZIDIME: SIGNIFICANT CHANGE UP
-  CIPROFLOXACIN: SIGNIFICANT CHANGE UP
-  COAGULASE NEGATIVE STAPHYLOCOCCUS: SIGNIFICANT CHANGE UP
-  GENTAMICIN: SIGNIFICANT CHANGE UP
-  IMIPENEM: SIGNIFICANT CHANGE UP
-  LEVOFLOXACIN: SIGNIFICANT CHANGE UP
-  MEROPENEM: SIGNIFICANT CHANGE UP
-  PIPERACILLIN/TAZOBACTAM: SIGNIFICANT CHANGE UP
-  TOBRAMYCIN: SIGNIFICANT CHANGE UP
ALBUMIN SERPL ELPH-MCNC: 1.7 G/DL — LOW (ref 3.3–5)
ALP SERPL-CCNC: 104 U/L — SIGNIFICANT CHANGE UP (ref 40–120)
ALT FLD-CCNC: 16 U/L — SIGNIFICANT CHANGE UP (ref 12–78)
ANION GAP SERPL CALC-SCNC: 9 MMOL/L — SIGNIFICANT CHANGE UP (ref 5–17)
AST SERPL-CCNC: 40 U/L — HIGH (ref 15–37)
BILIRUB SERPL-MCNC: 0.5 MG/DL — SIGNIFICANT CHANGE UP (ref 0.2–1.2)
BUN SERPL-MCNC: 46 MG/DL — HIGH (ref 7–23)
CALCIUM SERPL-MCNC: 7.8 MG/DL — LOW (ref 8.5–10.1)
CHLORIDE SERPL-SCNC: 107 MMOL/L — SIGNIFICANT CHANGE UP (ref 96–108)
CO2 SERPL-SCNC: 22 MMOL/L — SIGNIFICANT CHANGE UP (ref 22–31)
CREAT SERPL-MCNC: 1.4 MG/DL — HIGH (ref 0.5–1.3)
CULTURE RESULTS: SIGNIFICANT CHANGE UP
CULTURE RESULTS: SIGNIFICANT CHANGE UP
GLUCOSE SERPL-MCNC: 84 MG/DL — SIGNIFICANT CHANGE UP (ref 70–99)
GRAM STN FLD: SIGNIFICANT CHANGE UP
MAGNESIUM SERPL-MCNC: 2.3 MG/DL — SIGNIFICANT CHANGE UP (ref 1.6–2.6)
METHOD TYPE: SIGNIFICANT CHANGE UP
METHOD TYPE: SIGNIFICANT CHANGE UP
ORGANISM # SPEC MICROSCOPIC CNT: SIGNIFICANT CHANGE UP
PHOSPHATE SERPL-MCNC: 2.9 MG/DL — SIGNIFICANT CHANGE UP (ref 2.5–4.5)
POTASSIUM SERPL-MCNC: 4.8 MMOL/L — SIGNIFICANT CHANGE UP (ref 3.5–5.3)
POTASSIUM SERPL-SCNC: 4.8 MMOL/L — SIGNIFICANT CHANGE UP (ref 3.5–5.3)
PROT SERPL-MCNC: 5.7 G/DL — LOW (ref 6–8.3)
SODIUM SERPL-SCNC: 138 MMOL/L — SIGNIFICANT CHANGE UP (ref 135–145)
SPECIMEN SOURCE: SIGNIFICANT CHANGE UP
SPECIMEN SOURCE: SIGNIFICANT CHANGE UP
VANCOMYCIN TROUGH SERPL-MCNC: 22 UG/ML — HIGH (ref 10–20)

## 2018-07-23 PROCEDURE — 93970 EXTREMITY STUDY: CPT | Mod: 26

## 2018-07-23 PROCEDURE — 99291 CRITICAL CARE FIRST HOUR: CPT

## 2018-07-23 PROCEDURE — 99233 SBSQ HOSP IP/OBS HIGH 50: CPT

## 2018-07-23 RX ORDER — MIDODRINE HYDROCHLORIDE 2.5 MG/1
15 TABLET ORAL EVERY 8 HOURS
Qty: 0 | Refills: 0 | Status: DISCONTINUED | OUTPATIENT
Start: 2018-07-23 | End: 2018-07-26

## 2018-07-23 RX ORDER — SODIUM CHLORIDE 9 MG/ML
500 INJECTION, SOLUTION INTRAVENOUS ONCE
Qty: 0 | Refills: 0 | Status: COMPLETED | OUTPATIENT
Start: 2018-07-23 | End: 2018-07-23

## 2018-07-23 RX ADMIN — MIDODRINE HYDROCHLORIDE 15 MILLIGRAM(S): 2.5 TABLET ORAL at 05:55

## 2018-07-23 RX ADMIN — MIDODRINE HYDROCHLORIDE 15 MILLIGRAM(S): 2.5 TABLET ORAL at 13:37

## 2018-07-23 RX ADMIN — MONTELUKAST 10 MILLIGRAM(S): 4 TABLET, CHEWABLE ORAL at 12:33

## 2018-07-23 RX ADMIN — HEPARIN SODIUM 5000 UNIT(S): 5000 INJECTION INTRAVENOUS; SUBCUTANEOUS at 13:37

## 2018-07-23 RX ADMIN — HEPARIN SODIUM 5000 UNIT(S): 5000 INJECTION INTRAVENOUS; SUBCUTANEOUS at 05:56

## 2018-07-23 RX ADMIN — PIPERACILLIN AND TAZOBACTAM 25 GRAM(S): 4; .5 INJECTION, POWDER, LYOPHILIZED, FOR SOLUTION INTRAVENOUS at 23:47

## 2018-07-23 RX ADMIN — HEPARIN SODIUM 5000 UNIT(S): 5000 INJECTION INTRAVENOUS; SUBCUTANEOUS at 22:07

## 2018-07-23 RX ADMIN — MIDODRINE HYDROCHLORIDE 15 MILLIGRAM(S): 2.5 TABLET ORAL at 22:07

## 2018-07-23 RX ADMIN — Medication 166.67 MILLIGRAM(S): at 17:38

## 2018-07-23 RX ADMIN — Medication 225 MICROGRAM(S): at 05:55

## 2018-07-23 RX ADMIN — SODIUM CHLORIDE 1000 MILLILITER(S): 9 INJECTION, SOLUTION INTRAVENOUS at 01:58

## 2018-07-23 RX ADMIN — PIPERACILLIN AND TAZOBACTAM 25 GRAM(S): 4; .5 INJECTION, POWDER, LYOPHILIZED, FOR SOLUTION INTRAVENOUS at 12:33

## 2018-07-23 NOTE — PROGRESS NOTE ADULT - SUBJECTIVE AND OBJECTIVE BOX
[facility], Division of Infectious Diseases  REJI Chua A. Lee  [phone]  Name: MICHELLE RAMÍREZ  Age: 89y  Gender: Female  MRN: [id number]    Interval History--  Notes reviewed  pt states she can move her rle  and her abd feels better    Past Medical History--  Ovarian cancer  Afib  Ascites, malignant  Colon cancer  Obese  Hypothyroidism  HTN (Hypertension)  Overactive Bladder  DM Type 2 (Diabetes Mellitus, Type 2)  Asthma  Incarcerated hernia  H/O hernia repair  Artificial pacemaker  S/P colon resection  S/P Cataract Surgery  History of Tonsillectomy  History of Appendectomy  Status Post Total Knee Replacement      For details regarding the patient's social history, family history, and other miscellaneous elements, please refer the initial infectious diseases consultation and/or the admitting history and physical examination for this admission.    Allergies    No Known Allergies    Intolerances        Medications--  Antibiotics:  piperacillin/tazobactam IVPB. 3.375 Gram(s) IV Intermittent every 12 hours  vancomycin  IVPB      vancomycin  IVPB 1250 milliGRAM(s) IV Intermittent every 24 hours    Immunologic:    Other:  heparin  Injectable  levothyroxine  midodrine  montelukast  norepinephrine Infusion      Review of Systems--  A 10-point review of systems was obtained.     Pertinent positives and negatives--  Constitutional: No fevers. No Chills. No Rigors.   Cardiovascular: No chest pain. No palpitations.  Respiratory: No shortness of breath. No cough.  Gastrointestinal: No nausea or vomiting. No diarrhea or constipation.   Psychiatric:no anxiety    Review of systems otherwise negative except as previously noted.    Physical Examination--  Vital Signs: T(F): 98.5 (07-23-18 @ 04:01), Max: 98.5 (07-23-18 @ 00:11)  HR: 69 (07-23-18 @ 09:30)  BP: 113/56 (07-23-18 @ 09:30)  RR: 25 (07-23-18 @ 09:30)  SpO2: 100% (07-23-18 @ 09:30)  Wt(kg): --  General: Nontoxic-appearing Female in no acute distress.  HEENT: AT/NCAnicteric.  Neck: Not rigid. No sense of mass.  Nodes: None palpable.  Lungs: Clear bilaterally without rales, wheezing or rhonchi  Heart: Regular rate and rhythm. No Murmur.  Abdomen: Bowel sounds present and normoactive. Soft. Nondistended. + ostomy   Extremities: No cyanosis or clubbing. ++ edema. mild erythema rle  Skin: Warm. Dry. Good turgor. No rash. No vasculitic stigmata.  Psychiatric: Appropriate affect and mood for situation.         Laboratory Studies--  CBC                        9.7    18.29 )-----------( 325      ( 23 Jul 2018 07:57 )             31.1       Chemistries  07-23    138  |  107  |  46<H>  ----------------------------<  84  4.8   |  22  |  1.40<H>    Ca    7.8<L>      23 Jul 2018 07:17  Phos  2.9     07-23  Mg     2.3     07-23    TPro  5.7<L>  /  Alb  1.7<L>  /  TBili  0.5  /  DBili  x   /  AST  40<H>  /  ALT  16  /  AlkPhos  104  07-23      Culture Data    Culture - Body Fluid with Gram Stain (collected 21 Jul 2018 23:03)  Source: .Body Fluid Peritoneal Fluid  Gram Stain (22 Jul 2018 01:23):    polymorphonuclear leukocytes    No organisms seen    by cytocentrifuge  Preliminary Report (22 Jul 2018 16:30):    No growth    Culture - Acid Fast - Body Fluid w/Smear (collected 21 Jul 2018 23:03)  Source: .Body Fluid Peritoneal Fluid    Culture - Blood (collected 21 Jul 2018 16:58)  Source: .Blood Blood  Gram Stain (22 Jul 2018 22:00):    Upon re-evaluation of gram stain:    Growth in anaerobic bottle: Gram Positive Cocci in Clusters    Previously reported as:    Growth in anaerobic bottle: Gram Negative Rods    "Due to technical problems, Proteus sp. will Not be reported as part of    the BCID panel until further notice"    ***Blood Panel PCR results on this specimen are available    approximately 3 hours after the Gram stain result.***    Gram stain, PCR, and/or culture results may not always    correspond due to difference in methodologies.    ************************************************************    This PCR assay was performed using [employer].    The following targets are tested for: Enterococcus,    vancomycin resistant enterococci, Listeria monocytogenes,    coagulase negative staphylococci, S. aureus,    methicillin resistant S. aureus, Streptococcus agalactiae    (Group B), S. pneumoniae, S. pyogenes (Group A),    Acinetobacter baumannii, Enterobacter cloacae, E. coli,    Klebsiella oxytoca, K. pneumoniae, Proteus sp.,    Serratia marcescens, Haemophilus influenzae,    Neisseria meningitidis, Pseudomonas aeruginosa, Candida    albicans, C. glabrata, C krusei, C parapsilosis,    C. tropicalis and the KPC resistance gene.  Preliminary Report (22 Jul 2018 21:58):    Growth in anaerobic bottle: Gram Positive Cocci in Clusters  Organism: Blood Culture PCR (23 Jul 2018 01:52)  Organism: Blood Culture PCR (23 Jul 2018 01:52)    Culture - Blood (collected 21 Jul 2018 16:58)  Source: .Blood Blood  Gram Stain (23 Jul 2018 01:05):    Growth in anaerobic bottle:    Gram Positive Cocci in Clusters  Preliminary Report (23 Jul 2018 01:05):    Growth in anaerobic bottle:    Gram Positive Cocci in Clusters    Culture - Urine (collected 21 Jul 2018 16:38)  Source: .Urine Catheterized  Preliminary Report (22 Jul 2018 18:28):    10,000 - 49,000 CFU/mL Pseudomonas aeruginosa      < from: CT Abdomen and Pelvis No Cont (07.21.18 @ 13:36) >    EXAM:  CT ABDOMEN AND PELVIS                            PROCEDURE DATE:  07/21/2018          INTERPRETATION:  CT ABDOMEN AND PELVIS    CLINICAL INFORMATION:  Weakness. Decreased p.o. intake. History of   ovarian cancer and colon cancer. Prior ventral hernia repair.    PROCEDURE:  Using multislice helical CT, without intravenous or oral   contrast 2.5 mm sections were obtained from the domes of the diaphragm to   the ischial tuberosities.  Coronal reformatted images were performed.    COMPARISON: CT scan abdomen and pelvis 6/20/2018.    FINDINGS: Evaluation of the solid organ parenchyma is limited by the lack   of intravenous contrast.      There is a small partially loculated left-sided pleural effusion.    There is a heterogeneous appearance of the nonenhanced liver, evaluation   for metastatic disease is limited without intravenous contrast.    The spleen, adrenal glands and nonenhanced pancreas are unremarkable in   appearance.    Cholelithiasis with probable mild gallbladder wall thickening is again   noted.    There is a moderate volume of abdominal and pelvic ascites, similar to   prior study.  Nodularity along the peritoneal omental and mesenteric surfaces is   suggestive of peritoneal carcinomatosis.    No hydroureteronephrosis is noted.    There is arteriosclerotic calcification of the abdominal aorta.  There are enlarged retroperitoneal para-aortic and aortocaval lymph nodes.    The evaluation of the stomach and gastrointestinal tract is limited   without distention.  Again noted is segmental sigmoid resection with left-sided colostomy with   large parastomal hernia containing nondistended, nonobstructed colon and   ascites.  There is mild infiltration of the subcutaneous soft tissues along the   anterior abdominalwall, likely at the site of prior ventral repair.   There is a moderately distended fluid and particulate filled cecum, with   some moderately distended small bowel loops and probable segmental bowel   wall thickening. The evaluation of the bowel wall is limited without   intravenous contrast. Note discrete change of caliber is noted.    No free intraperitoneal air is noted.    The urinary bladder is nondistended.  No pelvic mass is noted.    Bilateral inguinal lymph nodes are noted.    There aremultilevel degenerative changes of the spine.    Impression:    Moderate volume of abdominal pelvic ascites, similar to prior study.   Peritoneal and mesenteric nodularity suggestive of carcinomatosis.    Large left-sided parastomal hernia, similar in appearance to prior study.    Mildly distended, probably subsegmental thickened small bowel, without   discrete change in caliber, no high-grade small bowel obstruction noted.    < end of copied text >    Culture - Acid Fast - Body Fluid w/Smear (07.21.18 @ 23:03)    Specimen Source: .Body Fluid Peritoneal Fluid    Acid Fast Bacilli Smear:   No acid fast bacilli seen by fluorochrome stain        1Cell Count, Body Fluid (07.21.18 @ 22:54)    BF Lymphocytes: 7 %    Monocyte/Macrophage Count - Body Fluid: 15 %    Fluid Segmented Granulocytes: 78 %    Fluid Type: Peritoneal fl    Body Fluid Appearance: Cloudy    Tube Type: Lavender    Color - Body Fluid: Yellow    Total Nucleated Cell Count, Body Fluid: 3727 /uL    Total RBC Count: 5000 /uL

## 2018-07-23 NOTE — PROGRESS NOTE ADULT - ASSESSMENT
89 y/o female pmhx ovarian cancer with peritoneal carcinomatosis, , afib s/p PPM, colon cancer s/p colostomy, DM2, Ascites w/ paracentesis [id number] , HTN, hypothyroidism, and  Asthma admitted w/ Septic shock 2/2 spontaneous bacterial peritonitis and r/o R LE cellulitis, also w/ BRITTANEY.

## 2018-07-23 NOTE — PROGRESS NOTE ADULT - SUBJECTIVE AND OBJECTIVE BOX
Patient is a 89y old  Female who presents with a chief complaint of abdominal pain (21 Jul 2018 18:15)      INTERVAL HPI: Pt seen and examined. State she feels well, denies any acute complaints at this time.     OVERNIGHT EVENTS: none noted  T(F): 98.3 (07-23-18 @ 20:21), Max: 98.5 (07-23-18 @ 00:11)  HR: 69 (07-23-18 @ 22:00) (69 - 70)  BP: 106/55 (07-23-18 @ 22:00) (74/40 - 143/70)  RR: 24 (07-23-18 @ 22:00) (20 - 37)  SpO2: 100% (07-23-18 @ 22:00) (91% - 100%)    I&O's Summary    22 Jul 2018 07:01  -  23 Jul 2018 07:00  --------------------------------------------------------  IN: 2718 mL / OUT: 150 mL / NET: 2568 mL    23 Jul 2018 07:01  -  23 Jul 2018 22:47  --------------------------------------------------------  IN: 980 mL / OUT: 100 mL / NET: 880 mL        REVIEW OF SYSTEMS: 12 systems reviewed and noncontributory except that stated in hpi      PHYSICAL EXAM:  GENERAL: NAD, well-groomed, well-developed, elder  HEAD:  Atraumatic, Normocephalic  EYES: EOMI, PERRLA, conjunctiva and sclera clear  ENMT: No tonsillar erythema, exudates, or enlargement; Moist mucous membranes, Good dentition, No lesions  NECK: Supple, No JVD,  NERVOUS SYSTEM:  Alert & Oriented X3, Good concentration; Motor Strength 4/5 B/L upper and lower extremities  CHEST/LUNG: Clear to percussion bilaterally; No rales, rhonchi, wheezing, or rubs  HEART: Regular rate and rhythm; No murmurs, rubs, or gallops  ABDOMEN: Soft, Nontender, Nondistended; Bowel sounds present, colostomy placed in LLQ with green-brown stools.   EXTREMITIES:  2+ Peripheral Pulses, No clubbing, cyanosis, or edema except RLE improving erythema  SKIN: No rashes or lesions    LABS:                        9.7    18.29 )-----------( 325      ( 23 Jul 2018 07:57 )             31.1     07-23    138  |  107  |  46<H>  ----------------------------<  84  4.8   |  22  |  1.40<H>    Ca    7.8<L>      23 Jul 2018 07:17  Phos  2.9     07-23  Mg     2.3     07-23    TPro  5.7<L>  /  Alb  1.7<L>  /  TBili  0.5  /  DBili  x   /  AST  40<H>  /  ALT  16  /  AlkPhos  104  07-23        CAPILLARY BLOOD GLUCOSE          07-21 @ 23:03   No growth  --  --  07-21 @ 16:58   Growth in anaerobic bottle:  Coag Negative Staphylococcus  --  Blood Culture PCR  07-21 @ 16:38   10,000 - 49,000 CFU/mL Pseudomonas aeruginosa  --  Pseudomonas aeruginosa          MEDICATIONS  (STANDING):  heparin  Injectable 5000 Unit(s) SubCutaneous every 8 hours  levothyroxine 225 MICROGram(s) Oral daily  midodrine 15 milliGRAM(s) Oral every 8 hours  montelukast 10 milliGRAM(s) Oral daily  norepinephrine Infusion 0.05 MICROgram(s)/kG/Min (8.634 mL/Hr) IV Continuous <Continuous>  piperacillin/tazobactam IVPB. 3.375 Gram(s) IV Intermittent every 12 hours  vancomycin  IVPB      vancomycin  IVPB 1250 milliGRAM(s) IV Intermittent every 24 hours    MEDICATIONS  (PRN):

## 2018-07-23 NOTE — PROGRESS NOTE ADULT - PROBLEM SELECTOR PLAN 1
related to spontaneous bacterial peritonitis  Titrating levophed to maintain MAP >65, actively weaning. Midodrine q8hr started   Vancomycin and zosyn  f/u blood and urine cultures related to spontaneous bacterial peritonitis  Titrating levophed to maintain MAP >65, actively weaning.   Increased Midodrine to 15mg q8hr   Vancomycin and zosyn  f/u blood and urine cultures

## 2018-07-23 NOTE — PROGRESS NOTE ADULT - SUBJECTIVE AND OBJECTIVE BOX
Patient is a 89y old  Female who presents with a chief complaint of abdominal pain (21 Jul 2018 18:15)    24 hour events: ***    REVIEW OF SYSTEMS  Constitutional: No fever, chills, fatigue  Neuro: No headache, numbness, weakness  Resp: No cough, wheezing, shortness of breath  CVS: No chest pain, palpitations, leg swelling  GI: No abdominal pain, nausea, vomiting, diarrhea   : No dysuria, frequency, incontinence  Skin: No itching, burning, rashes, or lesions   Msk: No joint pain or swelling  Psych: No depression, anxiety, mood swings    T(F): 97.9 (07-23-18 @ 11:30), Max: 98.5 (07-23-18 @ 00:11)  HR: 70 (07-23-18 @ 13:30) (69 - 70)  BP: 112/56 (07-23-18 @ 13:30) (74/40 - 142/69)  RR: 23 (07-23-18 @ 13:30) (20 - 37)  SpO2: 100% (07-23-18 @ 13:30) (91% - 100%)  Wt(kg): --        CAPILLARY BLOOD GLUCOSE      I&O's Summary    07-22 @ 07:01  -  07-23 @ 07:00  --------------------------------------------------------  IN: 2718 mL / OUT: 150 mL / NET: 2568 mL    07-23 @ 07:01  -  07-23 @ 13:56  --------------------------------------------------------  IN: 276 mL / OUT: 0 mL / NET: 276 mL      PHYSICAL EXAM  General:   CNS:   HEENT:   Resp:   CVS:   Abd:   Ext:   Skin:     MEDICATIONS  piperacillin/tazobactam IVPB. IV Intermittent  vancomycin  IVPB   vancomycin  IVPB IV Intermittent    midodrine Oral  norepinephrine Infusion IV Continuous    levothyroxine Oral    montelukast Oral        heparin  Injectable SubCutaneous                                      9.7    18.29 )-----------( 325      ( 23 Jul 2018 07:57 )             31.1       07-23    138  |  107  |  46<H>  ----------------------------<  84  4.8   |  22  |  1.40<H>    Ca    7.8<L>      23 Jul 2018 07:17  Phos  2.9     07-23  Mg     2.3     07-23    TPro  5.7<L>  /  Alb  1.7<L>  /  TBili  0.5  /  DBili  x   /  AST  40<H>  /  ALT  16  /  AlkPhos  104  07-23              .Body Fluid Peritoneal Fluid   No growth   polymorphonuclear leukocytes  No organisms seen  by cytocentrifuge 07-21 @ 23:03  .Blood Blood   Growth in anaerobic bottle:  Gram Positive Cocci in Clusters   Growth in anaerobic bottle:  Gram Positive Cocci in Clusters 07-21 @ 16:58  .Urine Catheterized   10,000 - 49,000 CFU/mL Pseudomonas aeruginosa -- 07-21 @ 16:38        Radiology: ***  Bedside lung ultrasound: ***  Bedside ECHO: ***    CENTRAL LINE: N            CULP: N                          A-LINE: N                           GLOBAL ISSUE/BEST PRACTICE  Analgesia:   Sedation:   CAM-ICU:   HOB elevation: yes  Stress ulcer prophylaxis:   VTE prophylaxis:   Glycemic control:   Nutrition:     CODE STATUS: Full Patient is a 89y old  Female who presents with a chief complaint of abdominal pain (21 Jul 2018 18:15)    24 hour events: Patient was placed on Levophed and Midodrine     REVIEW OF SYSTEMS  Constitutional: No fever, chills, fatigue  Neuro: No headache, numbness, weakness  Resp: No cough, wheezing, shortness of breath  CVS: No chest pain, palpitations, leg swelling  GI: No abdominal pain, nausea, vomiting, diarrhea   : No dysuria, frequency, incontinence  Skin: No itching, burning, rashes, or lesions   Msk: (+) Leg swelling, No joint pain   Psych: No depression, anxiety, mood swings    T(F): 97.9 (07-23-18 @ 11:30), Max: 98.5 (07-23-18 @ 00:11)  HR: 70 (07-23-18 @ 13:30) (69 - 70)  BP: 112/56 (07-23-18 @ 13:30) (74/40 - 142/69)  RR: 23 (07-23-18 @ 13:30) (20 - 37)  SpO2: 100% (07-23-18 @ 13:30) (91% - 100%)          CAPILLARY BLOOD GLUCOSE      I&O's Summary    07-22 @ 07:01  -  07-23 @ 07:00  --------------------------------------------------------  IN: 2718 mL / OUT: 150 mL / NET: 2568 mL    07-23 @ 07:01  -  07-23 @ 13:56  --------------------------------------------------------  IN: 276 mL / OUT: 0 mL / NET: 276 mL      PHYSICAL EXAM  General: Well developed, well nourished, No Acute Distress  CNS: AA&Ox3, nonfocal, sensation intact  HEENT: NC/AT, PERRLA, EOMI b/l, dry mucous membranes   Resp: CTA B/L, No Wheezes, rhonchi or rales  CVS: RRR, +S1/S2, no murmurs, rubs or gallops  Abd: Soft, Non-Tender, Distended +Bowel Sounds x4; colostomy placed in LLQ with green-brown stools.   Ext: +edema, RLE with erythema, warmth, tenderness with no blisters  Skin:  warm, dry, normal color, no rash or abnormal lesions    MEDICATIONS  piperacillin/tazobactam IVPB. IV Intermittent  vancomycin  IVPB   vancomycin  IVPB IV Intermittent    midodrine Oral  norepinephrine Infusion IV Continuous    levothyroxine Oral    montelukast Oral        heparin  Injectable SubCutaneous                                      9.7    18.29 )-----------( 325      ( 23 Jul 2018 07:57 )             31.1       07-23    138  |  107  |  46<H>  ----------------------------<  84  4.8   |  22  |  1.40<H>    Ca    7.8<L>      23 Jul 2018 07:17  Phos  2.9     07-23  Mg     2.3     07-23    TPro  5.7<L>  /  Alb  1.7<L>  /  TBili  0.5  /  DBili  x   /  AST  40<H>  /  ALT  16  /  AlkPhos  104  07-23              .Body Fluid Peritoneal Fluid   No growth   polymorphonuclear leukocytes  No organisms seen  by cytocentrifuge 07-21 @ 23:03  .Blood Blood   Growth in anaerobic bottle:  Gram Positive Cocci in Clusters   Growth in anaerobic bottle:  Gram Positive Cocci in Clusters 07-21 @ 16:58  .Urine Catheterized   10,000 - 49,000 CFU/mL Pseudomonas aeruginosa -- 07-21 @ 16:38        Radiology: < from: CT Abdomen and Pelvis No Cont (07.21.18 @ 13:36) >  EXAM:  CT ABDOMEN AND PELVIS                            PROCEDURE DATE:  07/21/2018          INTERPRETATION:  CT ABDOMEN AND PELVIS    CLINICAL INFORMATION:  Weakness. Decreased p.o. intake. History of   ovarian cancer and colon cancer. Prior ventral hernia repair.    PROCEDURE:  Using multislice helical CT, without intravenous or oral   contrast 2.5 mm sections were obtained from the domes of the diaphragm to   the ischial tuberosities.  Coronal reformatted images were performed.    COMPARISON: CT scan abdomen and pelvis 6/20/2018.    FINDINGS: Evaluation of the solid organ parenchyma is limited by the lack   of intravenous contrast.      There is a small partially loculated left-sided pleural effusion.    There is a heterogeneous appearance of the nonenhanced liver, evaluation   for metastatic disease is limited without intravenous contrast.    The spleen, adrenal glands and nonenhanced pancreas are unremarkable in   appearance.    Cholelithiasis with probable mild gallbladder wall thickening is again   noted.    There is a moderate volume of abdominal and pelvic ascites, similar to   prior study.  Nodularity along the peritoneal omental and mesenteric surfaces is   suggestive of peritoneal carcinomatosis.    No hydroureteronephrosis is noted.    There is arteriosclerotic calcification of the abdominal aorta.  There are enlarged retroperitoneal para-aortic and aortocaval lymph nodes.    The evaluation of the stomach and gastrointestinal tract is limited   without distention.  Again noted is segmental sigmoid resection with left-sided colostomy with   large parastomal hernia containing nondistended, nonobstructed colon and   ascites.  There is mild infiltration of the subcutaneous soft tissues along the   anterior abdominalwall, likely at the site of prior ventral repair.   There is a moderately distended fluid and particulate filled cecum, with   some moderately distended small bowel loops and probable segmental bowel   wall thickening. The evaluation of the bowel wall is limited without   intravenous contrast. Note discrete change of caliber is noted.    No free intraperitoneal air is noted.    The urinary bladder is nondistended.  No pelvic mass is noted.    Bilateral inguinal lymph nodes are noted.    There aremultilevel degenerative changes of the spine.    Impression:    Moderate volume of abdominal pelvic ascites, similar to prior study.   Peritoneal and mesenteric nodularity suggestive of carcinomatosis.    Large left-sided parastomal hernia, similar in appearance to prior study.    Mildly distended, probably subsegmental thickened small bowel, without   discrete change in caliber, no high-grade small bowel obstruction noted.    Other findings as discussed above.                                YESSY MALIK M.D., ATTENDING RADIOLOGIST  This document has been electronically signed. Jul 21 2018  2:16PM            CENTRAL LINE: N            CULP: N                          A-LINE: N                           GLOBAL ISSUE/BEST PRACTICE  Analgesia: No  Sedation: No  HOB elevation: yes  Stress ulcer prophylaxis: No  VTE prophylaxis: Yes  Glycemic control: No  Nutrition: Dash diet    CODE STATUS: Full Patient is a 89y old  Female who presents with a chief complaint of abdominal pain (21 Jul 2018 18:15)    24 hour events: midodrine started, levophed titrated down    REVIEW OF SYSTEMS  Constitutional: No fever, chills, fatigue  Neuro: No headache, numbness, weakness  Resp: No cough, wheezing, shortness of breath  CVS: No chest pain, palpitations, leg swelling  GI: No abdominal pain, nausea, vomiting, diarrhea   : No dysuria, frequency, incontinence  Skin: No itching, burning, rashes, or lesions   Msk: (+) Leg swelling, No joint pain   Psych: No depression, anxiety, mood swings    T(F): 97.9 (07-23-18 @ 11:30), Max: 98.5 (07-23-18 @ 00:11)  HR: 70 (07-23-18 @ 13:30) (69 - 70)  BP: 112/56 (07-23-18 @ 13:30) (74/40 - 142/69)  RR: 23 (07-23-18 @ 13:30) (20 - 37)  SpO2: 100% (07-23-18 @ 13:30) (91% - 100%)          CAPILLARY BLOOD GLUCOSE      I&O's Summary    07-22 @ 07:01  -  07-23 @ 07:00  --------------------------------------------------------  IN: 2718 mL / OUT: 150 mL / NET: 2568 mL    07-23 @ 07:01  -  07-23 @ 13:56  --------------------------------------------------------  IN: 276 mL / OUT: 0 mL / NET: 276 mL      PHYSICAL EXAM  General: Well developed, well nourished, No Acute Distress  CNS: AA&Ox3, nonfocal, sensation intact  HEENT: NC/AT, PERRLA, EOMI b/l, dry mucous membranes   Resp: CTA B/L, No Wheezes, rhonchi or rales  CVS: RRR, +S1/S2, no murmurs, rubs or gallops  Abd: Soft, Non-Tender, Distended +Bowel Sounds x4; colostomy placed in LLQ with green-brown stools.   Ext: +edema, RLE with erythema, warmth, tenderness with no blisters  Skin:  warm, dry, normal color, no rash or abnormal lesions    MEDICATIONS  piperacillin/tazobactam IVPB. IV Intermittent  vancomycin  IVPB   vancomycin  IVPB IV Intermittent    midodrine Oral  norepinephrine Infusion IV Continuous    levothyroxine Oral    montelukast Oral        heparin  Injectable SubCutaneous                                      9.7    18.29 )-----------( 325      ( 23 Jul 2018 07:57 )             31.1       07-23    138  |  107  |  46<H>  ----------------------------<  84  4.8   |  22  |  1.40<H>    Ca    7.8<L>      23 Jul 2018 07:17  Phos  2.9     07-23  Mg     2.3     07-23    TPro  5.7<L>  /  Alb  1.7<L>  /  TBili  0.5  /  DBili  x   /  AST  40<H>  /  ALT  16  /  AlkPhos  104  07-23              .Body Fluid Peritoneal Fluid   No growth   polymorphonuclear leukocytes  No organisms seen  by cytocentrifuge 07-21 @ 23:03    .Blood Blood   Growth in anaerobic bottle:  Gram Positive Cocci in Clusters   Growth in anaerobic bottle:  Gram Positive Cocci in Clusters 07-21 @ 16:58    .Urine Catheterized   10,000 - 49,000 CFU/mL Pseudomonas aeruginosa -- 07-21 @ 16:38        Radiology: < from: CT Abdomen and Pelvis No Cont (07.21.18 @ 13:36) >  EXAM:  CT ABDOMEN AND PELVIS                            PROCEDURE DATE:  07/21/2018          INTERPRETATION:  CT ABDOMEN AND PELVIS    CLINICAL INFORMATION:  Weakness. Decreased p.o. intake. History of   ovarian cancer and colon cancer. Prior ventral hernia repair.    PROCEDURE:  Using multislice helical CT, without intravenous or oral   contrast 2.5 mm sections were obtained from the domes of the diaphragm to   the ischial tuberosities.  Coronal reformatted images were performed.    COMPARISON: CT scan abdomen and pelvis 6/20/2018.    FINDINGS: Evaluation of the solid organ parenchyma is limited by the lack   of intravenous contrast.      There is a small partially loculated left-sided pleural effusion.    There is a heterogeneous appearance of the nonenhanced liver, evaluation   for metastatic disease is limited without intravenous contrast.    The spleen, adrenal glands and nonenhanced pancreas are unremarkable in   appearance.    Cholelithiasis with probable mild gallbladder wall thickening is again   noted.    There is a moderate volume of abdominal and pelvic ascites, similar to   prior study.  Nodularity along the peritoneal omental and mesenteric surfaces is   suggestive of peritoneal carcinomatosis.    No hydroureteronephrosis is noted.    There is arteriosclerotic calcification of the abdominal aorta.  There are enlarged retroperitoneal para-aortic and aortocaval lymph nodes.    The evaluation of the stomach and gastrointestinal tract is limited   without distention.  Again noted is segmental sigmoid resection with left-sided colostomy with   large parastomal hernia containing nondistended, nonobstructed colon and   ascites.  There is mild infiltration of the subcutaneous soft tissues along the   anterior abdominalwall, likely at the site of prior ventral repair.   There is a moderately distended fluid and particulate filled cecum, with   some moderately distended small bowel loops and probable segmental bowel   wall thickening. The evaluation of the bowel wall is limited without   intravenous contrast. Note discrete change of caliber is noted.    No free intraperitoneal air is noted.    The urinary bladder is nondistended.  No pelvic mass is noted.    Bilateral inguinal lymph nodes are noted.    There aremultilevel degenerative changes of the spine.    Impression:    Moderate volume of abdominal pelvic ascites, similar to prior study.   Peritoneal and mesenteric nodularity suggestive of carcinomatosis.    Large left-sided parastomal hernia, similar in appearance to prior study.    Mildly distended, probably subsegmental thickened small bowel, without   discrete change in caliber, no high-grade small bowel obstruction noted.    Other findings as discussed above.                                YESSY MALIK M.D., ATTENDING RADIOLOGIST  This document has been electronically signed. Jul 21 2018  2:16PM            CENTRAL LINE: N            CULP: N                          A-LINE: N                           GLOBAL ISSUE/BEST PRACTICE  Analgesia: No  Sedation: No  HOB elevation: yes  Stress ulcer prophylaxis: No  VTE prophylaxis: Yes  Glycemic control: No  Nutrition: Dash diet    CODE STATUS: Full

## 2018-07-23 NOTE — PROGRESS NOTE ADULT - PROBLEM SELECTOR PLAN 1
- admit to ICU: sec to probable sbp, f/u paracentesis cx  -management per ICU team  - f/u final blood, urine and paracentesis cultures; RLE cellulitis also noted and improving  - continue vanco/zosyn  - apprec ID recs

## 2018-07-23 NOTE — PROGRESS NOTE ADULT - ASSESSMENT
89 y/o F PMHx ovarian cancer w/ recurrent ascites, colon ca s/p colostomy, s/p incarcerated ventral hernia repair 10/2017, DM2, HTN, afib s/p PPM, hypothyroid, obesity, and overactive bladder presents with generalized weakness x 2 days prior to admission; now with septic shock admitted to ICU sec to probably SPB and RLE cellulitis

## 2018-07-23 NOTE — PROGRESS NOTE ADULT - ASSESSMENT
89 y/o F PMHx ovarian cancer w/ recurrent ascites, colon ca s/p colostomy with ostomy bag, s/p incarcerated ventral hernia repair 10/2017, DM2, HTN, afib s/p PPM, hypothyroid, obesity, and overactive bladder presents with generalized weakness, rigors, leukocytosis, and >3500 nucleated cells in peritoneal fluid.  shock resolved  peritonitis  + blood cx

## 2018-07-23 NOTE — PROGRESS NOTE ADULT - SUBJECTIVE AND OBJECTIVE BOX
Patient is a 89y old  Female who presents with a chief complaint of abdominal pain (2018 18:15)      BRIEF HOSPITAL COURSE:  89 y/o female pmhx ovarian cancer with peritoneal carcinomatosis, , afib s/p PPM, colon cancer s/p colostomy, DM2, Ascites w/ paracentesis [id number] , HTN, hypothyroidism, and  Asthma admitted w/ Septic shock 2/2 spontaneous bacterial peritonitis    Events last 24 hours: ***    PAST MEDICAL & SURGICAL HISTORY:  Ovarian cancer  Afib: s/p PPM  Ascites, malignant: from advanced ovarian cancer  Colon cancer  Obese  Hypothyroidism  HTN (Hypertension)  Overactive Bladder  DM Type 2 (Diabetes Mellitus, Type 2)  Asthma  Incarcerated hernia: s/p repair 10/2017  H/O hernia repair  Artificial pacemaker  S/P colon resection: with sigmoid colostomy for obstructing ovarian cancer  S/P Cataract Surgery: CASI  History of Tonsillectomy  History of Appendectomy  Status Post Total Knee Replacement: Left      Review of Systems:  CONSTITUTIONAL: No fever, chills, or fatigue  EYES: No eye pain, visual disturbances, or discharge  ENMT:  No difficulty hearing, tinnitus, vertigo; No sinus or throat pain  NECK: No pain or stiffness  RESPIRATORY: No cough, wheezing, chills or hemoptysis; No shortness of breath  CARDIOVASCULAR: No chest pain, palpitations, dizziness, or leg swelling  GASTROINTESTINAL: No abdominal or epigastric pain. No nausea, vomiting, or hematemesis; No diarrhea or constipation. No melena or hematochezia.  GENITOURINARY: No dysuria, frequency, hematuria, or incontinence  NEUROLOGICAL: No headaches, memory loss, loss of strength, numbness, or tremors  SKIN: No itching, burning, rashes, or lesions   MUSCULOSKELETAL: No joint pain or swelling; No muscle, back, or extremity pain  PSYCHIATRIC: No depression, anxiety, mood swings, or difficulty sleeping      Medications:  piperacillin/tazobactam IVPB. 3.375 Gram(s) IV Intermittent every 12 hours  vancomycin  IVPB      vancomycin  IVPB 1250 milliGRAM(s) IV Intermittent every 24 hours    midodrine 10 milliGRAM(s) Oral every 8 hours  norepinephrine Infusion 0.05 MICROgram(s)/kG/Min IV Continuous <Continuous>    montelukast 10 milliGRAM(s) Oral daily        heparin  Injectable 5000 Unit(s) SubCutaneous every 8 hours        levothyroxine 225 MICROGram(s) Oral daily                  ICU Vital Signs Last 24 Hrs  T(C): 36.8 (2018 19:57), Max: 36.8 (2018 19:57)  T(F): 98.3 (2018 19:57), Max: 98.3 (2018 19:57)  HR: 69 (2018 23:00) (69 - 71)  BP: 84/47 (2018 23:00) (84/47 - 142/69)  BP(mean): 60 (2018 23:00) (60 - 86)  ABP: --  ABP(mean): --  RR: 20 (2018 23:00) (19 - 33)  SpO2: 100% (2018 23:00) (96% - 100%)          I&O's Detail    2018 07:01  -  2018 07:00  --------------------------------------------------------  IN:    Albumin 5% - 50 mL: 100 mL    IV PiggyBack: 100 mL    norepinephrine Infusion: 257.6 mL    sodium chloride 0.9%: 60 mL    Solution: 167 mL  Total IN: 684.6 mL    OUT:    Colostomy: 450 mL  Total OUT: 450 mL    Total NET: 234.6 mL      2018 07:01  -  2018 00:00  --------------------------------------------------------  IN:    norepinephrine Infusion: 108 mL    Oral Fluid: 770 mL    Solution: 200 mL    Solution: 250 mL  Total IN: 1328 mL    OUT:    Colostomy: 150 mL  Total OUT: 150 mL    Total NET: 1178 mL            LABS:                        10.6   26.48 )-----------( 503      ( 2018 07:38 )             33.9     07-22    141  |  105  |  51<H>  ----------------------------<  170<H>  5.1   |  28  |  2.10<H>    Ca    7.8<L>      2018 14:36  Phos  4.5       Mg     2.3         TPro  6.3  /  Alb  2.1<L>  /  TBili  0.5  /  DBili  x   /  AST  33  /  ALT  17  /  AlkPhos  79        CARDIAC MARKERS ( 2018 11:51 )  .101 ng/mL / x     / x     / x     / <1.0 ng/mL      CAPILLARY BLOOD GLUCOSE        PT/INR - ( 2018 11:51 )   PT: 20.5 sec;   INR: 1.86 ratio         PTT - ( 2018 11:51 )  PTT:33.3 sec  Urinalysis Basic - ( 2018 12:02 )    Color: Yellow / Appearance: Slightly Turbid / S.025 / pH: x  Gluc: x / Ketone: Negative  / Bili: Small / Urobili: Negative   Blood: x / Protein: 25 mg/dL / Nitrite: Negative   Leuk Esterase: Trace / RBC: 0-2 /HPF / WBC 0-2   Sq Epi: x / Non Sq Epi: Occasional / Bacteria: Moderate      CULTURES:  Culture Results:   No growth (18 @ 23:03)  Culture Results:   No growth to date. (18 @ 16:58)  Culture Results:   Growth in anaerobic bottle: Gram Positive Cocci in Clusters (18 @ 16:58)  Culture Results:   10,000 - 49,000 CFU/mL Pseudomonas aeruginosa (18 @ 16:38)      Physical Examination:    General: No acute distress.  Alert, oriented, interactive, nonfocal    HEENT: Pupils equal, reactive to light.  Symmetric.    PULM: Clear to auscultation bilaterally, no significant sputum production    CVS: Regular rate and rhythm, no murmurs, rubs, or gallops    ABD: Soft, nondistended, nontender, normoactive bowel sounds, no masses    EXT: No edema, nontender    SKIN: Warm and well perfused, no rashes noted.    NEURO: A&Ox3, strength 5/5 all extremities, cranial nerves grossly intact, no focal deficits    RADIOLOGY: ***    CRITICAL CARE TIME SPENT: ***  Evaluating/treating patient, reviewing data/labs/imaging, discussing case with multidisciplinary team, discussing plan/goals of care with patient/family. Non-inclusive of procedure time. Patient is a 89y old  Female who presents with a chief complaint of abdominal pain (2018 18:15)      BRIEF HOSPITAL COURSE:  89 y/o female pmhx ovarian cancer with peritoneal carcinomatosis, , afib s/p PPM, colon cancer s/p colostomy, DM2, Ascites w/ paracentesis [id number] , HTN, hypothyroidism, and  Asthma admitted w/ Septic shock 2/2 spontaneous bacterial peritonitis and r/o R LE cellulitis, also w/ BRITTANEY. Pt s/p paracentesis w/ removal >5L ascites.     Events last 24 hours: Remains on low dose Levophed. Added Midodrine today. Awaiting culture results. ? vasopressor infiltration through PIV. hands warm, w/ pulses present and equal. No reaction to IV site. Given nitropaste     PAST MEDICAL & SURGICAL HISTORY:  Ovarian cancer  Afib: s/p PPM  Ascites, malignant: from advanced ovarian cancer  Colon cancer  Obese  Hypothyroidism  HTN (Hypertension)  Overactive Bladder  DM Type 2 (Diabetes Mellitus, Type 2)  Asthma  Incarcerated hernia: s/p repair 10/2017  H/O hernia repair  Artificial pacemaker  S/P colon resection: with sigmoid colostomy for obstructing ovarian cancer  S/P Cataract Surgery: CASI  History of Tonsillectomy  History of Appendectomy  Status Post Total Knee Replacement: Left      Review of Systems:  CONSTITUTIONAL: No fever, chills, (+) fatigue  EYES: No eye pain, visual disturbances, or discharge  ENMT:  No difficulty hearing, tinnitus, vertigo; No sinus or throat pain  NECK: No pain or stiffness  RESPIRATORY: No cough, wheezing, chills or hemoptysis; No shortness of breath  CARDIOVASCULAR: No chest pain, palpitations, dizziness, or leg swelling  GASTROINTESTINAL: No abdominal or epigastric pain. No nausea, vomiting, or hematemesis; No diarrhea or constipation. No melena or hematochezia.  GENITOURINARY: No dysuria, frequency, hematuria, or incontinence  NEUROLOGICAL: No headaches, memory loss, loss of strength, numbness, or tremors  SKIN: No itching, burning, rashes, or lesions   MUSCULOSKELETAL: No joint pain or swelling; No muscle, back, or extremity pain  PSYCHIATRIC: No depression, anxiety, mood swings, or difficulty sleeping      Medications:  piperacillin/tazobactam IVPB. 3.375 Gram(s) IV Intermittent every 12 hours  vancomycin  IVPB      vancomycin  IVPB 1250 milliGRAM(s) IV Intermittent every 24 hours  midodrine 10 milliGRAM(s) Oral every 8 hours  norepinephrine Infusion 0.05 MICROgram(s)/kG/Min IV Continuous <Continuous>  montelukast 10 milliGRAM(s) Oral daily  heparin  Injectable 5000 Unit(s) SubCutaneous every 8 hours  levothyroxine 225 MICROGram(s) Oral daily                  ICU Vital Signs Last 24 Hrs  T(C): 36.8 (2018 19:57), Max: 36.8 (2018 19:57)  T(F): 98.3 (2018 19:57), Max: 98.3 (2018 19:57)  HR: 69 (2018 23:00) (69 - 71)  BP: 84/47 (2018 23:00) (84/47 - 142/69)  BP(mean): 60 (2018 23:00) (60 - 86)  RR: 20 (2018 23:00) (19 - 33)  SpO2: 100% (2018 23:00) (96% - 100%)          I&O's Detail    2018 07:01  -  2018 07:00  --------------------------------------------------------  IN:    Albumin 5% - 50 mL: 100 mL    IV PiggyBack: 100 mL    norepinephrine Infusion: 257.6 mL    sodium chloride 0.9%: 60 mL    Solution: 167 mL  Total IN: 684.6 mL    OUT:    Colostomy: 450 mL  Total OUT: 450 mL    Total NET: 234.6 mL      2018 07:01  -  2018 00:00  --------------------------------------------------------  IN:    norepinephrine Infusion: 108 mL    Oral Fluid: 770 mL    Solution: 200 mL    Solution: 250 mL  Total IN: 1328 mL    OUT:    Colostomy: 150 mL  Total OUT: 150 mL    Total NET: 1178 mL            LABS:                        10.6   26.48 )-----------( 503      ( 2018 07:38 )             33.9         141  |  105  |  51<H>  ----------------------------<  170<H>  5.1   |  28  |  2.10<H>    Ca    7.8<L>      2018 14:36  Phos  4.5       Mg     2.3         TPro  6.3  /  Alb  2.1<L>  /  TBili  0.5  /  DBili  x   /  AST  33  /  ALT  17  /  AlkPhos  79        CARDIAC MARKERS ( 2018 11:51 )  .101 ng/mL / x     / x     / x     / <1.0 ng/mL      CAPILLARY BLOOD GLUCOSE        PT/INR - ( 2018 11:51 )   PT: 20.5 sec;   INR: 1.86 ratio         PTT - ( 2018 11:51 )  PTT:33.3 sec  Urinalysis Basic - ( 2018 12:02 )    Color: Yellow / Appearance: Slightly Turbid / S.025 / pH: x  Gluc: x / Ketone: Negative  / Bili: Small / Urobili: Negative   Blood: x / Protein: 25 mg/dL / Nitrite: Negative   Leuk Esterase: Trace / RBC: 0-2 /HPF / WBC 0-2   Sq Epi: x / Non Sq Epi: Occasional / Bacteria: Moderate      CULTURES:  Culture Results:   No growth (18 @ 23:03)  Culture Results:   No growth to date. (18 @ 16:58)  Culture Results:   Growth in anaerobic bottle: Gram Positive Cocci in Clusters (18 @ 16:58)  Culture Results:   10,000 - 49,000 CFU/mL Pseudomonas aeruginosa (18 @ 16:38)      Physical Examination:    General: No acute distress.  Alert, oriented, interactive, nonfocal    HEENT: Pupils equal, reactive to light.  Symmetric.    PULM: Clear to auscultation bilaterally, no significant sputum production    CVS: Regular rate and rhythm, no murmurs, rubs, or gallops    ABD: Soft, nondistended, nontender, normoactive bowel sounds, no masses    EXT: No edema, nontender    SKIN: Warm and well perfused, no rashes noted.    NEURO: A&Ox3, strength 5/5 all extremities, cranial nerves grossly intact, no focal deficits    RADIOLOGY: ***    CRITICAL CARE TIME SPENT: ***  Evaluating/treating patient, reviewing data/labs/imaging, discussing case with multidisciplinary team, discussing plan/goals of care with patient/family. Non-inclusive of procedure time. Patient is a 89y old  Female who presents with a chief complaint of abdominal pain (2018 18:15)      BRIEF HOSPITAL COURSE:  89 y/o female pmhx ovarian cancer with peritoneal carcinomatosis, , afib s/p PPM, colon cancer s/p colostomy, DM2, Ascites w/ paracentesis [id number] , HTN, hypothyroidism, and  Asthma admitted w/ Septic shock 2/2 spontaneous bacterial peritonitis and r/o R LE cellulitis, also w/ BRITTANEY. Pt s/p paracentesis w/ removal >5L ascites.     Events last 24 hours: Remains on low dose Levophed. Added Midodrine today. Awaiting culture results. ? vasopressor infiltration through PIV. hands warm, w/ pulses present and equal. No reaction to IV site. Given nitropaste     PAST MEDICAL & SURGICAL HISTORY:  Ovarian cancer  Afib: s/p PPM  Ascites, malignant: from advanced ovarian cancer  Colon cancer  Obese  Hypothyroidism  HTN (Hypertension)  Overactive Bladder  DM Type 2 (Diabetes Mellitus, Type 2)  Asthma  Incarcerated hernia: s/p repair 10/2017  H/O hernia repair  Artificial pacemaker  S/P colon resection: with sigmoid colostomy for obstructing ovarian cancer  S/P Cataract Surgery: CASI  History of Tonsillectomy  History of Appendectomy  Status Post Total Knee Replacement: Left      Review of Systems:  CONSTITUTIONAL: No fever, chills, (+) fatigue  EYES: No eye pain, visual disturbances, or discharge  ENMT:  No difficulty hearing, tinnitus, vertigo; No sinus or throat pain  NECK: No pain or stiffness  RESPIRATORY: No cough, wheezing, chills or hemoptysis; No shortness of breath  CARDIOVASCULAR: No chest pain, palpitations, dizziness, or leg swelling  GASTROINTESTINAL: No abdominal or epigastric pain. No nausea, vomiting, or hematemesis; No diarrhea or constipation. No melena or hematochezia.  GENITOURINARY: No dysuria, frequency, hematuria, or incontinence  NEUROLOGICAL: No headaches, memory loss, loss of strength, numbness, or tremors  SKIN: No itching, burning, rashes, or lesions   MUSCULOSKELETAL: No joint pain or swelling; No muscle, back, or extremity pain  PSYCHIATRIC: No depression, anxiety, mood swings, or difficulty sleeping      Medications:  piperacillin/tazobactam IVPB. 3.375 Gram(s) IV Intermittent every 12 hours  vancomycin  IVPB      vancomycin  IVPB 1250 milliGRAM(s) IV Intermittent every 24 hours  midodrine 10 milliGRAM(s) Oral every 8 hours  norepinephrine Infusion 0.05 MICROgram(s)/kG/Min IV Continuous <Continuous>  montelukast 10 milliGRAM(s) Oral daily  heparin  Injectable 5000 Unit(s) SubCutaneous every 8 hours  levothyroxine 225 MICROGram(s) Oral daily                  ICU Vital Signs Last 24 Hrs  T(C): 36.8 (2018 19:57), Max: 36.8 (2018 19:57)  T(F): 98.3 (2018 19:57), Max: 98.3 (2018 19:57)  HR: 69 (2018 23:00) (69 - 71)  BP: 84/47 (2018 23:00) (84/47 - 142/69)  BP(mean): 60 (2018 23:00) (60 - 86)  RR: 20 (2018 23:00) (19 - 33)  SpO2: 100% (2018 23:00) (96% - 100%)          I&O's Detail    2018 07:01  -  2018 07:00  --------------------------------------------------------  IN:    Albumin 5% - 50 mL: 100 mL    IV PiggyBack: 100 mL    norepinephrine Infusion: 257.6 mL    sodium chloride 0.9%: 60 mL    Solution: 167 mL  Total IN: 684.6 mL    OUT:    Colostomy: 450 mL  Total OUT: 450 mL    Total NET: 234.6 mL      2018 07:01  -  2018 00:00  --------------------------------------------------------  IN:    norepinephrine Infusion: 108 mL    Oral Fluid: 770 mL    Solution: 200 mL    Solution: 250 mL  Total IN: 1328 mL    OUT:    Colostomy: 150 mL  Total OUT: 150 mL    Total NET: 1178 mL            LABS:                        10.6   26.48 )-----------( 503      ( 2018 07:38 )             33.9         141  |  105  |  51<H>  ----------------------------<  170<H>  5.1   |  28  |  2.10<H>    Ca    7.8<L>      2018 14:36  Phos  4.5       Mg     2.3         TPro  6.3  /  Alb  2.1<L>  /  TBili  0.5  /  DBili  x   /  AST  33  /  ALT  17  /  AlkPhos  79        CARDIAC MARKERS ( 2018 11:51 )  .101 ng/mL / x     / x     / x     / <1.0 ng/mL      CAPILLARY BLOOD GLUCOSE        PT/INR - ( 2018 11:51 )   PT: 20.5 sec;   INR: 1.86 ratio         PTT - ( 2018 11:51 )  PTT:33.3 sec  Urinalysis Basic - ( 2018 12:02 )    Color: Yellow / Appearance: Slightly Turbid / S.025 / pH: x  Gluc: x / Ketone: Negative  / Bili: Small / Urobili: Negative   Blood: x / Protein: 25 mg/dL / Nitrite: Negative   Leuk Esterase: Trace / RBC: 0-2 /HPF / WBC 0-2   Sq Epi: x / Non Sq Epi: Occasional / Bacteria: Moderate      CULTURES:  Culture Results:   No growth (18 @ 23:03)  Culture Results:   No growth to date. (18 @ 16:58)  Culture Results:   Growth in anaerobic bottle: Gram Positive Cocci in Clusters (18 @ 16:58)  Culture Results:   10,000 - 49,000 CFU/mL Pseudomonas aeruginosa (18 @ 16:38)      Physical Examination:    General: AAOX3. No acute distress.     HEENT: Pupils equal, reactive to light.  Symmetric.    PULM: Bibasilar rales. No wheeze/rhonchi. Non labored breathing. No sputum production     CVS: +S1/S2 NSR. no murmurs No JVD       ABD: Soft, nondistended, nontender, normoactive bowel sounds, Left lower colostomy     EXT: No edema, nontender    SKIN: Warm and well perfused, no rashes noted.    NEURO: A&Ox3, able to move all extremities. No focal deficits     RADIOLOGY: < from: CT Abdomen and Pelvis No Cont (18 @ 13:36) >  XAM:  CT ABDOMEN AND PELVIS                            PROCEDURE DATE:  2018          INTERPRETATION:  CT ABDOMEN AND PELVIS    CLINICAL INFORMATION:  Weakness. Decreased p.o. intake. History of   ovarian cancer and colon cancer. Prior ventral hernia repair.    PROCEDURE:  Using multislice helical CT, without intravenous or oral   contrast 2.5 mm sections were obtained from the domes of the diaphragm to   the ischial tuberosities.  Coronal reformatted images were performed.    COMPARISON: CT scan abdomen and pelvis 2018.    FINDINGS: Evaluation of the solid organ parenchyma is limited by the lack   of intravenous contrast.      There is a small partially loculated left-sided pleural effusion.    There is a heterogeneous appearance of the nonenhanced liver, evaluation   for metastatic disease is limited without intravenous contrast.    The spleen, adrenal glands and nonenhanced pancreas are unremarkable in   appearance.    Cholelithiasis with probable mild gallbladder wall thickening is again   noted.    There is a moderate volume of abdominal and pelvic ascites, similar to   prior study.  Nodularity along the peritoneal omental and mesenteric surfaces is   suggestive of peritoneal carcinomatosis.    No hydroureteronephrosis is noted.    There is arteriosclerotic calcification of the abdominal aorta.  There are enlarged retroperitoneal para-aortic and aortocaval lymph nodes.    The evaluation of the stomach and gastrointestinal tract is limited   without distention.  Again noted is segmental sigmoid resection with left-sided colostomy with   large parastomal hernia containing nondistended, nonobstructed colon and   ascites.  There is mild infiltration of the subcutaneous soft tissues along the   anterior abdominalwall, likely at the site of prior ventral repair.   There is a moderately distended fluid and particulate filled cecum, with   some moderately distended small bowel loops and probable segmental bowel   wall thickening. The evaluation of the bowel wall is limited without   intravenous contrast. Note discrete change of caliber is noted.    No free intraperitoneal air is noted.    The urinary bladder is nondistended.  No pelvic mass is noted.    Bilateral inguinal lymph nodes are noted.    There aremultilevel degenerative changes of the spine.    Impression:    Moderate volume of abdominal pelvic ascites, similar to prior study.   Peritoneal and mesenteric nodularity suggestive of carcinomatosis.    Large left-sided parastomal hernia, similar in appearance to prior study.    Mildly distended, probably subsegmental thickened small bowel, without   discrete change in caliber, no high-grade small bowel obstruction noted.    Other findings as discussed above.    < end of copied text >      CRITICAL CARE TIME SPENT: 33 min  Evaluating/treating patient, reviewing data/labs/imaging, discussing case with multidisciplinary team, discussing plan/goals of care with patient/family. Non-inclusive of procedure time.

## 2018-07-23 NOTE — PROGRESS NOTE ADULT - ASSESSMENT
89 y/o F PMHx ovarian cancer w/ recurrent ascites, colon ca s/p colostomy, s/p incarcerated ventral hernia repair 10/2017, DM2, HTN, afib s/p PPM, hypothyroid, obesity, and overactive bladder presents with generalized weakness x 2 days; admitted with septic shock.    Neurology:  -Ambulation: PT/OT ordered   Respiratory:  - No active issues, 100% O2 saturation   Cardiology:  -Chronic AFib: stable, continue holding Xarelto due to kidney function  -Septic Shock: continue Levophed and Midodrine  Nephrology:  -BRITTANEY: improving creatinine function (1.4), continue monitoring  GI/:  -Ascites: paracentesis scheduled for outpatient   Endocrinology:  -Hypothyroidism: continue   Heme:  -No active issues, monitor WBC  ID:  -SBP: blood cultures showed Gram positive clusters, potential contamination, follow up new bood cultures and continue Zosyn and Vancomycin  Prophylaxis:  -DVT Prophylaxis: Heparin  Code status:   -Full Code

## 2018-07-23 NOTE — PROGRESS NOTE ADULT - ATTENDING COMMENTS
89 y/o female with ovarian cancer and peritoneal carcinomatosis with recurrent ascites requiring frequent paracentesis, multiple SBOs requiring surgery, also afib, CHF, colon cancer s/p colostomy, with septic shock likely due to SBP and/or RLE cellulitis. She also has BRITTANEY likely prerenal vs ATN.     --septic shock on very low dose of levophed  will likely wean off today, continu midodrine  --Afib controlled  hold AC for now in setting of critical illness  --stable from respiratory standpoint  --BRITTANEY improved  --SBP and RLE cellulitis  BCx with CNS likely a contaminant, repeat BCx pending  continue vanc, zosyn for now  --hypothyroidism, cont synthroid  --plan discussed with pt and daughter  --pt critically ill. CC time 40min

## 2018-07-24 LAB
-  AMPICILLIN/SULBACTAM: SIGNIFICANT CHANGE UP
-  CEFAZOLIN: SIGNIFICANT CHANGE UP
-  CLINDAMYCIN: SIGNIFICANT CHANGE UP
-  ERYTHROMYCIN: SIGNIFICANT CHANGE UP
-  GENTAMICIN: SIGNIFICANT CHANGE UP
-  OXACILLIN: SIGNIFICANT CHANGE UP
-  PENICILLIN: SIGNIFICANT CHANGE UP
-  RIFAMPIN: SIGNIFICANT CHANGE UP
-  TETRACYCLINE: SIGNIFICANT CHANGE UP
-  TRIMETHOPRIM/SULFAMETHOXAZOLE: SIGNIFICANT CHANGE UP
-  VANCOMYCIN: SIGNIFICANT CHANGE UP
ALBUMIN SERPL ELPH-MCNC: 1.7 G/DL — LOW (ref 3.3–5)
ALP SERPL-CCNC: 89 U/L — SIGNIFICANT CHANGE UP (ref 40–120)
ALT FLD-CCNC: 16 U/L — SIGNIFICANT CHANGE UP (ref 12–78)
ANION GAP SERPL CALC-SCNC: 6 MMOL/L — SIGNIFICANT CHANGE UP (ref 5–17)
AST SERPL-CCNC: 20 U/L — SIGNIFICANT CHANGE UP (ref 15–37)
BASOPHILS # BLD AUTO: 0.04 K/UL — SIGNIFICANT CHANGE UP (ref 0–0.2)
BASOPHILS NFR BLD AUTO: 0.5 % — SIGNIFICANT CHANGE UP (ref 0–2)
BILIRUB SERPL-MCNC: 0.6 MG/DL — SIGNIFICANT CHANGE UP (ref 0.2–1.2)
BUN SERPL-MCNC: 31 MG/DL — HIGH (ref 7–23)
CALCIUM SERPL-MCNC: 7.7 MG/DL — LOW (ref 8.5–10.1)
CHLORIDE SERPL-SCNC: 106 MMOL/L — SIGNIFICANT CHANGE UP (ref 96–108)
CO2 SERPL-SCNC: 29 MMOL/L — SIGNIFICANT CHANGE UP (ref 22–31)
CREAT SERPL-MCNC: 1.1 MG/DL — SIGNIFICANT CHANGE UP (ref 0.5–1.3)
CULTURE RESULTS: SIGNIFICANT CHANGE UP
EOSINOPHIL # BLD AUTO: 0.07 K/UL — SIGNIFICANT CHANGE UP (ref 0–0.5)
EOSINOPHIL NFR BLD AUTO: 0.9 % — SIGNIFICANT CHANGE UP (ref 0–6)
GLUCOSE SERPL-MCNC: 84 MG/DL — SIGNIFICANT CHANGE UP (ref 70–99)
HCT VFR BLD CALC: 28.4 % — LOW (ref 34.5–45)
HGB BLD-MCNC: 9 G/DL — LOW (ref 11.5–15.5)
IMM GRANULOCYTES NFR BLD AUTO: 0.7 % — SIGNIFICANT CHANGE UP (ref 0–1.5)
LYMPHOCYTES # BLD AUTO: 0.58 K/UL — LOW (ref 1–3.3)
LYMPHOCYTES # BLD AUTO: 7.2 % — LOW (ref 13–44)
MAGNESIUM SERPL-MCNC: 2.3 MG/DL — SIGNIFICANT CHANGE UP (ref 1.6–2.6)
MCHC RBC-ENTMCNC: 25 PG — LOW (ref 27–34)
MCHC RBC-ENTMCNC: 31.7 GM/DL — LOW (ref 32–36)
MCV RBC AUTO: 78.9 FL — LOW (ref 80–100)
METHOD TYPE: SIGNIFICANT CHANGE UP
MONOCYTES # BLD AUTO: 0.63 K/UL — SIGNIFICANT CHANGE UP (ref 0–0.9)
MONOCYTES NFR BLD AUTO: 7.9 % — SIGNIFICANT CHANGE UP (ref 2–14)
NEUTROPHILS # BLD AUTO: 6.63 K/UL — SIGNIFICANT CHANGE UP (ref 1.8–7.4)
NEUTROPHILS NFR BLD AUTO: 82.8 % — HIGH (ref 43–77)
ORGANISM # SPEC MICROSCOPIC CNT: SIGNIFICANT CHANGE UP
ORGANISM # SPEC MICROSCOPIC CNT: SIGNIFICANT CHANGE UP
PHOSPHATE SERPL-MCNC: 2.1 MG/DL — LOW (ref 2.5–4.5)
PLATELET # BLD AUTO: 344 K/UL — SIGNIFICANT CHANGE UP (ref 150–400)
POTASSIUM SERPL-MCNC: 3.6 MMOL/L — SIGNIFICANT CHANGE UP (ref 3.5–5.3)
POTASSIUM SERPL-SCNC: 3.6 MMOL/L — SIGNIFICANT CHANGE UP (ref 3.5–5.3)
PROT SERPL-MCNC: 5.7 G/DL — LOW (ref 6–8.3)
RBC # BLD: 3.6 M/UL — LOW (ref 3.8–5.2)
RBC # FLD: 16.6 % — HIGH (ref 10.3–14.5)
SODIUM SERPL-SCNC: 141 MMOL/L — SIGNIFICANT CHANGE UP (ref 135–145)
SPECIMEN SOURCE: SIGNIFICANT CHANGE UP
WBC # BLD: 8.01 K/UL — SIGNIFICANT CHANGE UP (ref 3.8–10.5)
WBC # FLD AUTO: 8.01 K/UL — SIGNIFICANT CHANGE UP (ref 3.8–10.5)

## 2018-07-24 PROCEDURE — 99233 SBSQ HOSP IP/OBS HIGH 50: CPT | Mod: GC

## 2018-07-24 RX ORDER — HEPARIN SODIUM 5000 [USP'U]/ML
5000 INJECTION INTRAVENOUS; SUBCUTANEOUS EVERY 8 HOURS
Qty: 0 | Refills: 0 | Status: DISCONTINUED | OUTPATIENT
Start: 2018-07-24 | End: 2018-07-25

## 2018-07-24 RX ORDER — VANCOMYCIN HCL 1 G
1000 VIAL (EA) INTRAVENOUS EVERY 24 HOURS
Qty: 0 | Refills: 0 | Status: DISCONTINUED | OUTPATIENT
Start: 2018-07-25 | End: 2018-07-25

## 2018-07-24 RX ORDER — RIVAROXABAN 15 MG-20MG
15 KIT ORAL DAILY
Qty: 0 | Refills: 0 | Status: DISCONTINUED | OUTPATIENT
Start: 2018-07-24 | End: 2018-07-24

## 2018-07-24 RX ORDER — NYSTATIN CREAM 100000 [USP'U]/G
1 CREAM TOPICAL ONCE
Qty: 0 | Refills: 0 | Status: COMPLETED | OUTPATIENT
Start: 2018-07-24 | End: 2018-07-24

## 2018-07-24 RX ORDER — ONDANSETRON 8 MG/1
4 TABLET, FILM COATED ORAL ONCE
Qty: 0 | Refills: 0 | Status: COMPLETED | OUTPATIENT
Start: 2018-07-24 | End: 2018-07-24

## 2018-07-24 RX ORDER — ACETAMINOPHEN 500 MG
650 TABLET ORAL ONCE
Qty: 0 | Refills: 0 | Status: COMPLETED | OUTPATIENT
Start: 2018-07-24 | End: 2018-07-24

## 2018-07-24 RX ADMIN — HEPARIN SODIUM 5000 UNIT(S): 5000 INJECTION INTRAVENOUS; SUBCUTANEOUS at 05:38

## 2018-07-24 RX ADMIN — MIDODRINE HYDROCHLORIDE 15 MILLIGRAM(S): 2.5 TABLET ORAL at 13:38

## 2018-07-24 RX ADMIN — MIDODRINE HYDROCHLORIDE 15 MILLIGRAM(S): 2.5 TABLET ORAL at 05:38

## 2018-07-24 RX ADMIN — PIPERACILLIN AND TAZOBACTAM 25 GRAM(S): 4; .5 INJECTION, POWDER, LYOPHILIZED, FOR SOLUTION INTRAVENOUS at 12:24

## 2018-07-24 RX ADMIN — ONDANSETRON 4 MILLIGRAM(S): 8 TABLET, FILM COATED ORAL at 13:38

## 2018-07-24 RX ADMIN — ONDANSETRON 4 MILLIGRAM(S): 8 TABLET, FILM COATED ORAL at 21:15

## 2018-07-24 RX ADMIN — Medication 225 MICROGRAM(S): at 05:38

## 2018-07-24 RX ADMIN — Medication 650 MILLIGRAM(S): at 21:15

## 2018-07-24 RX ADMIN — PIPERACILLIN AND TAZOBACTAM 25 GRAM(S): 4; .5 INJECTION, POWDER, LYOPHILIZED, FOR SOLUTION INTRAVENOUS at 23:24

## 2018-07-24 RX ADMIN — MONTELUKAST 10 MILLIGRAM(S): 4 TABLET, CHEWABLE ORAL at 12:24

## 2018-07-24 RX ADMIN — HEPARIN SODIUM 5000 UNIT(S): 5000 INJECTION INTRAVENOUS; SUBCUTANEOUS at 21:26

## 2018-07-24 RX ADMIN — NYSTATIN CREAM 1 APPLICATION(S): 100000 CREAM TOPICAL at 23:24

## 2018-07-24 RX ADMIN — MIDODRINE HYDROCHLORIDE 15 MILLIGRAM(S): 2.5 TABLET ORAL at 23:24

## 2018-07-24 NOTE — PROGRESS NOTE ADULT - SUBJECTIVE AND OBJECTIVE BOX
Patient is a 89y old  Female who presents with a chief complaint of abdominal pain (21 Jul 2018 18:15)    24 hour events: Still on the levophed and midodrine.     REVIEW OF SYSTEMS  Constitutional: No fever, chills, fatigue  Neuro: No headache, numbness, weakness  Resp: No cough, wheezing, shortness of breath  CVS: No chest pain, palpitations, leg swelling  GI: No abdominal pain, nausea, vomiting, diarrhea   : No dysuria, frequency, incontinence  Skin: No itching, burning, rashes, or lesions   Msk: (+) Leg swelling, (+) right achilles pain  Psych: No depression, anxiety, mood swings    T(F): 97.9 (07-24-18 @ 07:21), Max: 98.4 (07-23-18 @ 15:51)  HR: 69 (07-24-18 @ 07:00) (69 - 70)  BP: 126/64 (07-24-18 @ 07:00) (100/55 - 143/70)  RR: 26 (07-24-18 @ 07:00) (21 - 33)  SpO2: 100% (07-24-18 @ 07:00) (94% - 100%)          CAPILLARY BLOOD GLUCOSE      I&O's Summary    07-23 @ 07:01  -  07-24 @ 07:00  --------------------------------------------------------  IN: 1080 mL / OUT: 200 mL / NET: 880 mL      PHYSICAL EXAM  General: Well developed, well nourished, No Acute Distress  CNS: AA&Ox3, nonfocal, sensation intact  HEENT: NC/AT, PERRLA, EOMI b/l, dry mucous membranes   Resp: CTA B/L, No Wheezes, rhonchi or rales  CVS: RRR, +S1/S2, no murmurs, rubs or gallops  Abd: Soft, Non-Tender, Distended +Bowel Sounds x4; colostomy placed in LLQ with green-brown stools.   Ext: +edema, RLE with erythema, warmth, tenderness with no blisters   Skin:  warm, dry, normal color, no rash or abnormal lesions    MEDICATIONS  piperacillin/tazobactam IVPB. IV Intermittent  vancomycin  IVPB   vancomycin  IVPB IV Intermittent    midodrine Oral  norepinephrine Infusion IV Continuous    levothyroxine Oral    montelukast Oral        heparin  Injectable SubCutaneous                                      9.0    8.01  )-----------( 344      ( 24 Jul 2018 05:50 )             28.4       07-24    141  |  106  |  31<H>  ----------------------------<  84  3.6   |  29  |  1.10    Ca    7.7<L>      24 Jul 2018 05:50  Phos  2.1     07-24  Mg     2.3     07-24    TPro  5.7<L>  /  Alb  1.7<L>  /  TBili  0.6  /  DBili  x   /  AST  20  /  ALT  16  /  AlkPhos  89  07-24              .Body Fluid Peritoneal Fluid   No growth   polymorphonuclear leukocytes  No organisms seen  by cytocentrifuge 07-21 @ 23:03  .Blood Blood   Growth in anaerobic bottle:  Coag Negative Staphylococcus   Growth in anaerobic bottle:  Gram Positive Cocci in Clusters 07-21 @ 16:58  .Urine Catheterized   10,000 - 49,000 CFU/mL Pseudomonas aeruginosa -- 07-21 @ 16:38        Radiology: ***  Bedside lung ultrasound: ***  Bedside ECHO: ***    CENTRAL LINE: N            CULP: N                          A-LINE: N                           GLOBAL ISSUE/BEST PRACTICE  Analgesia: No  Sedation: No  HOB elevation: yes  Stress ulcer prophylaxis: No  VTE prophylaxis: Yes  Glycemic control: No  Nutrition: Dash diet    CODE STATUS: Full Patient is a 89y old  Female who presents with a chief complaint of abdominal pain (21 Jul 2018 18:15)    24 hour events: Off the levophed and continued on midodrine.     REVIEW OF SYSTEMS  Constitutional: No fever, chills, fatigue  Neuro: No headache, numbness, weakness  Resp: No cough, wheezing, shortness of breath  CVS: No chest pain, palpitations, leg swelling  GI: No abdominal pain, nausea, vomiting, diarrhea   : No dysuria, frequency, incontinence  Skin: No itching, burning, rashes, or lesions   Msk: (+) Leg swelling, (+) right achilles pain  Psych: No depression, anxiety, mood swings    T(F): 97.9 (07-24-18 @ 07:21), Max: 98.4 (07-23-18 @ 15:51)  HR: 69 (07-24-18 @ 07:00) (69 - 70)  BP: 126/64 (07-24-18 @ 07:00) (100/55 - 143/70)  RR: 26 (07-24-18 @ 07:00) (21 - 33)  SpO2: 100% (07-24-18 @ 07:00) (94% - 100%)          CAPILLARY BLOOD GLUCOSE      I&O's Summary    07-23 @ 07:01  -  07-24 @ 07:00  --------------------------------------------------------  IN: 1080 mL / OUT: 200 mL / NET: 880 mL      PHYSICAL EXAM  General: Well developed, well nourished, No Acute Distress  CNS: AA&Ox3, nonfocal, sensation intact  HEENT: NC/AT, PERRLA, EOMI b/l, dry mucous membranes   Resp: CTA B/L, No Wheezes, rhonchi or rales  CVS: RRR, +S1/S2, no murmurs, rubs or gallops  Abd: Soft, Non-Tender, Distended +Bowel Sounds x4; colostomy placed in LLQ with green-brown stools.   Ext: +edema, RLE with erythema, warmth, tenderness with no blisters   Skin:  warm, dry, normal color, no rash or abnormal lesions    MEDICATIONS  piperacillin/tazobactam IVPB. IV Intermittent  vancomycin  IVPB   vancomycin  IVPB IV Intermittent    midodrine Oral  norepinephrine Infusion IV Continuous    levothyroxine Oral    montelukast Oral        heparin  Injectable SubCutaneous                                      9.0    8.01  )-----------( 344      ( 24 Jul 2018 05:50 )             28.4       07-24    141  |  106  |  31<H>  ----------------------------<  84  3.6   |  29  |  1.10    Ca    7.7<L>      24 Jul 2018 05:50  Phos  2.1     07-24  Mg     2.3     07-24    TPro  5.7<L>  /  Alb  1.7<L>  /  TBili  0.6  /  DBili  x   /  AST  20  /  ALT  16  /  AlkPhos  89  07-24              .Body Fluid Peritoneal Fluid   No growth   polymorphonuclear leukocytes  No organisms seen  by cytocentrifuge 07-21 @ 23:03  .Blood Blood   Growth in anaerobic bottle:  Coag Negative Staphylococcus   Growth in anaerobic bottle:  Gram Positive Cocci in Clusters 07-21 @ 16:58  .Urine Catheterized   10,000 - 49,000 CFU/mL Pseudomonas aeruginosa -- 07-21 @ 16:38        CENTRAL LINE: N            CULP: N                          A-LINE: N                           GLOBAL ISSUE/BEST PRACTICE  Analgesia: No  Sedation: No  HOB elevation: yes  Stress ulcer prophylaxis: No  VTE prophylaxis: Yes  Glycemic control: No  Nutrition: Dash diet    CODE STATUS: Full

## 2018-07-24 NOTE — PROGRESS NOTE ADULT - ATTENDING COMMENTS
89 y/o female with ovarian cancer and peritoneal carcinomatosis with recurrent ascites requiring frequent paracentesis, multiple SBOs requiring surgery, also afib, CHF, colon cancer s/p colostomy, with septic shock likely due to SBP and/or RLE cellulitis. She also has BRITTANEY likely prerenal vs ATN which is resolving.     --septic shock; off norepi; on midodrine    --Afib controlled  will continue heparin; won't restart riveroxaban now given slight drop in hgb  -- Hgb notable for slight downtrend 10.6 -> 9.5 -> 9.0 since admission; no evidence of bleed  check FOBT  check hgb tonight  --stable from respiratory standpoint; RA  --BRITTANEY improved  --SBP and RLE cellulitis  BCx with CNS likely a contaminant, repeat BCx 7/23 pending  continue vanc, zosyn for now; if repeat blood cx neg will d/c vanco  reduce vanco dose as per ID rec  continue Zosyn for 7 day tx - stop date 7/28  --hypothyroidism, cont synthroid  --plan discussed with pt   --pt critically ill. CC time 40min

## 2018-07-24 NOTE — PROGRESS NOTE ADULT - ASSESSMENT
89 y/o F PMHx ovarian cancer w/ recurrent ascites, colon ca s/p colostomy, s/p incarcerated ventral hernia repair 10/2017, DM2, HTN, afib s/p PPM, hypothyroid, obesity, and overactive bladder presents with generalized weakness x 2 days; admitted with septic shock.    Neurology:  -Ambulation: PT/OT ordered   Respiratory:  - No active issues, 100% O2 saturation   Cardiology:  -Chronic AFib: stable, continue holding Xarelto due to kidney function  -Septic Shock: continue Levophed and Midodrine  Nephrology:  -BRITTANEY: improving creatinine function (1.10), continue monitoring  GI/:  -Ascites: paracentesis scheduled by patient for outpatient   Endocrinology:  -Hypothyroidism: continue   Heme:  -No active issues, monitor WBC  ID:  -SBP: blood cultures showed Gram positive clusters, potential contamination, follow up new bood cultures and continue Zosyn and Vancomycin  Prophylaxis:  -DVT Prophylaxis: Heparin  Code status:   -Full Code 89 y/o F PMHx ovarian cancer w/ recurrent ascites, colon ca s/p colostomy, s/p incarcerated ventral hernia repair 10/2017, DM2, HTN, afib s/p PPM, hypothyroid, obesity, and overactive bladder presents with generalized weakness x 2 days; admitted with septic shock.    Neurology:  -Ambulation: PT/OT ordered   Respiratory:  - No active issues, 100% O2 saturation   Cardiology:  -Chronic AFib: stable, continue holding Xarelto due to kidney function  -Septic Shock: continue Levophed and Midodrine  Nephrology:  -BRITTANEY: improving creatinine function (1.10), continue monitoring  GI/:  -Ascites: paracentesis scheduled by patient for outpatient   Endocrinology:  -Hypothyroidism: continue   Heme:  -No active issues, monitor WBC  ID:  -SBP: blood cultures showed Gram positive clusters, potential contamination, follow up new bood cultures and continue Zosyn. Vancomycin trough was 22, hold Vancomycin until tomorrow morning, then administer 1 gram per ID  Prophylaxis:  -DVT Prophylaxis: Heparin  Code status:   -Full Code 89 y/o F PMHx ovarian cancer w/ recurrent ascites, colon ca s/p colostomy, s/p incarcerated ventral hernia repair 10/2017, DM2, HTN, afib s/p PPM, hypothyroid, obesity, and overactive bladder presents with generalized weakness x 2 days; admitted with septic shock.    Neurology:  -Ambulation: PT/OT ordered   Respiratory:  - No active issues, 100% O2 saturation   Cardiology:  -Chronic AFib: stable, continue holding Xarelto due to kidney function  -Septic Shock: continue Levophed and Midodrine  Nephrology:  -BRITTANEY: improving creatinine function (1.10), continue monitoring  GI/:  -Ascites: paracentesis scheduled by patient for outpatient   Endocrinology:  -Hypothyroidism: continue synthroid  Heme:  -No active issues, monitor WBC  ID:  -SBP: blood cultures showed Gram positive clusters, potential contamination, follow up new bood cultures and continue Zosyn. Vancomycin trough was 22, hold Vancomycin until tomorrow morning, then administer 1 gram per ID  Prophylaxis:  -DVT Prophylaxis: Heparin  Code status:   -Full Code 89 y/o F PMHx ovarian cancer w/ recurrent ascites, colon ca s/p colostomy, s/p incarcerated ventral hernia repair 10/2017, DM2, HTN, afib s/p PPM, hypothyroid, obesity, and overactive bladder presents with generalized weakness x 2 days; admitted with septic shock.    Neurology:  -Ambulation: PT/OT ordered   Respiratory:  - No active issues, 100% O2 saturation   Cardiology:  -Chronic AFib: stable, restart Xarelto due to improving kidney function  -Hypotension: Blood pressure stable at 126/64 continue  Midodrine  Nephrology:  -BRITTANEY: improving creatinine function (1.10), continue monitoring  GI/:  -Ascites: paracentesis scheduled by patient for outpatient   Endocrinology:  -Hypothyroidism: continue synthroid  -DM II: managed with diet  Heme:  -No active issues, monitor WBC, FOBT ordered for decreasing H/H.   ID:  -SBP: blood cultures showed Gram positive clusters, potential contamination, follow up new bood cultures and continue Zosyn. Vancomycin trough was 22, hold Vancomycin until tomorrow morning, then administer 1 gram per ID  Prophylaxis:  -DVT Prophylaxis: Discontinue Heparin, restarting Xarelto   Dispo:  -Patient stable for transfer to the floors.   Code status:   -Full Code

## 2018-07-24 NOTE — PROGRESS NOTE ADULT - ASSESSMENT
89 y/o F PMHx ovarian cancer w/ recurrent ascites, colon ca s/p colostomy with ostomy bag, s/p incarcerated ventral hernia repair 10/2017, DM2, HTN, afib s/p PPM, hypothyroid, obesity, and overactive bladder presents with generalized weakness, rigors, leukocytosis, and >3500 nucleated cells in peritoneal fluid.  shock resolved  peritonitis  + blood cx, consistent with contaminant  Vanco trough high, appears to have been done at the appropriate time.  WBC normalized

## 2018-07-24 NOTE — PROGRESS NOTE ADULT - SUBJECTIVE AND OBJECTIVE BOX
[facility], Division of Infectious Diseases  REJI Chua A. Lee  [phone]    Name: MICHELLE RAMÍREZ  Age: 89y  Gender: Female  MRN: [id number]    Interval History--  Notes reviewed. Remains in ICU. Eating breakfast without problems. Only complaint is that right heel hurts and that she is worried about recurrence of pedal edema when she is walking. Denies fevers, chills, or rigors. No CP. No SOB. Denies abdominal pain.     Past Medical History--  Ovarian cancer  Afib  Ascites, malignant  Colon cancer  Obese  Hypothyroidism  HTN (Hypertension)  Overactive Bladder  DM Type 2 (Diabetes Mellitus, Type 2)  Asthma  Incarcerated hernia  H/O hernia repair  Artificial pacemaker  S/P colon resection  S/P Cataract Surgery  History of Tonsillectomy  History of Appendectomy  Status Post Total Knee Replacement      For details regarding the patient's social history, family history, and other miscellaneous elements, please refer the initial infectious diseases consultation and/or the admitting history and physical examination for this admission.    Allergies    No Known Allergies    Intolerances        Medications--  Antibiotics:  piperacillin/tazobactam IVPB. 3.375 Gram(s) IV Intermittent every 12 hours  vancomycin  IVPB      vancomycin  IVPB 1250 milliGRAM(s) IV Intermittent every 24 hours    Immunologic:    Other:  heparin  Injectable  levothyroxine  midodrine  montelukast  norepinephrine Infusion      Review of Systems--  A 10-point review of systems was obtained.   Review of systems otherwise negative except as previously noted.    Physical Examination--  Vital Signs: T(F): 97.9 (07-24-18 @ 07:21), Max: 98.4 (07-23-18 @ 15:51)  HR: 69 (07-24-18 @ 08:00)  BP: 122/66 (07-24-18 @ 08:00)  RR: 28 (07-24-18 @ 08:00)  SpO2: 100% (07-24-18 @ 08:00)  Wt(kg): --  General: Nontoxic-appearing Female in no acute distress.  HEENT: AT/NC. Anicteric. Conjunctiva pink and moist. Oropharynx clear.   Neck: Not rigid. No sense of mass.  Nodes: None palpable.  Lungs: Diminished breath sounds bilaterally without rales, wheezing or rhonchi  Heart: Regular rate and rhythm. No Murmur. No rub. No gallop. No palpable thrill.  Abdomen: Bowel sounds present and normoactive. Soft. Mildly distended. Nontender. No sense of mass. No organomegaly.  Extremities: No cyanosis or clubbing. 1+ LE edema, slight increase in erythema RLE c/w left. nontender. Consistent with venous stasis dermatitis rather than cellulitis.  R heel intact, no concerning findings on exam.  Skin: Warm. Dry. Good turgor. No rash. No vasculitic stigmata.  Psychiatric: Appropriate affect and mood for situation.         Laboratory Studies--  CBC                        9.0    8.01  )-----------( 344      ( 24 Jul 2018 05:50 )             28.4       Chemistries  07-24    141  |  106  |  31<H>  ----------------------------<  84  3.6   |  29  |  1.10    Ca    7.7<L>      24 Jul 2018 05:50  Phos  2.1     07-24  Mg     2.3     07-24    TPro  5.7<L>  /  Alb  1.7<L>  /  TBili  0.6  /  DBili  x   /  AST  20  /  ALT  16  /  AlkPhos  89  07-24    Vancomycin Level, Trough: 22.0: Vancomycin trough levels should be rapidly reached and maintained at  15-20 ug/ml for life threatening MRSA  infections such as sepsis, endocarditis, osteomyelitis and pneumonia. A  first trough level should be drawn  before the 3rd or 4th dose.  Risk of renal toxicity is increased for levels >15 ug/ml, in patients on  other nephrotoxic drugs, who are  hemodynamically unstable, have unstable renal function, or are on  Vancomycin therapy for >14 days. Renal function with  creatinine levels should be monitored for those patients. ug/mL (07.23.18 @ 16:40)        Culture Data    Culture - Fungal, Body Fluid (collected 21 Jul 2018 23:03)  Source: .Body Fluid Peritoneal Fluid  Preliminary Report (23 Jul 2018 11:42):    Testing in progress    Culture - Body Fluid with Gram Stain (collected 21 Jul 2018 23:03)  Source: .Body Fluid Peritoneal Fluid  Gram Stain (22 Jul 2018 01:23):    polymorphonuclear leukocytes    No organisms seen    by cytocentrifuge  Preliminary Report (22 Jul 2018 16:30):    No growth    Culture - Acid Fast - Body Fluid w/Smear (collected 21 Jul 2018 23:03)  Source: .Body Fluid Peritoneal Fluid    Culture - Blood (collected 21 Jul 2018 16:58)  Source: .Blood Blood  Gram Stain (22 Jul 2018 22:00):    Upon re-evaluation of gram stain:    Growth in anaerobic bottle: Gram Positive Cocci in Clusters    Previously reported as:    Growth in anaerobic bottle: Gram Negative Rods    "Due to technical problems, Proteus sp. will Not be reported as part of    the BCID panel until further notice"    ***Blood Panel PCR results on this specimen are available    approximately 3 hours after the Gram stain result.***    Gram stain, PCR, and/or culture results may not always    correspond due to difference in methodologies.    ************************************************************    This PCR assay was performed using [facility].    The following targets are tested for: Enterococcus,    vancomycin resistant enterococci, Listeria monocytogenes,    coagulase negative staphylococci, S. aureus,    methicillin resistant S. aureus, Streptococcus agalactiae    (Group B), S. pneumoniae, S. pyogenes (Group A),    Acinetobacter baumannii, Enterobacter cloacae, E. coli,    Klebsiella oxytoca, K. pneumoniae, Proteus sp.,    Serratia marcescens, Haemophilus influenzae,    Neisseria meningitidis, Pseudomonas aeruginosa, Candida    albicans, C. glabrata, C krusei, C parapsilosis,    C. tropicalis and the KPC resistance gene.  Final Report (23 Jul 2018 16:32):    Growth in anaerobic bottle: Coag Negative Staphylococcus    Single set isolate, possible contaminant. Contact    Microbiology if susceptibility testing clinically    indicated.  Organism: Blood Culture PCR (23 Jul 2018 16:32)  Organism: Blood Culture PCR (23 Jul 2018 16:32)    Culture - Blood (collected 21 Jul 2018 16:58)  Source: .Blood Blood  Gram Stain (23 Jul 2018 01:05):    Growth in anaerobic bottle:    Gram Positive Cocci in Clusters  Preliminary Report (23 Jul 2018 18:05):    Growth in anaerobic bottle:    Coag Negative Staphylococcus    Culture - Urine (collected 21 Jul 2018 16:38)  Source: .Urine Catheterized  Final Report (23 Jul 2018 21:12):    10,000 - 49,000 CFU/mL Pseudomonas aeruginosa  Organism: Pseudomonas aeruginosa (23 Jul 2018 21:12)  Organism: Pseudomonas aeruginosa (23 Jul 2018 21:12)

## 2018-07-24 NOTE — PHYSICAL THERAPY INITIAL EVALUATION ADULT - PERTINENT HX OF CURRENT PROBLEM, REHAB EVAL
89 y/o F PMHx ovarian cancer w/ recurrent ascites, colon ca s/p colostomy, s/p incarcerated ventral hernia repair 10/2017, DM2, HTN, afib s/p PPM, hypothyroid, obesity, and overactive bladder presents with generalized weakness x 2 days.  Patient reports that she began having chills 2 days ago, and bundled up in a chair for a nap, and then was not strong enough to get out of the chair on her own.

## 2018-07-25 DIAGNOSIS — L53.9 ERYTHEMATOUS CONDITION, UNSPECIFIED: ICD-10-CM

## 2018-07-25 DIAGNOSIS — R10.13 EPIGASTRIC PAIN: ICD-10-CM

## 2018-07-25 LAB
ALBUMIN SERPL ELPH-MCNC: 1.8 G/DL — LOW (ref 3.3–5)
ALP SERPL-CCNC: 97 U/L — SIGNIFICANT CHANGE UP (ref 40–120)
ALT FLD-CCNC: 16 U/L — SIGNIFICANT CHANGE UP (ref 12–78)
ANION GAP SERPL CALC-SCNC: 5 MMOL/L — SIGNIFICANT CHANGE UP (ref 5–17)
AST SERPL-CCNC: 16 U/L — SIGNIFICANT CHANGE UP (ref 15–37)
BASOPHILS # BLD AUTO: 0.03 K/UL — SIGNIFICANT CHANGE UP (ref 0–0.2)
BASOPHILS NFR BLD AUTO: 0.4 % — SIGNIFICANT CHANGE UP (ref 0–2)
BILIRUB SERPL-MCNC: 0.4 MG/DL — SIGNIFICANT CHANGE UP (ref 0.2–1.2)
BUN SERPL-MCNC: 22 MG/DL — SIGNIFICANT CHANGE UP (ref 7–23)
CALCIUM SERPL-MCNC: 7.8 MG/DL — LOW (ref 8.5–10.1)
CHLORIDE SERPL-SCNC: 108 MMOL/L — SIGNIFICANT CHANGE UP (ref 96–108)
CO2 SERPL-SCNC: 28 MMOL/L — SIGNIFICANT CHANGE UP (ref 22–31)
CREAT SERPL-MCNC: 0.96 MG/DL — SIGNIFICANT CHANGE UP (ref 0.5–1.3)
EOSINOPHIL # BLD AUTO: 0.07 K/UL — SIGNIFICANT CHANGE UP (ref 0–0.5)
EOSINOPHIL NFR BLD AUTO: 0.9 % — SIGNIFICANT CHANGE UP (ref 0–6)
GLUCOSE SERPL-MCNC: 82 MG/DL — SIGNIFICANT CHANGE UP (ref 70–99)
HCT VFR BLD CALC: 29.1 % — LOW (ref 34.5–45)
HGB BLD-MCNC: 9.3 G/DL — LOW (ref 11.5–15.5)
IMM GRANULOCYTES NFR BLD AUTO: 1.3 % — SIGNIFICANT CHANGE UP (ref 0–1.5)
LYMPHOCYTES # BLD AUTO: 0.61 K/UL — LOW (ref 1–3.3)
LYMPHOCYTES # BLD AUTO: 7.7 % — LOW (ref 13–44)
MAGNESIUM SERPL-MCNC: 2.2 MG/DL — SIGNIFICANT CHANGE UP (ref 1.6–2.6)
MCHC RBC-ENTMCNC: 25 PG — LOW (ref 27–34)
MCHC RBC-ENTMCNC: 32 GM/DL — SIGNIFICANT CHANGE UP (ref 32–36)
MCV RBC AUTO: 78.2 FL — LOW (ref 80–100)
MONOCYTES # BLD AUTO: 0.66 K/UL — SIGNIFICANT CHANGE UP (ref 0–0.9)
MONOCYTES NFR BLD AUTO: 8.3 % — SIGNIFICANT CHANGE UP (ref 2–14)
NEUTROPHILS # BLD AUTO: 6.48 K/UL — SIGNIFICANT CHANGE UP (ref 1.8–7.4)
NEUTROPHILS NFR BLD AUTO: 81.4 % — HIGH (ref 43–77)
PHOSPHATE SERPL-MCNC: 2.2 MG/DL — LOW (ref 2.5–4.5)
PLATELET # BLD AUTO: 276 K/UL — SIGNIFICANT CHANGE UP (ref 150–400)
POTASSIUM SERPL-MCNC: 3.7 MMOL/L — SIGNIFICANT CHANGE UP (ref 3.5–5.3)
POTASSIUM SERPL-SCNC: 3.7 MMOL/L — SIGNIFICANT CHANGE UP (ref 3.5–5.3)
PROT SERPL-MCNC: 5.7 G/DL — LOW (ref 6–8.3)
RBC # BLD: 3.72 M/UL — LOW (ref 3.8–5.2)
RBC # FLD: 16.8 % — HIGH (ref 10.3–14.5)
SODIUM SERPL-SCNC: 141 MMOL/L — SIGNIFICANT CHANGE UP (ref 135–145)
VANCOMYCIN TROUGH SERPL-MCNC: 7.1 UG/ML — LOW (ref 10–20)
WBC # BLD: 7.95 K/UL — SIGNIFICANT CHANGE UP (ref 3.8–10.5)
WBC # FLD AUTO: 7.95 K/UL — SIGNIFICANT CHANGE UP (ref 3.8–10.5)

## 2018-07-25 PROCEDURE — 99233 SBSQ HOSP IP/OBS HIGH 50: CPT

## 2018-07-25 RX ORDER — CIPROFLOXACIN LACTATE 400MG/40ML
500 VIAL (ML) INTRAVENOUS EVERY 12 HOURS
Qty: 0 | Refills: 0 | Status: DISCONTINUED | OUTPATIENT
Start: 2018-07-25 | End: 2018-07-26

## 2018-07-25 RX ORDER — ONDANSETRON 8 MG/1
4 TABLET, FILM COATED ORAL EVERY 8 HOURS
Qty: 0 | Refills: 0 | Status: DISCONTINUED | OUTPATIENT
Start: 2018-07-25 | End: 2018-07-26

## 2018-07-25 RX ORDER — SIMETHICONE 80 MG/1
80 TABLET, CHEWABLE ORAL THREE TIMES A DAY
Qty: 0 | Refills: 0 | Status: DISCONTINUED | OUTPATIENT
Start: 2018-07-25 | End: 2018-07-26

## 2018-07-25 RX ORDER — RIVAROXABAN 15 MG-20MG
15 KIT ORAL DAILY
Qty: 0 | Refills: 0 | Status: DISCONTINUED | OUTPATIENT
Start: 2018-07-25 | End: 2018-07-26

## 2018-07-25 RX ADMIN — RIVAROXABAN 15 MILLIGRAM(S): KIT at 21:46

## 2018-07-25 RX ADMIN — Medication 650 MILLIGRAM(S): at 21:45

## 2018-07-25 RX ADMIN — MIDODRINE HYDROCHLORIDE 15 MILLIGRAM(S): 2.5 TABLET ORAL at 21:46

## 2018-07-25 RX ADMIN — Medication 500 MILLIGRAM(S): at 17:31

## 2018-07-25 RX ADMIN — HEPARIN SODIUM 5000 UNIT(S): 5000 INJECTION INTRAVENOUS; SUBCUTANEOUS at 06:13

## 2018-07-25 RX ADMIN — Medication 1 TABLET(S): at 17:31

## 2018-07-25 RX ADMIN — MIDODRINE HYDROCHLORIDE 15 MILLIGRAM(S): 2.5 TABLET ORAL at 14:02

## 2018-07-25 RX ADMIN — MONTELUKAST 10 MILLIGRAM(S): 4 TABLET, CHEWABLE ORAL at 12:08

## 2018-07-25 RX ADMIN — HEPARIN SODIUM 5000 UNIT(S): 5000 INJECTION INTRAVENOUS; SUBCUTANEOUS at 14:02

## 2018-07-25 RX ADMIN — Medication 225 MICROGRAM(S): at 06:13

## 2018-07-25 RX ADMIN — MIDODRINE HYDROCHLORIDE 15 MILLIGRAM(S): 2.5 TABLET ORAL at 06:13

## 2018-07-25 RX ADMIN — ONDANSETRON 4 MILLIGRAM(S): 8 TABLET, FILM COATED ORAL at 18:59

## 2018-07-25 NOTE — GOALS OF CARE CONVERSATION - PERSONAL ADVANCE DIRECTIVE - NS PRO AD PATIENT TYPE ON CHART
copy molst/Health Care Proxy (HCP)/Do Not Resuscitate (DNR)/Medical Orders for Life-Sustaining Treatment (MOLST) Health Care Proxy (HCP)/copy molst/ molst completed 7/26/18/Do Not Resuscitate (DNR)/Medical Orders for Life-Sustaining Treatment (MOLST)

## 2018-07-25 NOTE — PROGRESS NOTE ADULT - PROBLEM SELECTOR PLAN 4
- hold xarelto:  - currently rate controlled
- hold xarelto:  - currently rate controlled
- resolved  - avoid nephrotoxic meds:   - encourage po
blood sugar control per primary team
s/p paracentesis w/ >5L fluid removal   Awaiting cultures and sensitives

## 2018-07-25 NOTE — PROGRESS NOTE ADULT - PROBLEM SELECTOR PROBLEM 3
BRITTANEY (acute kidney injury)
BRITTANEY (acute kidney injury)
Dyspepsia
Ovarian cancer
Spontaneous bacterial peritonitis

## 2018-07-25 NOTE — PROGRESS NOTE ADULT - PROBLEM SELECTOR PROBLEM 4
Chronic atrial fibrillation
BRITTANEY (acute kidney injury)
Chronic atrial fibrillation
DM Type 2 (Diabetes Mellitus, Type 2)
Ascites, malignant

## 2018-07-25 NOTE — PROGRESS NOTE ADULT - SUBJECTIVE AND OBJECTIVE BOX
Patient is a 89y old  Female who presents with a chief complaint of abdominal pain (21 Jul 2018 18:15)      INTERVAL HPI: Pt seen and examined with daughter at bedside. States R foot pain is impronving and erythema. Denies any acute complaints at this time.    OVERNIGHT EVENTS: some dyspepsia and nausea, zofran helped  T(F): 98.1 (07-25-18 @ 20:56), Max: 98.1 (07-25-18 @ 20:56)  HR: 70 (07-25-18 @ 20:56) (70 - 70)  BP: 126/77 (07-25-18 @ 20:56) (114/69 - 126/77)  RR: 18 (07-25-18 @ 20:56) (18 - 18)  SpO2: 94% (07-25-18 @ 20:56) (94% - 98%)  I&O's Summary    24 Jul 2018 07:01  -  25 Jul 2018 07:00  --------------------------------------------------------  IN: 550 mL / OUT: 0 mL / NET: 550 mL    25 Jul 2018 07:01  -  25 Jul 2018 21:09  --------------------------------------------------------  IN: 480 mL / OUT: 0 mL / NET: 480 mL        REVIEW OF SYSTEMS: 12 systems noncontributory other than that stated in hpi    PHYSICAL EXAM:  GENERAL: NAD, elder  HEAD:  Atraumatic, Normocephalic  EYES: EOMI, PERRLA, conjunctiva and sclera clear  ENMT: No tonsillar erythema, exudates, or enlargement; Moist mucous membranes, Good dentition, No lesions  NECK: Supple, No JVD, Normal thyroid  NERVOUS SYSTEM:  Alert & Oriented X3, Good concentration; Motor Strength 3/5 B/L upper and lower extremities  CHEST/LUNG: Clear to percussion bilaterally; No rales, rhonchi, wheezing, or rubs  HEART: Regular rate and rhythm; No murmurs, rubs, or gallops  ABDOMEN: Soft, Nontender, Nondistended; Bowel sounds present  EXTREMITIES:  2+ Peripheral Pulses, No clubbing, cyanosis, or edema  SKIN: R foot erythema and R leg erythema improving    LABS:                        9.3    7.95  )-----------( 276      ( 25 Jul 2018 05:52 )             29.1     07-25    141  |  108  |  22  ----------------------------<  82  3.7   |  28  |  0.96    Ca    7.8<L>      25 Jul 2018 05:52  Phos  2.2     07-25  Mg     2.2     07-25    TPro  5.7<L>  /  Alb  1.8<L>  /  TBili  0.4  /  DBili  x   /  AST  16  /  ALT  16  /  AlkPhos  97  07-25        CAPILLARY BLOOD GLUCOSE          07-23 @ 18:30   No growth to date.  --  --  07-21 @ 23:03   No growth  --  --          MEDICATIONS  (STANDING):  amoxicillin  875 milliGRAM(s)/clavulanate 1 Tablet(s) Oral every 12 hours  ciprofloxacin     Tablet 500 milliGRAM(s) Oral every 12 hours  levothyroxine 225 MICROGram(s) Oral daily  midodrine 15 milliGRAM(s) Oral every 8 hours  montelukast 10 milliGRAM(s) Oral daily  rivaroxaban 15 milliGRAM(s) Oral daily    MEDICATIONS  (PRN):  ondansetron Injectable 4 milliGRAM(s) IV Push every 8 hours PRN Nausea and/or Vomiting  simethicone 80 milliGRAM(s) Chew three times a day PRN Dyspepsia

## 2018-07-25 NOTE — PROGRESS NOTE ADULT - PROBLEM SELECTOR PLAN 1
- improving  -apprec ID recs Dr. Kowalski: transition from iv to po antibx  -monitor clinical course

## 2018-07-25 NOTE — PROGRESS NOTE ADULT - PROBLEM SELECTOR PLAN 5
- hold BP meds given septic shock   - continue pressors: management per ICU team
- hold BP meds given septic shock   - continue pressors: management per ICU team
- restart xarelto as improved kidney function  - chronic, stable  -cont home meds
Unclear etilogy.  Doubt infectious in nature at this time  Monitor
Xarelto held due to acute renal failure  Will restart when GFR >30

## 2018-07-25 NOTE — PROGRESS NOTE ADULT - PROBLEM SELECTOR PROBLEM 1
Sepsis, due to unspecified organism
Spontaneous bacterial peritonitis
Septic shock

## 2018-07-25 NOTE — CONSULT NOTE ADULT - SUBJECTIVE AND OBJECTIVE BOX
89 y/o female diabetic patient was seen for right foot cellulitis . patient states she came to ER 3 days ago for right foot and leg redness with swelling. patient states her swelling has been improved.  patient denies any fever, nausea, vomiting, chills and shortness of breath.       Vital Signs Last 24 Hrs  T(C): 36.7 (25 Jul 2018 13:31), Max: 36.7 (25 Jul 2018 13:31)  T(F): 98 (25 Jul 2018 13:31), Max: 98 (25 Jul 2018 13:31)  HR: 70 (25 Jul 2018 13:31) (69 - 71)  BP: 114/69 (25 Jul 2018 13:31) (106/56 - 123/56)  BP(mean): 81 (24 Jul 2018 18:00) (78 - 82)  RR: 18 (25 Jul 2018 13:31) (18 - 26)  SpO2: 95% (25 Jul 2018 13:31) (93% - 100%)                          9.3    7.95  )-----------( 276      ( 25 Jul 2018 05:52 )             29.1   07-25    141  |  108  |  22  ----------------------------<  82  3.7   |  28  |  0.96    Ca    7.8<L>      25 Jul 2018 05:52  Phos  2.2     07-25  Mg     2.2     07-25    TPro  5.7<L>  /  Alb  1.8<L>  /  TBili  0.4  /  DBili  x   /  AST  16  /  ALT  16  /  AlkPhos  97  07-25  History of Present Illness:  Reason for Admission: septic shock  History of Present Illness:   89 y/o F PMHx ovarian cancer w/ recurrent ascites, colon ca s/p colostomy, s/p incarcerated ventral hernia repair 10/2017, DM2, HTN, afib s/p PPM, hypothyroid, obesity, and overactive bladder presents with generalized weakness x 2 days.  Patient reports that she began having chills 2 days ago, and bundled up in a chair for a nap, and then was not strong enough to get out of the chair on her own.  Per granddaughter patient has been confused, and was weaker than normal the evening before admission, and decided to call EMS.    In ED patient is hypotensive (70/35), febrile (100.8), SpO2 100% on RA.  Labs significant for WBC 21.04, Hbg 9.7, Hct 30.9, Plt 447, Bands 33%, Lactate 4.2 (repeat 4.3), Crt 2.5, Gfr 16, Trop 0.101; CT ab/pelv shows moderate ascites similar to prior study.  Patient received Vanco/Zosyn, 2L NS bolus; tylenol 650mg x1.      Lower Extremity Exam:  Vasc: DP/PT Non- palpable, Edema and erythema noted to right forefoot dorsal and plantar  Neuro: protective sensation diminished   Derm:No open lesion noted to right foot, no fluctuation, no malodor, erythema noted to forefoot up to midfoot- improving   MSK: no pain with palpation of right foot

## 2018-07-25 NOTE — GOALS OF CARE CONVERSATION - PERSONAL ADVANCE DIRECTIVE - CONVERSATION DETAILS
met pt w daughter, reviewed copy of hcp & molst from EMR last admission. pt confirms hcp, daughter. reviewed molst, pt has original at home, daughter to provide. pt agrees to dnr dni, hospital consent completed, Dr Delgado to complete dnr form, contact # given. met pt w daughter, reviewed copy of hcp & molst from EMR last admission. pt confirms hcp, daughter. reviewed molst, pt has original at home, daughter to provide. pt agrees to dnr dni, hospital consent completed, Dr Delgado to complete dnr form, contact # given.    7/26/18  1030 hrs. pt daughter contacted me, unable to locate original molst. molst reviewed w pt, agrees to dnr dni No TF, wants treatment: IV flds, antibiotics, return to hospital if needed. pt for FRANNY today. molst completed by Dr Delgado

## 2018-07-25 NOTE — PROGRESS NOTE ADULT - NSHPATTENDINGPLANDISCUSS_GEN_ALL_CORE
patient, daughter
Dr. Del Valle ICU attending
Dr. Gonsales ICU attending
Daughter at bedside, Dr. Kowalski ID

## 2018-07-25 NOTE — CONSULT NOTE ADULT - ATTENDING COMMENTS
patient seen and evaluated   No open wound noted  clenased in between toes with alcohol pad  cellulitis localized to right forefoot and is improving   Cont IV antibiotics per ID recommendation  Recommend Vascular evaluation for non-palpable pulses - possibly as outpatient if pt is being discharged  Thank you for your consult
Patient assessed independently from above    89 y/o female presented with septic shock likely due to SBP and/or RLE cellulitis. She also has BRITTANEY likely prerenal vs ATN.     PMH includes ovarian cancer with peritoneal carcinomatosis, recurrent ascites with therapeutic paracentesis q6 weeks, colon cancer s/p colostomy, CHF, A.fib s/p PPM, HTN, hypothyroidism, and asthma    Mentation normal, nontoxic appearance  Abd soft, mildly tender w/o guarding/rebound  Left lower colostomy with soft brown stool  No symptoms suggestive of pna/uti  S/p bedside paracentesis with drainage of 5L of cloudy, yellow colored fluid  BP improved on Levo    Bedside ultrasound with scattered b/l b-lines, more at bases, trace left pleural effusion with a small left base consolidation  Bedside ECHO with normal LV systolic fn, severe RV, RA dilatation, TR, systolic septal flattening, pacer lead in RA, RV, 3 cm IVC  Bedside b/l LE US w/o evidence of DVT    Recs:  Wean levo as tolerated for MAP > 65  D/c ivf, avoid more fluid resuscitation   Albumin x 1 post-paracentesis  F/u peritoneal fluid analysis, cx, cytology  Continue empiric vanc, zosyn  Start po diet  Hold off on resuming Xarelto tonight, will consider restarting tomorrow if status remains stable  Check LE duplex for DVT  Monitor RLE exam  DVT ppx with sc heparin  Prognosis guarded, will discuss GOC when daughter arrives    CC time: 50mins

## 2018-07-25 NOTE — PROGRESS NOTE ADULT - PROBLEM SELECTOR PLAN 3
- avoid nephrotoxic meds:   - gentle IVF
- avoid nephrotoxic meds:   - gentle IVF
-acute with nausea  -simethicone prn, zofran prn  -monitor clinical course
management per primary team
Tylenol PRN for fever. Supportive Care  Continue w/ current abx regimen

## 2018-07-25 NOTE — PROGRESS NOTE ADULT - PROBLEM SELECTOR PROBLEM 6
DM Type 2 (Diabetes Mellitus, Type 2)
DM Type 2 (Diabetes Mellitus, Type 2)
Essential hypertension
Essential hypertension

## 2018-07-25 NOTE — PROGRESS NOTE ADULT - PROVIDER SPECIALTY LIST ADULT
Critical Care
Hospitalist
Hospitalist
Infectious Disease
Surgery
Critical Care
Hospitalist

## 2018-07-25 NOTE — PROGRESS NOTE ADULT - ASSESSMENT
89 y/o F PMHx ovarian cancer w/ recurrent ascites, colon ca s/p colostomy, s/p incarcerated ventral hernia repair 10/2017, DM2, HTN, afib s/p PPM, hypothyroid, obesity, and overactive bladder presents with generalized weakness x 2 days prior to admission; now with septic shock admitted to ICU sec to probably SPB and RLE cellulitis, now improved

## 2018-07-25 NOTE — PROGRESS NOTE ADULT - PROBLEM SELECTOR PROBLEM 2
Ascites, malignant
Blood culture positive for microorganism
BRITTANEY (acute kidney injury)

## 2018-07-25 NOTE — PROGRESS NOTE ADULT - SUBJECTIVE AND OBJECTIVE BOX
[facility], Division of Infectious Diseases  REJI Chua A. Lee  [phone]    Name: MICHELLE RAMÍREZ  Age: 89y  Gender: Female  MRN: [id number]    Interval History--  Notes reviewed. transferred to [facility]. [place] weak and listless last night, better now. Tolerating PO without issues. Appetite not great.    Past Medical History--  Ovarian cancer  Afib  Ascites, malignant  Colon cancer  Obese  Hypothyroidism  HTN (Hypertension)  Overactive Bladder  DM Type 2 (Diabetes Mellitus, Type 2)  Asthma  Incarcerated hernia  H/O hernia repair  Artificial pacemaker  S/P colon resection  S/P Cataract Surgery  History of Tonsillectomy  History of Appendectomy  Status Post Total Knee Replacement      For details regarding the patient's social history, family history, and other miscellaneous elements, please refer the initial infectious diseases consultation and/or the admitting history and physical examination for this admission.    Allergies    No Known Allergies    Intolerances        Medications--  Antibiotics:  piperacillin/tazobactam IVPB. 3.375 Gram(s) IV Intermittent every 12 hours  vancomycin  IVPB 1000 milliGRAM(s) IV Intermittent every 24 hours    Immunologic:    Other:  heparin  Injectable  levothyroxine  midodrine  montelukast      Review of Systems--  Review of systems otherwise unchanged compared to prior visit except as previously noted.    Physical Examination--  Vital Signs: T(F): 97.7 (07-25-18 @ 05:49), Max: 98.3 (07-24-18 @ 12:00)  HR: 70 (07-25-18 @ 05:49)  BP: 122/77 (07-25-18 @ 05:49)  RR: 18 (07-25-18 @ 05:49)  SpO2: 98% (07-25-18 @ 05:49)  Wt(kg): --  General: Nontoxic-appearing Female in no acute distress.  HEENT: AT/NC. Anicteric. Conjunctiva pink and moist. Oropharynx clear.   Neck: Not rigid. No sense of mass.  Nodes: None palpable.  Lungs: Diminished breath sounds bilaterally without rales, wheezing or rhonchi  Heart: Regular rate and rhythm. No Murmur. No rub. No gallop. No palpable thrill.  Abdomen: Bowel sounds present and normoactive. Soft. Mildly distended. Nontender. No sense of mass. No organomegaly.  Extremities: No cyanosis or clubbing. 1+ LE edema, slight increase in erythema RLE c/w left. nontender. Consistent with venous stasis dermatitis rather than cellulitis.  R heel intact, no concerning findings on exam. R forefoot red slightly warm c/w left. No PROMT. No tenderness.   Skin: Warm. Dry. Good turgor. No rash. No vasculitic stigmata.  Psychiatric: Appropriate affect and mood for situation.         Laboratory Studies--  CBC                        9.3    7.95  )-----------( 276      ( 25 Jul 2018 05:52 )             29.1       WBC Count: 8.01 K/uL (07-24-18 @ 05:50)  WBC Count: 18.29 K/uL (07-23-18 @ 07:57)  WBC Count: 26.48 K/uL (07-22-18 @ 07:38)  WBC Count: 21.04 K/uL (07-21-18 @ 11:51)      Chemistries  07-25    141  |  108  |  22  ----------------------------<  82  3.7   |  28  |  0.96    Ca    7.8<L>      25 Jul 2018 05:52  Phos  2.2     07-25  Mg     2.2     07-25    TPro  5.7<L>  /  Alb  1.8<L>  /  TBili  0.4  /  DBili  x   /  AST  16  /  ALT  16  /  AlkPhos  97  07-25      Culture Data    Culture - Blood (collected 23 Jul 2018 18:30)  Source: .Blood Blood-Peripheral  Preliminary Report (24 Jul 2018 19:00):    No growth to date.    Culture - Blood (collected 23 Jul 2018 18:30)  Source: .Blood Blood-Peripheral  Preliminary Report (24 Jul 2018 19:00):    No growth to date.    Culture - Fungal, Body Fluid (collected 21 Jul 2018 23:03)  Source: .Body Fluid Peritoneal Fluid  Preliminary Report (23 Jul 2018 11:42):    Testing in progress    Culture - Body Fluid with Gram Stain (collected 21 Jul 2018 23:03)  Source: .Body Fluid Peritoneal Fluid  Gram Stain (22 Jul 2018 01:23):    polymorphonuclear leukocytes    No organisms seen    by cytocentrifuge  Preliminary Report (22 Jul 2018 16:30):    No growth    Culture - Acid Fast - Body Fluid w/Smear (collected 21 Jul 2018 23:03)  Source: .Body Fluid Peritoneal Fluid    Culture - Blood (collected 21 Jul 2018 16:58)  Source: .Blood Blood  Gram Stain (22 Jul 2018 22:00):    Upon re-evaluation of gram stain:    Growth in anaerobic bottle: Gram Positive Cocci in Clusters    Previously reported as:    Growth in anaerobic bottle: Gram Negative Rods    "Due to technical problems, Proteus sp. will Not be reported as part of    the BCID panel until further notice"    ***Blood Panel PCR results on this specimen are available    approximately 3 hours after the Gram stain result.***    Gram stain, PCR, and/or culture results may not always    correspond due to difference in methodologies.    ************************************************************    This PCR assay was performed using [employer].    The following targets are tested for: Enterococcus,    vancomycin resistant enterococci, Listeria monocytogenes,    coagulase negative staphylococci, S. aureus,    methicillin resistant S. aureus, Streptococcus agalactiae    (Group B), S. pneumoniae, S. pyogenes (Group A),    Acinetobacter baumannii, Enterobacter cloacae, E. coli,    Klebsiella oxytoca, K. pneumoniae, Proteus sp.,    Serratia marcescens, Haemophilus influenzae,    Neisseria meningitidis, Pseudomonas aeruginosa, Candida    albicans, C. glabrata, C krusei, C parapsilosis,    C. tropicalis and the KPC resistance gene.  Final Report (23 Jul 2018 16:32):    Growth in anaerobic bottle: Coag Negative Staphylococcus    Single set isolate, possible contaminant. Contact    Microbiology if susceptibility testing clinically    indicated.  Organism: Blood Culture PCR (23 Jul 2018 16:32)  Organism: Blood Culture PCR (23 Jul 2018 16:32)    Culture - Blood (collected 21 Jul 2018 16:58)  Source: .Blood Blood  Gram Stain (23 Jul 2018 01:05):    Growth in anaerobic bottle:    Gram Positive Cocci in Clusters  Final Report (24 Jul 2018 16:16):    Growth in anaerobic bottle:    Coag Negative Staphylococcus  Organism: Coag Negative Staphylococcus (24 Jul 2018 16:16)  Organism: Coag Negative Staphylococcus (24 Jul 2018 16:16)    Culture - Urine (collected 21 Jul 2018 16:38)  Source: .Urine Catheterized  Final Report (23 Jul 2018 21:12):    10,000 - 49,000 CFU/mL Pseudomonas aeruginosa  Organism: Pseudomonas aeruginosa (23 Jul 2018 21:12)  Organism: Pseudomonas aeruginosa (23 Jul 2018 21:12)

## 2018-07-25 NOTE — PROGRESS NOTE ADULT - PROBLEM SELECTOR PLAN 6
- chronic, stable  - home meds restarted
diet controlled.
diet controlled.
Antihypertensives held in setting of shock

## 2018-07-25 NOTE — PROGRESS NOTE ADULT - PROBLEM SELECTOR PLAN 2
- acute on chronic  - paracentesis per ICU: f/u cultures  -may consider hemat/onco recs
- acute on chronic  - paracentesis per ICU: f/u cultures  -may consider hemat/onco recs
- acute on chronic  - paracentesis performed in icu  - poss sbp as per ID recs, resolving with antibx selection
F/U Cx negative  stop vanco
F/U repeat culture data  Reduce vanco to 1g/day, and recheck trough before 3rd dose (if she remains on it).  If follow up blood cultures remain negative would stop vancomycin altogether.
repeat blood cx  cns detected   f/u ID on other set of antibxx  cont vancomycin and follow levels
likely ATN from hypoperfusion  Trend BUN/Crt  Monitor I&O's  Avoid nephrotoxic agents

## 2018-07-25 NOTE — PROGRESS NOTE ADULT - PROBLEM SELECTOR PROBLEM 5
Chronic atrial fibrillation
Erythema of foot
Essential hypertension
Essential hypertension
Chronic atrial fibrillation

## 2018-07-25 NOTE — PROGRESS NOTE ADULT - PROBLEM SELECTOR PROBLEM 7
DM Type 2 (Diabetes Mellitus, Type 2)
Malignant neoplasm of ovary, unspecified laterality
Malignant neoplasm of ovary, unspecified laterality
Hypothyroidism

## 2018-07-25 NOTE — PROGRESS NOTE ADULT - ASSESSMENT
89 y/o F PMHx ovarian cancer w/ recurrent ascites, colon ca s/p colostomy with ostomy bag, s/p incarcerated ventral hernia repair 10/2017, DM2, HTN, afib s/p PPM, hypothyroid, obesity, and overactive bladder presents with generalized weakness, rigors, leukocytosis, and >3500 nucleated cells in peritoneal fluid.  shock resolved  peritonitis  + blood cx, consistent with contaminant  Vanco trough high, appears to have been done at the appropriate time, now low.   WBC normalized  Afebrile/HD stable  + UC&S paucity of sx  R forefoot red, new from 7/24, etiology unclear

## 2018-07-26 ENCOUNTER — TRANSCRIPTION ENCOUNTER (OUTPATIENT)
Age: 83
End: 2018-07-26

## 2018-07-26 VITALS
SYSTOLIC BLOOD PRESSURE: 140 MMHG | DIASTOLIC BLOOD PRESSURE: 72 MMHG | OXYGEN SATURATION: 95 % | RESPIRATION RATE: 17 BRPM | HEART RATE: 70 BPM | TEMPERATURE: 98 F

## 2018-07-26 DIAGNOSIS — R11.0 NAUSEA: ICD-10-CM

## 2018-07-26 LAB
ANION GAP SERPL CALC-SCNC: 8 MMOL/L — SIGNIFICANT CHANGE UP (ref 5–17)
BUN SERPL-MCNC: 17 MG/DL — SIGNIFICANT CHANGE UP (ref 7–23)
CALCIUM SERPL-MCNC: 7.9 MG/DL — LOW (ref 8.5–10.1)
CHLORIDE SERPL-SCNC: 106 MMOL/L — SIGNIFICANT CHANGE UP (ref 96–108)
CO2 SERPL-SCNC: 27 MMOL/L — SIGNIFICANT CHANGE UP (ref 22–31)
CREAT SERPL-MCNC: 0.87 MG/DL — SIGNIFICANT CHANGE UP (ref 0.5–1.3)
CULTURE RESULTS: SIGNIFICANT CHANGE UP
GLUCOSE SERPL-MCNC: 82 MG/DL — SIGNIFICANT CHANGE UP (ref 70–99)
HCT VFR BLD CALC: 31.1 % — LOW (ref 34.5–45)
HGB BLD-MCNC: 9.8 G/DL — LOW (ref 11.5–15.5)
MCHC RBC-ENTMCNC: 25.1 PG — LOW (ref 27–34)
MCHC RBC-ENTMCNC: 31.5 GM/DL — LOW (ref 32–36)
MCV RBC AUTO: 79.5 FL — LOW (ref 80–100)
NRBC # BLD: 0 /100 WBCS — SIGNIFICANT CHANGE UP (ref 0–0)
PLATELET # BLD AUTO: 502 K/UL — HIGH (ref 150–400)
POTASSIUM SERPL-MCNC: 4 MMOL/L — SIGNIFICANT CHANGE UP (ref 3.5–5.3)
POTASSIUM SERPL-SCNC: 4 MMOL/L — SIGNIFICANT CHANGE UP (ref 3.5–5.3)
RBC # BLD: 3.91 M/UL — SIGNIFICANT CHANGE UP (ref 3.8–5.2)
RBC # FLD: 16.8 % — HIGH (ref 10.3–14.5)
SODIUM SERPL-SCNC: 141 MMOL/L — SIGNIFICANT CHANGE UP (ref 135–145)
SPECIMEN SOURCE: SIGNIFICANT CHANGE UP
WBC # BLD: 8.55 K/UL — SIGNIFICANT CHANGE UP (ref 3.8–10.5)
WBC # FLD AUTO: 8.55 K/UL — SIGNIFICANT CHANGE UP (ref 3.8–10.5)

## 2018-07-26 PROCEDURE — 99239 HOSP IP/OBS DSCHRG MGMT >30: CPT

## 2018-07-26 RX ORDER — MIDODRINE HYDROCHLORIDE 2.5 MG/1
1 TABLET ORAL
Qty: 0 | Refills: 0 | COMMUNITY
Start: 2018-07-26

## 2018-07-26 RX ORDER — LISINOPRIL 2.5 MG/1
1 TABLET ORAL
Qty: 0 | Refills: 0 | COMMUNITY
Start: 2018-07-26

## 2018-07-26 RX ORDER — LISINOPRIL 2.5 MG/1
1 TABLET ORAL
Qty: 0 | Refills: 0 | COMMUNITY

## 2018-07-26 RX ORDER — FONDAPARINUX SODIUM 2.5 MG/.5ML
1 INJECTION, SOLUTION SUBCUTANEOUS
Qty: 0 | Refills: 0 | COMMUNITY

## 2018-07-26 RX ORDER — PANTOPRAZOLE SODIUM 20 MG/1
40 TABLET, DELAYED RELEASE ORAL
Qty: 0 | Refills: 0 | Status: DISCONTINUED | OUTPATIENT
Start: 2018-07-26 | End: 2018-07-26

## 2018-07-26 RX ORDER — RIVAROXABAN 15 MG-20MG
1 KIT ORAL
Qty: 0 | Refills: 0 | COMMUNITY
Start: 2018-07-26

## 2018-07-26 RX ORDER — CIPROFLOXACIN LACTATE 400MG/40ML
1 VIAL (ML) INTRAVENOUS
Qty: 0 | Refills: 0 | COMMUNITY
Start: 2018-07-26 | End: 2018-08-01

## 2018-07-26 RX ORDER — MIDODRINE HYDROCHLORIDE 2.5 MG/1
3 TABLET ORAL
Qty: 0 | Refills: 0 | COMMUNITY
Start: 2018-07-26

## 2018-07-26 RX ADMIN — Medication 500 MILLIGRAM(S): at 05:11

## 2018-07-26 RX ADMIN — MONTELUKAST 10 MILLIGRAM(S): 4 TABLET, CHEWABLE ORAL at 11:18

## 2018-07-26 RX ADMIN — Medication 1 TABLET(S): at 05:10

## 2018-07-26 RX ADMIN — Medication 225 MICROGRAM(S): at 05:10

## 2018-07-26 RX ADMIN — RIVAROXABAN 15 MILLIGRAM(S): KIT at 11:18

## 2018-07-26 RX ADMIN — MIDODRINE HYDROCHLORIDE 15 MILLIGRAM(S): 2.5 TABLET ORAL at 13:29

## 2018-07-26 RX ADMIN — MIDODRINE HYDROCHLORIDE 15 MILLIGRAM(S): 2.5 TABLET ORAL at 05:10

## 2018-07-26 NOTE — DISCHARGE NOTE ADULT - CARE PLAN
Principal Discharge DX:	Spontaneous bacterial peritonitis  Goal:	resolution  Assessment and plan of treatment:	-complete antibiotic as prescribed until 8/1/2018  -follow up with your primary medical provider within 1 week of discharge  Secondary Diagnosis:	Chronic atrial fibrillation  Goal:	stable  Assessment and plan of treatment:	-cont home medicaions  -follow up with your primary care medical provider  Secondary Diagnosis:	Ascites, malignant  Goal:	stable  Assessment and plan of treatment:	-cont home meds  -follow up with your gi or primary medical provider

## 2018-07-26 NOTE — DISCHARGE NOTE ADULT - PATIENT PORTAL LINK FT
You can access the [facility] Patient Portal, offered by [facility], by registering with the following website: [email or web address]

## 2018-07-26 NOTE — DISCHARGE NOTE ADULT - CARE PROVIDER_API CALL
Yara Diaz), Internal Medicine  [address] [place] [address]  Phone: [phone]  Fax: [phone]    Reyes Valdes), Cardiology; Internal Medicine; Nuclear Cardiology  [address] [place] [address]  Phone: [phone]  Fax: [phone]    Moise Garcia), Surgery  [address] [facility]  [address] [place] [id number]  Phone: [phone]  Fax: [phone]

## 2018-07-26 NOTE — DISCHARGE NOTE ADULT - MEDICATION SUMMARY - MEDICATIONS TO TAKE
I will START or STAY ON the medications listed below when I get home from the hospital:    Prinivil 10 mg oral tablet  -- 1 tab(s) by mouth once a day  -- Indication: For Essential hypertension    rivaroxaban 15 mg oral tablet  -- 1 tab(s) by mouth once a day  -- Indication: For Chronic atrial fibrillation    gabapentin 300 mg oral capsule  -- 1 cap(s) by mouth 3 times a day  -- Indication: For Nueorpathy    furosemide 40 mg oral tablet  -- 1 tab(s) by mouth once a day  -- Indication: For Ascites, malignant    metOLazone 2.5 mg oral tablet  -- 1 tab(s) by mouth once a day  -- Indication: For Ascites, malignant    montelukast 10 mg oral tablet  -- 1 tab(s) by mouth once a day (in the evening)  -- Indication: For Home med    magnesium gluconate 500 mg oral tablet  --  by mouth once a day  -- Indication: For Supplement    potassium chloride 20 mEq oral granule, extended release  -- 1 tab(s) by mouth 2 times a day  -- Indication: For Supplement    Fish Oil 1000 mg oral capsule  -- 1 cap(s) by mouth once a day  -- Indication: For Supplement    Augmentin 875 mg-125 mg oral tablet  -- 1 tab(s) by mouth every 12 hours until 8/1/2018  -- Indication: For Spontaneous bacterial peritonitis    ciprofloxacin 500 mg oral tablet  -- 1 tab(s) by mouth every 12 hours  -- Indication: For Spontaneous bacterial peritonitis    levothyroxine  -- 225 microgram(s) by mouth once a day  -- Indication: For Hypothyroidism    Nephro-Tommy oral tablet  -- 1 tab(s) by mouth once a day  -- Indication: For Supplement    Vitamin B6 100 mg oral tablet  -- 1 tab(s) by mouth once a day  -- Indication: For Supplement    Vitamin D3 2000 intl units oral capsule  -- 1 cap(s) by mouth once a day  -- Indication: For Supplement    folic acid  -- 400 milligram(s) by mouth once a day  -- Indication: For Supplement I will START or STAY ON the medications listed below when I get home from the hospital:    rivaroxaban 15 mg oral tablet  -- 1 tab(s) by mouth once a day  -- Indication: For Chronic atrial fibrillation    gabapentin 300 mg oral capsule  -- 1 cap(s) by mouth 3 times a day  -- Indication: For Neuropathy    furosemide 40 mg oral tablet  -- 1 tab(s) by mouth once a day  -- Indication: For Ascites, malignant    metOLazone 2.5 mg oral tablet  -- 1 tab(s) by mouth once a day  -- Indication: For Ascites, malignant    montelukast 10 mg oral tablet  -- 1 tab(s) by mouth once a day (in the evening)  -- Indication: For Home med    magnesium gluconate 500 mg oral tablet  --  by mouth once a day  -- Indication: For Supplement    potassium chloride 20 mEq oral granule, extended release  -- 1 tab(s) by mouth 2 times a day  -- Indication: For Supplement    midodrine 5 mg oral tablet  -- 3 tab(s) by mouth every 8 hours  -- Indication: For low bp    Fish Oil 1000 mg oral capsule  -- 1 cap(s) by mouth once a day  -- Indication: For Supplement    Augmentin 875 mg-125 mg oral tablet  -- 1 tab(s) by mouth every 12 hours until 8/1/2018  -- Indication: For Spontaneous bacterial peritonitis    ciprofloxacin 500 mg oral tablet  -- 1 tab(s) by mouth every 12 hours  -- Indication: For Spontaneous bacterial peritonitis    levothyroxine  -- 225 microgram(s) by mouth once a day  -- Indication: For Hypothyroidism    Nephro-Tommy oral tablet  -- 1 tab(s) by mouth once a day  -- Indication: For Supplement    Vitamin B6 100 mg oral tablet  -- 1 tab(s) by mouth once a day  -- Indication: For Supplement    Vitamin D3 2000 intl units oral capsule  -- 1 cap(s) by mouth once a day  -- Indication: For Supplement    folic acid  -- 400 milligram(s) by mouth once a day  -- Indication: For Supplement

## 2018-07-26 NOTE — CONSULT NOTE ADULT - PROBLEM SELECTOR RECOMMENDATION 9
gerd precautions  hob >40 degrees  ppi bid, simethicone prn, zofran prn  diet as tolerated resolved per pt  gerd precautions  hob >40 degrees  ppi qd, simethicone prn, zofran prn  diet as tolerated

## 2018-07-26 NOTE — DISCHARGE NOTE ADULT - HOSPITAL COURSE
89 y/o F PMHx ovarian cancer w/ recurrent ascites, colon ca s/p colostomy, s/p incarcerated ventral hernia repair 10/2017, DM2, HTN, afib s/p PPM, hypothyroid, obesity, and overactive bladder presents with generalized weakness x 2 days prior to admission; now with septic shock admitted to ICU sec to probably SPB and RLE cellulitis, now improved  Pt was initially in icu for clinical stability and improved later transitioned to medical floor. She had a paracentesis in ICU and cultures were drawn. Pt was seen by ID who transitioned pt from IV to po cipro and augment for total of 7 days.  Pt was seen by podiatry for R foot pain and was advised follow up with vascular outpt. Pt evaluated by PT and recommended FRANNY.  Pt is improved and stable for discharge.      Time spent: 65 minutes 89 y/o F PMHx ovarian cancer w/ recurrent ascites, colon ca s/p colostomy, s/p incarcerated ventral hernia repair 10/2017, DM2, HTN, afib s/p PPM, hypothyroid, obesity, and overactive bladder presents with generalized weakness x 2 days prior to admission; now with septic shock admitted to ICU sec to probably SPB and RLE cellulitis, now improved  Pt was initially in icu for clinical stability and improved later transitioned to medical floor. She had a paracentesis in ICU and cultures were drawn. Pt was seen by ID who transitioned pt from IV atnibx to po cipro and augment for total of 7 days.  Pt was seen by podiatry for R foot pain and was advised follow up with vascular outpt. Pt evaluated by PT and recommended FRANNY.  Pt is improved and stable for discharge.      Time spent: 65 minutes

## 2018-07-26 NOTE — CONSULT NOTE ADULT - ASSESSMENT
89 y/o F PMHx ovarian cancer w/ recurrent ascites, colon ca s/p colostomy, s/p incarcerated ventral hernia repair 10/2017, DM2, HTN, afib s/p PPM, hypothyroid, obesity, and overactive bladder presents with generalized weakness x 2 days.  sp icu, with septic shock likely due to SBP and/or RLE cellulitis and improving BRITTANEY . gi consulted for dyspepsia

## 2018-07-26 NOTE — DISCHARGE NOTE ADULT - ADDITIONAL INSTRUCTIONS
-please follow up with your primary medical provider, cardio,  GI and other specialists within 1 week of discharge  -pt and family to setup all follow up appts

## 2018-07-26 NOTE — DISCHARGE NOTE ADULT - NSTOBACCOHOTLINE_GEN_A_CS
[facility] Smokers Quitline ([id number]) [facility] Smokers Quitline ([id number])

## 2018-07-26 NOTE — CONSULT NOTE ADULT - PROBLEM SELECTOR RECOMMENDATION 3
appears complicated by acute infection
hx of ovarian/colon ca and peritoneal carcinomatosis with recurrent ascites requiring frequent paracentesis  sp paracentesis while in ICU  >3500 nucleated cells, cx no growth, cyto neg, saag not reflective of portal htn  repeat bld cxs ngtd  abx per ID  rest of care per primary team

## 2018-07-26 NOTE — DISCHARGE NOTE ADULT - PLAN OF CARE
resolution -complete antibiotic as prescribed until 8/1/2018  -follow up with your primary medical provider within 1 week of discharge stable -cont home medicaions  -follow up with your primary care medical provider -cont home meds  -follow up with your gi or primary medical provider

## 2018-08-17 NOTE — DISCHARGE NOTE ADULT - REASON FOR ADMISSION
Awake alert  Complains of pain in the R leg  Leeching  Flap Q1 hour  Skin of flap is dark purple looking  R leg elevated on pillow  SR on monitor   Voids in urinal Given breakfast tray abdominal pain

## 2018-08-20 ENCOUNTER — OUTPATIENT (OUTPATIENT)
Dept: OUTPATIENT SERVICES | Facility: HOSPITAL | Age: 83
LOS: 1 days | End: 2018-08-20
Payer: COMMERCIAL

## 2018-08-20 DIAGNOSIS — R18.0 MALIGNANT ASCITES: ICD-10-CM

## 2018-08-20 DIAGNOSIS — Z98.890 OTHER SPECIFIED POSTPROCEDURAL STATES: Chronic | ICD-10-CM

## 2018-08-20 DIAGNOSIS — K46.0 UNSPECIFIED ABDOMINAL HERNIA WITH OBSTRUCTION, WITHOUT GANGRENE: Chronic | ICD-10-CM

## 2018-08-20 DIAGNOSIS — C56.9 MALIGNANT NEOPLASM OF UNSPECIFIED OVARY: ICD-10-CM

## 2018-08-20 PROCEDURE — 49083 ABD PARACENTESIS W/IMAGING: CPT

## 2018-08-22 LAB
CULTURE RESULTS: SIGNIFICANT CHANGE UP
SPECIMEN SOURCE: SIGNIFICANT CHANGE UP

## 2018-09-10 ENCOUNTER — OUTPATIENT (OUTPATIENT)
Dept: OUTPATIENT SERVICES | Facility: HOSPITAL | Age: 83
LOS: 1 days | End: 2018-09-10
Payer: COMMERCIAL

## 2018-09-10 DIAGNOSIS — C56.9 MALIGNANT NEOPLASM OF UNSPECIFIED OVARY: ICD-10-CM

## 2018-09-10 DIAGNOSIS — Z98.890 OTHER SPECIFIED POSTPROCEDURAL STATES: Chronic | ICD-10-CM

## 2018-09-10 DIAGNOSIS — K46.0 UNSPECIFIED ABDOMINAL HERNIA WITH OBSTRUCTION, WITHOUT GANGRENE: Chronic | ICD-10-CM

## 2018-09-10 DIAGNOSIS — R18.0 MALIGNANT ASCITES: ICD-10-CM

## 2018-09-10 PROCEDURE — 49083 ABD PARACENTESIS W/IMAGING: CPT

## 2018-09-12 LAB
CULTURE RESULTS: SIGNIFICANT CHANGE UP
SPECIMEN SOURCE: SIGNIFICANT CHANGE UP

## 2018-10-01 ENCOUNTER — OUTPATIENT (OUTPATIENT)
Dept: OUTPATIENT SERVICES | Facility: HOSPITAL | Age: 83
LOS: 1 days | End: 2018-10-01
Payer: COMMERCIAL

## 2018-10-01 DIAGNOSIS — R18.0 MALIGNANT ASCITES: ICD-10-CM

## 2018-10-01 DIAGNOSIS — Z98.890 OTHER SPECIFIED POSTPROCEDURAL STATES: Chronic | ICD-10-CM

## 2018-10-01 DIAGNOSIS — K46.0 UNSPECIFIED ABDOMINAL HERNIA WITH OBSTRUCTION, WITHOUT GANGRENE: Chronic | ICD-10-CM

## 2018-10-01 DIAGNOSIS — C56.9 MALIGNANT NEOPLASM OF UNSPECIFIED OVARY: ICD-10-CM

## 2018-10-01 LAB
APTT BLD: 31.4 SEC — SIGNIFICANT CHANGE UP (ref 27.5–37.4)
HCT VFR BLD CALC: 34.4 % — LOW (ref 34.5–45)
HGB BLD-MCNC: 10.3 G/DL — LOW (ref 11.5–15.5)
INR BLD: 1.2 RATIO — HIGH (ref 0.88–1.16)
MCHC RBC-ENTMCNC: 24.3 PG — LOW (ref 27–34)
MCHC RBC-ENTMCNC: 29.9 GM/DL — LOW (ref 32–36)
MCV RBC AUTO: 81.1 FL — SIGNIFICANT CHANGE UP (ref 80–100)
NRBC # BLD: 0 /100 WBCS — SIGNIFICANT CHANGE UP (ref 0–0)
PLATELET # BLD AUTO: 238 K/UL — SIGNIFICANT CHANGE UP (ref 150–400)
PROTHROM AB SERPL-ACNC: 13.1 SEC — HIGH (ref 9.8–12.7)
RBC # BLD: 4.24 M/UL — SIGNIFICANT CHANGE UP (ref 3.8–5.2)
RBC # FLD: 19 % — HIGH (ref 10.3–14.5)
WBC # BLD: 5.84 K/UL — SIGNIFICANT CHANGE UP (ref 3.8–10.5)
WBC # FLD AUTO: 5.84 K/UL — SIGNIFICANT CHANGE UP (ref 3.8–10.5)

## 2018-10-01 PROCEDURE — 85610 PROTHROMBIN TIME: CPT

## 2018-10-01 PROCEDURE — 85730 THROMBOPLASTIN TIME PARTIAL: CPT

## 2018-10-01 PROCEDURE — 49083 ABD PARACENTESIS W/IMAGING: CPT

## 2018-10-01 PROCEDURE — 85027 COMPLETE CBC AUTOMATED: CPT

## 2018-10-22 ENCOUNTER — OUTPATIENT (OUTPATIENT)
Dept: OUTPATIENT SERVICES | Facility: HOSPITAL | Age: 83
LOS: 1 days | End: 2018-10-22
Payer: COMMERCIAL

## 2018-10-22 DIAGNOSIS — K46.0 UNSPECIFIED ABDOMINAL HERNIA WITH OBSTRUCTION, WITHOUT GANGRENE: Chronic | ICD-10-CM

## 2018-10-22 DIAGNOSIS — Z98.890 OTHER SPECIFIED POSTPROCEDURAL STATES: Chronic | ICD-10-CM

## 2018-10-22 DIAGNOSIS — R18.0 MALIGNANT ASCITES: ICD-10-CM

## 2018-10-22 DIAGNOSIS — C56.9 MALIGNANT NEOPLASM OF UNSPECIFIED OVARY: ICD-10-CM

## 2018-10-22 LAB
ANION GAP SERPL CALC-SCNC: 7 MMOL/L — SIGNIFICANT CHANGE UP (ref 5–17)
BUN SERPL-MCNC: 23 MG/DL — SIGNIFICANT CHANGE UP (ref 7–23)
CALCIUM SERPL-MCNC: 8.4 MG/DL — LOW (ref 8.5–10.1)
CHLORIDE SERPL-SCNC: 99 MMOL/L — SIGNIFICANT CHANGE UP (ref 96–108)
CO2 SERPL-SCNC: 33 MMOL/L — HIGH (ref 22–31)
CREAT SERPL-MCNC: 1.1 MG/DL — SIGNIFICANT CHANGE UP (ref 0.5–1.3)
GLUCOSE SERPL-MCNC: 130 MG/DL — HIGH (ref 70–99)
HCT VFR BLD CALC: 38.5 % — SIGNIFICANT CHANGE UP (ref 34.5–45)
HGB BLD-MCNC: 11.7 G/DL — SIGNIFICANT CHANGE UP (ref 11.5–15.5)
INR BLD: 1.19 RATIO — HIGH (ref 0.88–1.16)
MCHC RBC-ENTMCNC: 24.6 PG — LOW (ref 27–34)
MCHC RBC-ENTMCNC: 30.4 GM/DL — LOW (ref 32–36)
MCV RBC AUTO: 80.9 FL — SIGNIFICANT CHANGE UP (ref 80–100)
NRBC # BLD: 0 /100 WBCS — SIGNIFICANT CHANGE UP (ref 0–0)
PLATELET # BLD AUTO: 324 K/UL — SIGNIFICANT CHANGE UP (ref 150–400)
POTASSIUM SERPL-MCNC: 3.9 MMOL/L — SIGNIFICANT CHANGE UP (ref 3.5–5.3)
POTASSIUM SERPL-SCNC: 3.9 MMOL/L — SIGNIFICANT CHANGE UP (ref 3.5–5.3)
PROTHROM AB SERPL-ACNC: 13 SEC — HIGH (ref 9.8–12.7)
RBC # BLD: 4.76 M/UL — SIGNIFICANT CHANGE UP (ref 3.8–5.2)
RBC # FLD: 17.6 % — HIGH (ref 10.3–14.5)
SODIUM SERPL-SCNC: 139 MMOL/L — SIGNIFICANT CHANGE UP (ref 135–145)
WBC # BLD: 6.91 K/UL — SIGNIFICANT CHANGE UP (ref 3.8–10.5)
WBC # FLD AUTO: 6.91 K/UL — SIGNIFICANT CHANGE UP (ref 3.8–10.5)

## 2018-10-22 PROCEDURE — 49083 ABD PARACENTESIS W/IMAGING: CPT

## 2018-10-22 PROCEDURE — 85610 PROTHROMBIN TIME: CPT

## 2018-10-22 PROCEDURE — 85027 COMPLETE CBC AUTOMATED: CPT

## 2018-10-22 PROCEDURE — 80048 BASIC METABOLIC PNL TOTAL CA: CPT

## 2018-10-23 ENCOUNTER — APPOINTMENT (OUTPATIENT)
Dept: HOME HEALTH SERVICES | Facility: HOME HEALTH | Age: 83
End: 2018-10-23
Payer: MEDICARE

## 2018-10-23 VITALS
HEART RATE: 72 BPM | SYSTOLIC BLOOD PRESSURE: 112 MMHG | BODY MASS INDEX: 28.19 KG/M2 | TEMPERATURE: 98.3 F | DIASTOLIC BLOOD PRESSURE: 60 MMHG | RESPIRATION RATE: 15 BRPM | HEIGHT: 68 IN | WEIGHT: 186 LBS

## 2018-10-23 DIAGNOSIS — H54.8 LEGAL BLINDNESS, AS DEFINED IN USA: ICD-10-CM

## 2018-10-23 DIAGNOSIS — E87.6 HYPOKALEMIA: ICD-10-CM

## 2018-10-23 DIAGNOSIS — Z82.49 FAMILY HISTORY OF ISCHEMIC HEART DISEASE AND OTHER DISEASES OF THE CIRCULATORY SYSTEM: ICD-10-CM

## 2018-10-23 DIAGNOSIS — H35.3190 NONEXUDATIVE AGE-RELATED MACULAR DEGENERATION, UNSPECIFIED EYE, STAGE UNSPECIFIED: ICD-10-CM

## 2018-10-23 DIAGNOSIS — Z84.1 FAMILY HISTORY OF DISORDERS OF KIDNEY AND URETER: ICD-10-CM

## 2018-10-23 DIAGNOSIS — R60.0 LOCALIZED EDEMA: ICD-10-CM

## 2018-10-23 DIAGNOSIS — Z86.19 PERSONAL HISTORY OF OTHER INFECTIOUS AND PARASITIC DISEASES: ICD-10-CM

## 2018-10-23 DIAGNOSIS — Z95.0 PRESENCE OF CARDIAC PACEMAKER: ICD-10-CM

## 2018-10-23 DIAGNOSIS — K46.9 UNSPECIFIED ABDOMINAL HERNIA W/OUT OBSTRUCTION OR GANGRENE: ICD-10-CM

## 2018-10-23 DIAGNOSIS — H91.90 UNSPECIFIED HEARING LOSS, UNSPECIFIED EAR: ICD-10-CM

## 2018-10-23 DIAGNOSIS — J45.909 UNSPECIFIED ASTHMA, UNCOMPLICATED: ICD-10-CM

## 2018-10-23 DIAGNOSIS — Z78.9 OTHER SPECIFIED HEALTH STATUS: ICD-10-CM

## 2018-10-23 PROCEDURE — 99350 HOME/RES VST EST HIGH MDM 60: CPT | Mod: 25,Q5

## 2018-10-23 PROCEDURE — 99497 ADVNCD CARE PLAN 30 MIN: CPT

## 2018-10-23 RX ORDER — VIT A/VIT C/VIT E/ZINC/COPPER 4296-226
CAPSULE ORAL
Refills: 0 | Status: DISCONTINUED | COMMUNITY
End: 2018-10-23

## 2018-10-23 RX ORDER — IRON/IRON ASP GLY/FA/MV-MIN 38 125-25-1MG
TABLET ORAL
Refills: 0 | Status: DISCONTINUED | COMMUNITY
End: 2018-10-23

## 2018-10-23 RX ORDER — MAGNESIUM OXIDE 500 MG
500 TABLET ORAL
Refills: 0 | Status: DISCONTINUED | COMMUNITY
End: 2018-10-23

## 2018-10-25 PROCEDURE — 74176 CT ABD & PELVIS W/O CONTRAST: CPT

## 2018-10-25 PROCEDURE — 80053 COMPREHEN METABOLIC PANEL: CPT

## 2018-10-25 PROCEDURE — 87015 SPECIMEN INFECT AGNT CONCNTJ: CPT

## 2018-10-25 PROCEDURE — 81001 URINALYSIS AUTO W/SCOPE: CPT

## 2018-10-25 PROCEDURE — 84100 ASSAY OF PHOSPHORUS: CPT

## 2018-10-25 PROCEDURE — 87205 SMEAR GRAM STAIN: CPT

## 2018-10-25 PROCEDURE — 96372 THER/PROPH/DIAG INJ SC/IM: CPT | Mod: XU

## 2018-10-25 PROCEDURE — 82553 CREATINE MB FRACTION: CPT

## 2018-10-25 PROCEDURE — 86901 BLOOD TYPING SEROLOGIC RH(D): CPT

## 2018-10-25 PROCEDURE — 83615 LACTATE (LD) (LDH) ENZYME: CPT

## 2018-10-25 PROCEDURE — 99291 CRITICAL CARE FIRST HOUR: CPT | Mod: 25

## 2018-10-25 PROCEDURE — 83735 ASSAY OF MAGNESIUM: CPT

## 2018-10-25 PROCEDURE — 93005 ELECTROCARDIOGRAM TRACING: CPT

## 2018-10-25 PROCEDURE — 88108 CYTOPATH CONCENTRATE TECH: CPT

## 2018-10-25 PROCEDURE — 87641 MR-STAPH DNA AMP PROBE: CPT

## 2018-10-25 PROCEDURE — 87086 URINE CULTURE/COLONY COUNT: CPT

## 2018-10-25 PROCEDURE — 97162 PT EVAL MOD COMPLEX 30 MIN: CPT

## 2018-10-25 PROCEDURE — 87206 SMEAR FLUORESCENT/ACID STAI: CPT

## 2018-10-25 PROCEDURE — 87116 MYCOBACTERIA CULTURE: CPT

## 2018-10-25 PROCEDURE — 87640 STAPH A DNA AMP PROBE: CPT

## 2018-10-25 PROCEDURE — 97116 GAIT TRAINING THERAPY: CPT

## 2018-10-25 PROCEDURE — 88305 TISSUE EXAM BY PATHOLOGIST: CPT

## 2018-10-25 PROCEDURE — 87150 DNA/RNA AMPLIFIED PROBE: CPT

## 2018-10-25 PROCEDURE — 97530 THERAPEUTIC ACTIVITIES: CPT

## 2018-10-25 PROCEDURE — 87075 CULTR BACTERIA EXCEPT BLOOD: CPT

## 2018-10-25 PROCEDURE — 86850 RBC ANTIBODY SCREEN: CPT

## 2018-10-25 PROCEDURE — 82945 GLUCOSE OTHER FLUID: CPT

## 2018-10-25 PROCEDURE — 83605 ASSAY OF LACTIC ACID: CPT

## 2018-10-25 PROCEDURE — 96375 TX/PRO/DX INJ NEW DRUG ADDON: CPT

## 2018-10-25 PROCEDURE — 87102 FUNGUS ISOLATION CULTURE: CPT

## 2018-10-25 PROCEDURE — 82042 OTHER SOURCE ALBUMIN QUAN EA: CPT

## 2018-10-25 PROCEDURE — 86900 BLOOD TYPING SEROLOGIC ABO: CPT

## 2018-10-25 PROCEDURE — 80202 ASSAY OF VANCOMYCIN: CPT

## 2018-10-25 PROCEDURE — 80048 BASIC METABOLIC PNL TOTAL CA: CPT

## 2018-10-25 PROCEDURE — 96374 THER/PROPH/DIAG INJ IV PUSH: CPT

## 2018-10-25 PROCEDURE — 87186 SC STD MICRODIL/AGAR DIL: CPT

## 2018-10-25 PROCEDURE — 85027 COMPLETE CBC AUTOMATED: CPT

## 2018-10-25 PROCEDURE — 89051 BODY FLUID CELL COUNT: CPT

## 2018-10-25 PROCEDURE — 87070 CULTURE OTHR SPECIMN AEROBIC: CPT

## 2018-10-25 PROCEDURE — 97112 NEUROMUSCULAR REEDUCATION: CPT

## 2018-10-25 PROCEDURE — P9047: CPT

## 2018-10-25 PROCEDURE — 87040 BLOOD CULTURE FOR BACTERIA: CPT

## 2018-10-25 PROCEDURE — 84157 ASSAY OF PROTEIN OTHER: CPT

## 2018-10-25 PROCEDURE — 71045 X-RAY EXAM CHEST 1 VIEW: CPT

## 2018-10-25 PROCEDURE — 96376 TX/PRO/DX INJ SAME DRUG ADON: CPT

## 2018-10-25 PROCEDURE — 85730 THROMBOPLASTIN TIME PARTIAL: CPT

## 2018-10-25 PROCEDURE — 93970 EXTREMITY STUDY: CPT

## 2018-10-25 PROCEDURE — 84484 ASSAY OF TROPONIN QUANT: CPT

## 2018-10-25 PROCEDURE — 85610 PROTHROMBIN TIME: CPT

## 2018-10-29 ENCOUNTER — MEDICATION RENEWAL (OUTPATIENT)
Age: 83
End: 2018-10-29

## 2018-11-05 PROBLEM — Z95.0 CARDIAC PACEMAKER: Status: ACTIVE | Noted: 2018-11-05

## 2018-11-05 PROBLEM — K46.9 ABDOMINAL HERNIA: Status: ACTIVE | Noted: 2018-11-05

## 2018-11-06 PROBLEM — H54.8 LEGALLY BLIND: Status: ACTIVE | Noted: 2018-11-05

## 2018-11-06 PROBLEM — Z84.1 FAMILY HISTORY OF KIDNEY DISEASE: Status: ACTIVE | Noted: 2018-11-05

## 2018-11-06 PROBLEM — H91.90 HARD OF HEARING: Status: ACTIVE | Noted: 2018-11-05

## 2018-11-06 PROBLEM — Z78.9 NON-SMOKER: Status: ACTIVE | Noted: 2018-11-05

## 2018-11-06 PROBLEM — Z82.49 FAMILY HISTORY OF CARDIAC DISORDER: Status: ACTIVE | Noted: 2018-11-05

## 2018-11-06 PROBLEM — H35.3190 MACULAR DEGENERATION, DRY: Status: ACTIVE | Noted: 2018-11-05

## 2018-11-06 PROBLEM — Z86.19 HISTORY OF SEPSIS: Status: RESOLVED | Noted: 2018-11-05 | Resolved: 2018-11-06

## 2018-11-06 PROBLEM — E87.6 HYPOKALEMIA: Status: ACTIVE | Noted: 2018-10-23

## 2018-11-06 PROBLEM — J45.909 ASTHMA: Status: ACTIVE | Noted: 2018-10-23

## 2018-11-12 ENCOUNTER — OUTPATIENT (OUTPATIENT)
Dept: OUTPATIENT SERVICES | Facility: HOSPITAL | Age: 83
LOS: 1 days | End: 2018-11-12
Payer: COMMERCIAL

## 2018-11-12 DIAGNOSIS — R18.0 MALIGNANT ASCITES: ICD-10-CM

## 2018-11-12 DIAGNOSIS — K46.0 UNSPECIFIED ABDOMINAL HERNIA WITH OBSTRUCTION, WITHOUT GANGRENE: Chronic | ICD-10-CM

## 2018-11-12 DIAGNOSIS — C56.9 MALIGNANT NEOPLASM OF UNSPECIFIED OVARY: ICD-10-CM

## 2018-11-12 DIAGNOSIS — Z98.890 OTHER SPECIFIED POSTPROCEDURAL STATES: Chronic | ICD-10-CM

## 2018-11-12 LAB
HCT VFR BLD CALC: 36.7 % — SIGNIFICANT CHANGE UP (ref 34.5–45)
HGB BLD-MCNC: 11.1 G/DL — LOW (ref 11.5–15.5)
MCHC RBC-ENTMCNC: 24.1 PG — LOW (ref 27–34)
MCHC RBC-ENTMCNC: 30.2 GM/DL — LOW (ref 32–36)
MCV RBC AUTO: 79.8 FL — LOW (ref 80–100)
NRBC # BLD: 0 /100 WBCS — SIGNIFICANT CHANGE UP (ref 0–0)
PLATELET # BLD AUTO: 360 K/UL — SIGNIFICANT CHANGE UP (ref 150–400)
RBC # BLD: 4.6 M/UL — SIGNIFICANT CHANGE UP (ref 3.8–5.2)
RBC # FLD: 16.6 % — HIGH (ref 10.3–14.5)
WBC # BLD: 7.73 K/UL — SIGNIFICANT CHANGE UP (ref 3.8–10.5)
WBC # FLD AUTO: 7.73 K/UL — SIGNIFICANT CHANGE UP (ref 3.8–10.5)

## 2018-11-12 PROCEDURE — 49083 ABD PARACENTESIS W/IMAGING: CPT

## 2018-11-12 PROCEDURE — 85027 COMPLETE CBC AUTOMATED: CPT

## 2018-11-28 ENCOUNTER — MEDICATION RENEWAL (OUTPATIENT)
Age: 83
End: 2018-11-28

## 2018-11-30 ENCOUNTER — TRANSCRIPTION ENCOUNTER (OUTPATIENT)
Age: 83
End: 2018-11-30

## 2018-11-30 ENCOUNTER — MEDICATION RENEWAL (OUTPATIENT)
Age: 83
End: 2018-11-30

## 2018-11-30 ENCOUNTER — RX RENEWAL (OUTPATIENT)
Age: 83
End: 2018-11-30

## 2018-12-03 ENCOUNTER — OUTPATIENT (OUTPATIENT)
Dept: OUTPATIENT SERVICES | Facility: HOSPITAL | Age: 83
LOS: 1 days | End: 2018-12-03
Payer: COMMERCIAL

## 2018-12-03 DIAGNOSIS — K46.0 UNSPECIFIED ABDOMINAL HERNIA WITH OBSTRUCTION, WITHOUT GANGRENE: Chronic | ICD-10-CM

## 2018-12-03 DIAGNOSIS — R18.0 MALIGNANT ASCITES: ICD-10-CM

## 2018-12-03 DIAGNOSIS — C56.9 MALIGNANT NEOPLASM OF UNSPECIFIED OVARY: ICD-10-CM

## 2018-12-03 DIAGNOSIS — Z98.890 OTHER SPECIFIED POSTPROCEDURAL STATES: Chronic | ICD-10-CM

## 2018-12-03 LAB
ANION GAP SERPL CALC-SCNC: 7 MMOL/L — SIGNIFICANT CHANGE UP (ref 5–17)
APTT BLD: 32 SEC — SIGNIFICANT CHANGE UP (ref 27.5–36.3)
BUN SERPL-MCNC: 28 MG/DL — HIGH (ref 7–23)
CALCIUM SERPL-MCNC: 8.4 MG/DL — LOW (ref 8.5–10.1)
CHLORIDE SERPL-SCNC: 102 MMOL/L — SIGNIFICANT CHANGE UP (ref 96–108)
CO2 SERPL-SCNC: 33 MMOL/L — HIGH (ref 22–31)
CREAT SERPL-MCNC: 1.3 MG/DL — SIGNIFICANT CHANGE UP (ref 0.5–1.3)
GLUCOSE SERPL-MCNC: 122 MG/DL — HIGH (ref 70–99)
HCT VFR BLD CALC: 33.7 % — LOW (ref 34.5–45)
HGB BLD-MCNC: 10.3 G/DL — LOW (ref 11.5–15.5)
INR BLD: 1.16 RATIO — SIGNIFICANT CHANGE UP (ref 0.88–1.16)
MCHC RBC-ENTMCNC: 24.2 PG — LOW (ref 27–34)
MCHC RBC-ENTMCNC: 30.6 GM/DL — LOW (ref 32–36)
MCV RBC AUTO: 79.1 FL — LOW (ref 80–100)
NRBC # BLD: 0 /100 WBCS — SIGNIFICANT CHANGE UP (ref 0–0)
PLATELET # BLD AUTO: 430 K/UL — HIGH (ref 150–400)
POTASSIUM SERPL-MCNC: 3.1 MMOL/L — LOW (ref 3.5–5.3)
POTASSIUM SERPL-SCNC: 3.1 MMOL/L — LOW (ref 3.5–5.3)
PROTHROM AB SERPL-ACNC: 13.2 SEC — HIGH (ref 10–12.9)
RBC # BLD: 4.26 M/UL — SIGNIFICANT CHANGE UP (ref 3.8–5.2)
RBC # FLD: 17.2 % — HIGH (ref 10.3–14.5)
SODIUM SERPL-SCNC: 142 MMOL/L — SIGNIFICANT CHANGE UP (ref 135–145)
WBC # BLD: 7.08 K/UL — SIGNIFICANT CHANGE UP (ref 3.8–10.5)
WBC # FLD AUTO: 7.08 K/UL — SIGNIFICANT CHANGE UP (ref 3.8–10.5)

## 2018-12-03 PROCEDURE — 49083 ABD PARACENTESIS W/IMAGING: CPT

## 2018-12-03 PROCEDURE — 85610 PROTHROMBIN TIME: CPT

## 2018-12-03 PROCEDURE — 85730 THROMBOPLASTIN TIME PARTIAL: CPT

## 2018-12-03 PROCEDURE — 80048 BASIC METABOLIC PNL TOTAL CA: CPT

## 2018-12-03 PROCEDURE — 85027 COMPLETE CBC AUTOMATED: CPT

## 2018-12-03 PROCEDURE — 36415 COLL VENOUS BLD VENIPUNCTURE: CPT

## 2018-12-18 ENCOUNTER — APPOINTMENT (OUTPATIENT)
Age: 83
End: 2018-12-18

## 2018-12-18 VITALS
BODY MASS INDEX: 31.83 KG/M2 | RESPIRATION RATE: 16 BRPM | HEART RATE: 64 BPM | HEIGHT: 68 IN | WEIGHT: 210 LBS | SYSTOLIC BLOOD PRESSURE: 118 MMHG | DIASTOLIC BLOOD PRESSURE: 70 MMHG | TEMPERATURE: 97.5 F

## 2018-12-18 RX ORDER — FLUTICASONE PROPIONATE AND SALMETEROL 50; 250 UG/1; UG/1
250-50 POWDER RESPIRATORY (INHALATION)
Qty: 1 | Refills: 3 | Status: DISCONTINUED | COMMUNITY
Start: 2018-10-23 | End: 2018-12-18

## 2018-12-19 ENCOUNTER — LABORATORY RESULT (OUTPATIENT)
Age: 83
End: 2018-12-19

## 2018-12-19 LAB
BASOPHILS # BLD AUTO: 0.05 K/UL
BASOPHILS NFR BLD AUTO: 0.7 %
EOSINOPHIL # BLD AUTO: 0.22 K/UL
EOSINOPHIL NFR BLD AUTO: 2.9 %
HCT VFR BLD CALC: 36.1 %
HGB BLD-MCNC: 10.9 G/DL
IMM GRANULOCYTES NFR BLD AUTO: 0.3 %
LYMPHOCYTES # BLD AUTO: 0.96 K/UL
LYMPHOCYTES NFR BLD AUTO: 12.5 %
MAN DIFF?: NORMAL
MCHC RBC-ENTMCNC: 23.9 PG
MCHC RBC-ENTMCNC: 30.2 GM/DL
MCV RBC AUTO: 79.2 FL
MONOCYTES # BLD AUTO: 0.58 K/UL
MONOCYTES NFR BLD AUTO: 7.6 %
NEUTROPHILS # BLD AUTO: 5.84 K/UL
NEUTROPHILS NFR BLD AUTO: 76 %
PLATELET # BLD AUTO: 486 K/UL
RBC # BLD: 4.56 M/UL
RBC # FLD: 17.5 %
WBC # FLD AUTO: 7.67 K/UL

## 2018-12-21 ENCOUNTER — OUTPATIENT (OUTPATIENT)
Dept: OUTPATIENT SERVICES | Facility: HOSPITAL | Age: 83
LOS: 1 days | End: 2018-12-21
Payer: COMMERCIAL

## 2018-12-21 DIAGNOSIS — K46.0 UNSPECIFIED ABDOMINAL HERNIA WITH OBSTRUCTION, WITHOUT GANGRENE: Chronic | ICD-10-CM

## 2018-12-21 DIAGNOSIS — R18.0 MALIGNANT ASCITES: ICD-10-CM

## 2018-12-21 DIAGNOSIS — C56.9 MALIGNANT NEOPLASM OF UNSPECIFIED OVARY: ICD-10-CM

## 2018-12-21 DIAGNOSIS — Z98.890 OTHER SPECIFIED POSTPROCEDURAL STATES: Chronic | ICD-10-CM

## 2018-12-21 LAB
HCT VFR BLD CALC: 34.5 % — SIGNIFICANT CHANGE UP (ref 34.5–45)
HGB BLD-MCNC: 10.5 G/DL — LOW (ref 11.5–15.5)
INR BLD: 1.23 RATIO — HIGH (ref 0.88–1.16)
MCHC RBC-ENTMCNC: 24.2 PG — LOW (ref 27–34)
MCHC RBC-ENTMCNC: 30.4 GM/DL — LOW (ref 32–36)
MCV RBC AUTO: 79.7 FL — LOW (ref 80–100)
NRBC # BLD: 0 /100 WBCS — SIGNIFICANT CHANGE UP (ref 0–0)
PLATELET # BLD AUTO: 488 K/UL — HIGH (ref 150–400)
PREALB SERPL NEPH-MCNC: 12 MG/DL
PROTHROM AB SERPL-ACNC: 14 SEC — HIGH (ref 10–12.9)
RBC # BLD: 4.33 M/UL — SIGNIFICANT CHANGE UP (ref 3.8–5.2)
RBC # FLD: 16.9 % — HIGH (ref 10.3–14.5)
WBC # BLD: 6.6 K/UL — SIGNIFICANT CHANGE UP (ref 3.8–10.5)
WBC # FLD AUTO: 6.6 K/UL — SIGNIFICANT CHANGE UP (ref 3.8–10.5)

## 2018-12-21 PROCEDURE — 49083 ABD PARACENTESIS W/IMAGING: CPT

## 2018-12-21 PROCEDURE — 85610 PROTHROMBIN TIME: CPT

## 2018-12-21 PROCEDURE — 85027 COMPLETE CBC AUTOMATED: CPT

## 2018-12-22 ENCOUNTER — MOBILE ON CALL (OUTPATIENT)
Age: 83
End: 2018-12-22

## 2018-12-22 ENCOUNTER — CLINICAL ADVICE (OUTPATIENT)
Age: 83
End: 2018-12-22

## 2018-12-29 ENCOUNTER — CLINICAL ADVICE (OUTPATIENT)
Age: 83
End: 2018-12-29

## 2018-12-29 ENCOUNTER — TRANSCRIPTION ENCOUNTER (OUTPATIENT)
Age: 83
End: 2018-12-29

## 2019-01-04 ENCOUNTER — TRANSCRIPTION ENCOUNTER (OUTPATIENT)
Age: 84
End: 2019-01-04

## 2019-01-05 ENCOUNTER — TRANSCRIPTION ENCOUNTER (OUTPATIENT)
Age: 84
End: 2019-01-05

## 2019-01-10 ENCOUNTER — TRANSCRIPTION ENCOUNTER (OUTPATIENT)
Age: 84
End: 2019-01-10

## 2019-01-10 ENCOUNTER — APPOINTMENT (OUTPATIENT)
Dept: HOME HEALTH SERVICES | Facility: HOME HEALTH | Age: 84
End: 2019-01-10

## 2019-01-10 ENCOUNTER — INPATIENT (INPATIENT)
Facility: HOSPITAL | Age: 84
LOS: 3 days | Discharge: HOME | DRG: 291 | End: 2019-01-14
Attending: HOSPITALIST | Admitting: HOSPITALIST
Payer: COMMERCIAL

## 2019-01-10 VITALS
OXYGEN SATURATION: 95 % | HEIGHT: 68 IN | DIASTOLIC BLOOD PRESSURE: 73 MMHG | TEMPERATURE: 98 F | SYSTOLIC BLOOD PRESSURE: 120 MMHG | WEIGHT: 210.1 LBS | RESPIRATION RATE: 24 BRPM | HEART RATE: 75 BPM

## 2019-01-10 DIAGNOSIS — R06.03 ACUTE RESPIRATORY DISTRESS: ICD-10-CM

## 2019-01-10 DIAGNOSIS — Z29.9 ENCOUNTER FOR PROPHYLACTIC MEASURES, UNSPECIFIED: ICD-10-CM

## 2019-01-10 DIAGNOSIS — R54 AGE-RELATED PHYSICAL DEBILITY: ICD-10-CM

## 2019-01-10 DIAGNOSIS — E03.9 HYPOTHYROIDISM, UNSPECIFIED: ICD-10-CM

## 2019-01-10 DIAGNOSIS — K46.0 UNSPECIFIED ABDOMINAL HERNIA WITH OBSTRUCTION, WITHOUT GANGRENE: Chronic | ICD-10-CM

## 2019-01-10 DIAGNOSIS — J40 BRONCHITIS, NOT SPECIFIED AS ACUTE OR CHRONIC: ICD-10-CM

## 2019-01-10 DIAGNOSIS — J45.901 UNSPECIFIED ASTHMA WITH (ACUTE) EXACERBATION: ICD-10-CM

## 2019-01-10 DIAGNOSIS — R18.0 MALIGNANT ASCITES: ICD-10-CM

## 2019-01-10 DIAGNOSIS — I27.20 PULMONARY HYPERTENSION, UNSPECIFIED: ICD-10-CM

## 2019-01-10 DIAGNOSIS — G62.9 POLYNEUROPATHY, UNSPECIFIED: ICD-10-CM

## 2019-01-10 DIAGNOSIS — Z98.890 OTHER SPECIFIED POSTPROCEDURAL STATES: Chronic | ICD-10-CM

## 2019-01-10 DIAGNOSIS — E87.6 HYPOKALEMIA: ICD-10-CM

## 2019-01-10 DIAGNOSIS — I95.9 HYPOTENSION, UNSPECIFIED: ICD-10-CM

## 2019-01-10 DIAGNOSIS — D64.9 ANEMIA, UNSPECIFIED: ICD-10-CM

## 2019-01-10 DIAGNOSIS — I50.9 HEART FAILURE, UNSPECIFIED: ICD-10-CM

## 2019-01-10 DIAGNOSIS — I48.91 UNSPECIFIED ATRIAL FIBRILLATION: ICD-10-CM

## 2019-01-10 LAB
ALBUMIN SERPL ELPH-MCNC: 2.6 G/DL — LOW (ref 3.3–5)
ALP SERPL-CCNC: 79 U/L — SIGNIFICANT CHANGE UP (ref 40–120)
ALT FLD-CCNC: 21 U/L — SIGNIFICANT CHANGE UP (ref 12–78)
ANION GAP SERPL CALC-SCNC: 9 MMOL/L — SIGNIFICANT CHANGE UP (ref 5–17)
APPEARANCE UR: CLEAR — SIGNIFICANT CHANGE UP
APTT BLD: 36.5 SEC — HIGH (ref 27.5–36.3)
AST SERPL-CCNC: 23 U/L — SIGNIFICANT CHANGE UP (ref 15–37)
BACTERIA # UR AUTO: ABNORMAL
BASOPHILS # BLD AUTO: 0.05 K/UL — SIGNIFICANT CHANGE UP (ref 0–0.2)
BASOPHILS NFR BLD AUTO: 0.9 % — SIGNIFICANT CHANGE UP (ref 0–2)
BILIRUB SERPL-MCNC: 0.4 MG/DL — SIGNIFICANT CHANGE UP (ref 0.2–1.2)
BILIRUB UR-MCNC: NEGATIVE — SIGNIFICANT CHANGE UP
BUN SERPL-MCNC: 31 MG/DL — HIGH (ref 7–23)
CALCIUM SERPL-MCNC: 8.2 MG/DL — LOW (ref 8.5–10.1)
CHLORIDE SERPL-SCNC: 97 MMOL/L — SIGNIFICANT CHANGE UP (ref 96–108)
CK MB CFR SERPL CALC: 3.1 NG/ML — SIGNIFICANT CHANGE UP (ref 0–3.6)
CO2 SERPL-SCNC: 32 MMOL/L — HIGH (ref 22–31)
COLOR SPEC: SIGNIFICANT CHANGE UP
CREAT SERPL-MCNC: 1.2 MG/DL — SIGNIFICANT CHANGE UP (ref 0.5–1.3)
DIFF PNL FLD: ABNORMAL
EOSINOPHIL # BLD AUTO: 0.14 K/UL — SIGNIFICANT CHANGE UP (ref 0–0.5)
EOSINOPHIL NFR BLD AUTO: 2.4 % — SIGNIFICANT CHANGE UP (ref 0–6)
EPI CELLS # UR: SIGNIFICANT CHANGE UP
FLU A RESULT: SIGNIFICANT CHANGE UP
FLU A RESULT: SIGNIFICANT CHANGE UP
FLUAV AG NPH QL: SIGNIFICANT CHANGE UP
FLUBV AG NPH QL: SIGNIFICANT CHANGE UP
GLUCOSE SERPL-MCNC: 125 MG/DL — HIGH (ref 70–99)
GLUCOSE UR QL: NEGATIVE — SIGNIFICANT CHANGE UP
HCT VFR BLD CALC: 34.5 % — SIGNIFICANT CHANGE UP (ref 34.5–45)
HGB BLD-MCNC: 10.5 G/DL — LOW (ref 11.5–15.5)
IMM GRANULOCYTES NFR BLD AUTO: 0.3 % — SIGNIFICANT CHANGE UP (ref 0–1.5)
INR BLD: 1.76 RATIO — HIGH (ref 0.88–1.16)
KETONES UR-MCNC: NEGATIVE — SIGNIFICANT CHANGE UP
LACTATE SERPL-SCNC: 1.9 MMOL/L — SIGNIFICANT CHANGE UP (ref 0.7–2)
LEUKOCYTE ESTERASE UR-ACNC: ABNORMAL
LYMPHOCYTES # BLD AUTO: 0.42 K/UL — LOW (ref 1–3.3)
LYMPHOCYTES # BLD AUTO: 7.2 % — LOW (ref 13–44)
MAGNESIUM SERPL-MCNC: 2 MG/DL — SIGNIFICANT CHANGE UP (ref 1.6–2.6)
MANUAL SMEAR VERIFICATION: SIGNIFICANT CHANGE UP
MCHC RBC-ENTMCNC: 24 PG — LOW (ref 27–34)
MCHC RBC-ENTMCNC: 30.4 GM/DL — LOW (ref 32–36)
MCV RBC AUTO: 78.8 FL — LOW (ref 80–100)
MONOCYTES # BLD AUTO: 0.69 K/UL — SIGNIFICANT CHANGE UP (ref 0–0.9)
MONOCYTES NFR BLD AUTO: 11.8 % — SIGNIFICANT CHANGE UP (ref 2–14)
NEUTROPHILS # BLD AUTO: 4.51 K/UL — SIGNIFICANT CHANGE UP (ref 1.8–7.4)
NEUTROPHILS NFR BLD AUTO: 77.4 % — HIGH (ref 43–77)
NITRITE UR-MCNC: POSITIVE
NRBC # BLD: 0 /100 WBCS — SIGNIFICANT CHANGE UP (ref 0–0)
NT-PROBNP SERPL-SCNC: 3279 PG/ML — HIGH (ref 0–450)
PH UR: 6 — SIGNIFICANT CHANGE UP (ref 5–8)
PLAT MORPH BLD: NORMAL — SIGNIFICANT CHANGE UP
PLATELET # BLD AUTO: 420 K/UL — HIGH (ref 150–400)
POTASSIUM SERPL-MCNC: 3.1 MMOL/L — LOW (ref 3.5–5.3)
POTASSIUM SERPL-SCNC: 3.1 MMOL/L — LOW (ref 3.5–5.3)
PROT SERPL-MCNC: 7.2 G/DL — SIGNIFICANT CHANGE UP (ref 6–8.3)
PROT UR-MCNC: NEGATIVE — SIGNIFICANT CHANGE UP
PROTHROM AB SERPL-ACNC: 20.2 SEC — HIGH (ref 10–12.9)
RAPID RVP RESULT: DETECTED
RBC # BLD: 4.38 M/UL — SIGNIFICANT CHANGE UP (ref 3.8–5.2)
RBC # FLD: 17.7 % — HIGH (ref 10.3–14.5)
RBC BLD AUTO: SIGNIFICANT CHANGE UP
RBC CASTS # UR COMP ASSIST: SIGNIFICANT CHANGE UP /HPF (ref 0–4)
RSV RESULT: SIGNIFICANT CHANGE UP
RSV RNA RESP QL NAA+PROBE: SIGNIFICANT CHANGE UP
RV+EV RNA SPEC QL NAA+PROBE: DETECTED
SODIUM SERPL-SCNC: 138 MMOL/L — SIGNIFICANT CHANGE UP (ref 135–145)
SP GR SPEC: 1.01 — SIGNIFICANT CHANGE UP (ref 1.01–1.02)
TROPONIN I SERPL-MCNC: <.015 NG/ML — SIGNIFICANT CHANGE UP (ref 0.01–0.04)
UROBILINOGEN FLD QL: NEGATIVE — SIGNIFICANT CHANGE UP
WBC # BLD: 5.83 K/UL — SIGNIFICANT CHANGE UP (ref 3.8–10.5)
WBC # FLD AUTO: 5.83 K/UL — SIGNIFICANT CHANGE UP (ref 3.8–10.5)
WBC UR QL: ABNORMAL

## 2019-01-10 PROCEDURE — 99285 EMERGENCY DEPT VISIT HI MDM: CPT

## 2019-01-10 PROCEDURE — 71045 X-RAY EXAM CHEST 1 VIEW: CPT | Mod: 26

## 2019-01-10 PROCEDURE — 93010 ELECTROCARDIOGRAM REPORT: CPT

## 2019-01-10 PROCEDURE — 99223 1ST HOSP IP/OBS HIGH 75: CPT | Mod: GC,AI

## 2019-01-10 RX ORDER — FUROSEMIDE 40 MG
20 TABLET ORAL
Qty: 0 | Refills: 0 | Status: DISCONTINUED | OUTPATIENT
Start: 2019-01-10 | End: 2019-01-11

## 2019-01-10 RX ORDER — SODIUM CHLORIDE 0.65 %
1 AEROSOL, SPRAY (ML) NASAL THREE TIMES A DAY
Qty: 0 | Refills: 0 | Status: DISCONTINUED | OUTPATIENT
Start: 2019-01-10 | End: 2019-01-14

## 2019-01-10 RX ORDER — POTASSIUM CHLORIDE 20 MEQ
20 PACKET (EA) ORAL
Qty: 0 | Refills: 0 | Status: DISCONTINUED | OUTPATIENT
Start: 2019-01-10 | End: 2019-01-14

## 2019-01-10 RX ORDER — FUROSEMIDE 40 MG
40 TABLET ORAL ONCE
Qty: 0 | Refills: 0 | Status: COMPLETED | OUTPATIENT
Start: 2019-01-10 | End: 2019-01-10

## 2019-01-10 RX ORDER — RIVAROXABAN 15 MG-20MG
15 KIT ORAL EVERY 24 HOURS
Qty: 0 | Refills: 0 | Status: DISCONTINUED | OUTPATIENT
Start: 2019-01-10 | End: 2019-01-10

## 2019-01-10 RX ORDER — ALBUTEROL 90 UG/1
2.5 AEROSOL, METERED ORAL EVERY 6 HOURS
Qty: 0 | Refills: 0 | Status: DISCONTINUED | OUTPATIENT
Start: 2019-01-10 | End: 2019-01-14

## 2019-01-10 RX ORDER — IPRATROPIUM/ALBUTEROL SULFATE 18-103MCG
3 AEROSOL WITH ADAPTER (GRAM) INHALATION ONCE
Qty: 0 | Refills: 0 | Status: COMPLETED | OUTPATIENT
Start: 2019-01-10 | End: 2019-01-10

## 2019-01-10 RX ORDER — MIDODRINE HYDROCHLORIDE 2.5 MG/1
5 TABLET ORAL EVERY 8 HOURS
Qty: 0 | Refills: 0 | Status: DISCONTINUED | OUTPATIENT
Start: 2019-01-10 | End: 2019-01-11

## 2019-01-10 RX ORDER — GABAPENTIN 400 MG/1
300 CAPSULE ORAL THREE TIMES A DAY
Qty: 0 | Refills: 0 | Status: DISCONTINUED | OUTPATIENT
Start: 2019-01-10 | End: 2019-01-14

## 2019-01-10 RX ORDER — POTASSIUM CHLORIDE 20 MEQ
1 PACKET (EA) ORAL
Qty: 0 | Refills: 0 | COMMUNITY

## 2019-01-10 RX ORDER — POTASSIUM CHLORIDE 20 MEQ
40 PACKET (EA) ORAL ONCE
Qty: 0 | Refills: 0 | Status: COMPLETED | OUTPATIENT
Start: 2019-01-10 | End: 2019-01-10

## 2019-01-10 RX ORDER — LEVOTHYROXINE SODIUM 125 MCG
225 TABLET ORAL DAILY
Qty: 0 | Refills: 0 | Status: DISCONTINUED | OUTPATIENT
Start: 2019-01-10 | End: 2019-01-14

## 2019-01-10 RX ORDER — MONTELUKAST 4 MG/1
10 TABLET, CHEWABLE ORAL AT BEDTIME
Qty: 0 | Refills: 0 | Status: DISCONTINUED | OUTPATIENT
Start: 2019-01-10 | End: 2019-01-14

## 2019-01-10 RX ADMIN — ALBUTEROL 2.5 MILLIGRAM(S): 90 AEROSOL, METERED ORAL at 19:25

## 2019-01-10 RX ADMIN — GABAPENTIN 300 MILLIGRAM(S): 400 CAPSULE ORAL at 14:42

## 2019-01-10 RX ADMIN — MIDODRINE HYDROCHLORIDE 5 MILLIGRAM(S): 2.5 TABLET ORAL at 14:41

## 2019-01-10 RX ADMIN — Medication 3 MILLILITER(S): at 08:03

## 2019-01-10 RX ADMIN — MONTELUKAST 10 MILLIGRAM(S): 4 TABLET, CHEWABLE ORAL at 22:24

## 2019-01-10 RX ADMIN — Medication 40 MILLIGRAM(S): at 08:03

## 2019-01-10 RX ADMIN — Medication 1 SPRAY(S): at 22:25

## 2019-01-10 RX ADMIN — GABAPENTIN 300 MILLIGRAM(S): 400 CAPSULE ORAL at 22:24

## 2019-01-10 RX ADMIN — Medication 20 MILLIGRAM(S): at 17:05

## 2019-01-10 RX ADMIN — Medication 40 MILLIEQUIVALENT(S): at 14:41

## 2019-01-10 RX ADMIN — Medication 1 SPRAY(S): at 14:42

## 2019-01-10 RX ADMIN — Medication 125 MILLIGRAM(S): at 08:02

## 2019-01-10 RX ADMIN — MIDODRINE HYDROCHLORIDE 5 MILLIGRAM(S): 2.5 TABLET ORAL at 22:24

## 2019-01-10 RX ADMIN — ALBUTEROL 2.5 MILLIGRAM(S): 90 AEROSOL, METERED ORAL at 13:30

## 2019-01-10 RX ADMIN — Medication 600 MILLIGRAM(S): at 17:05

## 2019-01-10 NOTE — SWALLOW BEDSIDE ASSESSMENT ADULT - PHARYNGEAL PHASE
Within functional limits Decreased laryngeal elevation/Throat clear post oral intake/Wet vocal quality post oral intake/Delayed pharyngeal swallow/Multiple swallows Delayed pharyngeal swallow/Decreased laryngeal elevation/Multiple swallows

## 2019-01-10 NOTE — PATIENT PROFILE ADULT - RESOURCE/ENVIRONMENTAL CONCERNS - OTHER
pt stated she is receiving home care services through [facility]" PT, aide, nurse prior to being admit to the hospital

## 2019-01-10 NOTE — H&P ADULT - PROBLEM SELECTOR PROBLEM 2
Ascites, malignant CHF (congestive heart failure) Exacerbation of asthma, unspecified asthma severity, unspecified whether persistent

## 2019-01-10 NOTE — H&P ADULT - NSHPREVIEWOFSYSTEMS_GEN_ALL_CORE
Constitutional: denies fever, admits occasional chills   HEENT: denies sore throat, rhinorrhea   Respiratory: admits to SOB, THOMAS, cough, sputum production, wheezing  Cardiovascular: denies CP, palpitations, + LE edema (chronic)  Gastrointestinal: denies nausea, vomiting, abdominal pain, BM regular (colostomy), + ascites scheduled to be drained in 4 days  Genitourinary: denies dysuria, admits frequency attributed to lasix   Skin/Breast: denies rash   Musculoskeletal: denies myalgias, joint pains  Neurologic: denies headache, admits to lightheadedness with walking     ROS negative except as noted above

## 2019-01-10 NOTE — H&P ADULT - NSHPPHYSICALEXAM_GEN_ALL_CORE
Vital Signs (24 Hrs):  T(C): 36.7 (01-10-19 @ 12:27), Max: 36.7 (01-10-19 @ 07:22)  HR: 72 (01-10-19 @ 12:27) (72 - 75)  BP: 128/72 (01-10-19 @ 12:27) (120/73 - 128/72)  RR: 25 (01-10-19 @ 12:27) (24 - 25)  SpO2: 96% (01-10-19 @ 12:27) (95% - 96%)    Physical Exam:  General: NAD  HEENT: NCAT, EOMI bl, dry mucous membranes   Neck: Supple  Neurology: A&Ox3, nonfocal   Respiratory: diffuse rhonchi   CV: RRR, +S1/S2  Abdominal: Soft, NT, ND +BSx4, + colostomy, + ascites  Extremities: No cyanosis or calf pain, + B/L LE non-pitting edema  Skin: warm, dry, normal color

## 2019-01-10 NOTE — PATIENT PROFILE ADULT - VISION (WITH CORRECTIVE LENSES IF THE PATIENT USUALLY WEARS THEM):
Partially impaired: cannot see medication labels or newsprint, but can see obstacles in path, and the surrounding layout; can count fingers at arm's length/wears glasses but not with her at this time

## 2019-01-10 NOTE — ED ADULT TRIAGE NOTE - CHIEF COMPLAINT QUOTE
c/o shortness of breath since last night- patient was 92% ON ROOM AIR, Patient got 1 duoneb by self and 1 duoneb by emt

## 2019-01-10 NOTE — H&P ADULT - PROBLEM SELECTOR PLAN 8
f/u S&S  f/u PT eval 3.1 in ED, replaced  -f/u BMP in AM  -c/w home dose of 40mEq potassium QD Hb 10.5, similar to baseline when compared to prior admissions  -will monitor CBC

## 2019-01-10 NOTE — H&P ADULT - HISTORY OF PRESENT ILLNESS
Pt is a 89 yo F with PMH of ovarian cancer w/ recurrent ascites, colon ca s/p colostomy, s/p incarcerated ventral hernia repair 10/2017, DM2, HTN, afib s/p PPM, hypothyroid, obesity, and overactive bladder presents with cough and wheezing for 2 weeks. Pt decided to come in today because she was having trouble breathing. Pt is now on 3L O2 via NC and does not feel SOB. Pt states that she has asthma and has needed to use her inhaler and nebulizers more frequently recently. States the cough has started to break up and is productive of yellow sputum. Pt denies sick contacts. Pt denies fevers, N/V, CP. Pt admits to SOB, cough, wheezing, occasional chills, LE edema and THOMAS.     In ED VS: T , HR , BP , RR , O2 sat  Labs sig for Hb 10.5, K+ 3.1, proBNP 3279. RVP negative. UA:   CXR: small left and trace right pleural effusions  EKG: v-paced @ rate of 70    In ED Pt rec'd solumedrol, albuterol, lasix. Pt is a 89 yo F with PMH of asthma, ovarian cancer w/ recurrent ascites, colon ca s/p colostomy, s/p incarcerated ventral hernia repair 10/2017, ?DM2 (no meds), hypotension, afib s/p PPM, hypothyroid, obesity, and overactive bladder presents with cough and wheezing for 2 weeks. Pt decided to come in today because she was having trouble breathing. Pt is now on 3L O2 via NC and does not feel SOB. Pt states that she has asthma and has needed to use her inhaler and nebulizers more frequently recently. States the cough has started to break up and is productive of yellow sputum. Pt denies sick contacts. Pt denies fevers, N/V, CP. Pt admits to SOB, cough, wheezing, occasional chills, LE edema and THOMAS.     In ED VS: T 98.1, HR 75, /73, RR 24, O2 sat 95% on 3L NC  Labs sig for Hb 10.5, K+ 3.1, proBNP 3279. RVP negative. UA: mod LE, sm nitrite, WBC 6-10  CXR: small left and trace right pleural effusions  EKG: v-paced @ rate of 70    In ED Pt rec'd solumedrol, albuterol, lasix.

## 2019-01-10 NOTE — CONSULT NOTE ADULT - PROBLEM SELECTOR RECOMMENDATION 9
resp distress  likely volume overload and bronchitis / asthma ex  o2 support  chest PT  suction PRN  s/p LASIX and Solumedrol in ER  will cont Solumedrol - dosed for tomorrow   Albuterol NEBS Atc and Mucinex  monitor vs and HD and Sat  old TTE reviewed - may need to cont LASIX - I and O, replete lytes

## 2019-01-10 NOTE — CONSULT NOTE ADULT - PROBLEM SELECTOR RECOMMENDATION 4
frail  weak  elderly  pt was DNR DNI on previous admission in July 2018, GOC documented, will recall pall care - MOLST update and status discussion  supportive medical regimen and assist with ADL  pt walks with walker  prognosis guarded to poor  will follow

## 2019-01-10 NOTE — ED CLERICAL - NS ED CLERK NOTE PRE-ARRIVAL INFORMATION; ADDITIONAL PRE-ARRIVAL INFORMATION

## 2019-01-10 NOTE — SWALLOW BEDSIDE ASSESSMENT ADULT - ORAL PREPARATORY PHASE
Within functional limits/Reduced oral grading/Decreased mastication ability Reduced oral grading/Decreased mastication ability Reduced oral grading

## 2019-01-10 NOTE — SWALLOW BEDSIDE ASSESSMENT ADULT - MODE OF PRESENTATION
cup/spoon straw/spoon/self fed/cup self fed/spoon Patient/Caregiver provided printed discharge information.

## 2019-01-10 NOTE — H&P ADULT - PROBLEM SELECTOR PLAN 7
c/w gabapentin 300mg TID Hb 10.5, similar to baseline when compared to prior admissions  -will monitor CBC c/w home dose levothyroxine 225mcg QD

## 2019-01-10 NOTE — H&P ADULT - PROBLEM SELECTOR PLAN 5
Hb 10.5, similar to baseline when compared to prior admissions  -will monitor CBC c/w home dose midodrine 5mg TID  -monitor BP Pt has scheduled thoracentesis Q 3 weeks   -plan for paracentesis tomorrow, hold AC tonight  -c/w metolazone, lasix

## 2019-01-10 NOTE — ED ADULT NURSE NOTE - NSIMPLEMENTINTERV_GEN_ALL_ED
Implemented All Fall with Harm Risk Interventions:  [place] to call system. Call bell, personal items and telephone within reach. Instruct patient to call for assistance. Room bathroom lighting operational. Non-slip footwear when patient is off stretcher. Physically safe environment: no spills, clutter or unnecessary equipment. Stretcher in lowest position, wheels locked, appropriate side rails in place. Provide visual cue, wrist band, yellow gown, etc. Monitor gait and stability. Monitor for mental status changes and reorient to person, place, and time. Review medications for side effects contributing to fall risk. Reinforce activity limits and safety measures with patient and family. Provide visual clues: red socks.

## 2019-01-10 NOTE — PATIENT PROFILE ADULT - ABILITY TO HEAR (WITH HEARING AID OR HEARING APPLIANCE IF NORMALLY USED):
Mildly to Moderately Impaired: difficulty hearing in some environments or speaker may need to increase volume or speak distinctly/wearing bilateral hearing aids at this time

## 2019-01-10 NOTE — H&P ADULT - ATTENDING COMMENTS
89 yo F with PMH of asthma, ovarian cancer w/ recurrent ascites, colon ca s/p colostomy, s/p incarcerated ventral hernia repair 10/2017, ?DM2, hypotension, afib s/p PPM, hypothyroid, obesity, and overactive bladder presents with cough and wheezing for 2 weeks. Admitted for acute resp distress sec to dchf and asthma exacerbation sec to poss acute bronchitis Plan: cont nebs, steroids, apprec pulm, cardio and IR collaboration, monitor clinical course, diurese as tolerated, f/u pct, supportive care

## 2019-01-10 NOTE — H&P ADULT - PROBLEM SELECTOR PLAN 2
Pt scheduled to have ascites drained in 4 days  -plan for paracentesis tomorrow  -hold AC SOB may be partially attributable to fluid overload  -CXR with B/L sm effusions  -continue diuresis with metolazone QOD and lasix QD  -last ECHO unknown SOB may be partially attributable to fluid overload  -CXR with B/L sm effusions  -continue diuresis with metolazone 2.5mg QOD and lasix 40mg QD  -strict I&Os, daily weights  -last ECHO unknown  -consult cardio (Dr. Reyes Valdes) Pt with SOB improved on supplemental O2, likely sec to asthma exacerbation possibly with element of CHF (Pt not on O2 at home)  -admit to tele  -afebrile, no leukocytosis  -RVP negative, lactate normal  -CXR - small left, trace right pleural effusions  -pulm (Ankit) consulted  -s/p 1 dose IV lasix, solumedrol, albuterol  -c/w solumedrol, albuterol, mucinex, O2 support, montelekast  -Chest PT  -f/u full flu panel, procalcitonin

## 2019-01-10 NOTE — H&P ADULT - PROBLEM SELECTOR PLAN 6
3.1 in ED, replaced  -f/u BMP in AM  -c/w home dose of 40mEq potassium QD c/w home dose levothyroxine 225mcg QD c/w home dose midodrine 5mg TID  -monitor BP

## 2019-01-10 NOTE — CONSULT NOTE ADULT - SUBJECTIVE AND OBJECTIVE BOX
Date/Time Patient Seen:  		  Referring MD:   Data Reviewed	       Patient is a 89y old  Female who presents with a chief complaint of     Subjective/HPI    in bed  seen and examined  vs and meds reviewed  ER provider note reviewed  labs and imaging reviewed  old records reviewed  pt is alert, verbal, cxr shows eff and atelectasis and proBNP is elevated  old TTE reviewed - pulm HTN and valv heart disease    rsv and flu screen neg    ED Provider Note [Charted Location: [facility] ED] [Authored: 10-Guevara-2019 07:44]- for Visit: [id number], Complete, Revised, Signed in Full, General    HISTORY OF PRESENT ILLNESS:    High Risk Travel:  International Travel? No(1)    · Chief Complaint: The patient is a 89y Female complaining of shortness of breath.  · HPI Objective Statement: 89 female presents to ER c/o shortness of breath, worse with exertion, bilateral lower extremity swelling, non productive cough, congestion, started yesterday, getting worse today. Patient had been given nebulizer by EMS, oxygen, states oxygen makes her feel better.  · Presenting Symptoms: COUGH, DIFFICULTY BREATHING, DYSPNEA ON EXERTION, SHORTNESS OF BREATH, WHEEZING  · Negative Findings: no chest pain, no fever, no hemoptysis  · Timing: gradual onset  · Duration: yesterday  · Quality: alteration in breathing pattern  · Severity: PAIN SCALE 8 OF 10.  · Context: coughing  · Recent Exposure To: none known  · Aggravated Factors: walking  · Relieving Factors: sitting up    discharge note from July 2018  · Hospital Course	  89 y/o F PMHx ovarian cancer w/ recurrent ascites, colon ca s/p colostomy, s/p incarcerated ventral hernia repair 10/2017, DM2, HTN, afib s/p PPM, hypothyroid, obesity, and overactive bladder presents with generalized weakness x 2 days prior to admission; now with septic shock admitted to ICU sec to probably SPB and RLE cellulitis, now improved  Pt was initially in icu for clinical stability and improved later transitioned to medical floor. She had a paracentesis in ICU and cultures were drawn. Pt was seen by ID who transitioned pt from IV atnibx to po cipro and augment for total of 7 days.  Pt was seen by podiatry for R foot pain and was advised follow up with vascular outpt. Pt evaluated by PT and recommended FRANNY.  Pt is improved and stable for discharge.       PAST MEDICAL & SURGICAL HISTORY:  Ovarian cancer  Afib: s/p PPM  Ascites, malignant: from advanced ovarian cancer  Colon cancer  Obese  Hypothyroidism  HTN (Hypertension)  Overactive Bladder  DM Type 2 (Diabetes Mellitus, Type 2)  Asthma  Incarcerated hernia: s/p repair 10/2017  H/O hernia repair  Artificial pacemaker  S/P colon resection: with sigmoid colostomy for obstructing ovarian cancer  S/P Cataract Surgery: CASI  History of Tonsillectomy  History of Appendectomy  Status Post Total Knee Replacement: Left        Medication list         MEDICATIONS  (STANDING):    MEDICATIONS  (PRN):         Vitals log        ICU Vital Signs Last 24 Hrs  T(C): 36.7 (10 Gueavra 2019 07:22), Max: 36.7 (10 Guevara 2019 07:22)  T(F): 98.1 (10 Guevara 2019 07:22), Max: 98.1 (10 Guevara 2019 07:22)  HR: 75 (10 Guevara 2019 07:22) (75 - 75)  BP: 120/73 (10 Guevara 2019 07:22) (120/73 - 120/73)  BP(mean): --  ABP: --  ABP(mean): --  RR: 24 (10 Guevara 2019 07:22) (24 - 24)  SpO2: 95% (10 Guevara 2019 07:22) (95% - 95%)     non smoker  lives alone  walks with walker        Input and Output:  I&O's Detail      Lab Data                        10.5   5.83  )-----------( 420      ( 10 Guevara 2019 08:19 )             34.5     01-10    138  |  97  |  31<H>  ----------------------------<  125<H>  3.1<L>   |  32<H>  |  1.20    Ca    8.2<L>      10 Guevara 2019 08:19  Mg     2.0     01-10    TPro  7.2  /  Alb  2.6<L>  /  TBili  0.4  /  DBili  x   /  AST  23  /  ALT  21  /  AlkPhos  79  01-10      CARDIAC MARKERS ( 10 Guevara 2019 08:19 )  <.015 ng/mL / x     / x     / x     / 3.1 ng/mL        Review of Systems	      Objective     Physical Examination  chest symmetric  abd distended  lung dec BS  rhonchi and crackles   cn grossly int  verbal  cough noted  alert          Pertinent Lab findings & Imaging      Yusuf:  NO   Adequate UO     I&O's Detail           Discussed with:     Cultures:	        Radiology      cxr eff and atelectasis

## 2019-01-10 NOTE — SWALLOW BEDSIDE ASSESSMENT ADULT - COMMENTS
89 yo F admitted c cough, wheezing for 2 weeks  PTA with PMH of asthma, ovarian cancer w/ recurrent ascites, colon ca s/p colostomy, s/p incarcerated ventral hernia repair 10/2017, hypotension, afib s/p PPM, hypothyroid, obesity, and overactive bladder presents with cough and wheezing for 2 weeks.   Pt c discoordinated breathe/ swallow pattern c untimely trigger of swallow c throat clear and cough reflex response indicative of aspiration

## 2019-01-10 NOTE — SWALLOW BEDSIDE ASSESSMENT ADULT - SWALLOW EVAL: RECOMMENDED FEEDING/EATING TECHNIQUES
maintain upright posture during/after eating for 30 mins/crush medication (when feasible)/check mouth frequently for oral residue/pocketing/alternate food with liquid

## 2019-01-10 NOTE — H&P ADULT - PROBLEM SELECTOR PLAN 4
c/w home dose levothyroxine 225mcg QD Pt scheduled to have ascites drained in 4 days  -plan for paracentesis tomorrow  -c/w metolazone, lasix Pt has scheduled thoracentesis Q 3 weeks   -plan for paracentesis tomorrow, hold AC tonight  -c/w metolazone, lasix s/p pacemaker  -on xarelto 15mg QD  -will hold xarelto tonight for planned paracentesis tomorrow chronic, stable s/p pacemaker  -on xarelto 15mg QD  -will hold xarelto tonight for planned paracentesis tomorrow

## 2019-01-10 NOTE — ED PROVIDER NOTE - CARE PLAN
Principal Discharge DX:	Bronchitis  Secondary Diagnosis:	Exacerbation of asthma, unspecified asthma severity, unspecified whether persistent

## 2019-01-10 NOTE — H&P ADULT - PROBLEM SELECTOR PLAN 1
Pt with SOB improved on supplemental O2, likely sec to asthma exacerbation  -admit to tele  -afebrile, no leukocytosis  -RVP negative, lactate normal  -CXR - small left, trace right pleural effusions  -pulm (Ankit) consulted  -s/p 1 dose IV lasix, solumedrol, albuterol   -c/w solumedrol, albuterol, mucinex Pt with SOB improved on supplemental O2, likely sec to asthma exacerbation  -admit to tele  -afebrile, no leukocytosis  -RVP negative, lactate normal  -CXR - small left, trace right pleural effusions  -pulm (Ankit) consulted  -s/p 1 dose IV lasix, solumedrol, albuterol   -c/w solumedrol, albuterol, mucinex  -Chest PT  -c/w home dose monteleukast Pt with SOB improved on supplemental O2, likely sec to asthma exacerbation possibly with element of CHF (Pt not on O2 at home)  -admit to tele  -afebrile, no leukocytosis  -RVP negative, lactate normal  -CXR - small left, trace right pleural effusions  -pulm (Ankit) consulted  -s/p 1 dose IV lasix, solumedrol, albuterol  -c/w solumedrol, albuterol, mucinex, O2 support, montelekast  -Chest PT Pt with SOB improved on supplemental O2, likely sec to asthma exacerbation possibly with element of CHF (Pt not on O2 at home)  -admit to tele  -afebrile, no leukocytosis  -RVP negative, lactate normal  -CXR - small left, trace right pleural effusions  -pulm (Ankit) consulted  -s/p 1 dose IV lasix, solumedrol, albuterol  -c/w solumedrol, albuterol, mucinex, O2 support, montelekast  -Chest PT  -f/u full flu panel, procalcitonin -sec to asthma and chf exacerbation, due to volume overload and poss acute bronchitis  - -sec to asthma and chf exacerbation, due to volume overload and poss acute bronchitis  -supportive care  -nebs and steroids  -apprec pulm recs  -wean off O2 when improved  -monitor on remote tele  -diurese with lasix, paracentesis tomorrow should help expedite improvement of breahting

## 2019-01-10 NOTE — H&P ADULT - ASSESSMENT
Pt is a 89 yo F with PMH of asthma, ovarian cancer w/ recurrent ascites, colon ca s/p colostomy, s/p incarcerated ventral hernia repair 10/2017, ?DM2, hypotension, afib s/p PPM, hypothyroid, obesity, and overactive bladder presents with cough and wheezing for 2 weeks. Admitted for shortness of breath likely sec to COPD exacerbation. 89 yo F with PMH of asthma, ovarian cancer w/ recurrent ascites, colon ca s/p colostomy, s/p incarcerated ventral hernia repair 10/2017, ?DM2, hypotension, afib s/p PPM, hypothyroid, obesity, and overactive bladder presents with cough and wheezing for 2 weeks. Admitted for acute resp distress sec to dchf and asthma exacerbation sec to poss acute bronchitis.

## 2019-01-10 NOTE — H&P ADULT - PROBLEM SELECTOR PROBLEM 1
Exacerbation of asthma, unspecified asthma severity, unspecified whether persistent Acute respiratory distress

## 2019-01-10 NOTE — CONSULT NOTE ADULT - PROBLEM SELECTOR RECOMMENDATION 2
ascites  ovarian ca, hx of colon ca  supportive medical regimen  will need assessment for accumulation of ascites and possible paracentesis  abd distended on physical exam

## 2019-01-10 NOTE — PATIENT PROFILE ADULT - NSPROGENSOURCEINFO_GEN_A_NUR
Copy Forward H&P from 1/10/2019,info verified with pt  Pt's dtr assisted with home med reconcilliation/family/patient/health record

## 2019-01-10 NOTE — GOALS OF CARE CONVERSATION - PERSONAL ADVANCE DIRECTIVE - NS PRO AD PATIENT TYPE ON CHART
copy of molst/Health Care Proxy (HCP)/Do Not Resuscitate (DNR)/Medical Orders for Life-Sustaining Treatment (MOLST)

## 2019-01-10 NOTE — H&P ADULT - PROBLEM SELECTOR PLAN 3
c/w home dose midodrine 5mg 3 tabs TID  -monitor BP s/p pacemaker  -on xarelto 15mg QD  - continue xarelto s/p pacemaker  -on xarelto 15mg QD  -will hold xarelto tonight for planned paracentesis tomorrow SOB may be partially attributable to fluid overload  -CXR with B/L sm effusions  -continue diuresis with metolazone 2.5mg QOD and lasix 40mg QD  -strict I&Os, daily weights  -last ECHO unknown  -consult cardio (Dr. Reyes Valdes) -diastolic chf exacerbation, ef 50% in 01/2018  -SOB may be partially attributable to fluid overload  -CXR with B/L sm effusions  -continue diuresis with metolazone 2.5mg QOD and lasix 40mg QD  -strict I&Os, daily weights  -consult cardio (Dr. Reyes Valdes)

## 2019-01-10 NOTE — ED PROVIDER NOTE - MEDICAL DECISION MAKING DETAILS
shortness of breath, b/l lower extremity swelling, congestion, f/u chest xray, labs, ekg, duoneb, steroids, lasix

## 2019-01-10 NOTE — ED PROVIDER NOTE - OBJECTIVE STATEMENT
89 female presents to ER c/o shortness of breath, worse with exertion, bilateral lower extremity swelling, non productive cough, congestion, started yesterday, getting worse today. Patient had been given nebulizer by EMS, oxygen, states oxygen makes her feel better.

## 2019-01-10 NOTE — CONSULT NOTE ADULT - PROBLEM SELECTOR RECOMMENDATION 3
pulm HTN  valv heart disease  TTE reviewed - old records  cont with LASIX  I and O  BP control  replete lytes  prognosis guarded

## 2019-01-11 ENCOUNTER — APPOINTMENT (OUTPATIENT)
Dept: HOME HEALTH SERVICES | Facility: HOME HEALTH | Age: 84
End: 2019-01-11

## 2019-01-11 DIAGNOSIS — E11.9 TYPE 2 DIABETES MELLITUS WITHOUT COMPLICATIONS: ICD-10-CM

## 2019-01-11 LAB
ANION GAP SERPL CALC-SCNC: 9 MMOL/L — SIGNIFICANT CHANGE UP (ref 5–17)
BASOPHILS # BLD AUTO: 0.02 K/UL — SIGNIFICANT CHANGE UP (ref 0–0.2)
BASOPHILS NFR BLD AUTO: 0.2 % — SIGNIFICANT CHANGE UP (ref 0–2)
BUN SERPL-MCNC: 32 MG/DL — HIGH (ref 7–23)
CALCIUM SERPL-MCNC: 8.7 MG/DL — SIGNIFICANT CHANGE UP (ref 8.5–10.1)
CHLORIDE SERPL-SCNC: 95 MMOL/L — LOW (ref 96–108)
CO2 SERPL-SCNC: 35 MMOL/L — HIGH (ref 22–31)
CREAT SERPL-MCNC: 1.1 MG/DL — SIGNIFICANT CHANGE UP (ref 0.5–1.3)
CRP SERPL-MCNC: 2.33 MG/DL — HIGH (ref 0–0.4)
EOSINOPHIL # BLD AUTO: 0 K/UL — SIGNIFICANT CHANGE UP (ref 0–0.5)
EOSINOPHIL NFR BLD AUTO: 0 % — SIGNIFICANT CHANGE UP (ref 0–6)
GLUCOSE SERPL-MCNC: 131 MG/DL — HIGH (ref 70–99)
HBA1C BLD-MCNC: 6.5 % — HIGH (ref 4–5.6)
HCT VFR BLD CALC: 36.5 % — SIGNIFICANT CHANGE UP (ref 34.5–45)
HGB BLD-MCNC: 11 G/DL — LOW (ref 11.5–15.5)
IGE SERPL-ACNC: 204 IU/ML — HIGH (ref 0–100)
IMM GRANULOCYTES NFR BLD AUTO: 0.5 % — SIGNIFICANT CHANGE UP (ref 0–1.5)
LYMPHOCYTES # BLD AUTO: 0.42 K/UL — LOW (ref 1–3.3)
LYMPHOCYTES # BLD AUTO: 3.5 % — LOW (ref 13–44)
MAGNESIUM SERPL-MCNC: 2.1 MG/DL — SIGNIFICANT CHANGE UP (ref 1.6–2.6)
MCHC RBC-ENTMCNC: 23.8 PG — LOW (ref 27–34)
MCHC RBC-ENTMCNC: 30.1 GM/DL — LOW (ref 32–36)
MCV RBC AUTO: 78.8 FL — LOW (ref 80–100)
MONOCYTES # BLD AUTO: 0.81 K/UL — SIGNIFICANT CHANGE UP (ref 0–0.9)
MONOCYTES NFR BLD AUTO: 6.8 % — SIGNIFICANT CHANGE UP (ref 2–14)
NEUTROPHILS # BLD AUTO: 10.65 K/UL — HIGH (ref 1.8–7.4)
NEUTROPHILS NFR BLD AUTO: 89 % — HIGH (ref 43–77)
PLATELET # BLD AUTO: 390 K/UL — SIGNIFICANT CHANGE UP (ref 150–400)
POTASSIUM SERPL-MCNC: 3.1 MMOL/L — LOW (ref 3.5–5.3)
POTASSIUM SERPL-SCNC: 3.1 MMOL/L — LOW (ref 3.5–5.3)
PROCALCITONIN SERPL-MCNC: 0.05 NG/ML — HIGH (ref 0–0.04)
RBC # BLD: 4.63 M/UL — SIGNIFICANT CHANGE UP (ref 3.8–5.2)
RBC # FLD: 17.8 % — HIGH (ref 10.3–14.5)
SODIUM SERPL-SCNC: 139 MMOL/L — SIGNIFICANT CHANGE UP (ref 135–145)
WBC # BLD: 11.96 K/UL — HIGH (ref 3.8–10.5)
WBC # FLD AUTO: 11.96 K/UL — HIGH (ref 3.8–10.5)

## 2019-01-11 PROCEDURE — 49083 ABD PARACENTESIS W/IMAGING: CPT

## 2019-01-11 PROCEDURE — 99233 SBSQ HOSP IP/OBS HIGH 50: CPT | Mod: GC

## 2019-01-11 RX ORDER — RIVAROXABAN 15 MG-20MG
15 KIT ORAL EVERY 24 HOURS
Qty: 0 | Refills: 0 | Status: DISCONTINUED | OUTPATIENT
Start: 2019-01-11 | End: 2019-01-14

## 2019-01-11 RX ORDER — MIDODRINE HYDROCHLORIDE 2.5 MG/1
10 TABLET ORAL THREE TIMES A DAY
Qty: 0 | Refills: 0 | Status: DISCONTINUED | OUTPATIENT
Start: 2019-01-11 | End: 2019-01-14

## 2019-01-11 RX ORDER — POTASSIUM CHLORIDE 20 MEQ
20 PACKET (EA) ORAL ONCE
Qty: 0 | Refills: 0 | Status: COMPLETED | OUTPATIENT
Start: 2019-01-11 | End: 2019-01-11

## 2019-01-11 RX ORDER — POTASSIUM CHLORIDE 20 MEQ
40 PACKET (EA) ORAL ONCE
Qty: 0 | Refills: 0 | Status: COMPLETED | OUTPATIENT
Start: 2019-01-11 | End: 2019-01-11

## 2019-01-11 RX ORDER — SPIRONOLACTONE 25 MG/1
25 TABLET, FILM COATED ORAL DAILY
Qty: 0 | Refills: 0 | Status: DISCONTINUED | OUTPATIENT
Start: 2019-01-11 | End: 2019-01-14

## 2019-01-11 RX ADMIN — Medication 20 MILLIEQUIVALENT(S): at 18:04

## 2019-01-11 RX ADMIN — ALBUTEROL 2.5 MILLIGRAM(S): 90 AEROSOL, METERED ORAL at 08:01

## 2019-01-11 RX ADMIN — Medication 20 MILLIEQUIVALENT(S): at 06:01

## 2019-01-11 RX ADMIN — Medication 600 MILLIGRAM(S): at 18:04

## 2019-01-11 RX ADMIN — Medication 40 MILLIEQUIVALENT(S): at 08:40

## 2019-01-11 RX ADMIN — GABAPENTIN 300 MILLIGRAM(S): 400 CAPSULE ORAL at 22:21

## 2019-01-11 RX ADMIN — MIDODRINE HYDROCHLORIDE 10 MILLIGRAM(S): 2.5 TABLET ORAL at 23:54

## 2019-01-11 RX ADMIN — ALBUTEROL 2.5 MILLIGRAM(S): 90 AEROSOL, METERED ORAL at 00:39

## 2019-01-11 RX ADMIN — ALBUTEROL 2.5 MILLIGRAM(S): 90 AEROSOL, METERED ORAL at 19:40

## 2019-01-11 RX ADMIN — GABAPENTIN 300 MILLIGRAM(S): 400 CAPSULE ORAL at 06:01

## 2019-01-11 RX ADMIN — GABAPENTIN 300 MILLIGRAM(S): 400 CAPSULE ORAL at 15:24

## 2019-01-11 RX ADMIN — Medication 225 MICROGRAM(S): at 06:01

## 2019-01-11 RX ADMIN — MIDODRINE HYDROCHLORIDE 5 MILLIGRAM(S): 2.5 TABLET ORAL at 22:21

## 2019-01-11 RX ADMIN — Medication 1 SPRAY(S): at 22:21

## 2019-01-11 RX ADMIN — ALBUTEROL 2.5 MILLIGRAM(S): 90 AEROSOL, METERED ORAL at 13:53

## 2019-01-11 RX ADMIN — MONTELUKAST 10 MILLIGRAM(S): 4 TABLET, CHEWABLE ORAL at 22:21

## 2019-01-11 RX ADMIN — Medication 20 MILLIEQUIVALENT(S): at 23:54

## 2019-01-11 RX ADMIN — MIDODRINE HYDROCHLORIDE 5 MILLIGRAM(S): 2.5 TABLET ORAL at 15:22

## 2019-01-11 RX ADMIN — Medication 30 MILLIGRAM(S): at 18:03

## 2019-01-11 RX ADMIN — Medication 1 SPRAY(S): at 15:21

## 2019-01-11 RX ADMIN — Medication 30 MILLIGRAM(S): at 06:02

## 2019-01-11 RX ADMIN — Medication 600 MILLIGRAM(S): at 06:01

## 2019-01-11 RX ADMIN — RIVAROXABAN 15 MILLIGRAM(S): KIT at 18:03

## 2019-01-11 RX ADMIN — SPIRONOLACTONE 25 MILLIGRAM(S): 25 TABLET, FILM COATED ORAL at 23:54

## 2019-01-11 RX ADMIN — Medication 1 SPRAY(S): at 06:01

## 2019-01-11 RX ADMIN — Medication 20 MILLIGRAM(S): at 18:04

## 2019-01-11 RX ADMIN — Medication 10 MILLIGRAM(S): at 23:54

## 2019-01-11 RX ADMIN — MIDODRINE HYDROCHLORIDE 5 MILLIGRAM(S): 2.5 TABLET ORAL at 06:01

## 2019-01-11 NOTE — DIETITIAN INITIAL EVALUATION ADULT. - OTHER INFO
pt's wt fluctuates with fluid from ascites, gets paracentisis(at procedure now). UBW(dry) in July was 198#.

## 2019-01-11 NOTE — PROGRESS NOTE ADULT - PROBLEM SELECTOR PLAN 1
pulm congestion  resp distress  ascites  mets ca  malignant ascites  URI - RVP positive  cont NEBS and Systemic Steroids and Nasal saline and cough rx regimen -   o2 support as needed - keep sat > 88 pct  oral hygiene  skin care  on PO LASIX - monitor labs, replete lytes  planned for Paracentesis - may need IV Albumin  will follow and monitor  prognosis guarded  pt is DNR DNI

## 2019-01-11 NOTE — PROGRESS NOTE ADULT - PROBLEM SELECTOR PLAN 2
-diastolic chf exacerbation, ef 50% in 01/2018  -SOB may be partially attributable to fluid overload  -CXR with B/L sm effusions  -continue diuresis with metolazone 2.5mg QOD and lasix 40mg QD  -strict I&Os, daily weights  -consult cardio (Dr. Reyes Valdes) -diastolic chf exacerbation, ef 50% in 01/2018  -SOB may be partially attributable to fluid overload  -CXR with B/L sm effusions  -continue diuresis with metolazone and lasix 20mg po BID  -strict I&Os, daily weights  -consult cardio (Dr. Licea / Dr. Valdes)

## 2019-01-11 NOTE — PROGRESS NOTE ADULT - SUBJECTIVE AND OBJECTIVE BOX
Patient is a 89y old  Female who presents with a chief complaint of SOB, cough (2019 07:36)      INTERVAL HPI/OVERNIGHT EVENTS: Pt seen and examined at bedside. S/p paracentesis. Pt states 8 bottles were filled. Pt states SOB has improved a little, but still SOB. Denies CP, palpitations, fevers.     MEDICATIONS  (STANDING):  ALBUTerol    0.083% 2.5 milliGRAM(s) Nebulizer every 6 hours  furosemide    Tablet 20 milliGRAM(s) Oral two times a day  gabapentin 300 milliGRAM(s) Oral three times a day  guaiFENesin  milliGRAM(s) Oral every 12 hours  levothyroxine 225 MICROGram(s) Oral daily  methylPREDNISolone sodium succinate Injectable 30 milliGRAM(s) IV Push every 12 hours  metolazone 2.5 milliGRAM(s) Oral <User Schedule>  midodrine 5 milliGRAM(s) Oral every 8 hours  montelukast 10 milliGRAM(s) Oral at bedtime  potassium chloride    Tablet ER 20 milliEquivalent(s) Oral two times a day  sodium chloride 0.65% Nasal 1 Spray(s) Both Nostrils three times a day    MEDICATIONS  (PRN):      Allergies    No Known Allergies    Intolerances        REVIEW OF SYSTEMS:  CONSTITUTIONAL: No fever or chills  HEENT:  No headache, no sore throat  RESPIRATORY: + cough, + wheezing, + shortness of breath  CARDIOVASCULAR: No chest pain, palpitations, + leg swelling  GASTROINTESTINAL: No abd pain, nausea, vomiting, or diarrhea  GENITOURINARY: No dysuria, frequency, or hematuria  NEUROLOGICAL: no focal weakness or dizziness  MUSCULOSKELETAL: no myalgias     Vital Signs Last 24 Hrs  T(C): 36.4 (2019 07:30), Max: 36.5 (2019 04:31)  T(F): 97.6 (2019 07:30), Max: 97.7 (2019 04:31)  HR: 71 (2019 07:30) (67 - 88)  BP: 129/80 (2019 07:30) (127/70 - 133/72)  BP(mean): --  RR: 20 (2019 07:30) (20 - 25)  SpO2: 96% (2019 07:30) (96% - 100%)    PHYSICAL EXAM:  GENERAL: NAD  HEENT:  EOMI, moist mucous membranes  CHEST/LUNG:  non-labored breathing, B/L wheezes and rhonchi, on 3L NC   HEART:  irregular, S1, S2  ABDOMEN:  BS+, soft, nontender, nondistended  EXTREMITIES: + B/L edema, No cyanosis, or calf tenderness  NERVOUS SYSTEM: AA&Ox3    LABS:                        11.0   11.96 )-----------( 390      ( 2019 05:02 )             36.5     CBC Full  -  ( 2019 05:02 )  WBC Count : 11.96 K/uL  Hemoglobin : 11.0 g/dL  Hematocrit : 36.5 %  Platelet Count - Automated : 390 K/uL  Mean Cell Volume : 78.8 fl  Mean Cell Hemoglobin : 23.8 pg  Mean Cell Hemoglobin Concentration : 30.1 gm/dL  Auto Neutrophil # : 10.65 K/uL  Auto Lymphocyte # : 0.42 K/uL  Auto Monocyte # : 0.81 K/uL  Auto Eosinophil # : 0.00 K/uL  Auto Basophil # : 0.02 K/uL  Auto Neutrophil % : 89.0 %  Auto Lymphocyte % : 3.5 %  Auto Monocyte % : 6.8 %  Auto Eosinophil % : 0.0 %  Auto Basophil % : 0.2 %    2019 05:02    139    |  95     |  32     ----------------------------<  131    3.1     |  35     |  1.10     Ca    8.7        2019 05:02  Phos  3.3       2019 05:02  Mg     2.1       2019 05:02      PT/INR - ( 10 Guevara 2019 08:19 )   PT: 20.2 sec;   INR: 1.76 ratio         PTT - ( 10 Guevara 2019 08:19 )  PTT:36.5 sec  Urinalysis Basic - ( 10 Guevara 2019 09:09 )    Color: Pale Yellow / Appearance: Clear / S.015 / pH: x  Gluc: x / Ketone: Negative  / Bili: Negative / Urobili: Negative   Blood: x / Protein: Negative / Nitrite: Positive   Leuk Esterase: Small / RBC: 0-2 /HPF / WBC 6-10   Sq Epi: x / Non Sq Epi: Occasional / Bacteria: Moderate      CAPILLARY BLOOD GLUCOSE            Culture - Urine (collected 01-10-19 @ 11:45)  Source: .Urine Clean Catch (Midstream)  Preliminary Report (19 @ 08:47):    50,000 - 99,000 CFU/mL Escherichia coli    Culture - Blood (collected 01-10-19 @ 11:34)  Source: .Blood Blood-Peripheral  Preliminary Report (19 @ 12:02):    No growth to date.    Culture - Blood (collected 01-10-19 @ 11:34)  Source: .Blood Blood-Peripheral  Preliminary Report (19 @ 12:02):    No growth to date.        RADIOLOGY & ADDITIONAL TESTS:    Personally reviewed.     Consultant(s) Notes Reviewed:  [x] YES  [ ] NO    Care Discussed with [x] Consultants  [x] Patient  [ ] Family  [ ]      [ ] Other; RN  DVT ppx Patient is a 89y old  Female who presents with a chief complaint of SOB, cough (2019 07:36)      INTERVAL HPI/OVERNIGHT EVENTS: Pt seen and examined at bedside. S/p paracentesis with 8.35L removed.. Pt states SOB has improved, but still SOB. Denies CP, palpitations, fevers.     MEDICATIONS  (STANDING):  ALBUTerol    0.083% 2.5 milliGRAM(s) Nebulizer every 6 hours  furosemide    Tablet 20 milliGRAM(s) Oral two times a day  gabapentin 300 milliGRAM(s) Oral three times a day  guaiFENesin  milliGRAM(s) Oral every 12 hours  levothyroxine 225 MICROGram(s) Oral daily  methylPREDNISolone sodium succinate Injectable 30 milliGRAM(s) IV Push every 12 hours  metolazone 2.5 milliGRAM(s) Oral <User Schedule>  midodrine 5 milliGRAM(s) Oral every 8 hours  montelukast 10 milliGRAM(s) Oral at bedtime  potassium chloride    Tablet ER 20 milliEquivalent(s) Oral two times a day  sodium chloride 0.65% Nasal 1 Spray(s) Both Nostrils three times a day    MEDICATIONS  (PRN):      Allergies    No Known Allergies    Intolerances        REVIEW OF SYSTEMS:  CONSTITUTIONAL: No fever or chills  HEENT:  No headache, no sore throat  RESPIRATORY: + cough, + wheezing, + shortness of breath  CARDIOVASCULAR: No chest pain, palpitations, + leg swelling  GASTROINTESTINAL: No abd pain, nausea, vomiting, or diarrhea  GENITOURINARY: No dysuria, frequency, or hematuria  NEUROLOGICAL: no focal weakness or dizziness  MUSCULOSKELETAL: no myalgias     Vital Signs Last 24 Hrs  T(C): 36.4 (2019 07:30), Max: 36.5 (2019 04:31)  T(F): 97.6 (2019 07:30), Max: 97.7 (2019 04:31)  HR: 71 (2019 07:30) (67 - 88)  BP: 129/80 (2019 07:30) (127/70 - 133/72)  BP(mean): --  RR: 20 (2019 07:30) (20 - 25)  SpO2: 96% (2019 07:30) (96% - 100%)    PHYSICAL EXAM:  GENERAL: NAD  HEENT:  EOMI, moist mucous membranes  CHEST/LUNG:  non-labored breathing, B/L wheezes and rhonchi, on 3L NC   HEART:  irregular, S1, S2  ABDOMEN:  BS+, soft, nontender, nondistended ; colostomy in place  EXTREMITIES: + B/L LE edema, No cyanosis, or calf tenderness  NERVOUS SYSTEM: AA&Ox3    LABS:                        11.0   11.96 )-----------( 390      ( 2019 05:02 )             36.5     CBC Full  -  ( 2019 05:02 )  WBC Count : 11.96 K/uL  Hemoglobin : 11.0 g/dL  Hematocrit : 36.5 %  Platelet Count - Automated : 390 K/uL  Mean Cell Volume : 78.8 fl  Mean Cell Hemoglobin : 23.8 pg  Mean Cell Hemoglobin Concentration : 30.1 gm/dL  Auto Neutrophil # : 10.65 K/uL  Auto Lymphocyte # : 0.42 K/uL  Auto Monocyte # : 0.81 K/uL  Auto Eosinophil # : 0.00 K/uL  Auto Basophil # : 0.02 K/uL  Auto Neutrophil % : 89.0 %  Auto Lymphocyte % : 3.5 %  Auto Monocyte % : 6.8 %  Auto Eosinophil % : 0.0 %  Auto Basophil % : 0.2 %    2019 05:02    139    |  95     |  32     ----------------------------<  131    3.1     |  35     |  1.10     Ca    8.7        2019 05:02  Phos  3.3       2019 05:02  Mg     2.1       2019 05:02      PT/INR - ( 10 Guevara 2019 08:19 )   PT: 20.2 sec;   INR: 1.76 ratio         PTT - ( 10 Guevara 2019 08:19 )  PTT:36.5 sec  Urinalysis Basic - ( 10 Guevara 2019 09:09 )    Color: Pale Yellow / Appearance: Clear / S.015 / pH: x  Gluc: x / Ketone: Negative  / Bili: Negative / Urobili: Negative   Blood: x / Protein: Negative / Nitrite: Positive   Leuk Esterase: Small / RBC: 0-2 /HPF / WBC 6-10   Sq Epi: x / Non Sq Epi: Occasional / Bacteria: Moderate      CAPILLARY BLOOD GLUCOSE            Culture - Urine (collected 01-10-19 @ 11:45)  Source: .Urine Clean Catch (Midstream)  Preliminary Report (19 @ 08:47):    50,000 - 99,000 CFU/mL Escherichia coli    Culture - Blood (collected 01-10-19 @ 11:34)  Source: .Blood Blood-Peripheral  Preliminary Report (19 @ 12:02):    No growth to date.    Culture - Blood (collected 01-10-19 @ 11:34)  Source: .Blood Blood-Peripheral  Preliminary Report (19 @ 12:02):    No growth to date.        RADIOLOGY & ADDITIONAL TESTS:    Personally reviewed.     Consultant(s) Notes Reviewed:  [x] YES  [ ] NO

## 2019-01-11 NOTE — DIETITIAN INITIAL EVALUATION ADULT. - PROBLEM SELECTOR PLAN 3
-diastolic chf exacerbation, ef 50% in 01/2018  -SOB may be partially attributable to fluid overload  -CXR with B/L sm effusions  -continue diuresis with metolazone 2.5mg QOD and lasix 40mg QD  -strict I&Os, daily weights  -consult cardio (Dr. Reyes Valdes)

## 2019-01-11 NOTE — CONSULT NOTE ADULT - SUBJECTIVE AND OBJECTIVE BOX
[facility] [place]     Patient is a 89y old  Female who presents with a chief complaint of SOB, cough (2019 13:39)      HPI:  Pt is a 89 yo F with PMH of asthma, ovarian cancer w/ recurrent ascites, colon ca s/p colostomy, s/p incarcerated ventral hernia repair 10/2017, ?DM2 (no meds), hypotension, afib s/p PPM, hypothyroid, obesity, and overactive bladder presents with cough and wheezing for 2 weeks. Pt decided to come in today because she was having trouble breathing. Pt is now on 3L O2 via NC and does not feel SOB. Pt states that she has asthma and has needed to use her inhaler and nebulizers more frequently recently. States the cough has started to break up and is productive of yellow sputum. Pt denies sick contacts. Pt denies fevers, N/V, CP. Pt admits to SOB, cough, wheezing, occasional chills, LE edema and THOMAS.     In ED VS: T 98.1, HR 75, /73, RR 24, O2 sat 95% on 3L NC  Labs sig for Hb 10.5, K+ 3.1, proBNP 3279. RVP negative. UA: mod LE, sm nitrite, WBC 6-10  CXR: small left and trace right pleural effusions  EKG: v-paced @ rate of 70    In ED Pt rec'd solumedrol, albuterol, lasix. (10 Guevara 2019 13:10)      HPI:    89y Female for Cardiology Consult    PAST MEDICAL & SURGICAL HISTORY:  Ovarian cancer  Afib: s/p PPM  Ascites, malignant: from advanced ovarian cancer  Colon cancer  Obese  Hypothyroidism  HTN (Hypertension)  Overactive Bladder  DM Type 2 (Diabetes Mellitus, Type 2)  Asthma  Incarcerated hernia: s/p repair 10/2017  H/O hernia repair  Artificial pacemaker  S/P colon resection: with sigmoid colostomy for obstructing ovarian cancer  S/P Cataract Surgery: CASI  History of Tonsillectomy  History of Appendectomy  Status Post Total Knee Replacement: Left      FAMILY HISTORY:  No pertinent family history in first degree relatives      SOCIAL HISTORY:   Alcohol:  Smoking:    Allergies    No Known Allergies    Intolerances        MEDICATIONS  (STANDING):  ALBUTerol    0.083% 2.5 milliGRAM(s) Nebulizer every 6 hours  furosemide    Tablet 20 milliGRAM(s) Oral two times a day  gabapentin 300 milliGRAM(s) Oral three times a day  guaiFENesin  milliGRAM(s) Oral every 12 hours  levothyroxine 225 MICROGram(s) Oral daily  methylPREDNISolone sodium succinate Injectable 30 milliGRAM(s) IV Push every 12 hours  metolazone 2.5 milliGRAM(s) Oral <User Schedule>  midodrine 5 milliGRAM(s) Oral every 8 hours  montelukast 10 milliGRAM(s) Oral at bedtime  potassium chloride    Tablet ER 20 milliEquivalent(s) Oral two times a day  rivaroxaban 15 milliGRAM(s) Oral every 24 hours  sodium chloride 0.65% Nasal 1 Spray(s) Both Nostrils three times a day    MEDICATIONS  (PRN):      REVIEW OF SYSTEMS:  CONSTITUTIONAL: No fever, weight loss, chills, shakes, or fat  RESPIRATORY: No cough, wheezing, hemoptysis, or shortness of breath  CARDIOVASCULAR: No chest pain, dyspnea, palpitations, dizziness, syncope, paroxysmal nocturnal dyspnea, orthopnea, or arm or leg swelling  GASTROINTESTINAL: No abdominal  or epigastric pain, nausea, vomiting, hematemesis, diarrhea, constipation, melena or bright red bloo  NEUROLOGICAL: No headaches, memory loss, slurred speech, limb weakness, loss of strength, numbness, or tremors  SKIN: No itching, burning, rashes, or lesions  ENDOCRINE: No heat or cold intolerance, or hair loss  MUSCULOSKELETAL: No joint pain or swelling, muscle, back, or extremity pain  HEME/LYMPH: No easy bruising or bleeding gums  ALLERY AND IMMUNOLOGIC: No hives or rash.    Vital Signs Last 24 Hrs  T(C): 36.5 (2019 21:08), Max: 36.6 (2019 13:00)  T(F): 97.7 (2019 21:08), Max: 97.9 (2019 13:00)  HR: 75 (2019 21:08) (62 - 88)  BP: 146/75 (2019 21:08) (111/61 - 146/75)  BP(mean): --  RR: 18 (2019 21:08) (18 - 22)  SpO2: 98% (2019 21:08) (90% - 100%)    PHYSICAL EXAM:  HEAD:  Atraumatic, Normocephalic  EYES: EOMI, PERRLA, conjunctiva and sclera clear  NECK: Supple and normal thyroid.  No JVD or carotid bruit.   HEART: S1, S2 regular , 1/6 soft ejection systolic murmur in mitral area , no thrill and no gallops .  PULMONARY: Bilateral vesicular breathing , few scattered ronchi and few scattered rales are present .  ABDOMEN: Soft nontender and positive bowl sounds   EXTREMITIES:  No clubbing, cyanosis, or pedal  edema  NEUROLOGICAL: AAOX3 , no focal deficit .    LABS:                        11.0   11.96 )-----------( 390      ( 2019 05:02 )             36.5     01-11    139  |  95<L>  |  32<H>  ----------------------------<  131<H>  3.1<L>   |  35<H>  |  1.10    Ca    8.7      2019 05:02  Phos  3.3     -11  Mg     2.1     -11    TPro  7.2  /  Alb  2.6<L>  /  TBili  0.4  /  DBili  x   /  AST  23  /  ALT  21  /  AlkPhos  79  01-10    CARDIAC MARKERS ( 10 Guevara 2019 08:19 )  <.015 ng/mL / x     / x     / x     / 3.1 ng/mL      PT/INR - ( 10 Guevara 2019 08:19 )   PT: 20.2 sec;   INR: 1.76 ratio         PTT - ( 10 Guevara 2019 08:19 )  PTT:36.5 sec  Urinalysis Basic - ( 10 Guevara 2019 09:09 )    Color: Pale Yellow / Appearance: Clear / S.015 / pH: x  Gluc: x / Ketone: Negative  / Bili: Negative / Urobili: Negative   Blood: x / Protein: Negative / Nitrite: Positive   Leuk Esterase: Small / RBC: 0-2 /HPF / WBC 6-10   Sq Epi: x / Non Sq Epi: Occasional / Bacteria: Moderate      BNP      EKG:  ECHO:  IMAGING:    Assessment and Plan :     Will continue to follow during hospital course and give further recommendations as needed . Thanks for your referral . if any questions please contact me at office ([phone])cell [phone]) [facility] [place]     Patient is a 89y old  Female who presents with a chief complaint of SOB, cough (2019 13:39)      HPI:  Pt is a 89 yo F with PMH of asthma, ovarian cancer w/ recurrent ascites, colon ca s/p colostomy, s/p incarcerated ventral hernia repair 10/2017, ?DM2 (no meds), hypotension, afib s/p PPM, hypothyroid, obesity, and overactive bladder presents with cough and wheezing for 2 weeks. Pt decided to come in today because she was having trouble breathing. Pt is now on 3L O2 via NC and does not feel SOB. Pt states that she has asthma and has needed to use her inhaler and nebulizers more frequently recently. States the cough has started to break up and is productive of yellow sputum. Pt denies sick contacts. Pt denies fevers, N/V, CP. Pt admits to SOB, cough, wheezing, occasional chills, LE edema and THOMAS.     In ED VS: T 98.1, HR 75, /73, RR 24, O2 sat 95% on 3L NC  Labs sig for Hb 10.5, K+ 3.1, proBNP 3279. RVP negative. UA: mod LE, sm nitrite, WBC 6-10  CXR: small left and trace right pleural effusions  EKG: v-paced @ rate of 70    In ED Pt rec'd solumedrol, albuterol, lasix. (10 Guevara 2019 13:10)      HPI:    89y Female for Cardiology Consult    PAST MEDICAL & SURGICAL HISTORY:  Ovarian cancer  Afib: s/p PPM  Ascites, malignant: from advanced ovarian cancer  Colon cancer  Obese  Hypothyroidism  HTN (Hypertension)  Overactive Bladder  DM Type 2 (Diabetes Mellitus, Type 2)  Asthma  Incarcerated hernia: s/p repair 10/2017  H/O hernia repair  Artificial pacemaker  S/P colon resection: with sigmoid colostomy for obstructing ovarian cancer  S/P Cataract Surgery: CASI  History of Tonsillectomy  History of Appendectomy  Status Post Total Knee Replacement: Left      FAMILY HISTORY:  No pertinent family history in first degree relatives      SOCIAL HISTORY:   Alcohol:  Smoking:    Allergies    No Known Allergies    Intolerances        MEDICATIONS  (STANDING):  ALBUTerol    0.083% 2.5 milliGRAM(s) Nebulizer every 6 hours  furosemide    Tablet 20 milliGRAM(s) Oral two times a day  gabapentin 300 milliGRAM(s) Oral three times a day  guaiFENesin  milliGRAM(s) Oral every 12 hours  levothyroxine 225 MICROGram(s) Oral daily  methylPREDNISolone sodium succinate Injectable 30 milliGRAM(s) IV Push every 12 hours  metolazone 2.5 milliGRAM(s) Oral <User Schedule>  midodrine 5 milliGRAM(s) Oral every 8 hours  montelukast 10 milliGRAM(s) Oral at bedtime  potassium chloride    Tablet ER 20 milliEquivalent(s) Oral two times a day  rivaroxaban 15 milliGRAM(s) Oral every 24 hours  sodium chloride 0.65% Nasal 1 Spray(s) Both Nostrils three times a day    MEDICATIONS  (PRN):    Vital Signs Last 24 Hrs  T(C): 36.5 (2019 21:08), Max: 36.6 (2019 13:00)  T(F): 97.7 (2019 21:08), Max: 97.9 (2019 13:00)  HR: 75 (2019 21:08) (62 - 88)  BP: 146/75 (2019 21:08) (111/61 - 146/75)  BP(mean): --  RR: 18 (2019 21:08) (18 - 22)  SpO2: 98% (2019 21:08) (90% - 100%)  LABS:                        11.0   11.96 )-----------( 390      ( 2019 05:02 )             36.5     01-11    139  |  95<L>  |  32<H>  ----------------------------<  131<H>  3.1<L>   |  35<H>  |  1.10    Ca    8.7      2019 05:02  Phos  3.3     01-11  Mg     2.1     -11    TPro  7.2  /  Alb  2.6<L>  /  TBili  0.4  /  DBili  x   /  AST  23  /  ALT  21  /  AlkPhos  79  01-10    CARDIAC MARKERS ( 10 Guevara 2019 08:19 )  <.015 ng/mL / x     / x     / x     / 3.1 ng/mL      PT/INR - ( 10 Guevara 2019 08:19 )   PT: 20.2 sec;   INR: 1.76 ratio         PTT - ( 10 Guevara 2019 08:19 )  PTT:36.5 sec  Urinalysis Basic - ( 10 Guevara 2019 09:09 )    Color: Pale Yellow / Appearance: Clear / S.015 / pH: x  Gluc: x / Ketone: Negative  / Bili: Negative / Urobili: Negative   Blood: x / Protein: Negative / Nitrite: Positive   Leuk Esterase: Small / RBC: 0-2 /HPF / WBC 6-10   Sq Epi: x / Non Sq Epi: Occasional / Bacteria: Moderate        Assessment and Plan :   FULL CONSULT DICTATED .  89 YEARS OLD female obese has SOB and has large ascites and CHF and also has atrial fibrillation .  Will continue to follow during hospital course and give further recommendations as needed . Thanks for your referral . if any questions please contact me at office ([phone])cell [phone])

## 2019-01-11 NOTE — DIETITIAN INITIAL EVALUATION ADULT. - PROBLEM SELECTOR PLAN 1
-sec to asthma and chf exacerbation, due to volume overload and poss acute bronchitis  -supportive care  -nebs and steroids  -apprec pulm recs  -wean off O2 when improved  -monitor on remote tele  -diurese with lasix, paracentesis tomorrow should help expedite improvement of breahting

## 2019-01-11 NOTE — DIETITIAN INITIAL EVALUATION ADULT. - ENERGY NEEDS
St 2 sacral pressure injury noted flowsheet/Patient profile. Per RN ate well this am-light meal before paracentisis. Seen by SLP, rec Dys 3 diet with nectar liquids, diet changed accordingly. Hx DM on solumedrol, glu acceptable at present, will follow -rec add consistent CHO. BMI 32, obese class 1. Colostomy noted, functioning. Hx CHF on lasix, rec sodium restriction. MOLST- no artificial nutrition.Rec MVI daily. + pleural effusions

## 2019-01-11 NOTE — PROGRESS NOTE ADULT - PROBLEM SELECTOR PLAN 8
Hb 11.0, similar to baseline when compared to prior admissions  -will monitor CBC Hgb 11.0, similar to baseline when compared to prior admissions  -will monitor CBC

## 2019-01-11 NOTE — DIETITIAN INITIAL EVALUATION ADULT. - PROBLEM SELECTOR PLAN 4
chronic, stable s/p pacemaker  -on xarelto 15mg QD  -will hold xarelto tonight for planned paracentesis tomorrow

## 2019-01-11 NOTE — CHART NOTE - NSCHARTNOTEFT_GEN_A_CORE
Called to evaluate patient for cough. 89 yo F with PMH of asthma, ovarian cancer w/ recurrent ascites, colon ca s/p colostomy, s/p incarcerated ventral hernia repair 10/2017, ?DM2, hypotension, afib s/p PPM, hypothyroid, obesity, and overactive bladder presents with cough and wheezing for 2 weeks. Admitted for acute resp distress sec to dchf and asthma exacerbation sec to poss acute bronchitis. Patient complaining of a dry cough which has been persistent for 2 weeks. Patient takes Robitussin at home with significants relief. Denies fever, chills, headaches, dizziness, blurred vision, chest pain, palpitations      T(C): 36.5 (19 @ 04:31), Max: 36.7 (01-10-19 @ 07:22)  HR: 74 (19 @ 04:31) (67 - 88)  BP: 133/72 (19 @ 04:31) (120/73 - 133/72)  RR: 22 (19 @ 04:31) (22 - 25)  SpO2: 97% (19 @ 04:31) (95% - 100%)  Wt(kg): --    Physical Exam:  General: NAD calm, pleasant   Neurology: A&Ox3, nonfocal   Respiratory: diffuse rhonchi, mild rales at bases    CV: RRR, +S1/S2  Abdominal: Soft, NT, ND +BSx4, + colostomy, + ascites  Extremities: + B/L LE non-pitting edema    LABS:                        11.0   11.96 )-----------( 390      ( 2019 05:02 )             36.5         139  |  95<L>  |  32<H>  ----------------------------<  131<H>  3.1<L>   |  35<H>  |  1.10    Ca    8.7      2019 05:02  Phos  3.3       Mg     2.1         TPro  7.2  /  Alb  2.6<L>  /  TBili  0.4  /  DBili  x   /  AST  23  /  ALT  21  /  AlkPhos  79  01-10    PT/INR - ( 10 Guevara 2019 08:19 )   PT: 20.2 sec;   INR: 1.76 ratio         PTT - ( 10 Guevara 2019 08:19 )  PTT:36.5 sec  Urinalysis Basic - ( 10 Guevara 2019 09:09 )    Color: Pale Yellow / Appearance: Clear / S.015 / pH: x  Gluc: x / Ketone: Negative  / Bili: Negative / Urobili: Negative   Blood: x / Protein: Negative / Nitrite: Positive   Leuk Esterase: Small / RBC: 0-2 /HPF / WBC 6-10   Sq Epi: x / Non Sq Epi: Occasional / Bacteria: Moderate        CARDIAC MARKERS ( 10 Guevara 2019 08:19 )  <.015 ng/mL / x     / x     / x     / 3.1 ng/mL        Assessment/Plan  Admitted for acute resp distress sec to dchf and asthma exacerbation sec to poss acute bronchitis.     Appears volume overloaded with component of asthma exacerbation/ bronchitis   Patient due for diuretics, breathing treatment and Mucinex.  ? paracentesis today  Continue to monitor vitals  RN to call with any changes  D/w Pulm Dr. Pham

## 2019-01-11 NOTE — PROGRESS NOTE ADULT - PROBLEM SELECTOR PLAN 3
chronic, stable s/p pacemaker  -on xarelto 15mg QD  -resume xarelto s/p paracentesis chronic, stable s/p pacemaker  -on xarelto 15mg daily  -resume xarelto s/p paracentesis

## 2019-01-11 NOTE — PROGRESS NOTE ADULT - PROBLEM SELECTOR PLAN 1
Pt with SOB improved on supplemental O2, likely sec to asthma exacerbation possibly with element of CHF (Pt not on O2 at home)  -admit to tele  -afebrile, no leukocytosis  -RVP negative, lactate normal  -CXR - small left, trace right pleural effusions  -pulm (Ankit) consulted  -s/p 1 dose IV lasix, solumedrol, albuterol  -c/w solumedrol, albuterol, mucinex, O2 support, montelekast  -Chest PT  -enterovirus +, procalcitonin 0.05 -acute respiratory distress was likely due to asthma exacerbation due to viral PNA (entero/rhinovirus positive)  -c/w nebs and steroids as per pulm recs  -SOB improved after large volume paracentesis (8.35L) today as likely that was contributing to resp distress by causing an extrinsic restrictive pulmonary process making it difficult for diaphragm to contract  -afebrile, no leukocytosis, no sign of bacterial infection  -CXR - small left, trace right pleural effusions so there may be a component of decompensated CHF, but unclear -- f/up cardio consult  -pulm (Ankit) consult appreciated  -c/w solumedrol, albuterol, mucinex, O2 support, montelekast  -Chest PT

## 2019-01-11 NOTE — PROGRESS NOTE ADULT - ASSESSMENT
89 yo F with PMH of asthma, ovarian cancer w/ recurrent ascites, colon ca s/p colostomy, s/p incarcerated ventral hernia repair 10/2017, ?DM2, hypotension, afib s/p PPM, hypothyroid, obesity, and overactive bladder presents with cough and wheezing for 2 weeks. Admitted for acute resp distress sec to [facility] and asthma exacerbation sec to poss acute bronchitis. 89 yo F with PMH of asthma, ovarian cancer (w/ recurrent malignant ascites with large volume paracenteses [id number]; had surgical resection >6 years ago and two chemotherapies last of which was 5 years ago and pt has been off treatment since), mets to colon (s/p colostomy), s/p incarcerated ventral hernia repair 10/2017, Type 2 DM, hypotension, afib (on low-dose xarelto), s/p PPM, hypothyroid, obesity, and overactive bladder presents with cough and wheezing for 2 weeks. Admitted for acute resp distress due to asthma exacerbation due to viral pneumonia, and also due to extrinsic restrictive process from tense ascites.

## 2019-01-11 NOTE — PROGRESS NOTE ADULT - PROBLEM SELECTOR PLAN 7
not on any medication at home  HbA1c 6.5%  -Hypoglycemia Protocol, Low Dose Insulin Sliding Coverage, Accuchecks AC&HS.  -Diet: Consistent Carbs w/ Evening Snack. not on any medication at home  HbA1c 6.5%  will amend diet to dysphagia 3- Consistent Carbs w/ Evening Snack.

## 2019-01-11 NOTE — PROGRESS NOTE ADULT - SUBJECTIVE AND OBJECTIVE BOX
Date/Time Patient Seen:  		  Referring MD:   Data Reviewed	       Patient is a 89y old  Female who presents with a chief complaint of SOB, cough (10 Guevara 2019 13:10)      Subjective/HPI     PAST MEDICAL & SURGICAL HISTORY:  Ovarian cancer  Afib: s/p PPM  Ascites, malignant: from advanced ovarian cancer  Colon cancer  Obese  Hypothyroidism  HTN (Hypertension)  Overactive Bladder  DM Type 2 (Diabetes Mellitus, Type 2)  Asthma  Incarcerated hernia: s/p repair 10/2017  H/O hernia repair  Artificial pacemaker  S/P colon resection: with sigmoid colostomy for obstructing ovarian cancer  S/P Cataract Surgery: CASI  History of Tonsillectomy  History of Appendectomy  Status Post Total Knee Replacement: Left        Medication list         MEDICATIONS  (STANDING):  ALBUTerol    0.083% 2.5 milliGRAM(s) Nebulizer every 6 hours  furosemide    Tablet 20 milliGRAM(s) Oral two times a day  gabapentin 300 milliGRAM(s) Oral three times a day  guaiFENesin  milliGRAM(s) Oral every 12 hours  levothyroxine 225 MICROGram(s) Oral daily  methylPREDNISolone sodium succinate Injectable 30 milliGRAM(s) IV Push every 12 hours  metolazone 2.5 milliGRAM(s) Oral <User Schedule>  midodrine 5 milliGRAM(s) Oral every 8 hours  montelukast 10 milliGRAM(s) Oral at bedtime  potassium chloride    Tablet ER 20 milliEquivalent(s) Oral two times a day  sodium chloride 0.65% Nasal 1 Spray(s) Both Nostrils three times a day    MEDICATIONS  (PRN):         Vitals log        ICU Vital Signs Last 24 Hrs  T(C): 36.5 (11 Jan 2019 04:31), Max: 36.7 (10 Guevara 2019 12:27)  T(F): 97.7 (11 Jan 2019 04:31), Max: 98.1 (10 Guevara 2019 12:27)  HR: 74 (11 Jan 2019 04:31) (67 - 88)  BP: 133/72 (11 Jan 2019 04:31) (127/70 - 133/72)  BP(mean): --  ABP: --  ABP(mean): --  RR: 22 (11 Jan 2019 04:31) (22 - 25)  SpO2: 97% (11 Jan 2019 04:31) (96% - 100%)           Input and Output:  I&O's Detail    10 Guevara 2019 07:01  -  11 Jan 2019 07:00  --------------------------------------------------------  IN:    Oral Fluid: 120 mL  Total IN: 120 mL    OUT:    Colostomy: 200 mL    Voided: 750 mL  Total OUT: 950 mL    Total NET: -830 mL          Lab Data                        11.0   11.96 )-----------( 390      ( 11 Jan 2019 05:02 )             36.5     01-11    139  |  95<L>  |  32<H>  ----------------------------<  131<H>  3.1<L>   |  35<H>  |  1.10    Ca    8.7      11 Jan 2019 05:02  Phos  3.3     01-11  Mg     2.1     01-11    TPro  7.2  /  Alb  2.6<L>  /  TBili  0.4  /  DBili  x   /  AST  23  /  ALT  21  /  AlkPhos  79  01-10      CARDIAC MARKERS ( 10 Guevara 2019 08:19 )  <.015 ng/mL / x     / x     / x     / 3.1 ng/mL        Review of Systems	      Objective     Physical Examination    heart s1s2  lung dec BS  abd soft  wheezing bilaterally      Pertinent Lab findings & Imaging      Paramjit:  NO   Adequate UO     I&O's Detail    10 Guevara 2019 07:01  -  11 Jan 2019 07:00  --------------------------------------------------------  IN:    Oral Fluid: 120 mL  Total IN: 120 mL    OUT:    Colostomy: 200 mL    Voided: 750 mL  Total OUT: 950 mL    Total NET: -830 mL               Discussed with:     Cultures:	        Radiology

## 2019-01-11 NOTE — DIETITIAN INITIAL EVALUATION ADULT. - PROBLEM SELECTOR PLAN 5
Pt has scheduled thoracentesis Q 3 weeks   -plan for paracentesis tomorrow, hold AC tonight  -c/w metolazone, lasix

## 2019-01-11 NOTE — PROGRESS NOTE ADULT - PROBLEM SELECTOR PLAN 4
Pt has scheduled thoracentesis Q 3 weeks   -s/p paracentesis, resume AC tonight  -c/w metolazone, lasix Pt has scheduled paracentesis Q 3 weeks   -s/p paracentesis, resume A/C tonight  -c/w metolazone, lasix  -pt has been off chemo for 5 years and still quite stable.  -outpt f/up for ovarian ca.

## 2019-01-11 NOTE — DIETITIAN INITIAL EVALUATION ADULT. - PROBLEM SELECTOR PLAN 2
Pt with SOB improved on supplemental O2, likely sec to asthma exacerbation possibly with element of CHF (Pt not on O2 at home)  -admit to tele  -afebrile, no leukocytosis  -RVP negative, lactate normal  -CXR - small left, trace right pleural effusions  -pulm (Ankit) consulted  -s/p 1 dose IV lasix, solumedrol, albuterol  -c/w solumedrol, albuterol, mucinex, O2 support, montelekast  -Chest PT  -f/u full flu panel, procalcitonin

## 2019-01-11 NOTE — PROGRESS NOTE ADULT - ATTENDING COMMENTS
Pt seen + examined. Case discussed with intern/resident. Agree with assessment and plan above (edited) with following addendum:  Time spent: 45min. More than 50% of the visit was spent counseling the patient /pt's family on medical condition -- malignant ascites and paracentesis, acute respiratory distress, viral PNA, asthma exacerbation.     89 yo F with PMH of asthma, ovarian cancer (w/ recurrent malignant ascites with large volume paracenteses [id number]; had surgical resection >6 years ago and two chemotherapies last of which was 5 years ago and pt has been off treatment since), mets to colon (s/p colostomy), s/p incarcerated ventral hernia repair 10/2017, Type 2 DM, hypotension, afib (on low-dose xarelto), s/p PPM, hypothyroid, obesity, and overactive bladder presents with cough and wheezing for 2 weeks. Admitted for acute resp distress due to asthma exacerbation due to viral pneumonia, and also due to extrinsic restrictive process from tense ascites.  -acute respiratory distress was likely due to asthma exacerbation due to viral PNA (entero/rhinovirus positive)  -c/w nebs and steroids as per pulm recs  -SOB improved after large volume paracentesis (8.35L) today as likely that was contributing to resp distress by causing an extrinsic restrictive pulmonary process making it difficult for diaphragm to contract  -afebrile, no leukocytosis, no sign of bacterial infection  -CXR - small left, trace right pleural effusions so there may be a component of decompensated CHF, but unclear -- f/up cardio consult  -pulm (Ankit) consult appreciated  -c/w solumedrol, albuterol, mucinex, O2 support, montelukast  -Chest PT  -f/up Dr. Licea recs re: whether he feels there is need for additional diuresis

## 2019-01-12 LAB
-  AMIKACIN: SIGNIFICANT CHANGE UP
-  AMOXICILLIN/CLAVULANIC ACID: SIGNIFICANT CHANGE UP
-  AMPICILLIN/SULBACTAM: SIGNIFICANT CHANGE UP
-  AMPICILLIN: SIGNIFICANT CHANGE UP
-  AZTREONAM: SIGNIFICANT CHANGE UP
-  CEFAZOLIN: SIGNIFICANT CHANGE UP
-  CEFEPIME: SIGNIFICANT CHANGE UP
-  CEFOXITIN: SIGNIFICANT CHANGE UP
-  CEFTRIAXONE: SIGNIFICANT CHANGE UP
-  CIPROFLOXACIN: SIGNIFICANT CHANGE UP
-  ERTAPENEM: SIGNIFICANT CHANGE UP
-  GENTAMICIN: SIGNIFICANT CHANGE UP
-  IMIPENEM: SIGNIFICANT CHANGE UP
-  LEVOFLOXACIN: SIGNIFICANT CHANGE UP
-  MEROPENEM: SIGNIFICANT CHANGE UP
-  NITROFURANTOIN: SIGNIFICANT CHANGE UP
-  PIPERACILLIN/TAZOBACTAM: SIGNIFICANT CHANGE UP
-  TIGECYCLINE: SIGNIFICANT CHANGE UP
-  TOBRAMYCIN: SIGNIFICANT CHANGE UP
-  TRIMETHOPRIM/SULFAMETHOXAZOLE: SIGNIFICANT CHANGE UP
ANION GAP SERPL CALC-SCNC: 9 MMOL/L — SIGNIFICANT CHANGE UP (ref 5–17)
BUN SERPL-MCNC: 31 MG/DL — HIGH (ref 7–23)
CALCIUM SERPL-MCNC: 8.2 MG/DL — LOW (ref 8.5–10.1)
CHLORIDE SERPL-SCNC: 98 MMOL/L — SIGNIFICANT CHANGE UP (ref 96–108)
CO2 SERPL-SCNC: 35 MMOL/L — HIGH (ref 22–31)
CREAT SERPL-MCNC: 0.97 MG/DL — SIGNIFICANT CHANGE UP (ref 0.5–1.3)
CULTURE RESULTS: SIGNIFICANT CHANGE UP
GLUCOSE SERPL-MCNC: 147 MG/DL — HIGH (ref 70–99)
HCT VFR BLD CALC: 35.6 % — SIGNIFICANT CHANGE UP (ref 34.5–45)
HGB BLD-MCNC: 11 G/DL — LOW (ref 11.5–15.5)
MCHC RBC-ENTMCNC: 24.1 PG — LOW (ref 27–34)
MCHC RBC-ENTMCNC: 30.9 GM/DL — LOW (ref 32–36)
MCV RBC AUTO: 78.1 FL — LOW (ref 80–100)
METHOD TYPE: SIGNIFICANT CHANGE UP
NRBC # BLD: 0 /100 WBCS — SIGNIFICANT CHANGE UP (ref 0–0)
ORGANISM # SPEC MICROSCOPIC CNT: SIGNIFICANT CHANGE UP
ORGANISM # SPEC MICROSCOPIC CNT: SIGNIFICANT CHANGE UP
PHOSPHATE SERPL-MCNC: 2.8 MG/DL — SIGNIFICANT CHANGE UP (ref 2.5–4.5)
PLATELET # BLD AUTO: 422 K/UL — HIGH (ref 150–400)
POTASSIUM SERPL-MCNC: 2.8 MMOL/L — CRITICAL LOW (ref 3.5–5.3)
POTASSIUM SERPL-MCNC: 3.4 MMOL/L — LOW (ref 3.5–5.3)
POTASSIUM SERPL-SCNC: 2.8 MMOL/L — CRITICAL LOW (ref 3.5–5.3)
POTASSIUM SERPL-SCNC: 3.4 MMOL/L — LOW (ref 3.5–5.3)
RBC # BLD: 4.56 M/UL — SIGNIFICANT CHANGE UP (ref 3.8–5.2)
RBC # FLD: 17.5 % — HIGH (ref 10.3–14.5)
SODIUM SERPL-SCNC: 142 MMOL/L — SIGNIFICANT CHANGE UP (ref 135–145)
SPECIMEN SOURCE: SIGNIFICANT CHANGE UP
WBC # BLD: 18.44 K/UL — HIGH (ref 3.8–10.5)
WBC # FLD AUTO: 18.44 K/UL — HIGH (ref 3.8–10.5)

## 2019-01-12 PROCEDURE — 99233 SBSQ HOSP IP/OBS HIGH 50: CPT

## 2019-01-12 RX ORDER — POTASSIUM CHLORIDE 20 MEQ
10 PACKET (EA) ORAL ONCE
Qty: 0 | Refills: 0 | Status: COMPLETED | OUTPATIENT
Start: 2019-01-12 | End: 2019-01-12

## 2019-01-12 RX ADMIN — MONTELUKAST 10 MILLIGRAM(S): 4 TABLET, CHEWABLE ORAL at 22:10

## 2019-01-12 RX ADMIN — Medication 20 MILLIEQUIVALENT(S): at 17:39

## 2019-01-12 RX ADMIN — Medication 100 MILLIEQUIVALENT(S): at 12:21

## 2019-01-12 RX ADMIN — Medication 30 MILLIGRAM(S): at 05:15

## 2019-01-12 RX ADMIN — GABAPENTIN 300 MILLIGRAM(S): 400 CAPSULE ORAL at 22:10

## 2019-01-12 RX ADMIN — MIDODRINE HYDROCHLORIDE 10 MILLIGRAM(S): 2.5 TABLET ORAL at 13:45

## 2019-01-12 RX ADMIN — Medication 20 MILLIEQUIVALENT(S): at 05:16

## 2019-01-12 RX ADMIN — Medication 600 MILLIGRAM(S): at 05:17

## 2019-01-12 RX ADMIN — GABAPENTIN 300 MILLIGRAM(S): 400 CAPSULE ORAL at 05:17

## 2019-01-12 RX ADMIN — ALBUTEROL 2.5 MILLIGRAM(S): 90 AEROSOL, METERED ORAL at 19:27

## 2019-01-12 RX ADMIN — Medication 100 MILLIEQUIVALENT(S): at 10:30

## 2019-01-12 RX ADMIN — Medication 225 MICROGRAM(S): at 05:17

## 2019-01-12 RX ADMIN — RIVAROXABAN 15 MILLIGRAM(S): KIT at 17:39

## 2019-01-12 RX ADMIN — GABAPENTIN 300 MILLIGRAM(S): 400 CAPSULE ORAL at 13:45

## 2019-01-12 RX ADMIN — MIDODRINE HYDROCHLORIDE 10 MILLIGRAM(S): 2.5 TABLET ORAL at 22:10

## 2019-01-12 RX ADMIN — ALBUTEROL 2.5 MILLIGRAM(S): 90 AEROSOL, METERED ORAL at 02:20

## 2019-01-12 RX ADMIN — Medication 1 SPRAY(S): at 05:18

## 2019-01-12 RX ADMIN — ALBUTEROL 2.5 MILLIGRAM(S): 90 AEROSOL, METERED ORAL at 07:30

## 2019-01-12 RX ADMIN — ALBUTEROL 2.5 MILLIGRAM(S): 90 AEROSOL, METERED ORAL at 13:44

## 2019-01-12 RX ADMIN — Medication 1 SPRAY(S): at 22:11

## 2019-01-12 RX ADMIN — Medication 600 MILLIGRAM(S): at 17:39

## 2019-01-12 RX ADMIN — Medication 30 MILLIGRAM(S): at 17:39

## 2019-01-12 RX ADMIN — Medication 1 SPRAY(S): at 13:45

## 2019-01-12 RX ADMIN — Medication 10 MILLIGRAM(S): at 05:17

## 2019-01-12 RX ADMIN — Medication 10 MILLIGRAM(S): at 17:39

## 2019-01-12 RX ADMIN — MIDODRINE HYDROCHLORIDE 10 MILLIGRAM(S): 2.5 TABLET ORAL at 05:16

## 2019-01-12 RX ADMIN — Medication 20 MILLIEQUIVALENT(S): at 08:38

## 2019-01-12 NOTE — PROGRESS NOTE ADULT - SUBJECTIVE AND OBJECTIVE BOX
[facility]     SUBJECTIVE: Feels better, less SOB. Some cough with yellow sputum prodn. Less leg edema.       ALLERGIES:  Allergies    No Known Allergies    Intolerances        Vital Signs Last 24 Hrs  T(C): 36.5 (12 Jan 2019 12:25), Max: 36.7 (12 Jan 2019 08:02)  T(F): 97.7 (12 Jan 2019 12:25), Max: 98.1 (12 Jan 2019 08:02)  HR: 70 (12 Jan 2019 12:25) (62 - 85)  BP: 111/68 (12 Jan 2019 12:25) (111/68 - 146/75)  BP(mean): --  RR: 17 (12 Jan 2019 12:25) (17 - 19)  SpO2: 98% (12 Jan 2019 12:25) (92% - 100%)    PHYSICAL EXAM:  HEAD:  Atraumatic, Normocephalic  NECK: Supple and normal thyroid.  No JVD or carotid bruit.   HEART: S1, S2 normal, no S3  PULMONARY: scattered bibasal rhonchi  ABDOMEN: Soft nontender and positive bowel sounds   EXTREMITIES:  mild edema b/l at ankles  NEUROLOGICAL: no focal deficits     LABS:                        11.0   18.44 )-----------( 422      ( 12 Jan 2019 08:17 )             35.6     01-12    142  |  98  |  31<H>  ----------------------------<  147<H>  2.8<LL>   |  35<H>  |  0.97    Ca    8.2<L>      12 Jan 2019 08:17  Phos  2.8     01-12  Mg     1.8     01-12              BNP  TSHThyroid Stimulating Hormone, Serum: 2.54 uIU/mL (01-10 @ 08:19)        EKG:    ECHO:   EXAM:  ECHO TTE W/O CON COMP W/DOPPLR         PROCEDURE DATE:  01/09/2018        INTERPRETATION:  Ordering Physician: ISRRAEL PENNINGTON  Indication: Cardiac arrhythmias. Cardiac murmur.  Technician: LEN.  Study Quality: Good.   A complete echocardiographic study was performed utilizing standard   protocol including spectral and color Doppler in all echocardiographic   windows.    Heiht: 170cm  Weight: 96.6kg  BSA: 2.08m2  Blood Pressure: 130/60  MEASUREMENTS  IVS: 1.3cm  PWT: 1.2cm  LA: 4.7cm  AO:2.9cm  LVIDd: 4.2 cm.  LVIDs: 3.2cm  LVEF: 50-55%.  PASP: 44mm Hg  FINDINGS  Left Ventricle: Normal in size with the preserved LV systolic function   with estimated LVEF 50-55%.  Right Ventricle: Normal in size and  normal RV systolic function.  LeftAtrium: Mildly dilated.  Right Atrium: Normal in size.  Mitral Valve: Mild mitral annular calcification is present. Mitral valve   is mildly calcified and no evidence of any mitral stenosis. Mild mitral   regurgitation is present.  Aortic Valve: Aortic root is mild sclerotic and of normal dimension.   Aortic valve is mildly calcified and no evidence of any aortic stenosis.  Tricuspid Valve: Moderate tricuspid regurgitation with calculated  PA   systolic pressure of 44 mmHg is present.  Pulmonic Valve:Trace  Pulmonary regurgitation is present.  Diastolic Function: Cannot evaluate from this study because of underlying   atrial fibrillation.  Pericardium/Pleura: No evidence of any pericardial effusion.  No intracardiac thrombus or vegetations and  tumor.  Echodense in right sided chambers  suggestive of PPM/AICD wire is present.  Mild concentric left ventricular hypertrophy is present.  CONCLUSIONS:  Normal LV size and  preserved LV systolic function.    Mild concentric left ventricular hypertrophy is present.    Mild mitral regurgitation is present.    Moderate  tricuspid regurgitation with mild pulmonary hypertension is   present.    Cannot evaluate LV diastolic dysfunction due to underlying atrial   fibrillation.    Grade clinically above findings.    CARLTON SANTOS M.D.  This document has been electronically signed. Guevara 10 2018  7:23AM          IMAGING:EXAM:  XR CHEST PORTABLE URGENT 1V                            PROCEDURE DATE:  01/10/2019        INTERPRETATION:  Clinical information: Shortness of breath. Ovarian   cancer. Atrial fibrillation.    Technique: Frontal view of the chest.    Comparison: Prior chest x-ray examination from 7/21/2018.    Findings: There is a cardiac pacemaker overlying the left chest wall.    Trace right and small left sided pleural effusions noted. The heart size   is mildly enlarged. There are mild multilevel degenerative changes of the   thoracic spine.     IMPRESSION: Small left and trace right-sided pleural effusions.    CHRYSTAL VITAL M.D., ATTENDING RADIOLOGIST  This document has been electronically signed. Guevara 10 2019  8:45AM      MEDICATIONS  (STANDING):  ALBUTerol    0.083% 2.5 milliGRAM(s) Nebulizer every 6 hours  gabapentin 300 milliGRAM(s) Oral three times a day  guaiFENesin  milliGRAM(s) Oral every 12 hours  levothyroxine 225 MICROGram(s) Oral daily  methylPREDNISolone sodium succinate Injectable 30 milliGRAM(s) IV Push every 12 hours  midodrine 10 milliGRAM(s) Oral three times a day  montelukast 10 milliGRAM(s) Oral at bedtime  potassium chloride    Tablet ER 20 milliEquivalent(s) Oral two times a day  rivaroxaban 15 milliGRAM(s) Oral every 24 hours  sodium chloride 0.65% Nasal 1 Spray(s) Both Nostrils three times a day  spironolactone 25 milliGRAM(s) Oral daily  torsemide 10 milliGRAM(s) Oral two times a day    MEDICATIONS  (PRN):

## 2019-01-12 NOTE — PROGRESS NOTE ADULT - PROBLEM SELECTOR PLAN 4
Pt has scheduled paracentesis Q 3 weeks   -s/p paracentesis, resumed A/C   -c/w metolazone, Torsemide   -pt has been off chemo for 5 years and still quite stable.  -outpt f/up for ovarian ca.

## 2019-01-12 NOTE — PROGRESS NOTE ADULT - PROBLEM SELECTOR PLAN 1
-acute respiratory distress was likely due to asthma exacerbation due to viral PNA (entero/rhinovirus positive)  -c/w nebs and steroids as per pulm recs  -SOB improved after large volume paracentesis (8.35L) today as likely that was contributing to resp distress by causing an extrinsic restrictive pulmonary process making it difficult for diaphragm to contract  -afebrile, no leukocytosis, no sign of bacterial infection  -CXR - small left, trace right pleural effusions so there may be a component of decompensated CHF, but unclear -- f/up cardio consult  -pulm (Ankit) consult appreciated  -c/w solumedrol, albuterol, mucinex, O2 support, montelekast  -Chest PT

## 2019-01-12 NOTE — PROGRESS NOTE ADULT - SUBJECTIVE AND OBJECTIVE BOX
Patient is a 89y old  Female who presents with a chief complaint of SOB, cough (12 Jan 2019 13:21)      INTERVAL HPI/OVERNIGHT EVENTS: 89 yo F with PMH of asthma, ovarian cancer (w/ recurrent malignant ascites with large volume paracenteses [id number]; had surgical resection >6 years ago and two chemotherapies last of which was 5 years ago and pt has been off treatment since), mets to colon (s/p colostomy), s/p incarcerated ventral hernia repair 10/2017, Type 2 DM, hypotension, afib (on low-dose xarelto), s/p PPM, hypothyroid, obesity, and overactive bladder presents with cough and wheezing for 2 weeks. Admitted for acute resp distress due to asthma exacerbation due to viral pneumonia, and also due to extrinsic restrictive process from tense ascites. pt feels better,     MEDICATIONS  (STANDING):  ALBUTerol    0.083% 2.5 milliGRAM(s) Nebulizer every 6 hours  gabapentin 300 milliGRAM(s) Oral three times a day  guaiFENesin  milliGRAM(s) Oral every 12 hours  levothyroxine 225 MICROGram(s) Oral daily  methylPREDNISolone sodium succinate Injectable 30 milliGRAM(s) IV Push every 12 hours  midodrine 10 milliGRAM(s) Oral three times a day  montelukast 10 milliGRAM(s) Oral at bedtime  potassium chloride    Tablet ER 20 milliEquivalent(s) Oral two times a day  rivaroxaban 15 milliGRAM(s) Oral every 24 hours  sodium chloride 0.65% Nasal 1 Spray(s) Both Nostrils three times a day  spironolactone 25 milliGRAM(s) Oral daily  torsemide 10 milliGRAM(s) Oral two times a day    MEDICATIONS  (PRN):      Allergies    No Known Allergies    Intolerances        REVIEW OF SYSTEMS:  CONSTITUTIONAL: No fever, weight loss, or fatigue  EYES: No eye pain, visual disturbances, or discharge  ENMT:  No difficulty hearing, tinnitus, vertigo; No sinus or throat pain  NECK: No pain or stiffness  BREASTS: No pain, masses, or nipple discharge  RESPIRATORY: No cough, wheezing, chills or hemoptysis; No shortness of breath  CARDIOVASCULAR: No chest pain, palpitations, dizziness, or leg swelling  GASTROINTESTINAL: No abdominal or epigastric pain. No nausea, vomiting, or hematemesis; No diarrhea or constipation. No melena or hematochezia.  GENITOURINARY: No dysuria, frequency, hematuria, or incontinence  NEUROLOGICAL: No headaches, memory loss, loss of strength, numbness, or tremors  SKIN: No itching, burning, rashes, or lesions   LYMPH NODES: No enlarged glands  ENDOCRINE: No heat or cold intolerance; No hair loss; No polydipsia or polyuria  MUSCULOSKELETAL: No joint pain or swelling; No muscle, back, or extremity pain  PSYCHIATRIC: No depression, anxiety, mood swings, or difficulty sleeping  HEME/LYMPH: No easy bruising, or bleeding gums  ALLERGY AND IMMUNOLOGIC: No hives or eczema    Vital Signs Last 24 Hrs  T(C): 36.5 (12 Jan 2019 16:08), Max: 36.7 (12 Jan 2019 08:02)  T(F): 97.7 (12 Jan 2019 16:08), Max: 98.1 (12 Jan 2019 08:02)  HR: 71 (12 Jan 2019 16:08) (68 - 85)  BP: 114/72 (12 Jan 2019 16:08) (111/68 - 146/75)  BP(mean): --  RR: 17 (12 Jan 2019 16:08) (17 - 19)  SpO2: 95% (12 Jan 2019 16:08) (94% - 100%)    PHYSICAL EXAM:  GENERAL: NAD, well-groomed, well-developed  HEAD:  Atraumatic, Normocephalic  EYES: EOMI, PERRLA, conjunctiva and sclera clear  ENMT: No tonsillar erythema, exudates, or enlargement; Moist mucous membranes, Good dentition, No lesions  NECK: Supple, No JVD, Normal thyroid  NERVOUS SYSTEM:  Alert & Oriented X3, Good concentration; Motor Strength 5/5 B/L upper and lower extremities; DTRs 2+ intact and symmetric  CHEST/LUNG: Clear to auscultation bilaterally; No rales, rhonchi, wheezing, or rubs  HEART: Regular rate and rhythm; No murmurs, rubs, or gallops  ABDOMEN: Soft, Nontender, Nondistended; Bowel sounds present  EXTREMITIES:  2+ Peripheral Pulses, No clubbing, cyanosis, or edema  LYMPH: No lymphadenopathy noted  SKIN: No rashes or lesions    LABS:                        11.0   18.44 )-----------( 422      ( 12 Jan 2019 08:17 )             35.6     12 Jan 2019 15:44    x      |  x      |  x      ----------------------------<  x      3.4     |  x      |  x        Ca    8.2        12 Jan 2019 08:17  Phos  2.8       12 Jan 2019 08:17  Mg     1.8       12 Jan 2019 08:17          CAPILLARY BLOOD GLUCOSE          RADIOLOGY & ADDITIONAL TESTS:    Imaging Personally Reviewed:  [ x] YES  [ ] NO    Consultant(s) Notes Reviewed:  [x ] YES  [ ] NO    Care Discussed with Consultants/Other Providers [x ] YES  [ ] NO

## 2019-01-12 NOTE — PROGRESS NOTE ADULT - PROBLEM SELECTOR PLAN 7
not on any medication at home  HbA1c 6.5%  will amend diet to dysphagia 3- Consistent Carbs w/ Evening Snack.

## 2019-01-12 NOTE — PROGRESS NOTE ADULT - ASSESSMENT
*Diastolic CHF  *Pulm HTN  *Ovarian Cancer with recurrent ascites  *HTN  *DM2  *Hypothyroidism  *CAF  *S/P PPM  *S/P Colostomy  - stable cardiacwise, improving slowly  - K+ being repleted  - daily weights  - cont current meds

## 2019-01-12 NOTE — PROVIDER CONTACT NOTE (CRITICAL VALUE NOTIFICATION) - ACTION/TREATMENT ORDERED:
md is aware. will follow up as per 
giving pottassium IV rider 10meq * 2 doses and then repeat level in the afternoon

## 2019-01-12 NOTE — PROGRESS NOTE ADULT - ASSESSMENT
89 yo F with PMH of asthma, ovarian cancer (w/ recurrent malignant ascites with large volume paracenteses [id number]; had surgical resection >6 years ago and two chemotherapies last of which was 5 years ago and pt has been off treatment since), mets to colon (s/p colostomy), s/p incarcerated ventral hernia repair 10/2017, Type 2 DM, hypotension, afib (on low-dose xarelto), s/p PPM, hypothyroid, obesity, and overactive bladder presents with cough and wheezing for 2 weeks. Admitted for acute resp distress due to asthma exacerbation due to viral pneumonia, and also due to extrinsic restrictive process from tense ascites.

## 2019-01-12 NOTE — PROGRESS NOTE ADULT - PROBLEM SELECTOR PLAN 2
-diastolic chf exacerbation, ef 50% in 01/2018  -SOB may be partially attributable to fluid overload  -CXR with B/L sm effusions  -continue diuresis with metolazone and Torsemide 10mg po BID  -strict I&Os, daily weights  -consult cardio (Dr. Licea / Dr. Valdes)

## 2019-01-12 NOTE — PROGRESS NOTE ADULT - SUBJECTIVE AND OBJECTIVE BOX
Date/Time Patient Seen:  		  Referring MD:   Data Reviewed	       Patient is a 89y old  Female who presents with a chief complaint of SOB, cough (11 Jan 2019 22:36)      Subjective/HPI     PAST MEDICAL & SURGICAL HISTORY:  Ovarian cancer  Afib: s/p PPM  Ascites, malignant: from advanced ovarian cancer  Colon cancer  Obese  Hypothyroidism  HTN (Hypertension)  Overactive Bladder  DM Type 2 (Diabetes Mellitus, Type 2)  Asthma  Incarcerated hernia: s/p repair 10/2017  H/O hernia repair  Artificial pacemaker  S/P colon resection: with sigmoid colostomy for obstructing ovarian cancer  S/P Cataract Surgery: CASI  History of Tonsillectomy  History of Appendectomy  Status Post Total Knee Replacement: Left        Medication list         MEDICATIONS  (STANDING):  ALBUTerol    0.083% 2.5 milliGRAM(s) Nebulizer every 6 hours  gabapentin 300 milliGRAM(s) Oral three times a day  guaiFENesin  milliGRAM(s) Oral every 12 hours  levothyroxine 225 MICROGram(s) Oral daily  methylPREDNISolone sodium succinate Injectable 30 milliGRAM(s) IV Push every 12 hours  midodrine 10 milliGRAM(s) Oral three times a day  montelukast 10 milliGRAM(s) Oral at bedtime  potassium chloride    Tablet ER 20 milliEquivalent(s) Oral two times a day  rivaroxaban 15 milliGRAM(s) Oral every 24 hours  sodium chloride 0.65% Nasal 1 Spray(s) Both Nostrils three times a day  spironolactone 25 milliGRAM(s) Oral daily  torsemide 10 milliGRAM(s) Oral two times a day    MEDICATIONS  (PRN):         Vitals log        ICU Vital Signs Last 24 Hrs  T(C): 36.5 (12 Jan 2019 12:25), Max: 36.7 (12 Jan 2019 08:02)  T(F): 97.7 (12 Jan 2019 12:25), Max: 98.1 (12 Jan 2019 08:02)  HR: 70 (12 Jan 2019 12:25) (62 - 85)  BP: 111/68 (12 Jan 2019 12:25) (111/61 - 146/75)  BP(mean): --  ABP: --  ABP(mean): --  RR: 17 (12 Jan 2019 12:25) (17 - 20)  SpO2: 98% (12 Jan 2019 12:25) (92% - 100%)           Input and Output:  I&O's Detail      Lab Data                        11.0   18.44 )-----------( 422      ( 12 Jan 2019 08:17 )             35.6     01-12    142  |  98  |  31<H>  ----------------------------<  147<H>  2.8<LL>   |  35<H>  |  0.97    Ca    8.2<L>      12 Jan 2019 08:17  Phos  2.8     01-12  Mg     1.8     01-12              Review of Systems	      Objective     Physical Examination    heart s1s2  lung dec BS  abd soft      Pertinent Lab findings & Imaging      Paramjit:  NO   Adequate UO     I&O's Detail           Discussed with:     Cultures:	        Radiology

## 2019-01-12 NOTE — PROGRESS NOTE ADULT - PROBLEM SELECTOR PLAN 1
resp distress  atelectasis  HOB elev  oral hygiene  asthma  mets ca  ascites  s/p Paracentesis - > 8 L drained  I and O  cont systemic steroids, NEBS and Nasal Saline and cough rx regimen  enc cough  out of bed as tolerated  frail and weak  PT as tolerated  prognosis poor  ascites path pending

## 2019-01-13 LAB
ANION GAP SERPL CALC-SCNC: 7 MMOL/L — SIGNIFICANT CHANGE UP (ref 5–17)
BUN SERPL-MCNC: 41 MG/DL — HIGH (ref 7–23)
CALCIUM SERPL-MCNC: 8.5 MG/DL — SIGNIFICANT CHANGE UP (ref 8.5–10.1)
CHLORIDE SERPL-SCNC: 98 MMOL/L — SIGNIFICANT CHANGE UP (ref 96–108)
CO2 SERPL-SCNC: 34 MMOL/L — HIGH (ref 22–31)
CREAT SERPL-MCNC: 1.1 MG/DL — SIGNIFICANT CHANGE UP (ref 0.5–1.3)
GLUCOSE SERPL-MCNC: 162 MG/DL — HIGH (ref 70–99)
HCT VFR BLD CALC: 37 % — SIGNIFICANT CHANGE UP (ref 34.5–45)
HGB BLD-MCNC: 11.3 G/DL — LOW (ref 11.5–15.5)
MCHC RBC-ENTMCNC: 24 PG — LOW (ref 27–34)
MCHC RBC-ENTMCNC: 30.5 GM/DL — LOW (ref 32–36)
MCV RBC AUTO: 78.6 FL — LOW (ref 80–100)
NRBC # BLD: 0 /100 WBCS — SIGNIFICANT CHANGE UP (ref 0–0)
PLATELET # BLD AUTO: 405 K/UL — HIGH (ref 150–400)
POTASSIUM SERPL-MCNC: 4.5 MMOL/L — SIGNIFICANT CHANGE UP (ref 3.5–5.3)
POTASSIUM SERPL-SCNC: 4.5 MMOL/L — SIGNIFICANT CHANGE UP (ref 3.5–5.3)
RBC # BLD: 4.71 M/UL — SIGNIFICANT CHANGE UP (ref 3.8–5.2)
RBC # FLD: 17.4 % — HIGH (ref 10.3–14.5)
SODIUM SERPL-SCNC: 139 MMOL/L — SIGNIFICANT CHANGE UP (ref 135–145)
WBC # BLD: 13.8 K/UL — HIGH (ref 3.8–10.5)
WBC # FLD AUTO: 13.8 K/UL — HIGH (ref 3.8–10.5)

## 2019-01-13 PROCEDURE — 99233 SBSQ HOSP IP/OBS HIGH 50: CPT

## 2019-01-13 RX ADMIN — GABAPENTIN 300 MILLIGRAM(S): 400 CAPSULE ORAL at 05:17

## 2019-01-13 RX ADMIN — Medication 225 MICROGRAM(S): at 05:17

## 2019-01-13 RX ADMIN — Medication 600 MILLIGRAM(S): at 17:38

## 2019-01-13 RX ADMIN — ALBUTEROL 2.5 MILLIGRAM(S): 90 AEROSOL, METERED ORAL at 19:05

## 2019-01-13 RX ADMIN — Medication 600 MILLIGRAM(S): at 05:16

## 2019-01-13 RX ADMIN — MONTELUKAST 10 MILLIGRAM(S): 4 TABLET, CHEWABLE ORAL at 22:55

## 2019-01-13 RX ADMIN — Medication 1 TABLET(S): at 17:38

## 2019-01-13 RX ADMIN — GABAPENTIN 300 MILLIGRAM(S): 400 CAPSULE ORAL at 22:55

## 2019-01-13 RX ADMIN — Medication 20 MILLIGRAM(S): at 17:38

## 2019-01-13 RX ADMIN — Medication 10 MILLIGRAM(S): at 05:17

## 2019-01-13 RX ADMIN — Medication 30 MILLIGRAM(S): at 05:17

## 2019-01-13 RX ADMIN — Medication 1 SPRAY(S): at 22:55

## 2019-01-13 RX ADMIN — Medication 20 MILLIEQUIVALENT(S): at 05:16

## 2019-01-13 RX ADMIN — Medication 10 MILLIGRAM(S): at 17:38

## 2019-01-13 RX ADMIN — GABAPENTIN 300 MILLIGRAM(S): 400 CAPSULE ORAL at 13:58

## 2019-01-13 RX ADMIN — RIVAROXABAN 15 MILLIGRAM(S): KIT at 17:38

## 2019-01-13 RX ADMIN — Medication 1 SPRAY(S): at 13:57

## 2019-01-13 RX ADMIN — MIDODRINE HYDROCHLORIDE 10 MILLIGRAM(S): 2.5 TABLET ORAL at 22:55

## 2019-01-13 RX ADMIN — Medication 1 SPRAY(S): at 05:18

## 2019-01-13 RX ADMIN — MIDODRINE HYDROCHLORIDE 10 MILLIGRAM(S): 2.5 TABLET ORAL at 13:58

## 2019-01-13 RX ADMIN — SPIRONOLACTONE 25 MILLIGRAM(S): 25 TABLET, FILM COATED ORAL at 05:17

## 2019-01-13 RX ADMIN — Medication 20 MILLIEQUIVALENT(S): at 17:38

## 2019-01-13 RX ADMIN — ALBUTEROL 2.5 MILLIGRAM(S): 90 AEROSOL, METERED ORAL at 07:16

## 2019-01-13 RX ADMIN — ALBUTEROL 2.5 MILLIGRAM(S): 90 AEROSOL, METERED ORAL at 13:46

## 2019-01-13 RX ADMIN — MIDODRINE HYDROCHLORIDE 10 MILLIGRAM(S): 2.5 TABLET ORAL at 05:17

## 2019-01-13 NOTE — PROGRESS NOTE ADULT - SUBJECTIVE AND OBJECTIVE BOX
Date/Time Patient Seen:  		  Referring MD:   Data Reviewed	       Patient is a 89y old  Female who presents with a chief complaint of SOB, cough (12 Jan 2019 16:15)      Subjective/HPI     PAST MEDICAL & SURGICAL HISTORY:  Ovarian cancer  Afib: s/p PPM  Ascites, malignant: from advanced ovarian cancer  Colon cancer  Obese  Hypothyroidism  HTN (Hypertension)  Overactive Bladder  DM Type 2 (Diabetes Mellitus, Type 2)  Asthma  Incarcerated hernia: s/p repair 10/2017  H/O hernia repair  Artificial pacemaker  S/P colon resection: with sigmoid colostomy for obstructing ovarian cancer  S/P Cataract Surgery: CASI  History of Tonsillectomy  History of Appendectomy  Status Post Total Knee Replacement: Left        Medication list         MEDICATIONS  (STANDING):  ALBUTerol    0.083% 2.5 milliGRAM(s) Nebulizer every 6 hours  gabapentin 300 milliGRAM(s) Oral three times a day  guaiFENesin  milliGRAM(s) Oral every 12 hours  levothyroxine 225 MICROGram(s) Oral daily  midodrine 10 milliGRAM(s) Oral three times a day  montelukast 10 milliGRAM(s) Oral at bedtime  potassium chloride    Tablet ER 20 milliEquivalent(s) Oral two times a day  predniSONE   Tablet 20 milliGRAM(s) Oral two times a day  rivaroxaban 15 milliGRAM(s) Oral every 24 hours  sodium chloride 0.65% Nasal 1 Spray(s) Both Nostrils three times a day  spironolactone 25 milliGRAM(s) Oral daily  torsemide 10 milliGRAM(s) Oral two times a day    MEDICATIONS  (PRN):         Vitals log        ICU Vital Signs Last 24 Hrs  T(C): 36.7 (13 Jan 2019 08:11), Max: 36.7 (13 Jan 2019 08:11)  T(F): 98 (13 Jan 2019 08:11), Max: 98 (13 Jan 2019 08:11)  HR: 70 (13 Jan 2019 08:11) (67 - 86)  BP: 124/78 (13 Jan 2019 08:11) (111/68 - 124/78)  BP(mean): --  ABP: --  ABP(mean): --  RR: 18 (13 Jan 2019 08:11) (17 - 18)  SpO2: 99% (13 Jan 2019 08:11) (92% - 99%)           Input and Output:  I&O's Detail    12 Jan 2019 07:01  -  13 Jan 2019 07:00  --------------------------------------------------------  IN:    Oral Fluid: 480 mL    Solution: 200 mL  Total IN: 680 mL    OUT:    Colostomy: 550 mL    Voided: 651 mL  Total OUT: 1201 mL    Total NET: -521 mL          Lab Data                        11.3   13.80 )-----------( 405      ( 13 Jan 2019 07:23 )             37.0     01-13    139  |  98  |  41<H>  ----------------------------<  162<H>  4.5   |  34<H>  |  1.10    Ca    8.5      13 Jan 2019 07:23  Phos  2.8     01-12  Mg     1.8     01-12              Review of Systems	      Objective     Physical Examination    heart s1s2  lung dec BS  congested  frail  weak      Pertinent Lab findings & Imaging      Paramjit:  NO   Adequate UO     I&O's Detail    12 Jan 2019 07:01  -  13 Jan 2019 07:00  --------------------------------------------------------  IN:    Oral Fluid: 480 mL    Solution: 200 mL  Total IN: 680 mL    OUT:    Colostomy: 550 mL    Voided: 651 mL  Total OUT: 1201 mL    Total NET: -521 mL               Discussed with:     Cultures:	        Radiology

## 2019-01-13 NOTE — PROGRESS NOTE ADULT - PROBLEM SELECTOR PLAN 1
heart failure  congestion  atelectasis  URI - RVP noted  Bronchitis  Asthma COPD  cont NEBS  cont Singulair  cont Mucinex  cont Systemic steroids - will taper this am  cont diuresis - on dual regimen - monitor for contraction alkalosis  s/p paracentesis - known mets ca,   will follow and monitor  prognosis guarded  frail and weak overall

## 2019-01-13 NOTE — CONSULT NOTE ADULT - CONSULT REASON
CHF
sob  eid  ascites hx  pulm HTN  pleural eff - small  atelectasis  frail and weak elderly
complic UTI

## 2019-01-13 NOTE — PROGRESS NOTE ADULT - PROBLEM SELECTOR PLAN 9
2.8 replaced with IV runs and po, repeat level at 4 pm.   -f/u BMP in AM  -c/w home dose of 40mEq potassium QD
3.1, replaced  -f/u BMP in AM  -c/w home dose of 40mEq potassium QD
2.8 replaced with IV runs and po, repeat level at 4 pm.   -f/u BMP in AM  -c/w home dose of 40mEq potassium QD

## 2019-01-13 NOTE — CONSULT NOTE ADULT - ASSESSMENT
IMP: complicated UTI vs colonization    Plan: Add Bactrim SS q12h x 5 days. IMP: complicated UTI vs colonization. bronchitis secondary entero/rhinovirus    Plan: Add Bactrim SS q12h x 5 days.

## 2019-01-13 NOTE — CONSULT NOTE ADULT - SUBJECTIVE AND OBJECTIVE BOX
Patient is a 89y old  Female who presents with a chief complaint of SOB, cough (13 Jan 2019 08:34)      HPI:  Pt is a 89 yo F with PMH of asthma, ovarian cancer w/ recurrent ascites, colon ca s/p colostomy, s/p incarcerated ventral hernia repair 10/2017, ?DM2 (no meds), hypotension, afib s/p PPM, hypothyroid, obesity, and overactive bladder presents with cough and wheezing for 2 weeks. Pt decided to come in today because she was having trouble breathing. Pt is now on 3L O2 via NC and does not feel SOB. Pt states that she has asthma and has needed to use her inhaler and nebulizers more frequently recently. States the cough has started to break up and is productive of yellow sputum. Pt denies sick contacts. Pt denies fevers, N/V, CP. Pt admits to SOB, cough, wheezing, occasional chills, LE edema and THOMAS. Urine cx < 100K e coli (ESBL)    In ED VS: T 98.1, HR 75, /73, RR 24, O2 sat 95% on 3L NC  Labs sig for Hb 10.5, K+ 3.1, proBNP 3279. RVP negative. UA: mod LE, sm nitrite, WBC 6-10  CXR: small left and trace right pleural effusions  EKG: v-paced @ rate of 70    In ED Pt rec'd solumedrol, albuterol, lasix. (10 Guevara 2019 13:10)      Asked to see patient for ID Consult    Allergies    No Known Allergies    Intolerances        MEDICATIONS  (STANDING):  ALBUTerol    0.083% 2.5 milliGRAM(s) Nebulizer every 6 hours  gabapentin 300 milliGRAM(s) Oral three times a day  guaiFENesin  milliGRAM(s) Oral every 12 hours  levothyroxine 225 MICROGram(s) Oral daily  midodrine 10 milliGRAM(s) Oral three times a day  montelukast 10 milliGRAM(s) Oral at bedtime  potassium chloride    Tablet ER 20 milliEquivalent(s) Oral two times a day  predniSONE   Tablet 20 milliGRAM(s) Oral two times a day  rivaroxaban 15 milliGRAM(s) Oral every 24 hours  sodium chloride 0.65% Nasal 1 Spray(s) Both Nostrils three times a day  spironolactone 25 milliGRAM(s) Oral daily  torsemide 10 milliGRAM(s) Oral two times a day    MEDICATIONS  (PRN):      PAST MEDICAL & SURGICAL HISTORY:  Ovarian cancer  Afib: s/p PPM  Ascites, malignant: from advanced ovarian cancer  Colon cancer  Obese  Hypothyroidism  HTN (Hypertension)  Overactive Bladder  DM Type 2 (Diabetes Mellitus, Type 2)  Asthma  Incarcerated hernia: s/p repair 10/2017  H/O hernia repair  Artificial pacemaker  S/P colon resection: with sigmoid colostomy for obstructing ovarian cancer  S/P Cataract Surgery: CASI  History of Tonsillectomy  History of Appendectomy  Status Post Total Knee Replacement: Left      FAMILY HISTORY:  No pertinent family history in first degree relatives      Social History    Smoking History:  YES[  ]  NO[ x ]   UNKNOWN[  ]    REVIEW OF SYSTEMS:  All systems below were reviewed and are normal [  ]  Constitutional:  HEENT:  Lymph nodes:  ID:  Pulmonary:no cough sob  Cardiac:no CP  GI:no NVD abd pain  Renal:no dysuria, frequency, back pain  Musculoskeletal:  Neuro:  Endocrine:  Skin:  All other systems above were reviewed and are normal   [x  ]    HOME MEDICATIONS:    MEDICATIONS  (STANDING):  ALBUTerol    0.083% 2.5 milliGRAM(s) Nebulizer every 6 hours  gabapentin 300 milliGRAM(s) Oral three times a day  guaiFENesin  milliGRAM(s) Oral every 12 hours  levothyroxine 225 MICROGram(s) Oral daily  midodrine 10 milliGRAM(s) Oral three times a day  montelukast 10 milliGRAM(s) Oral at bedtime  potassium chloride    Tablet ER 20 milliEquivalent(s) Oral two times a day  predniSONE   Tablet 20 milliGRAM(s) Oral two times a day  rivaroxaban 15 milliGRAM(s) Oral every 24 hours  sodium chloride 0.65% Nasal 1 Spray(s) Both Nostrils three times a day  spironolactone 25 milliGRAM(s) Oral daily  torsemide 10 milliGRAM(s) Oral two times a day    MEDICATIONS  (PRN):        Vital Signs Last 24 Hrs  T(C): 36.7 (13 Jan 2019 08:11), Max: 36.7 (13 Jan 2019 08:11)  T(F): 98 (13 Jan 2019 08:11), Max: 98 (13 Jan 2019 08:11)  HR: 70 (13 Jan 2019 08:11) (67 - 86)  BP: 124/78 (13 Jan 2019 08:11) (111/68 - 124/78)  BP(mean): --  RR: 18 (13 Jan 2019 08:11) (17 - 18)  SpO2: 99% (13 Jan 2019 08:11) (92% - 99%)    PHYSICAL EXAM:  Constitutional:  HEENT:  Neck:  Lymph Nodes:  Lungs:b/l rhonchi  Heart:rr  Abdomen:soft nontender  Renal:no CVAT  Extremities:  Neurologic:  Vascular:  Endocrine:  Skin:  Except for written comments, all of above normal [ x ]    I&O's Summary    12 Jan 2019 07:01  -  13 Jan 2019 07:00  --------------------------------------------------------  IN: 680 mL / OUT: 1201 mL / NET: -521 mL        LABORATORY:    CBC Full  -  ( 13 Jan 2019 07:23 )  WBC Count : 13.80 K/uL  Hemoglobin : 11.3 g/dL  Hematocrit : 37.0 %  Platelet Count - Automated : 405 K/uL  Mean Cell Volume : 78.6 fl  Mean Cell Hemoglobin : 24.0 pg  Mean Cell Hemoglobin Concentration : 30.5 gm/dL  Auto Neutrophil # : x  Auto Lymphocyte # : x  Auto Monocyte # : x  Auto Eosinophil # : x  Auto Basophil # : x  Auto Neutrophil % : x  Auto Lymphocyte % : x  Auto Monocyte % : x  Auto Eosinophil % : x  Auto Basophil % : x      01-13    139  |  98  |  41<H>  ----------------------------<  162<H>  4.5   |  34<H>  |  1.10    Ca    8.5      13 Jan 2019 07:23  Phos  2.8     01-12  Mg     1.8     01-12      UA 6-10 WBC      ESR:                   01-11 @ 07:35  --    C-Reactive Protein:     01-11 @ 07:35  2.33    Procalcitonin:           01-11 @ 07:35   --    ESR:                   01-11 @ 05:02  --    C-Reactive Protein:     01-11 @ 05:02  --    Procalcitonin:           01-11 @ 05:02   0.05      01-13    139  |  98  |  41<H>  ----------------------------<  162<H>  4.5   |  34<H>  |  1.10    Ca    8.5      13 Jan 2019 07:23  Phos  2.8     01-12  Mg     1.8     01-12              Blood Culture:           01-10 @ 11:45  Organism Escherichia coli ESBL  Gram stain --        Urine Culture:          01-10 @ 11:45  Organism Escherichia coli ESBL    Culture Results:       50,000 - 99,000 CFU/mL Escherichia coli ESBL  Specimen Source .Urine Clean Catch (Midstream)          Rapid Respiratory Viral Panel Result        01-10 @ 15:21  Rapid RVP Detected  Coronovirus --  Adenovirus --  Bordetella Pertussis --  Chlamydia Pneumonia --  Entero/RhinovirusDetected  HKU1 Coronovirus --  HMPV Coronovirus --  Influenza A --  Influenza AH1 --  Influenza AH1 2009 --  Influenza AH3 --  Influenza B --  Mycoplasma Pneumoniae --  NL63 Coronovirus --  OC43 Coronovirus --  Parainfluenza 1 --  Parainfluenza 2 --  Parainfluenza 3 --  Parainfluenza 4 --  Resp Syncytial Virus --          Vanco. trough:  Genta trough:    Radiology:  < from: Xray Chest 1 View- PORTABLE-Urgent (01.10.19 @ 08:26) >    EXAM:  XR CHEST PORTABLE URGENT 1V                            PROCEDURE DATE:  01/10/2019          INTERPRETATION:  Clinical information: Shortness of breath. Ovarian   cancer. Atrial fibrillation.    Technique: Frontal view of the chest.    Comparison: Prior chest x-ray examination from 7/21/2018.    Findings: There is a cardiac pacemaker overlying the left chest wall.    Trace right and small left sided pleural effusions noted. The heart size   is mildly enlarged. There are mild multilevel degenerative changes of the   thoracic spine.     IMPRESSION: Small left and trace right-sided pleural effusions.                CHRYSTAL VITAL M.D., ATTENDING RADIOLOGIST  This document has been electronically signed. Guevara 10 2019  8:45AM              < end of copied text >

## 2019-01-13 NOTE — PROGRESS NOTE ADULT - SUBJECTIVE AND OBJECTIVE BOX
Patient is a 89y old  Female who presents with a chief complaint of SOB, cough (13 Jan 2019 10:20)      INTERVAL HPI/OVERNIGHT EVENTS: 89 yo F with PMH of asthma, ovarian cancer (w/ recurrent malignant ascites with large volume paracenteses [id number]; had surgical resection >6 years ago and two chemotherapies last of which was 5 years ago and pt has been off treatment since), mets to colon (s/p colostomy), s/p incarcerated ventral hernia repair 10/2017, Type 2 DM, hypotension, afib (on low-dose xarelto), s/p PPM, hypothyroid, obesity, and overactive bladder presents with cough and wheezing for 2 weeks. Admitted for acute resp distress due to asthma exacerbation due to viral pneumonia, and also due to extrinsic restrictive process from tense ascites.     MEDICATIONS  (STANDING):  ALBUTerol    0.083% 2.5 milliGRAM(s) Nebulizer every 6 hours  gabapentin 300 milliGRAM(s) Oral three times a day  guaiFENesin  milliGRAM(s) Oral every 12 hours  levothyroxine 225 MICROGram(s) Oral daily  midodrine 10 milliGRAM(s) Oral three times a day  montelukast 10 milliGRAM(s) Oral at bedtime  potassium chloride    Tablet ER 20 milliEquivalent(s) Oral two times a day  predniSONE   Tablet 20 milliGRAM(s) Oral two times a day  rivaroxaban 15 milliGRAM(s) Oral every 24 hours  sodium chloride 0.65% Nasal 1 Spray(s) Both Nostrils three times a day  spironolactone 25 milliGRAM(s) Oral daily  torsemide 10 milliGRAM(s) Oral two times a day  trimethoprim   80 mG/sulfamethoxazole 400 mG 1 Tablet(s) Oral two times a day    MEDICATIONS  (PRN):      Allergies    No Known Allergies    Intolerances        REVIEW OF SYSTEMS:  CONSTITUTIONAL: No fever, weight loss, or fatigue  EYES: No eye pain, visual disturbances, or discharge  ENMT:  No difficulty hearing, tinnitus, vertigo; No sinus or throat pain  NECK: No pain or stiffness  BREASTS: No pain, masses, or nipple discharge  RESPIRATORY: No cough, wheezing, chills or hemoptysis; No shortness of breath  CARDIOVASCULAR: No chest pain, palpitations, dizziness, or leg swelling  GASTROINTESTINAL: No abdominal or epigastric pain. No nausea, vomiting, or hematemesis; No diarrhea or constipation. No melena or hematochezia.  GENITOURINARY: No dysuria, frequency, hematuria, or incontinence  NEUROLOGICAL: No headaches, memory loss, loss of strength, numbness, or tremors  SKIN: No itching, burning, rashes, or lesions   LYMPH NODES: No enlarged glands  ENDOCRINE: No heat or cold intolerance; No hair loss; No polydipsia or polyuria  MUSCULOSKELETAL: No joint pain or swelling; No muscle, back, or extremity pain  PSYCHIATRIC: No depression, anxiety, mood swings, or difficulty sleeping  HEME/LYMPH: No easy bruising, or bleeding gums  ALLERGY AND IMMUNOLOGIC: No hives or eczema    Vital Signs Last 24 Hrs  T(C): 36.5 (13 Jan 2019 11:55), Max: 36.7 (13 Jan 2019 08:11)  T(F): 97.7 (13 Jan 2019 11:55), Max: 98 (13 Jan 2019 08:11)  HR: 75 (13 Jan 2019 13:51) (67 - 86)  BP: 113/78 (13 Jan 2019 11:55) (113/78 - 124/78)  BP(mean): --  RR: 18 (13 Jan 2019 11:55) (17 - 18)  SpO2: 97% (13 Jan 2019 13:51) (92% - 99%)    PHYSICAL EXAM:  GENERAL: NAD, well-groomed, well-developed  HEAD:  Atraumatic, Normocephalic  EYES: EOMI, PERRLA, conjunctiva and sclera clear  ENMT: No tonsillar erythema, exudates, or enlargement; Moist mucous membranes, Good dentition, No lesions  NECK: Supple, No JVD, Normal thyroid  NERVOUS SYSTEM:  Alert & Oriented X3, Good concentration; Motor Strength 5/5 B/L upper and lower extremities; DTRs 2+ intact and symmetric  CHEST/LUNG: Clear to auscultation bilaterally; No rales, rhonchi, wheezing, or rubs  HEART: Regular rate and rhythm; No murmurs, rubs, or gallops  ABDOMEN: Soft, Nontender, Nondistended; Bowel sounds present  EXTREMITIES:  2+ Peripheral Pulses, No clubbing, cyanosis, or edema  LYMPH: No lymphadenopathy noted  SKIN: No rashes or lesions    LABS:                        11.3   13.80 )-----------( 405      ( 13 Jan 2019 07:23 )             37.0     13 Jan 2019 07:23    139    |  98     |  41     ----------------------------<  162    4.5     |  34     |  1.10     Ca    8.5        13 Jan 2019 07:23          CAPILLARY BLOOD GLUCOSE          RADIOLOGY & ADDITIONAL TESTS:    Imaging Personally Reviewed:  [x ] YES  [ ] NO    Consultant(s) Notes Reviewed:  [ x] YES  [ ] NO    Care Discussed with Consultants/Other Providers [x ] YES  [ ] NO

## 2019-01-13 NOTE — PROGRESS NOTE ADULT - ASSESSMENT
89 yo F with PMH of asthma, ovarian cancer (w/ recurrent malignant ascites with large volume paracenteses [id number]; had surgical resection >6 years ago and two chemotherapies last of which was 5 years ago and pt has been off treatment since), mets to colon (s/p colostomy), s/p incarcerated ventral hernia repair 10/2017, Type 2 DM, hypotension, afib (on low-dose xarelto), s/p PPM, hypothyroid, obesity, and overactive bladder presents with cough and wheezing for 2 weeks. Admitted for acute resp distress due to asthma exacerbation due to viral pneumonia, and also due to extrinsic restrictive process from tense ascites.     UTI with ESBL, U/c + 50 to 99 K Ecoli, consutled ID started on po Bactrim pt is asymptomatic     PT eval at am, can be discharge at am.

## 2019-01-14 ENCOUNTER — TRANSCRIPTION ENCOUNTER (OUTPATIENT)
Age: 84
End: 2019-01-14

## 2019-01-14 VITALS — OXYGEN SATURATION: 95 %

## 2019-01-14 LAB
ANION GAP SERPL CALC-SCNC: 7 MMOL/L — SIGNIFICANT CHANGE UP (ref 5–17)
BUN SERPL-MCNC: 43 MG/DL — HIGH (ref 7–23)
CALCIUM SERPL-MCNC: 8.4 MG/DL — LOW (ref 8.5–10.1)
CHLORIDE SERPL-SCNC: 97 MMOL/L — SIGNIFICANT CHANGE UP (ref 96–108)
CO2 SERPL-SCNC: 35 MMOL/L — HIGH (ref 22–31)
CREAT SERPL-MCNC: 1.2 MG/DL — SIGNIFICANT CHANGE UP (ref 0.5–1.3)
GLUCOSE SERPL-MCNC: 188 MG/DL — HIGH (ref 70–99)
HCT VFR BLD CALC: 36.7 % — SIGNIFICANT CHANGE UP (ref 34.5–45)
HGB BLD-MCNC: 11.3 G/DL — LOW (ref 11.5–15.5)
MCHC RBC-ENTMCNC: 24 PG — LOW (ref 27–34)
MCHC RBC-ENTMCNC: 30.8 GM/DL — LOW (ref 32–36)
MCV RBC AUTO: 77.9 FL — LOW (ref 80–100)
NRBC # BLD: 0 /100 WBCS — SIGNIFICANT CHANGE UP (ref 0–0)
PLATELET # BLD AUTO: 494 K/UL — HIGH (ref 150–400)
POTASSIUM SERPL-MCNC: 4.2 MMOL/L — SIGNIFICANT CHANGE UP (ref 3.5–5.3)
POTASSIUM SERPL-SCNC: 4.2 MMOL/L — SIGNIFICANT CHANGE UP (ref 3.5–5.3)
RBC # BLD: 4.71 M/UL — SIGNIFICANT CHANGE UP (ref 3.8–5.2)
RBC # FLD: 17.2 % — HIGH (ref 10.3–14.5)
SODIUM SERPL-SCNC: 139 MMOL/L — SIGNIFICANT CHANGE UP (ref 135–145)
WBC # BLD: 14.39 K/UL — HIGH (ref 3.8–10.5)
WBC # FLD AUTO: 14.39 K/UL — HIGH (ref 3.8–10.5)

## 2019-01-14 PROCEDURE — 94667 MNPJ CHEST WALL 1ST: CPT

## 2019-01-14 PROCEDURE — 83605 ASSAY OF LACTIC ACID: CPT

## 2019-01-14 PROCEDURE — 87486 CHLMYD PNEUM DNA AMP PROBE: CPT

## 2019-01-14 PROCEDURE — 83036 HEMOGLOBIN GLYCOSYLATED A1C: CPT

## 2019-01-14 PROCEDURE — 94760 N-INVAS EAR/PLS OXIMETRY 1: CPT

## 2019-01-14 PROCEDURE — 80048 BASIC METABOLIC PNL TOTAL CA: CPT

## 2019-01-14 PROCEDURE — 93005 ELECTROCARDIOGRAM TRACING: CPT

## 2019-01-14 PROCEDURE — 85610 PROTHROMBIN TIME: CPT

## 2019-01-14 PROCEDURE — 99239 HOSP IP/OBS DSCHRG MGMT >30: CPT

## 2019-01-14 PROCEDURE — 96375 TX/PRO/DX INJ NEW DRUG ADDON: CPT

## 2019-01-14 PROCEDURE — 49083 ABD PARACENTESIS W/IMAGING: CPT

## 2019-01-14 PROCEDURE — 84484 ASSAY OF TROPONIN QUANT: CPT

## 2019-01-14 PROCEDURE — 87086 URINE CULTURE/COLONY COUNT: CPT

## 2019-01-14 PROCEDURE — 87581 M.PNEUMON DNA AMP PROBE: CPT

## 2019-01-14 PROCEDURE — 84443 ASSAY THYROID STIM HORMONE: CPT

## 2019-01-14 PROCEDURE — 87631 RESP VIRUS 3-5 TARGETS: CPT

## 2019-01-14 PROCEDURE — 96374 THER/PROPH/DIAG INJ IV PUSH: CPT

## 2019-01-14 PROCEDURE — 87186 SC STD MICRODIL/AGAR DIL: CPT

## 2019-01-14 PROCEDURE — 85027 COMPLETE CBC AUTOMATED: CPT

## 2019-01-14 PROCEDURE — 36415 COLL VENOUS BLD VENIPUNCTURE: CPT

## 2019-01-14 PROCEDURE — 82553 CREATINE MB FRACTION: CPT

## 2019-01-14 PROCEDURE — 94668 MNPJ CHEST WALL SBSQ: CPT

## 2019-01-14 PROCEDURE — 86140 C-REACTIVE PROTEIN: CPT

## 2019-01-14 PROCEDURE — 71045 X-RAY EXAM CHEST 1 VIEW: CPT

## 2019-01-14 PROCEDURE — 83880 ASSAY OF NATRIURETIC PEPTIDE: CPT

## 2019-01-14 PROCEDURE — 80053 COMPREHEN METABOLIC PANEL: CPT

## 2019-01-14 PROCEDURE — 85730 THROMBOPLASTIN TIME PARTIAL: CPT

## 2019-01-14 PROCEDURE — 81001 URINALYSIS AUTO W/SCOPE: CPT

## 2019-01-14 PROCEDURE — 84100 ASSAY OF PHOSPHORUS: CPT

## 2019-01-14 PROCEDURE — 99221 1ST HOSP IP/OBS SF/LOW 40: CPT

## 2019-01-14 PROCEDURE — 84145 PROCALCITONIN (PCT): CPT

## 2019-01-14 PROCEDURE — 83735 ASSAY OF MAGNESIUM: CPT

## 2019-01-14 PROCEDURE — 84132 ASSAY OF SERUM POTASSIUM: CPT

## 2019-01-14 PROCEDURE — 87633 RESP VIRUS 12-25 TARGETS: CPT

## 2019-01-14 PROCEDURE — 87040 BLOOD CULTURE FOR BACTERIA: CPT

## 2019-01-14 PROCEDURE — 94640 AIRWAY INHALATION TREATMENT: CPT

## 2019-01-14 PROCEDURE — 82785 ASSAY OF IGE: CPT

## 2019-01-14 PROCEDURE — 99285 EMERGENCY DEPT VISIT HI MDM: CPT | Mod: 25

## 2019-01-14 PROCEDURE — 97162 PT EVAL MOD COMPLEX 30 MIN: CPT

## 2019-01-14 RX ORDER — SPIRONOLACTONE 25 MG/1
1 TABLET, FILM COATED ORAL
Qty: 30 | Refills: 0 | OUTPATIENT
Start: 2019-01-14 | End: 2019-02-12

## 2019-01-14 RX ORDER — SPIRONOLACTONE 25 MG/1
1 TABLET, FILM COATED ORAL
Qty: 0 | Refills: 0 | COMMUNITY
Start: 2019-01-14

## 2019-01-14 RX ORDER — BACILLUS COAGULANS/INULIN 1B-250 MG
1 CAPSULE ORAL
Qty: 14 | Refills: 0 | OUTPATIENT
Start: 2019-01-14 | End: 2019-01-27

## 2019-01-14 RX ORDER — FUROSEMIDE 40 MG
40 TABLET ORAL EVERY 12 HOURS
Qty: 0 | Refills: 0 | Status: DISCONTINUED | OUTPATIENT
Start: 2019-01-14 | End: 2019-01-14

## 2019-01-14 RX ORDER — POTASSIUM CHLORIDE 20 MEQ
1 PACKET (EA) ORAL
Qty: 0 | Refills: 0 | COMMUNITY

## 2019-01-14 RX ORDER — GUAIFENESIN/DEXTROMETHORPHAN 600MG-30MG
10 TABLET, EXTENDED RELEASE 12 HR ORAL
Qty: 0 | Refills: 0 | COMMUNITY
Start: 2019-01-14

## 2019-01-14 RX ADMIN — Medication 20 MILLIEQUIVALENT(S): at 05:13

## 2019-01-14 RX ADMIN — ALBUTEROL 2.5 MILLIGRAM(S): 90 AEROSOL, METERED ORAL at 13:42

## 2019-01-14 RX ADMIN — ALBUTEROL 2.5 MILLIGRAM(S): 90 AEROSOL, METERED ORAL at 07:25

## 2019-01-14 RX ADMIN — ALBUTEROL 2.5 MILLIGRAM(S): 90 AEROSOL, METERED ORAL at 00:00

## 2019-01-14 RX ADMIN — GABAPENTIN 300 MILLIGRAM(S): 400 CAPSULE ORAL at 05:13

## 2019-01-14 RX ADMIN — Medication 20 MILLIGRAM(S): at 05:13

## 2019-01-14 RX ADMIN — Medication 225 MICROGRAM(S): at 05:13

## 2019-01-14 RX ADMIN — Medication 1 SPRAY(S): at 13:27

## 2019-01-14 RX ADMIN — Medication 10 MILLIGRAM(S): at 05:13

## 2019-01-14 RX ADMIN — Medication 600 MILLIGRAM(S): at 05:13

## 2019-01-14 RX ADMIN — GABAPENTIN 300 MILLIGRAM(S): 400 CAPSULE ORAL at 13:26

## 2019-01-14 RX ADMIN — Medication 1 TABLET(S): at 05:13

## 2019-01-14 RX ADMIN — MIDODRINE HYDROCHLORIDE 10 MILLIGRAM(S): 2.5 TABLET ORAL at 13:26

## 2019-01-14 RX ADMIN — MIDODRINE HYDROCHLORIDE 10 MILLIGRAM(S): 2.5 TABLET ORAL at 05:13

## 2019-01-14 RX ADMIN — Medication 40 MILLIGRAM(S): at 11:19

## 2019-01-14 RX ADMIN — SPIRONOLACTONE 25 MILLIGRAM(S): 25 TABLET, FILM COATED ORAL at 05:13

## 2019-01-14 NOTE — DISCHARGE NOTE ADULT - HOSPITAL COURSE
89 yo F with PMH of asthma, ovarian cancer (w/ recurrent malignant ascites with large volume paracenteses [id number]; had surgical resection >6 years ago and two chemotherapies last of which was 5 years ago and pt has been off treatment since), mets to colon (s/p colostomy), s/p incarcerated ventral hernia repair 10/2017, Type 2 DM, hypotension, afib (on low-dose xarelto), s/p PPM, hypothyroid, obesity, and overactive bladder presents with cough and wheezing for 2 weeks. Admitted for acute resp distress due to asthma exacerbation due to viral pneumonia, and also due to extrinsic restrictive process from tense ascites. 89 yo F with PMH of asthma, ovarian cancer (w/ recurrent malignant ascites with large volume paracenteses [id number]; had surgical resection >6 years ago and two chemotherapies last of which was 5 years ago and pt has been off treatment since), mets to colon (s/p colostomy), s/p incarcerated ventral hernia repair 10/2017, Type 2 DM, hypotension, Afib (on low-dose Xarelto s/p PPM, hypothyroid, obesity, and overactive bladder presents with cough and wheezing for 2 weeks. Admitted for acute respiratory distress due to asthma exacerbation due to enterorhinovirus pneumonia, and also due to extrinsic restrictive process from tense ascites.  In the ED, tachypneic and requiring 3L NC for hypoxemia. Noted to have hypokalemia, anemia and elevated ProBNP 3279. Viral panel positive for enterorhinovirus. CXR: small left and trace right pleural effusions. EKG showed v-paced rhythm with rate 70.  Pt was treated with IV solumedrol, albuterol nebulizer and IV Lasix Pt received a large volume paracentesis (8.35L) likely contributing to respiratory distress by causing an extrinsic restrictive pulmonary . Continued diuresis with IV lasix and systemic steroids for bronchospasm. Pt clinically improved and today, is not requiring supplemental oxygen. Pt states cough improved and loosened phlegm, encouraged ambulation with walker. Stable for discharge home.     T(C): 36.5 (01-14-19 @ 12:40), Max: 36.8 (01-13-19 @ 23:59)  HR: 69 (01-14-19 @ 12:40) (69 - 89)  BP: 127/63 (01-14-19 @ 12:40) (114/74 - 134/84)  RR: 22 (01-14-19 @ 12:40) (18 - 22)  SpO2: 92% (01-14-19 @ 12:40) (92% - 99%)    GENERAL: patient appears well, no acute distress, appropriate, pleasant, speaking in full sentences on room air, sitting comfortably in bedside chair  EYES: sclera clear, no exudates  ENMT: oropharynx clear without erythema, no exudates, moist mucous membranes  NECK: supple, soft, FROM  LUNGS: non-labored breathing, diffuse rhonchi and some wheezing,   HEART: S1/S2, irregular rhythm, pacemaker in place on Left chest wall,  no murmurs noted, 2+ pitting edema bilaterally  GASTROINTESTINAL: abdomen is soft, nontender, nondistended, normoactive bowel sounds, chronic abdominal hernia on left, + colostomy, patent, pink mucosa and stool  INTEGUMENT: good skin turgor, no lesions noted  MUSCULOSKELETAL: no clubbing or cyanosis, no obvious deformity  NEUROLOGIC: awake, alert, oriented x3, good muscle tone in 4 extremities, no obvious sensory deficits  PSYCHIATRIC: mood is good, affect is congruent, linear and logical thought process  HEME/LYMPH: no obvious ecchymosis or petechiae 89 yo F with PMH of asthma, ovarian cancer (w/ recurrent malignant ascites with large volume paracenteses [id number]; had surgical resection >6 years ago and two chemotherapies last of which was 5 years ago and pt has been off treatment since), mets to colon (s/p colostomy), s/p incarcerated ventral hernia repair 10/2017, Type 2 DM, hypotension, Afib (on low-dose Xarelto s/p PPM, hypothyroid, obesity, and overactive bladder presents with cough and wheezing for 2 weeks. Admitted for acute respiratory distress due to asthma exacerbation due to enterorhinovirus pneumonia, and also due to extrinsic restrictive process from tense ascites.  In the ED, tachypneic and requiring 3L NC for hypoxemia. Noted to have hypokalemia, anemia and elevated ProBNP 3279. Viral panel positive for enterorhinovirus. CXR: small left and trace right pleural effusions. EKG showed v-paced rhythm with rate 70.  Pt was treated with IV solumedrol, albuterol nebulizer and IV Lasix Pt received a large volume paracentesis (8.35L) likely contributing to respiratory distress by causing an extrinsic restrictive pulmonary . Continued diuresis with IV lasix and systemic steroids for bronchospasm. Pt clinically improved and today, is not requiring supplemental oxygen. Pt states cough improved and loosened phlegm, encouraged ambulation with walker. Stable for discharge home.     T(C): 36.5 (01-14-19 @ 12:40), Max: 36.8 (01-13-19 @ 23:59)  HR: 69 (01-14-19 @ 12:40) (69 - 89)  BP: 127/63 (01-14-19 @ 12:40) (114/74 - 134/84)  RR: 22 (01-14-19 @ 12:40) (18 - 22)  SpO2: 92% (01-14-19 @ 12:40) (92% - 99%)    GENERAL: patient appears well, no acute distress, appropriate, pleasant, speaking in full sentences on room air, sitting comfortably in bedside chair  EYES: sclera clear, no exudates  ENMT: oropharynx clear without erythema, no exudates, moist mucous membranes  NECK: supple, soft, FROM  LUNGS: non-labored breathing, diffuse rhonchi and some wheezing,   HEART: S1/S2, irregular rhythm, pacemaker in place on Left chest wall,  no murmurs noted, 2+ pitting edema bilaterally  GASTROINTESTINAL: abdomen is soft, nontender, nondistended, normoactive bowel sounds, chronic abdominal hernia on left, + colostomy, patent, pink mucosa and stool  INTEGUMENT: good skin turgor, no lesions noted  MUSCULOSKELETAL: no clubbing or cyanosis, no obvious deformity  NEUROLOGIC: awake, alert, oriented x3, good muscle tone in 4 extremities, no obvious sensory deficits  PSYCHIATRIC: mood is good, affect is congruent, linear and logical thought process  HEME/LYMPH: no obvious ecchymosis or petechiae   I spent  45 min and notidied pcp

## 2019-01-14 NOTE — PROGRESS NOTE ADULT - SUBJECTIVE AND OBJECTIVE BOX
[facility]       HPI:  Pt is a 89 yo F with PMH of asthma, ovarian cancer w/ recurrent ascites, colon ca s/p colostomy, s/p incarcerated ventral hernia repair 10/2017, ?DM2 (no meds), hypotension, afib s/p PPM, hypothyroid, obesity, and overactive bladder presents with cough and wheezing for 2 weeks. Pt decided to come in today because she was having trouble breathing. Pt is now on 3L O2 via NC and does not feel SOB. Pt states that she has asthma and has needed to use her inhaler and nebulizers more frequently recently. States the cough has started to break up and is productive of yellow sputum. Pt denies sick contacts. Pt denies fevers, N/V, CP. Pt admits to SOB, cough, wheezing, occasional chills, LE edema and THOMAS.     In ED VS: T 98.1, HR 75, /73, RR 24, O2 sat 95% on 3L NC  Labs sig for Hb 10.5, K+ 3.1, proBNP 3279. RVP negative. UA: mod LE, sm nitrite, WBC 6-10  CXR: small left and trace right pleural effusions  EKG: v-paced @ rate of 70    In ED Pt rec'd solumedrol, albuterol, lasix. (10 Guevara 2019 13:10)        SUBJECTIVE:      ALLERGIES:  Allergies    No Known Allergies    Intolerances          MEDICATIONS  (STANDING):  ALBUTerol    0.083% 2.5 milliGRAM(s) Nebulizer every 6 hours  gabapentin 300 milliGRAM(s) Oral three times a day  guaiFENesin  milliGRAM(s) Oral every 12 hours  levothyroxine 225 MICROGram(s) Oral daily  midodrine 10 milliGRAM(s) Oral three times a day  montelukast 10 milliGRAM(s) Oral at bedtime  potassium chloride    Tablet ER 20 milliEquivalent(s) Oral two times a day  predniSONE   Tablet 20 milliGRAM(s) Oral two times a day  rivaroxaban 15 milliGRAM(s) Oral every 24 hours  sodium chloride 0.65% Nasal 1 Spray(s) Both Nostrils three times a day  spironolactone 25 milliGRAM(s) Oral daily  torsemide 10 milliGRAM(s) Oral two times a day  trimethoprim   80 mG/sulfamethoxazole 400 mG 1 Tablet(s) Oral two times a day    MEDICATIONS  (PRN):      REVIEW OF SYSTEMS:  CONSTITUTIONAL: No fever,  RESPIRATORY: No cough, wheezing, shortness of breath  CARDIOVASCULAR: No chest pain, dyspnea, palpitations, dizziness, syncope, paroxysmal nocturnal dyspnea, orthopnea, or arm or leg swelling  GASTROINTESTINAL: No abdominal  or epigastric pain, nausea, vomiting,  diarrhea  NEUROLOGICAL: No headaches,  loss of strength, numbness, or tremors    Vital Signs Last 24 Hrs  T(C): 36.4 (14 Jan 2019 07:50), Max: 36.8 (13 Jan 2019 23:59)  T(F): 97.6 (14 Jan 2019 07:50), Max: 98.2 (13 Jan 2019 23:59)  HR: 70 (14 Jan 2019 07:50) (69 - 89)  BP: 134/77 (14 Jan 2019 07:50) (113/78 - 134/84)  BP(mean): --  RR: 20 (14 Jan 2019 07:50) (18 - 20)  SpO2: 98% (14 Jan 2019 07:50) (92% - 99%)    PHYSICAL EXAM:  HEAD:  Atraumatic, Normocephalic  NECK: Supple and normal thyroid.  No JVD or carotid bruit.   HEART: S1, S2 regular , 1/6 soft ejection systolic murmur in mitral area , no thrill and no gallops .  PULMONARY: Bilateral vesicular breathing , few scattered ronchi and few scattered rales are present .  ABDOMEN: Soft nontender and positive bowl sounds   EXTREMITIES:  No clubbing, cyanosis, or pedal  edema  NEUROLOGICAL: AAOX3 , no focal deficit .    LABS:                        11.3   14.39 )-----------( 494      ( 14 Jan 2019 07:11 )             36.7     01-14    139  |  97  |  43<H>  ----------------------------<  188<H>  4.2   |  35<H>  |  1.20    Ca    8.4<L>      14 Jan 2019 07:11              BNP      EKG:  ECHO:  IMAGING:    Assessment/Plan    Will continue to follow during hospital course and give further recommendations as needed . Thanks for your referral . if any questions please contact me at office ([phone])cell [phone]) [facility]       HPI:  Pt is a 89 yo F with PMH of asthma, ovarian cancer w/ recurrent ascites, colon ca s/p colostomy, s/p incarcerated ventral hernia repair 10/2017, ?DM2 (no meds), hypotension, afib s/p PPM, hypothyroid, obesity, and overactive bladder presents with cough and wheezing for 2 weeks. Pt decided to come in today because she was having trouble breathing. Pt is now on 3L O2 via NC and does not feel SOB. Pt states that she has asthma and has needed to use her inhaler and nebulizers more frequently recently. States the cough has started to break up and is productive of yellow sputum. Pt denies sick contacts. Pt denies fevers, N/V, CP. Pt admits to SOB, cough, wheezing, occasional chills, LE edema and THOMAS.     In ED VS: T 98.1, HR 75, /73, RR 24, O2 sat 95% on 3L NC  Labs sig for Hb 10.5, K+ 3.1, proBNP 3279. RVP negative. UA: mod LE, sm nitrite, WBC 6-10  CXR: small left and trace right pleural effusions  EKG: v-paced @ rate of 70    In ED Pt rec'd solumedrol, albuterol, lasix. (10 Guevara 2019 13:10)        SUBJECTIVE: Patient has cough and mild SOB .       ALLERGIES:  Allergies    No Known Allergies    Intolerances          MEDICATIONS  (STANDING):  ALBUTerol    0.083% 2.5 milliGRAM(s) Nebulizer every 6 hours  gabapentin 300 milliGRAM(s) Oral three times a day  guaiFENesin  milliGRAM(s) Oral every 12 hours  levothyroxine 225 MICROGram(s) Oral daily  midodrine 10 milliGRAM(s) Oral three times a day  montelukast 10 milliGRAM(s) Oral at bedtime  potassium chloride    Tablet ER 20 milliEquivalent(s) Oral two times a day  predniSONE   Tablet 20 milliGRAM(s) Oral two times a day  rivaroxaban 15 milliGRAM(s) Oral every 24 hours  sodium chloride 0.65% Nasal 1 Spray(s) Both Nostrils three times a day  spironolactone 25 milliGRAM(s) Oral daily  torsemide 10 milliGRAM(s) Oral two times a day  trimethoprim   80 mG/sulfamethoxazole 400 mG 1 Tablet(s) Oral two times a day    MEDICATIONS  (PRN):      REVIEW OF SYSTEMS:  CONSTITUTIONAL: No fever,  RESPIRATORY: Mild  cough, wheezing, shortness of breath  CARDIOVASCULAR: No chest pain, dyspnea, palpitations, dizziness, syncope, paroxysmal nocturnal dyspnea, orthopnea, or arm swelling and leg swelling present .  GASTROINTESTINAL: No abdominal  or epigastric pain, nausea, vomiting,  diarrhea  NEUROLOGICAL: No headaches,  loss of strength, numbness, or tremors    Vital Signs Last 24 Hrs  T(C): 36.4 (14 Jan 2019 07:50), Max: 36.8 (13 Jan 2019 23:59)  T(F): 97.6 (14 Jan 2019 07:50), Max: 98.2 (13 Jan 2019 23:59)  HR: 70 (14 Jan 2019 07:50) (69 - 89)  BP: 134/77 (14 Jan 2019 07:50) (113/78 - 134/84)  BP(mean): --  RR: 20 (14 Jan 2019 07:50) (18 - 20)  SpO2: 98% (14 Jan 2019 07:50) (92% - 99%)    PHYSICAL EXAM:  HEAD:  Atraumatic, Normocephalic  NECK: Supple and normal thyroid.  No JVD or carotid bruit.   HEART: S1, S2 irregular , 1/6 soft ejection systolic murmur in mitral area , no thrill and no gallops .  PULMONARY: Bilateral vesicular breathing , few scattered ronchi and few scattered rales are present .  ABDOMEN: Soft nontender and positive bowl sounds   EXTREMITIES:  No clubbing, cyanosis, or pedal  edema  NEUROLOGICAL: AAOX3 , no focal deficit .    LABS:                        11.3   14.39 )-----------( 494      ( 14 Jan 2019 07:11 )             36.7     01-14    139  |  97  |  43<H>  ----------------------------<  188<H>  4.2   |  35<H>  |  1.20    Ca    8.4<L>      14 Jan 2019 07:11            Assessment/Plan   Patient has :  1) CHF : acute on chronic systolic and diastolic heart failure and better .  2) Atrial fibrillation and stable   3) Ascites and ovarian tumor ,.  Plan : Cardiac stable so far . 2) I/V lasix as ordered . 3) Monitor hemoglobin and electrolytes .  Will continue to follow during hospital course and give further recommendations as needed . Thanks for your referral . if any questions please contact me at office ([phone])cell [phone])

## 2019-01-14 NOTE — PROGRESS NOTE ADULT - NSHPATTENDINGPLANDISCUSS_GEN_ALL_CORE
pt and Dr Woodardout her ascitis.
dr DALLAS
pt and dR Madera about her UTI.
pt, pt's family, consultants, intern/resident

## 2019-01-14 NOTE — DISCHARGE NOTE ADULT - PATIENT PORTAL LINK FT
You can access the [facility] Patient Portal, offered by [facility], by registering with the following website: [email or web address]

## 2019-01-14 NOTE — PROGRESS NOTE ADULT - REASON FOR ADMISSION
SOB, cough

## 2019-01-14 NOTE — DISCHARGE NOTE ADULT - CARE PROVIDER_API CALL
Hardeep Madrigal), Family Medicine  [address] [place] [address]  Phone: [phone]  Fax: [phone]

## 2019-01-14 NOTE — DISCHARGE NOTE ADULT - CARE PLAN
Principal Discharge DX:	Asthma exacerbation  Goal:	Stable without need for respiratory support with supplemental oxygen or IV steroids  Assessment and plan of treatment:	You were in respiratory distress due to a viral infection in your lungs that worsened your asthma. You will continue to take a tapering prednisone dose for the next 6 days. Continue home dose of albuterol, Advair diskus, and Montelukast. You should call to make an appointment  with your primary care physician to follow up in 1 week from hospital discharge.  Secondary Diagnosis:	Afib  Assessment and plan of treatment:	Chronic. Continue Xarelto for prevention of blood clots.  Secondary Diagnosis:	Anemia  Assessment and plan of treatment:	You have chronic microcytic anemia.  You should call to make an appointment  with your primary care physician to follow up in 1 week from hospital discharge.  Secondary Diagnosis:	Asthma  Assessment and plan of treatment:	See plan in primary discharge diagnosis  Secondary Diagnosis:	DM type 2 (diabetes mellitus, type 2)  Assessment and plan of treatment:	Chronic, continue home  Secondary Diagnosis:	Hypotension  Assessment and plan of treatment:	Chronic hypotension, Continue home dose of midodrine  Secondary Diagnosis:	Hypothyroidism  Assessment and plan of treatment:	Chronic, continue home dose of levothyroxine Principal Discharge DX:	Asthma exacerbation  Goal:	Stable without need for respiratory support with supplemental oxygen or IV steroids  Assessment and plan of treatment:	You were in respiratory distress due to a viral infection in your lungs that worsened your asthma. You will continue to take a tapering prednisone dose for the next 6 days. Continue home dose of albuterol, Advair diskus, and Montelukast. You should call to make an appointment  with your primary care physician to follow up in 1 week from hospital discharge.  Secondary Diagnosis:	Afib  Assessment and plan of treatment:	Chronic. Continue Xarelto for prevention of blood clots.  Secondary Diagnosis:	Anemia  Assessment and plan of treatment:	You have chronic microcytic anemia.  You should call to make an appointment  with your primary care physician to follow up in 1 week from hospital discharge.  Secondary Diagnosis:	Asthma  Assessment and plan of treatment:	See plan in primary discharge diagnosis  Secondary Diagnosis:	DM type 2 (diabetes mellitus, type 2)  Assessment and plan of treatment:	Chronic, continue home  Secondary Diagnosis:	Hypothyroidism  Assessment and plan of treatment:	Chronic hypotension, Continue home dose of midodrine  Secondary Diagnosis:	Ascites, malignant  Assessment and plan of treatment:	Spoke with Cardiology, Dr. Licea. Please take Torsemide 10 mg twice daily and spironolactone 25 mg daily

## 2019-01-14 NOTE — PROGRESS NOTE ADULT - SUBJECTIVE AND OBJECTIVE BOX
Date/Time Patient Seen:  		  Referring MD:   Data Reviewed	       Patient is a 89y old  Female who presents with a chief complaint of SOB, cough (14 Jan 2019 09:37)      Subjective/HPI     PAST MEDICAL & SURGICAL HISTORY:  Ovarian cancer  Afib: s/p PPM  Ascites, malignant: from advanced ovarian cancer  Colon cancer  Obese  Hypothyroidism  HTN (Hypertension)  Overactive Bladder  DM Type 2 (Diabetes Mellitus, Type 2)  Asthma  Incarcerated hernia: s/p repair 10/2017  H/O hernia repair  Artificial pacemaker  S/P colon resection: with sigmoid colostomy for obstructing ovarian cancer  S/P Cataract Surgery: CASI  History of Tonsillectomy  History of Appendectomy  Status Post Total Knee Replacement: Left        Medication list         MEDICATIONS  (STANDING):  ALBUTerol    0.083% 2.5 milliGRAM(s) Nebulizer every 6 hours  gabapentin 300 milliGRAM(s) Oral three times a day  guaiFENesin/dextromethorphan  Syrup 10 milliLiter(s) Oral four times a day  levothyroxine 225 MICROGram(s) Oral daily  midodrine 10 milliGRAM(s) Oral three times a day  montelukast 10 milliGRAM(s) Oral at bedtime  potassium chloride    Tablet ER 20 milliEquivalent(s) Oral two times a day  predniSONE   Tablet 20 milliGRAM(s) Oral two times a day  rivaroxaban 15 milliGRAM(s) Oral every 24 hours  sodium chloride 0.65% Nasal 1 Spray(s) Both Nostrils three times a day  spironolactone 25 milliGRAM(s) Oral daily  trimethoprim   80 mG/sulfamethoxazole 400 mG 1 Tablet(s) Oral two times a day    MEDICATIONS  (PRN):         Vitals log        ICU Vital Signs Last 24 Hrs  T(C): 36.4 (14 Jan 2019 07:50), Max: 36.8 (13 Jan 2019 23:59)  T(F): 97.6 (14 Jan 2019 07:50), Max: 98.2 (13 Jan 2019 23:59)  HR: 70 (14 Jan 2019 07:50) (69 - 89)  BP: 134/77 (14 Jan 2019 07:50) (113/78 - 134/84)  BP(mean): --  ABP: --  ABP(mean): --  RR: 20 (14 Jan 2019 07:50) (18 - 20)  SpO2: 98% (14 Jan 2019 07:50) (92% - 99%)           Input and Output:  I&O's Detail    13 Jan 2019 07:01  -  14 Jan 2019 07:00  --------------------------------------------------------  IN:    Oral Fluid: 480 mL  Total IN: 480 mL    OUT:    Colostomy: 400 mL  Total OUT: 400 mL    Total NET: 80 mL          Lab Data                        11.3   14.39 )-----------( 494      ( 14 Jan 2019 07:11 )             36.7     01-14    139  |  97  |  43<H>  ----------------------------<  188<H>  4.2   |  35<H>  |  1.20    Ca    8.4<L>      14 Jan 2019 07:11              Review of Systems	      Objective     Physical Examination    heart s1s2  lung dec BS  abd soft      Pertinent Lab findings & Imaging      Paramjit:  NO   Adequate UO     I&O's Detail    13 Jan 2019 07:01  -  14 Jan 2019 07:00  --------------------------------------------------------  IN:    Oral Fluid: 480 mL  Total IN: 480 mL    OUT:    Colostomy: 400 mL  Total OUT: 400 mL    Total NET: 80 mL               Discussed with:     Cultures:	        Radiology

## 2019-01-14 NOTE — PROGRESS NOTE ADULT - PROBLEM SELECTOR PROBLEM 1
Acute respiratory distress
Exacerbation of asthma, unspecified asthma severity, unspecified whether persistent
Exacerbation of asthma, unspecified asthma severity, unspecified whether persistent
Acute respiratory distress
Exacerbation of asthma, unspecified asthma severity, unspecified whether persistent

## 2019-01-14 NOTE — DISCHARGE NOTE ADULT - MEDICATION SUMMARY - MEDICATIONS TO CHANGE
I will SWITCH the dose or number of times a day I take the medications listed below when I get home from the hospital:    midodrine 5 mg oral tablet  -- 1 tab(s) by mouth every 8 hours I will SWITCH the dose or number of times a day I take the medications listed below when I get home from the hospital:    potassium chloride 20 mEq oral granule, extended release  -- 1 tab(s) by mouth 4 times a day    potassium chloride 20 mEq oral granule, extended release  -- 1 tab(s) by mouth 2 times a day    midodrine 5 mg oral tablet  -- 1 tab(s) by mouth every 8 hours

## 2019-01-14 NOTE — PROGRESS NOTE ADULT - PROBLEM SELECTOR PLAN 1
poss Lower resp tract infection - on emp ABX - ID following, monitor vs and HD and Sat  on furosemide - cardio following, cxr shows - eff and atelectasis and pulm congestion  frail and weak  cough rx regimen - NEBS, robitussin, nasal saline for pulm congestion, oral hygiene  on systemic steroids for bronchospasm - will taper tomorrow   out of bed as tolerated  enc Cough  enc Activity as tolerated  keep sat > 88 pct  may need repeat CXR in 2 - 3 weeks  serial labs, replete lytes, caution with high dose of LASIX - monitor for contraction alkalosis

## 2019-01-14 NOTE — DISCHARGE NOTE ADULT - MEDICATION SUMMARY - MEDICATIONS TO STOP TAKING
I will STOP taking the medications listed below when I get home from the hospital:    furosemide 40 mg oral tablet  -- 1 tab(s) by mouth once a day    metOLazone 2.5 mg oral tablet  -- 1 tab(s) by mouth once a day    Augmentin 875 mg-125 mg oral tablet  -- 1 tab(s) by mouth every 12 hours until 8/1/2018    ciprofloxacin 500 mg oral tablet  -- 1 tab(s) by mouth every 12 hours    midodrine 5 mg oral tablet  -- 3 tab(s) by mouth every 8 hours    metOLazone 2.5 mg oral tablet  -- 1 tab(s) by mouth every other day I will STOP taking the medications listed below when I get home from the hospital:    furosemide 40 mg oral tablet  -- 1 tab(s) by mouth once a day    metOLazone 2.5 mg oral tablet  -- 1 tab(s) by mouth once a day    Augmentin 875 mg-125 mg oral tablet  -- 1 tab(s) by mouth every 12 hours until 8/1/2018    ciprofloxacin 500 mg oral tablet  -- 1 tab(s) by mouth every 12 hours    midodrine 5 mg oral tablet  -- 3 tab(s) by mouth every 8 hours    furosemide 20 mg oral tablet  -- 1 tab(s) by mouth 2 times a day    metOLazone 2.5 mg oral tablet  -- 1 tab(s) by mouth every other day

## 2019-01-14 NOTE — PROGRESS NOTE ADULT - SUBJECTIVE AND OBJECTIVE BOX
Patient is a 89y old  Female who presents with a chief complaint of SOB, cough (13 Jan 2019 14:20)    Asked to see patient for ID Consult  Interval History:complic UTI. bronchitis secondary to entero/rhinovirus    CENTRAL LINE:   [  ] YES       [x  ] NO  CULP:                 [  ] YES       [ x ] NO     PAST MEDICAL & SURGICAL HISTORY:  Ovarian cancer  Afib: s/p PPM  Ascites, malignant: from advanced ovarian cancer  Colon cancer  Obese  Hypothyroidism  HTN (Hypertension)  Overactive Bladder  DM Type 2 (Diabetes Mellitus, Type 2)  Asthma  Incarcerated hernia: s/p repair 10/2017  H/O hernia repair  Artificial pacemaker  S/P colon resection: with sigmoid colostomy for obstructing ovarian cancer  S/P Cataract Surgery: CASI  History of Tonsillectomy  History of Appendectomy  Status Post Total Knee Replacement: Left      REVIEW OF SYSTEMS:  All systems below were reviewed and are normal [  ]  Constitutional:  HEENT:  Lymph nodes:  ID:  Pulmonary:+ dry cough and wheeze  Cardiac:no CP  GI:no NVD abd pain  Renal:no dysuria  Musculoskeletal:  Neuro:  Endocrine:  Skin:  All other systems above were reviewed and are normal   [x  ]    Allergies  Allergies    No Known Allergies    Intolerances        MEDICATIONS  (STANDING):  ALBUTerol    0.083% 2.5 milliGRAM(s) Nebulizer every 6 hours  gabapentin 300 milliGRAM(s) Oral three times a day  guaiFENesin  milliGRAM(s) Oral every 12 hours  levothyroxine 225 MICROGram(s) Oral daily  midodrine 10 milliGRAM(s) Oral three times a day  montelukast 10 milliGRAM(s) Oral at bedtime  potassium chloride    Tablet ER 20 milliEquivalent(s) Oral two times a day  predniSONE   Tablet 20 milliGRAM(s) Oral two times a day  rivaroxaban 15 milliGRAM(s) Oral every 24 hours  sodium chloride 0.65% Nasal 1 Spray(s) Both Nostrils three times a day  spironolactone 25 milliGRAM(s) Oral daily  torsemide 10 milliGRAM(s) Oral two times a day  trimethoprim   80 mG/sulfamethoxazole 400 mG 1 Tablet(s) Oral two times a day    MEDICATIONS  (PRN):      Vital Signs Last 24 Hrs  T(C): 36.3 (14 Jan 2019 04:57), Max: 36.8 (13 Jan 2019 23:59)  T(F): 97.4 (14 Jan 2019 04:57), Max: 98.2 (13 Jan 2019 23:59)  HR: 70 (14 Jan 2019 04:57) (69 - 89)  BP: 134/84 (14 Jan 2019 04:57) (113/78 - 134/84)  BP(mean): --  RR: 18 (14 Jan 2019 04:57) (18 - 19)  SpO2: 97% (14 Jan 2019 04:57) (92% - 99%)    I&O's Summary    13 Jan 2019 07:01  -  14 Jan 2019 07:00  --------------------------------------------------------  IN: 480 mL / OUT: 400 mL / NET: 80 mL        PHYSICAL EXAM:  Constitutional:  HEENT:  Lymph nodes:  Neck:  Lungs:b/l rhonchi-no rales  Heart:rr  Abdomen:soft nontender  Renal:no CVAT  Extremities:  Musculoskeletal:  Neurologic:  Vascular:  Endocrine:  Skin:  All of the above normal except for written comments [x  ]    LABORATORY:    CBC Full  -  ( 14 Jan 2019 07:11 )  WBC Count : 14.39 K/uL  Hemoglobin : 11.3 g/dL  Hematocrit : 36.7 %  Platelet Count - Automated : 494 K/uL  Mean Cell Volume : 77.9 fl  Mean Cell Hemoglobin : 24.0 pg  Mean Cell Hemoglobin Concentration : 30.8 gm/dL  Auto Neutrophil # : x  Auto Lymphocyte # : x  Auto Monocyte # : x  Auto Eosinophil # : x  Auto Basophil # : x  Auto Neutrophil % : x  Auto Lymphocyte % : x  Auto Monocyte % : x  Auto Eosinophil % : x  Auto Basophil % : x      ESR:                   01-11 @ 07:35  --    C-Reactive Protein:     01-11 @ 07:35  2.33    Procalcitonin:           01-11 @ 07:35   --  ESR:                   01-11 @ 05:02  --    C-Reactive Protein:     01-11 @ 05:02  --    Procalcitonin:           01-11 @ 05:02   0.05      01-14    139  |  97  |  43<H>  ----------------------------<  188<H>  4.2   |  35<H>  |  1.20    Ca    8.4<L>      14 Jan 2019 07:11  Phos  2.8     01-12  Mg     1.8     01-12        Rapid Respiratory Viral Panel Result        01-10 @ 15:21  Rapid RVP Detected  Coronovirus --  Adenovirus --  Bordetella Pertussis --  Chlamydia Pneumonia --  Entero/RhinovirusDetected  HKU1 Coronovirus --  HMPV Coronovirus --  Influenza A --  Influenza AH1 --  Influenza AH1 2009 --  Influenza AH3 --  Influenza B --  Mycoplasma Pneumoniae --  NL63 Coronovirus --  OC43 Coronovirus --  Parainfluenza 1 --  Parainfluenza 2 --  Parainfluenza 3 --  Parainfluenza 4 --  Resp Syncytial Virus --          Vanco. trough:  Genta. trough:      Radiology:    Microbiology:  Culture - Urine (collected 10 Guevara 2019 11:45)  Source: .Urine Clean Catch (Midstream)  Final Report (12 Jan 2019 09:41):    50,000 - 99,000 CFU/mL Escherichia coli ESBL  Organism: Escherichia coli ESBL (12 Jan 2019 09:41)  Organism: Escherichia coli ESBL (12 Jan 2019 09:41)      -  Amikacin: S <=8      -  Amoxicillin/Clavulanic Acid: S <=8/4      -  Ampicillin: R >16 These ampicillin results predict results for amoxicillin      -  Ampicillin/Sulbactam: R <=4/2      -  Aztreonam: R 16      -  Cefazolin: R >16 For uncomplicated UTI with K. pneumoniae, E. coli, or P. mirablis: LEX <=16 is sensitive and LEX >=32 is resistant. This also predicts results for oral agents cefaclor, cefdinir, cefpodoxime, cefprozil, cefuroxime axetil, cephalexin and locarbef for uncomplicated UTI. Note that some isolates may be susceptible to these agents while testing resistant to cefazolin.      -  Cefepime: R 16      -  Cefoxitin: S <=4      -  Ceftriaxone: R >32 Enterobacter, Citrobacter, and Serratia may develop resistance during prolonged therapy      -  Ciprofloxacin: R >2      -  Ertapenem: S <=0.5      -  Gentamicin: S <=1      -  Imipenem: S <=1      -  Levofloxacin: R >4      -  Meropenem: S <=1      -  Nitrofurantoin: S <=32 Should not be used to treat pyelonephritis      -  Piperacillin/Tazobactam: R <=8      -  Tigecycline: S <=1      -  Tobramycin: S <=2      -  Trimethoprim/Sulfamethoxazole: S <=0.5/9.5      Method Type: LEX    Culture - Blood (collected 10 Guevara 2019 11:34)  Source: .Blood Blood-Peripheral  Preliminary Report (11 Jan 2019 12:02):    No growth to date.    Culture - Blood (collected 10 Guevara 2019 11:34)  Source: .Blood Blood-Peripheral  Preliminary Report (11 Jan 2019 12:02):    No growth to date.

## 2019-01-14 NOTE — PHYSICAL THERAPY INITIAL EVALUATION ADULT - GENERAL OBSERVATIONS, REHAB EVAL
Pt rec'd supine in bed with telemetry intact Pt rec'd supine in bed with telemetry intact, 02 via nc 2 liters

## 2019-01-14 NOTE — DISCHARGE NOTE ADULT - ADDITIONAL INSTRUCTIONS
Please follow up with your primary care provider Dr. Madrigal Please follow up with your primary care provider Dr. Madrigal within 1 week from discharge.  Please follow up with your Cardiologist Dr. Licea within 1 week from discharge.

## 2019-01-14 NOTE — PROGRESS NOTE ADULT - PROVIDER SPECIALTY LIST ADULT
Cardiology
Cardiology
Hospitalist
Hospitalist
Infectious Disease
Pulmonology
Hospitalist

## 2019-01-14 NOTE — DISCHARGE NOTE ADULT - PLAN OF CARE
Stable without need for respiratory support with supplemental oxygen or IV steroids You were in respiratory distress due to a viral infection in your lungs that worsened your asthma. You will continue to take a tapering prednisone dose for the next 6 days. Continue home dose of albuterol, Advair diskus, and Montelukast. You should call to make an appointment  with your primary care physician to follow up in 1 week from hospital discharge. Chronic. Continue Xarelto for prevention of blood clots. You have chronic microcytic anemia.  You should call to make an appointment  with your primary care physician to follow up in 1 week from hospital discharge. See plan in primary discharge diagnosis Chronic, continue home Chronic hypotension, Continue home dose of midodrine Chronic, continue home dose of levothyroxine Spoke with Cardiology, Dr. Licea. Please take Torsemide 10 mg twice daily and spironolactone 25 mg daily

## 2019-01-14 NOTE — DISCHARGE NOTE ADULT - SECONDARY DIAGNOSIS.
Afib Anemia Asthma DM type 2 (diabetes mellitus, type 2) Hypotension Hypothyroidism Ascites, malignant

## 2019-01-15 ENCOUNTER — APPOINTMENT (OUTPATIENT)
Age: 84
End: 2019-01-15

## 2019-01-15 VITALS
HEART RATE: 68 BPM | TEMPERATURE: 98.9 F | SYSTOLIC BLOOD PRESSURE: 122 MMHG | HEIGHT: 68 IN | WEIGHT: 208.38 LBS | DIASTOLIC BLOOD PRESSURE: 68 MMHG | RESPIRATION RATE: 16 BRPM | BODY MASS INDEX: 31.58 KG/M2

## 2019-01-15 RX ORDER — AZITHROMYCIN 250 MG/1
250 TABLET, FILM COATED ORAL
Qty: 1 | Refills: 0 | Status: DISCONTINUED | COMMUNITY
Start: 2019-01-07 | End: 2019-01-15

## 2019-01-15 RX ORDER — MIDODRINE HYDROCHLORIDE 10 MG/1
10 TABLET ORAL 3 TIMES DAILY
Qty: 90 | Refills: 3 | Status: DISCONTINUED | COMMUNITY
Start: 2019-01-15 | End: 2019-01-15

## 2019-01-15 RX ORDER — IPRATROPIUM BROMIDE AND ALBUTEROL SULFATE 2.5; .5 MG/3ML; MG/3ML
0.5-2.5 (3) SOLUTION RESPIRATORY (INHALATION) EVERY 6 HOURS
Refills: 1 | Status: DISCONTINUED | COMMUNITY
Start: 2019-01-15 | End: 2019-01-15

## 2019-01-15 RX ORDER — METOLAZONE 2.5 MG/1
2.5 TABLET ORAL
Refills: 0 | Status: DISCONTINUED | COMMUNITY
Start: 2018-11-21 | End: 2019-01-15

## 2019-01-15 RX ORDER — ALBUTEROL SULFATE 2.5 MG/3ML
(2.5 MG/3ML) SOLUTION RESPIRATORY (INHALATION)
Qty: 1 | Refills: 3 | Status: DISCONTINUED | COMMUNITY
Start: 2018-10-23 | End: 2019-01-15

## 2019-01-15 RX ORDER — FUROSEMIDE 20 MG/1
20 TABLET ORAL
Qty: 60 | Refills: 2 | Status: DISCONTINUED | COMMUNITY
End: 2019-01-15

## 2019-01-17 ENCOUNTER — APPOINTMENT (OUTPATIENT)
Dept: HOME HEALTH SERVICES | Facility: HOME HEALTH | Age: 84
End: 2019-01-17
Payer: MEDICARE

## 2019-01-17 VITALS
HEART RATE: 63 BPM | SYSTOLIC BLOOD PRESSURE: 126 MMHG | RESPIRATION RATE: 16 BRPM | TEMPERATURE: 99.6 F | DIASTOLIC BLOOD PRESSURE: 56 MMHG | OXYGEN SATURATION: 93 %

## 2019-01-17 DIAGNOSIS — J45.901 UNSPECIFIED ASTHMA WITH (ACUTE) EXACERBATION: ICD-10-CM

## 2019-01-17 DIAGNOSIS — I95.9 HYPOTENSION, UNSPECIFIED: ICD-10-CM

## 2019-01-17 DIAGNOSIS — H35.3290 EXUDATIVE AGE-RELATED MACULAR DEGENERATION, UNSPECIFIED EYE, STAGE UNSPECIFIED: ICD-10-CM

## 2019-01-17 DIAGNOSIS — Z93.3 COLOSTOMY STATUS: ICD-10-CM

## 2019-01-17 PROCEDURE — 99349 HOME/RES VST EST MOD MDM 40: CPT | Mod: Q5

## 2019-01-17 NOTE — COUNSELING
[DNR] : Code Status: DNR [No Limitations] : Treatment Guidelines: No limitations [Trial of Intubation] : Intubation: Trial of Intubation [[facility] Ver[facility] Date: _____] [facility] Completion/last verification date: [unfilled] [_____] : HCP: [unfilled]

## 2019-01-17 NOTE — LETTER HEADER
[Care Plan reviewed and provided to patient/caregiver] : Care plan reviewed and provided to patient/caregiver [Care Plan reviewed every ___ weeks] : Care plan reviewed every [unfilled] weeks [Care Plan managed/Care coordinated by: ___] : Care plan managed/Care coordinated by: [unfilled] [Initiation or substantial revisions made to care plan involving mod/high medical decision making for complex CCM] : Initiation or substantial revisions made to care plan involving mod/high medical decision making for complex CCM

## 2019-01-17 NOTE — CURRENT MEDS
[Medication and Allergies Reconciled] : medication and allergies reconciled [Reviewed patient reported medication adherence from Comprehensive Assessment] : Reviewed patient reported medication adherence from comprehensive assessment [Compliant with medications] : Patient is compliant with medications

## 2019-01-19 ENCOUNTER — TRANSCRIPTION ENCOUNTER (OUTPATIENT)
Age: 84
End: 2019-01-19

## 2019-01-19 RX ORDER — LEVOFLOXACIN 750 MG/1
750 TABLET, FILM COATED ORAL
Qty: 5 | Refills: 0 | Status: DISCONTINUED | COMMUNITY
Start: 2019-01-18 | End: 2019-01-19

## 2019-01-27 PROBLEM — J45.901 ASTHMA EXACERBATION: Status: ACTIVE | Noted: 2019-01-07

## 2019-01-27 PROBLEM — H35.3290 MACULAR DEGENERATION, WET: Status: ACTIVE | Noted: 2018-11-05

## 2019-01-27 PROBLEM — Z93.3 COLOSTOMY IN PLACE: Status: ACTIVE | Noted: 2018-11-05

## 2019-01-27 PROBLEM — I95.9 HYPOTENSION: Status: ACTIVE | Noted: 2018-10-23

## 2019-01-27 NOTE — REASON FOR VISIT
[Family Member] : family member [Pre-Visit Preparation] : pre-visit preparation was done [Follow-Up] : a follow-up visit [Intercurrent Specialty/Sub-specialty Visits] : the patient has intercurrent specialty/sub-specialty visits [FreeTextEntry3] : has therapeutic paracentesis performed at [facility].

## 2019-01-27 NOTE — PHYSICAL EXAM
[No Acute Distress] : no acute distress [Well Nourished] : well nourished [Well Developed] : well developed [Normal Voice/Communication] : normal voice communication [Normal Sclera/Conjunctiva] : normal sclera/conjunctiva [EOMI] : extra ocular movement intact [Normal Outer Ear/Nose] : the ears and nose were normal in appearance [No JVD] : no jugular venous distention [Supple] : the neck was supple [No LAD] : no lymphadenopathy [No Respiratory Distress] : no respiratory distress [No Accessory Muscle Use] : no accessory muscle use [Normal Rate] : heart rate was normal  [Regular Rhythm] : with a regular rhythm [Normal S1, S2] : normal S1 and S2 [No Murmurs] : no murmurs heard [No Edema] : there was no peripheral edema [Normal Bowel Sounds] : normal bowel sounds [Non Tender] : non-tender [Soft] : abdomen soft [Not Distended] : not distended [Normal Post Cervical Nodes] : no posterior cervical lymphadenopathy [Normal Anterior Cervical Nodes] : no anterior cervical lymphadenopathy [Normal Gait] : normal gait [No Joint Swelling] : no joint swelling seen [No Clubbing, Cyanosis] : no clubbing  or cyanosis of the fingernails [Cranial Nerves Intact] : cranial nerves 2-12 were intact [No Motor Deficits] : the motor exam was normal [Oriented x3] : oriented to person, place, and time [Normal Affect] : the affect was normal [Normal Mood] : the mood was normal [de-identified] : See vitals performed by RN [de-identified] : diffuse expiratory wheezing, but good air movement. [de-identified] : has large LLQ hernia.  Also, stoma with parastomal redness.  + fluid on palpation of abdomen.  However, stoma not visible at this visit because dressing was just placed by home care RN. [de-identified] : parastomal redness, did not remove pouch as it was just placed.

## 2019-01-27 NOTE — HISTORY OF PRESENT ILLNESS
[Patient] : patient [Family Member] : family member [FreeTextEntry2] : Patient is a 89 yo woman with malignant ascites, pt is s/p ovarian CA with mets to colon and s/p partial colectomy (now with stoma) and chemo x 2, T2DM, Afib, CKD Stage 3, diabetic nephropathy, asthma, B/L leg edema, being seen for intake.\par \par pt is s/p hospitalization for asthma exacerbation.  \par prednisone, probiotic and antibiotics are going to be stopped (due to course to be completed) over the weekend.\par urine is clear yellow and goes to the bathroom frequently during the daytime (due to medications).\par \par needs office notes/order for paracentesis to be done 2/11/19 (in addition to 1/28/19), then 3 weeks after 2/11.\par \par asthma exacerbation: took cough medicine in am, which is helping, but cough still persistent.  doesn't use duoneb if doesn't need it.  [facility].\par \par re: afib: no palpitations\par re: DM: 153.  usu 105-109.  \par \par Colostomy still in place - has sore at colostomy, that is taken care of by wound care rn, and it hasn't been getting better.\par macular degeneration: still issue, can't read.  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  When she goes for a paracentesis in the hospital, they draw her lab work (CBC, INR, BMP) in preparation for her next paracentesis in 3 weeks (pt needs labs Q30 days).  \par needs current office notes for paracentesis.\par \par had sepsis (SBP) over the summer.  went to [facility] for 3 weeks.\par \par has b/l leg swelling - edema.  (not lymphedema)\par \par re: macular degeneration - has both wet and dry.  Gets shots in L eye. (trying to save sight in one eye).\par \par CHF - had echo in 12/2017; BNP 5000 in plvw in 1/2018; but gets labs with cardiology routinely.\par \maggie has hernia x 3 since october of 2017.  Had hernia surgery 1st - didn't put mesh, then had 2nd one in january with mesh, and then original one popped through june 2018 with mesh.  Has a large LLQ hernia , but can't reverse it (high-mortality surgical risk).  \par \maggie had paracentesis with 9L 2 weeks ago, with 6L this week.\par on xarelto (was on coumadin before) for 5 years - had an ulcer of leg that took a year to heal (with wound care).  \par \maggie was on dx'd with dm b/c a1c was 7%.  was put on meds but then over the past summer in rehab has been normal and since then.  has been off meds for a while.  \par \par sees podiatry for toenails -  pt will call [facility] for visiting.

## 2019-01-28 ENCOUNTER — OUTPATIENT (OUTPATIENT)
Dept: OUTPATIENT SERVICES | Facility: HOSPITAL | Age: 84
LOS: 1 days | End: 2019-01-28
Payer: COMMERCIAL

## 2019-01-28 DIAGNOSIS — K46.0 UNSPECIFIED ABDOMINAL HERNIA WITH OBSTRUCTION, WITHOUT GANGRENE: Chronic | ICD-10-CM

## 2019-01-28 DIAGNOSIS — R18.0 MALIGNANT ASCITES: ICD-10-CM

## 2019-01-28 DIAGNOSIS — Z98.890 OTHER SPECIFIED POSTPROCEDURAL STATES: Chronic | ICD-10-CM

## 2019-01-28 PROCEDURE — 49083 ABD PARACENTESIS W/IMAGING: CPT

## 2019-02-08 ENCOUNTER — MEDICATION RENEWAL (OUTPATIENT)
Age: 84
End: 2019-02-08

## 2019-02-11 ENCOUNTER — OUTPATIENT (OUTPATIENT)
Dept: OUTPATIENT SERVICES | Facility: HOSPITAL | Age: 84
LOS: 1 days | End: 2019-02-11
Payer: COMMERCIAL

## 2019-02-11 DIAGNOSIS — R18.0 MALIGNANT ASCITES: ICD-10-CM

## 2019-02-11 DIAGNOSIS — Z98.890 OTHER SPECIFIED POSTPROCEDURAL STATES: Chronic | ICD-10-CM

## 2019-02-11 DIAGNOSIS — K46.0 UNSPECIFIED ABDOMINAL HERNIA WITH OBSTRUCTION, WITHOUT GANGRENE: Chronic | ICD-10-CM

## 2019-02-11 LAB
HCT VFR BLD CALC: 34.9 % — SIGNIFICANT CHANGE UP (ref 34.5–45)
HGB BLD-MCNC: 10.5 G/DL — LOW (ref 11.5–15.5)
INR BLD: 1.2 RATIO — HIGH (ref 0.88–1.16)
MCHC RBC-ENTMCNC: 24.1 PG — LOW (ref 27–34)
MCHC RBC-ENTMCNC: 30.1 GM/DL — LOW (ref 32–36)
MCV RBC AUTO: 80 FL — SIGNIFICANT CHANGE UP (ref 80–100)
NRBC # BLD: 0 /100 WBCS — SIGNIFICANT CHANGE UP (ref 0–0)
PLATELET # BLD AUTO: 405 K/UL — HIGH (ref 150–400)
PROTHROM AB SERPL-ACNC: 13.7 SEC — HIGH (ref 10–12.9)
RBC # BLD: 4.36 M/UL — SIGNIFICANT CHANGE UP (ref 3.8–5.2)
RBC # FLD: 19.6 % — HIGH (ref 10.3–14.5)
WBC # BLD: 5.78 K/UL — SIGNIFICANT CHANGE UP (ref 3.8–10.5)
WBC # FLD AUTO: 5.78 K/UL — SIGNIFICANT CHANGE UP (ref 3.8–10.5)

## 2019-02-11 PROCEDURE — 85610 PROTHROMBIN TIME: CPT

## 2019-02-11 PROCEDURE — 49083 ABD PARACENTESIS W/IMAGING: CPT

## 2019-02-11 PROCEDURE — 85027 COMPLETE CBC AUTOMATED: CPT

## 2019-02-11 PROCEDURE — 36415 COLL VENOUS BLD VENIPUNCTURE: CPT

## 2019-02-19 ENCOUNTER — TRANSCRIPTION ENCOUNTER (OUTPATIENT)
Age: 84
End: 2019-02-19

## 2019-02-19 DIAGNOSIS — R09.89 OTHER SPECIFIED SYMPTOMS AND SIGNS INVOLVING THE CIRCULATORY AND RESPIRATORY SYSTEMS: ICD-10-CM

## 2019-02-26 NOTE — PHYSICAL THERAPY INITIAL EVALUATION ADULT - PERTINENT HX OF CURRENT PROBLEM, REHAB EVAL
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   • lisdexamfetamine (VYVANSE) 70 MG capsule Take 1 capsule by mouth daily.   • fluticasone (FLONASE) 50 MCG/ACT nasal spray Spray 2 sprays in each nostril daily.   • fexofenadine (ALLEGRA) 180 MG tablet Take 1 tablet by mouth daily.   • buPROPion (WELLBUTRIN XL) 300 MG 24 hr tablet Take 1 tablet by mouth daily.   • montelukast (SINGULAIR) 10 MG tablet Take 1 tablet by mouth nightly. TAKE 1 TABLET BY MOUTH AT BEDTIME   • EPINEPHrine (EPIPEN 2-KELLIE) 0.3 MG/0.3ML auto-injector INJECT 0.3ML INTRAMUSCULARLY AS DIRECTED   • albuterol (PROAIR HFA) 108 (90 Base) MCG/ACT inhaler INHALE 2 PUFFS BY MOUTH 4 TIMES A DAY AS NEEDED   • promethazine-dextromethorphan (PROMETHAZINE-DM) 6.25-15 MG/5ML syrup TAKE 10 ML BY MOUTH EVERY 4 TO 6 HOURS AS NEEDED FOR COUGH   • tobramycin (TOBREX) 0.3 % ophthalmic solution INSTILL 2 DROP 4 TIMES DAILY both eyes     No current facility-administered medications for this visit.        ALLERGIES:  ALLERGIES:   Allergen Reactions   • No Name Available Other (See Comments)     No  known drug allergies   • Peanut   (Food Or Med) Other (See Comments)     unknown       PAST SURGICAL HISTORY:  Past Surgical History:   Procedure Laterality Date   • Appendectomy     • Astigmatism correcting function of intraocular lens     • Inner ear surgery proc unlisted     • Tympanoplasty         FAMILY HISTORY:  Family History   Problem Relation Age of Onset   • Diabetes Mother    • Myocardial Infarction Mother    • Kidney disease Mother         renal failure   • Diabetes Father    • Coronary Artery Disease Father        SOCIAL HISTORY:  Social History     Tobacco Use   • Smoking status: Never Smoker   • Smokeless tobacco: Never Used   Substance Use Topics   • Alcohol use: Yes     Comment: socially    • Drug use: Not on file       Review of Systems   Constitutional: Negative for activity change, appetite change, chills, fatigue, fever and unexpected weight change.   HENT: Positive for voice change. Negative for dental problem, hearing loss, mouth sores, sinus pain, sore throat and trouble swallowing.    Eyes: Negative for visual disturbance.   Respiratory: Negative for cough, chest tightness and shortness of breath.    Cardiovascular: Negative for chest pain and palpitations.   Gastrointestinal: Negative for abdominal pain, constipation, diarrhea, nausea and vomiting.   Genitourinary: Negative for dysuria and frequency.   Musculoskeletal: Negative for arthralgias, back pain, gait problem and neck pain.   Skin: Negative for rash.   Neurological: Negative for weakness, light-headedness, numbness and headaches.   Psychiatric/Behavioral: Negative for agitation, decreased concentration, dysphoric mood and sleep disturbance. The patient is not nervous/anxious.         Stable w meds  Psych q 3 months  No med changes       OBJECTIVE:     Visit Vitals  /87   Pulse 95   Temp 97.6 °F (36.4 °C)   Ht 5' 9\" (1.753 m)   Wt 123.4 kg (272 lb)   BMI 40.17 kg/m²       Physical Exam   Constitutional: He is oriented to  person, place, and time. He appears well-developed and well-nourished. No distress.   HENT:   Head: Normocephalic and atraumatic.   Right Ear: External ear normal.   Left Ear: External ear normal.   Nose: Nose normal.   Mouth/Throat: Oropharynx is clear and moist. No oropharyngeal exudate.   Eyes: Conjunctivae and EOM are normal. Pupils are equal, round, and reactive to light. No scleral icterus.   Neck: Normal range of motion. Neck supple.   Cardiovascular: Normal rate, regular rhythm, normal heart sounds and intact distal pulses.   No murmur heard.  118/78   Pulmonary/Chest: Effort normal and breath sounds normal. He exhibits no tenderness.   PJ=[id number]   Abdominal: Soft. Bowel sounds are normal. He exhibits no distension and no mass. There is no tenderness.   Musculoskeletal: Normal range of motion. He exhibits no edema or deformity.   Lymphadenopathy:     He has no cervical adenopathy.   Neurological: He is alert and oriented to person, place, and time. He has normal reflexes. No cranial nerve deficit. He exhibits normal muscle tone. Coordination normal.   Skin: Skin is warm and dry. He is not diaphoretic.   Psychiatric: He has a normal mood and affect. His behavior is normal. Judgment and thought content normal.       DIAGNOSTIC STUDIES:   LAB RESULTS:    Lab Services on 02/09/2019   Component Date Value Ref Range Status   • Hemoglobin A1C 02/09/2019 5.2  4.5 - 5.6 % Final   • FASTING STATUS 02/09/2019 10  hrs Final   • CHOLESTEROL 02/09/2019 203* <200 mg/dL Final   • CALCULATED LDL 02/09/2019 UNABLE TO CALCULATE LDL.  <130 mg/dL Final   • HDL 02/09/2019 24* >39 mg/dL Final   • TRIGLYCERIDE 02/09/2019 596* <150 mg/dL Final   • CALCULATED NON HDL 02/09/2019 179  mg/dL Final   • CHOL/HDL 02/09/2019 8.5* <4.5 Final   • Fasting Status 02/09/2019 10  hrs Final   • Sodium 02/09/2019 141  135 - 145 mmol/L Final   • Potassium 02/09/2019 4.4  3.4 - 5.1 mmol/L Final   • Chloride 02/09/2019 104  98 - 107 mmol/L Final   •  Carbon Dioxide 02/09/2019 28  21 - 32 mmol/L Final   • Anion Gap 02/09/2019 13  10 - 20 mmol/L Final   • Glucose 02/09/2019 92  65 - 99 mg/dL Final   • BUN 02/09/2019 16  6 - 20 mg/dL Final   • Creatinine 02/09/2019 1.30* 0.67 - 1.17 mg/dL Final   • GFR Estimate,  02/09/2019 82   Final   • GFR Estimate, Non  02/09/2019 71   Final   • BUN/Creatinine Ratio 02/09/2019 12  7 - 25 Final   • CALCIUM 02/09/2019 9.4  8.4 - 10.2 mg/dL Final   • TOTAL BILIRUBIN 02/09/2019 0.6  0.2 - 1.0 mg/dL Final   • AST/SGOT 02/09/2019 31  <38 Units/L Final   • ALT/SGPT 02/09/2019 62  <79 Units/L Final   • ALK PHOSPHATASE 02/09/2019 105  45 - 117 Units/L Final   • TOTAL PROTEIN 02/09/2019 7.6  6.4 - 8.2 g/dL Final   • Albumin 02/09/2019 4.3  3.6 - 5.1 g/dL Final   • GLOBULIN 02/09/2019 3.3  2.0 - 4.0 g/dL Final   • A/G Ratio, Serum 02/09/2019 1.3  1.0 - 2.4 Final       Assessment AND PLAN:   This is a 36 year old year-old male who presents with:    Problem List Items Addressed This Visit        Respiratory    Asthma in adult, moderate persistent, uncomplicated - Primary     Asthma is improving with treatment.  The patient is experiencing monthly daytime asthma symptoms. He is experiencing monthly nighttime asthma symptoms.  Patient to keep asthma diary.  FE=520  Asthma stable           Chronic cough       Digestive    GERD (gastroesophageal reflux disease)     Zantac same         Obesity, morbid, BMI 40.0-49.9 (CMS/[facility])       Other    Hoarseness     [facility]  eval and treat  Voice rest               Please take medications as directed.      Instructions provided as documented in the AVS.    Return in about 3 months (around 5/26/2019).      The patient indicated understanding of the diagnosis and agreed with the plan of care.         89 y/o F PMHx ovarian cancer w/ recurrent ascites, colon ca s/p colostomy, s/p incarcerated ventral hernia repair 10/2017, DM2, HTN, afib s/p PPM, hypothyroid, obesity, and overactive bladder presents with generalized weakness x 2 days.  Patient reports that she began having cough and wheezing x 2 weeks.

## 2019-03-04 ENCOUNTER — MEDICATION RENEWAL (OUTPATIENT)
Age: 84
End: 2019-03-04

## 2019-03-04 ENCOUNTER — OUTPATIENT (OUTPATIENT)
Dept: OUTPATIENT SERVICES | Facility: HOSPITAL | Age: 84
LOS: 1 days | End: 2019-03-04
Payer: COMMERCIAL

## 2019-03-04 DIAGNOSIS — Z98.890 OTHER SPECIFIED POSTPROCEDURAL STATES: Chronic | ICD-10-CM

## 2019-03-04 DIAGNOSIS — K46.0 UNSPECIFIED ABDOMINAL HERNIA WITH OBSTRUCTION, WITHOUT GANGRENE: Chronic | ICD-10-CM

## 2019-03-04 DIAGNOSIS — R18.0 MALIGNANT ASCITES: ICD-10-CM

## 2019-03-04 LAB
HCT VFR BLD CALC: 34.1 % — LOW (ref 34.5–45)
HGB BLD-MCNC: 10.3 G/DL — LOW (ref 11.5–15.5)
INR BLD: 1.21 RATIO — HIGH (ref 0.88–1.16)
MCHC RBC-ENTMCNC: 24.3 PG — LOW (ref 27–34)
MCHC RBC-ENTMCNC: 30.2 GM/DL — LOW (ref 32–36)
MCV RBC AUTO: 80.4 FL — SIGNIFICANT CHANGE UP (ref 80–100)
NRBC # BLD: 0 /100 WBCS — SIGNIFICANT CHANGE UP (ref 0–0)
PLATELET # BLD AUTO: 207 K/UL — SIGNIFICANT CHANGE UP (ref 150–400)
PROTHROM AB SERPL-ACNC: 13.8 SEC — HIGH (ref 10–12.9)
RBC # BLD: 4.24 M/UL — SIGNIFICANT CHANGE UP (ref 3.8–5.2)
RBC # FLD: 19.1 % — HIGH (ref 10.3–14.5)
WBC # BLD: 6.36 K/UL — SIGNIFICANT CHANGE UP (ref 3.8–10.5)
WBC # FLD AUTO: 6.36 K/UL — SIGNIFICANT CHANGE UP (ref 3.8–10.5)

## 2019-03-04 PROCEDURE — 49083 ABD PARACENTESIS W/IMAGING: CPT

## 2019-03-04 PROCEDURE — 85610 PROTHROMBIN TIME: CPT

## 2019-03-04 PROCEDURE — 85027 COMPLETE CBC AUTOMATED: CPT

## 2019-03-05 ENCOUNTER — TRANSCRIPTION ENCOUNTER (OUTPATIENT)
Age: 84
End: 2019-03-05

## 2019-03-07 ENCOUNTER — APPOINTMENT (OUTPATIENT)
Dept: HOME HEALTH SERVICES | Facility: HOME HEALTH | Age: 84
End: 2019-03-07
Payer: MEDICARE

## 2019-03-07 VITALS — DIASTOLIC BLOOD PRESSURE: 68 MMHG | SYSTOLIC BLOOD PRESSURE: 110 MMHG | TEMPERATURE: 97.6 F | RESPIRATION RATE: 18 BRPM

## 2019-03-07 DIAGNOSIS — G62.0 DRUG-INDUCED POLYNEUROPATHY: ICD-10-CM

## 2019-03-07 DIAGNOSIS — J18.1 LOBAR PNEUMONIA, UNSPECIFIED ORGANISM: ICD-10-CM

## 2019-03-07 DIAGNOSIS — T45.1X5A DRUG-INDUCED POLYNEUROPATHY: ICD-10-CM

## 2019-03-07 DIAGNOSIS — D47.3 ESSENTIAL (HEMORRHAGIC) THROMBOCYTHEMIA: ICD-10-CM

## 2019-03-07 PROCEDURE — 99350 HOME/RES VST EST HIGH MDM 60: CPT | Mod: Q5

## 2019-03-12 LAB
ALBUMIN SERPL ELPH-MCNC: 3.3 G/DL
ALP BLD-CCNC: 100 U/L
ALT SERPL-CCNC: 13 U/L
ANION GAP SERPL CALC-SCNC: 15 MMOL/L
AST SERPL-CCNC: 17 U/L
BASOPHILS # BLD AUTO: 0.1 K/UL
BASOPHILS NFR BLD AUTO: 1.3 %
BILIRUB SERPL-MCNC: <0.2 MG/DL
BUN SERPL-MCNC: 32 MG/DL
CALCIUM SERPL-MCNC: 9 MG/DL
CHLORIDE SERPL-SCNC: 101 MMOL/L
CO2 SERPL-SCNC: 26 MMOL/L
CREAT SERPL-MCNC: 1.39 MG/DL
EOSINOPHIL # BLD AUTO: 0.22 K/UL
EOSINOPHIL NFR BLD AUTO: 2.8 %
HCT VFR BLD CALC: 36.8 %
HGB BLD-MCNC: 10.8 G/DL
IMM GRANULOCYTES NFR BLD AUTO: 0.4 %
LYMPHOCYTES # BLD AUTO: 0.96 K/UL
LYMPHOCYTES NFR BLD AUTO: 12.3 %
MAN DIFF?: NORMAL
MCHC RBC-ENTMCNC: 24.4 PG
MCHC RBC-ENTMCNC: 29.3 GM/DL
MCV RBC AUTO: 83.1 FL
MONOCYTES # BLD AUTO: 0.57 K/UL
MONOCYTES NFR BLD AUTO: 7.3 %
NEUTROPHILS # BLD AUTO: 5.92 K/UL
NEUTROPHILS NFR BLD AUTO: 75.9 %
PLATELET # BLD AUTO: 449 K/UL
POTASSIUM SERPL-SCNC: 4.8 MMOL/L
PREALB SERPL NEPH-MCNC: 18 MG/DL
PROT SERPL-MCNC: 6.6 G/DL
RBC # BLD: 4.43 M/UL
RBC # FLD: 18.7 %
SODIUM SERPL-SCNC: 142 MMOL/L
WBC # FLD AUTO: 7.8 K/UL

## 2019-03-12 NOTE — PATIENT PROFILE ADULT. - PRO ARRIVE FROM
[FreeTextEntry1] : 64 yo woman with PFO and lacunar infarct and arachnoid cyst. On ASA. \par \par - the decision on antiplatelets vs anticoagulation is made by neurology\par will obtain hypercoagulability w/u and rtc in one week; left a message to Dr Rosa
home

## 2019-03-15 ENCOUNTER — TRANSCRIPTION ENCOUNTER (OUTPATIENT)
Age: 84
End: 2019-03-15

## 2019-03-15 ENCOUNTER — MOBILE ON CALL (OUTPATIENT)
Age: 84
End: 2019-03-15

## 2019-03-17 PROBLEM — G62.0 CHEMOTHERAPY-INDUCED NEUROPATHY: Status: ACTIVE | Noted: 2019-01-27

## 2019-03-17 PROBLEM — J18.1 LLL PNEUMONIA: Status: RESOLVED | Noted: 2019-01-18 | Resolved: 2019-03-17

## 2019-03-17 PROBLEM — D47.3 THROMBOCYTOSIS: Status: ACTIVE | Noted: 2019-03-12

## 2019-03-17 NOTE — HISTORY OF PRESENT ILLNESS
[Family Member] : family member [Patient] : patient [FreeTextEntry1] : malignant ascites [FreeTextEntry2] : Patient is a 89 yo woman with malignant ascites, pt is s/p ovarian CA with mets to colon and s/p partial colectomy (now with stoma) and chemo x 2, T2DM, Afib, CKD Stage 3, diabetic nephropathy, asthma, B/L leg edema, being seen for a follow up visit.\par \par re: malignant ascites: had 7 L removed on paracentesis on Monday.  lab called, never showed up.  reordered labs, can be performed on monday.\par \par has bilateral neuropathy of both hands and feet from chemotherapy in the past.  has to be careful with what she carries.\par \par had biopsy of stoma which was negative (biopsy in the past).\par has ulcer at peristoma site.

## 2019-03-17 NOTE — PHYSICAL EXAM
[No Acute Distress] : no acute distress [Well Nourished] : well nourished [Well Developed] : well developed [Normal Voice/Communication] : normal voice communication [Normal Sclera/Conjunctiva] : normal sclera/conjunctiva [EOMI] : extra ocular movement intact [Normal Outer Ear/Nose] : the ears and nose were normal in appearance [Supple] : the neck was supple [No LAD] : no lymphadenopathy [No Respiratory Distress] : no respiratory distress [No Accessory Muscle Use] : no accessory muscle use [Normal Rate] : heart rate was normal  [Regular Rhythm] : with a regular rhythm [Normal S1, S2] : normal S1 and S2 [No Murmurs] : no murmurs heard [Normal Bowel Sounds] : normal bowel sounds [Non Tender] : non-tender [Soft] : abdomen soft [Not Distended] : not distended [Normal Post Cervical Nodes] : no posterior cervical lymphadenopathy [Normal Anterior Cervical Nodes] : no anterior cervical lymphadenopathy [Normal Gait] : normal gait [No Joint Swelling] : no joint swelling seen [No Clubbing, Cyanosis] : no clubbing  or cyanosis of the fingernails [Cranial Nerves Intact] : cranial nerves 2-12 were intact [No Motor Deficits] : the motor exam was normal [Oriented x3] : oriented to person, place, and time [Normal Affect] : the affect was normal [Normal Mood] : the mood was normal [Clear to Auscultation] : lungs were clear to auscultation bilaterally [de-identified] : + JVD bilaterally. [de-identified] : good air movement. [de-identified] : has large LLQ hernia.  Declined removing dressing for now. Stoma not visible at this visit because dressing was just placed by home care RN. [de-identified] : parastomal redness, did not remove pouch as it was just placed.

## 2019-03-21 LAB
ALBUMIN SERPL ELPH-MCNC: 3.7 G/DL
ALP BLD-CCNC: 102 U/L
ALT SERPL-CCNC: 14 U/L
ANION GAP SERPL CALC-SCNC: 14 MMOL/L
AST SERPL-CCNC: 23 U/L
BASOPHILS # BLD AUTO: 0.11 K/UL
BASOPHILS NFR BLD AUTO: 1.4 %
BILIRUB SERPL-MCNC: 0.3 MG/DL
BUN SERPL-MCNC: 34 MG/DL
CALCIUM SERPL-MCNC: 9.5 MG/DL
CHLORIDE SERPL-SCNC: 101 MMOL/L
CO2 SERPL-SCNC: 25 MMOL/L
CREAT SERPL-MCNC: 1.19 MG/DL
EOSINOPHIL # BLD AUTO: 0.21 K/UL
EOSINOPHIL NFR BLD AUTO: 2.7 %
ESTIMATED AVERAGE GLUCOSE: 140 MG/DL
HBA1C MFR BLD HPLC: 6.5 %
HCT VFR BLD CALC: 40.3 %
HGB BLD-MCNC: 11.5 G/DL
IMM GRANULOCYTES NFR BLD AUTO: 0.5 %
LYMPHOCYTES # BLD AUTO: 0.98 K/UL
LYMPHOCYTES NFR BLD AUTO: 12.6 %
MAN DIFF?: NORMAL
MCHC RBC-ENTMCNC: 23.8 PG
MCHC RBC-ENTMCNC: 28.5 GM/DL
MCV RBC AUTO: 83.3 FL
MONOCYTES # BLD AUTO: 0.67 K/UL
MONOCYTES NFR BLD AUTO: 8.6 %
NEUTROPHILS # BLD AUTO: 5.77 K/UL
NEUTROPHILS NFR BLD AUTO: 74.2 %
NT-PROBNP SERPL-MCNC: 2150 PG/ML
PLATELET # BLD AUTO: 532 K/UL
POTASSIUM SERPL-SCNC: 4.4 MMOL/L
PROT SERPL-MCNC: 8.1 G/DL
RBC # BLD: 4.84 M/UL
RBC # FLD: 18.6 %
SODIUM SERPL-SCNC: 140 MMOL/L
TSH SERPL-ACNC: 1.55 UIU/ML
WBC # FLD AUTO: 7.78 K/UL

## 2019-03-23 ENCOUNTER — TRANSCRIPTION ENCOUNTER (OUTPATIENT)
Age: 84
End: 2019-03-23

## 2019-03-26 ENCOUNTER — OUTPATIENT (OUTPATIENT)
Dept: OUTPATIENT SERVICES | Facility: HOSPITAL | Age: 84
LOS: 1 days | End: 2019-03-26
Payer: COMMERCIAL

## 2019-03-26 DIAGNOSIS — R18.0 MALIGNANT ASCITES: ICD-10-CM

## 2019-03-26 DIAGNOSIS — Z98.890 OTHER SPECIFIED POSTPROCEDURAL STATES: Chronic | ICD-10-CM

## 2019-03-26 DIAGNOSIS — K46.0 UNSPECIFIED ABDOMINAL HERNIA WITH OBSTRUCTION, WITHOUT GANGRENE: Chronic | ICD-10-CM

## 2019-03-26 LAB
HCT VFR BLD CALC: 35.2 % — SIGNIFICANT CHANGE UP (ref 34.5–45)
HGB BLD-MCNC: 10.5 G/DL — LOW (ref 11.5–15.5)
INR BLD: 1.23 RATIO — HIGH (ref 0.88–1.16)
MCHC RBC-ENTMCNC: 24 PG — LOW (ref 27–34)
MCHC RBC-ENTMCNC: 29.8 GM/DL — LOW (ref 32–36)
MCV RBC AUTO: 80.4 FL — SIGNIFICANT CHANGE UP (ref 80–100)
NRBC # BLD: 0 /100 WBCS — SIGNIFICANT CHANGE UP (ref 0–0)
PLATELET # BLD AUTO: 345 K/UL — SIGNIFICANT CHANGE UP (ref 150–400)
PROTHROM AB SERPL-ACNC: 14 SEC — HIGH (ref 10–12.9)
RBC # BLD: 4.38 M/UL — SIGNIFICANT CHANGE UP (ref 3.8–5.2)
RBC # FLD: 18.4 % — HIGH (ref 10.3–14.5)
WBC # BLD: 5.63 K/UL — SIGNIFICANT CHANGE UP (ref 3.8–10.5)
WBC # FLD AUTO: 5.63 K/UL — SIGNIFICANT CHANGE UP (ref 3.8–10.5)

## 2019-03-26 PROCEDURE — 85610 PROTHROMBIN TIME: CPT

## 2019-03-26 PROCEDURE — 85027 COMPLETE CBC AUTOMATED: CPT

## 2019-03-26 PROCEDURE — 49083 ABD PARACENTESIS W/IMAGING: CPT

## 2019-03-26 PROCEDURE — 36415 COLL VENOUS BLD VENIPUNCTURE: CPT

## 2019-03-30 ENCOUNTER — TRANSCRIPTION ENCOUNTER (OUTPATIENT)
Age: 84
End: 2019-03-30

## 2019-03-30 ENCOUNTER — EMERGENCY (EMERGENCY)
Facility: HOSPITAL | Age: 84
LOS: 1 days | Discharge: HOME | End: 2019-03-30
Attending: EMERGENCY MEDICINE | Admitting: EMERGENCY MEDICINE
Payer: COMMERCIAL

## 2019-03-30 VITALS
DIASTOLIC BLOOD PRESSURE: 71 MMHG | TEMPERATURE: 98 F | HEART RATE: 71 BPM | HEIGHT: 68 IN | OXYGEN SATURATION: 99 % | WEIGHT: 197.98 LBS | SYSTOLIC BLOOD PRESSURE: 142 MMHG | RESPIRATION RATE: 16 BRPM

## 2019-03-30 DIAGNOSIS — K46.0 UNSPECIFIED ABDOMINAL HERNIA WITH OBSTRUCTION, WITHOUT GANGRENE: Chronic | ICD-10-CM

## 2019-03-30 DIAGNOSIS — Z98.890 OTHER SPECIFIED POSTPROCEDURAL STATES: Chronic | ICD-10-CM

## 2019-03-30 PROCEDURE — 99282 EMERGENCY DEPT VISIT SF MDM: CPT

## 2019-03-30 NOTE — ED ADULT TRIAGE NOTE - CHIEF COMPLAINT QUOTE
As per daughter, "she had a paracentesis on Tuesday and had a glue placed to the skin.  The home care nurse saw my mom yesterday and knocked the glue off by accident. I called the house calls doc and he told me to come to the ER to get more glue put over the site"

## 2019-03-30 NOTE — ED PROVIDER NOTE - OBJECTIVE STATEMENT
89 yo white female s/p paracentesis yesterday, Dermabond placed over puncture site, displaced today and started oozing again so sent here by visiting nurse for evaluation. No abdominal pains.

## 2019-03-30 NOTE — ED PROVIDER NOTE - GASTROINTESTINAL, MLM
Abdomen soft, non-tender, no guarding. Obese. Minimal weaping from puncture site RLQ (Paracentesis Site)

## 2019-03-30 NOTE — ED ADULT NURSE NOTE - NSIMPLEMENTINTERV_GEN_ALL_ED
Implemented All Fall Risk Interventions:  [place] to call system. Call bell, personal items and telephone within reach. Instruct patient to call for assistance. Room bathroom lighting operational. Non-slip footwear when patient is off stretcher. Physically safe environment: no spills, clutter or unnecessary equipment. Stretcher in lowest position, wheels locked, appropriate side rails in place. Provide visual cue, wrist band, yellow gown, etc. Monitor gait and stability. Monitor for mental status changes and reorient to person, place, and time. Review medications for side effects contributing to fall risk. Reinforce activity limits and safety measures with patient and family.

## 2019-03-30 NOTE — ED PROVIDER NOTE - CARE PROVIDER_API CALL
Jadiel Jenkins)  Diagnostic Radiology; [facility]  [address] [place] [address]  Phone: [phone]  Fax: [phone]  Follow Up Time:

## 2019-04-01 ENCOUNTER — RX CHANGE (OUTPATIENT)
Age: 84
End: 2019-04-01

## 2019-04-01 ENCOUNTER — TRANSCRIPTION ENCOUNTER (OUTPATIENT)
Age: 84
End: 2019-04-01

## 2019-04-01 RX ORDER — MIDODRINE HYDROCHLORIDE 5 MG/1
5 TABLET ORAL
Qty: 90 | Refills: 0 | Status: DISCONTINUED | COMMUNITY
Start: 2018-10-23 | End: 2019-04-01

## 2019-04-02 ENCOUNTER — APPOINTMENT (OUTPATIENT)
Age: 84
End: 2019-04-02

## 2019-04-02 VITALS
HEART RATE: 62 BPM | OXYGEN SATURATION: 98 % | BODY MASS INDEX: 31.32 KG/M2 | RESPIRATION RATE: 16 BRPM | TEMPERATURE: 98.9 F | DIASTOLIC BLOOD PRESSURE: 70 MMHG | WEIGHT: 206 LBS | SYSTOLIC BLOOD PRESSURE: 120 MMHG

## 2019-04-02 RX ORDER — AZITHROMYCIN 500 MG/1
500 TABLET, FILM COATED ORAL
Qty: 6 | Refills: 0 | Status: DISCONTINUED | COMMUNITY
Start: 2019-01-19 | End: 2019-04-02

## 2019-04-02 RX ORDER — AMOXICILLIN 875 MG/1
875 TABLET, FILM COATED ORAL 3 TIMES DAILY
Qty: 15 | Refills: 0 | Status: DISCONTINUED | COMMUNITY
Start: 2019-01-19 | End: 2019-04-02

## 2019-04-02 RX ORDER — SULFAMETHOXAZOLE AND TRIMETHOPRIM 400; 80 MG/1; MG/1
400-80 TABLET ORAL
Refills: 0 | Status: DISCONTINUED | COMMUNITY
Start: 2019-01-15 | End: 2019-04-02

## 2019-04-02 RX ORDER — PREDNISONE 5 MG/1
5 TABLET ORAL
Qty: 90 | Refills: 0 | Status: DISCONTINUED | COMMUNITY
Start: 2019-01-15 | End: 2019-04-02

## 2019-04-17 ENCOUNTER — OUTPATIENT (OUTPATIENT)
Dept: OUTPATIENT SERVICES | Facility: HOSPITAL | Age: 84
LOS: 1 days | End: 2019-04-17
Payer: COMMERCIAL

## 2019-04-17 DIAGNOSIS — K46.0 UNSPECIFIED ABDOMINAL HERNIA WITH OBSTRUCTION, WITHOUT GANGRENE: Chronic | ICD-10-CM

## 2019-04-17 DIAGNOSIS — Z98.890 OTHER SPECIFIED POSTPROCEDURAL STATES: Chronic | ICD-10-CM

## 2019-04-17 DIAGNOSIS — R18.8 OTHER ASCITES: ICD-10-CM

## 2019-04-17 LAB
HCT VFR BLD CALC: 33.9 % — LOW (ref 34.5–45)
HGB BLD-MCNC: 10.1 G/DL — LOW (ref 11.5–15.5)
INR BLD: 1.21 RATIO — HIGH (ref 0.88–1.16)
MCHC RBC-ENTMCNC: 23.9 PG — LOW (ref 27–34)
MCHC RBC-ENTMCNC: 29.8 GM/DL — LOW (ref 32–36)
MCV RBC AUTO: 80.3 FL — SIGNIFICANT CHANGE UP (ref 80–100)
NRBC # BLD: 0 /100 WBCS — SIGNIFICANT CHANGE UP (ref 0–0)
PLATELET # BLD AUTO: 182 K/UL — SIGNIFICANT CHANGE UP (ref 150–400)
PROTHROM AB SERPL-ACNC: 13.7 SEC — HIGH (ref 10–12.9)
RBC # BLD: 4.22 M/UL — SIGNIFICANT CHANGE UP (ref 3.8–5.2)
RBC # FLD: 17.8 % — HIGH (ref 10.3–14.5)
WBC # BLD: 6.74 K/UL — SIGNIFICANT CHANGE UP (ref 3.8–10.5)
WBC # FLD AUTO: 6.74 K/UL — SIGNIFICANT CHANGE UP (ref 3.8–10.5)

## 2019-04-17 PROCEDURE — 49083 ABD PARACENTESIS W/IMAGING: CPT

## 2019-04-17 PROCEDURE — 85610 PROTHROMBIN TIME: CPT

## 2019-04-17 PROCEDURE — 85027 COMPLETE CBC AUTOMATED: CPT

## 2019-04-17 PROCEDURE — 36415 COLL VENOUS BLD VENIPUNCTURE: CPT

## 2019-04-21 ENCOUNTER — TRANSCRIPTION ENCOUNTER (OUTPATIENT)
Age: 84
End: 2019-04-21

## 2019-04-22 ENCOUNTER — MEDICATION RENEWAL (OUTPATIENT)
Age: 84
End: 2019-04-22

## 2019-04-29 ENCOUNTER — APPOINTMENT (OUTPATIENT)
Dept: HOME HEALTH SERVICES | Facility: HOME HEALTH | Age: 84
End: 2019-04-29
Payer: MEDICARE

## 2019-04-29 VITALS
HEART RATE: 60 BPM | SYSTOLIC BLOOD PRESSURE: 110 MMHG | HEIGHT: 68 IN | DIASTOLIC BLOOD PRESSURE: 50 MMHG | RESPIRATION RATE: 18 BRPM | WEIGHT: 203 LBS | OXYGEN SATURATION: 94 % | TEMPERATURE: 97.1 F | BODY MASS INDEX: 30.77 KG/M2

## 2019-04-29 DIAGNOSIS — N18.3 CHRONIC KIDNEY DISEASE, STAGE 3 (MODERATE): ICD-10-CM

## 2019-04-29 PROCEDURE — 99350 HOME/RES VST EST HIGH MDM 60: CPT

## 2019-04-30 LAB
ALBUMIN SERPL ELPH-MCNC: 3.4 G/DL
ALP BLD-CCNC: 112 U/L
ALT SERPL-CCNC: 14 U/L
ANION GAP SERPL CALC-SCNC: 12 MMOL/L
AST SERPL-CCNC: 18 U/L
BASOPHILS # BLD AUTO: 0.1 K/UL
BASOPHILS NFR BLD AUTO: 1.5 %
BILIRUB SERPL-MCNC: 0.2 MG/DL
BUN SERPL-MCNC: 23 MG/DL
CALCIUM SERPL-MCNC: 8.9 MG/DL
CHLORIDE SERPL-SCNC: 101 MMOL/L
CO2 SERPL-SCNC: 28 MMOL/L
CREAT SERPL-MCNC: 1.06 MG/DL
EOSINOPHIL # BLD AUTO: 0.14 K/UL
EOSINOPHIL NFR BLD AUTO: 2.1 %
GLUCOSE SERPL-MCNC: 89 MG/DL
HCT VFR BLD CALC: 40.9 %
HGB BLD-MCNC: 11.7 G/DL
IMM GRANULOCYTES NFR BLD AUTO: 0.3 %
INR PPP: 1.14 RATIO
LYMPHOCYTES # BLD AUTO: 0.72 K/UL
LYMPHOCYTES NFR BLD AUTO: 10.6 %
MAN DIFF?: NORMAL
MCHC RBC-ENTMCNC: 23.7 PG
MCHC RBC-ENTMCNC: 28.6 GM/DL
MCV RBC AUTO: 83 FL
MONOCYTES # BLD AUTO: 0.58 K/UL
MONOCYTES NFR BLD AUTO: 8.5 %
NEUTROPHILS # BLD AUTO: 5.25 K/UL
NEUTROPHILS NFR BLD AUTO: 77 %
PLATELET # BLD AUTO: 523 K/UL
POTASSIUM SERPL-SCNC: 4.6 MMOL/L
PROT SERPL-MCNC: 7.1 G/DL
PT BLD: 13.1 SEC
RBC # BLD: 4.93 M/UL
RBC # FLD: 17.3 %
SODIUM SERPL-SCNC: 141 MMOL/L
WBC # FLD AUTO: 6.81 K/UL

## 2019-04-30 NOTE — COUNSELING
[DNR] : Code Status: DNR [No Limitations] : Treatment Guidelines: No limitations [Trial of Intubation] : Intubation: Trial of Intubation [Last [facility] Date: _____] [facility]/last verification date: [unfilled] [_____] : HCP: [unfilled]

## 2019-05-01 PROBLEM — N18.3 CKD (CHRONIC KIDNEY DISEASE), STAGE III: Status: ACTIVE | Noted: 2018-10-23

## 2019-05-01 NOTE — REASON FOR VISIT
[Follow-Up] : a follow-up visit [Pre-Visit Preparation] : pre-visit preparation was done [Intercurrent Specialty/Sub-specialty Visits] : the patient has intercurrent specialty/sub-specialty visits [FreeTextEntry3] : has therapeutic paracentesis performed at [facility].

## 2019-05-01 NOTE — HISTORY OF PRESENT ILLNESS
[Patient] : patient [Family Member] : family member [FreeTextEntry2] : Patient is a 89 yo woman with malignant ascites, pt is s/p ovarian CA with mets to colon and s/p partial colectomy (now with stoma) and chemo x 2, T2DM, Afib, CKD Stage 3, diabetic nephropathy, asthma, B/L leg edema, being seen for followup.\par \par updates are as follows:\par Pt had paracentesis with 9 L removed, and then gained weight shortly thereafter.  Please see my telephone chart note from 4/19 for further details.  Had gone the following Monday and had area "reglued and taped" as it was leaking from Friday and over the weekend.  Since taping/regluing, no further leakage.\par \par Pt scheduled for tunnel cath 5/2/19 - "for a better quality of life" as "the paracentesis every 2 weeks isn't working anymore".\par \par re: afib: no palpitations\par re: DM: controlled.  Eats well, but has snacks as well.\par \par Colostomy still in place - has sore at colostomy, that is taken care of by wound care rn, and it hasn't been getting better.\par macular degeneration: still issue, can't read.  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

## 2019-05-01 NOTE — PHYSICAL EXAM
[No Acute Distress] : no acute distress [Well Nourished] : well nourished [Well Developed] : well developed [Normal Voice/Communication] : normal voice communication [Normal Sclera/Conjunctiva] : normal sclera/conjunctiva [EOMI] : extra ocular movement intact [Normal Outer Ear/Nose] : the ears and nose were normal in appearance [Supple] : the neck was supple [No LAD] : no lymphadenopathy [No Respiratory Distress] : no respiratory distress [No Accessory Muscle Use] : no accessory muscle use [Normal Rate] : heart rate was normal  [Regular Rhythm] : with a regular rhythm [Normal S1, S2] : normal S1 and S2 [Normal Bowel Sounds] : normal bowel sounds [Non Tender] : non-tender [Soft] : abdomen soft [Not Distended] : not distended [Normal Post Cervical Nodes] : no posterior cervical lymphadenopathy [Normal Anterior Cervical Nodes] : no anterior cervical lymphadenopathy [Normal Gait] : normal gait [No Joint Swelling] : no joint swelling seen [No Clubbing, Cyanosis] : no clubbing  or cyanosis of the fingernails [Cranial Nerves Intact] : cranial nerves 2-12 were intact [No Motor Deficits] : the motor exam was normal [Oriented x3] : oriented to person, place, and time [Normal Affect] : the affect was normal [Normal Mood] : the mood was normal [Clear to Auscultation] : lungs were clear to auscultation bilaterally [de-identified] : pulse ox difficult to perform due to nailpolish . [de-identified] : + ERNESTO, R>L. [de-identified] : has large LLQ hernia.  Also, stoma with parastomal redness.  + fluid on palpation of abdomen.  However, stoma not visible at this visit because dressing was just placed by home care RN. [de-identified] : parastomal redness, did not remove pouch as it was just placed. [de-identified] : + murmur, systolic and diastolic.; LE edema 3+ R and 2+ on L.

## 2019-05-02 ENCOUNTER — OUTPATIENT (OUTPATIENT)
Dept: OUTPATIENT SERVICES | Facility: HOSPITAL | Age: 84
LOS: 1 days | End: 2019-05-02
Payer: COMMERCIAL

## 2019-05-02 ENCOUNTER — MEDICATION RENEWAL (OUTPATIENT)
Age: 84
End: 2019-05-02

## 2019-05-02 DIAGNOSIS — Z98.890 OTHER SPECIFIED POSTPROCEDURAL STATES: Chronic | ICD-10-CM

## 2019-05-02 DIAGNOSIS — R18.8 OTHER ASCITES: ICD-10-CM

## 2019-05-02 DIAGNOSIS — K46.0 UNSPECIFIED ABDOMINAL HERNIA WITH OBSTRUCTION, WITHOUT GANGRENE: Chronic | ICD-10-CM

## 2019-05-02 PROCEDURE — 76942 ECHO GUIDE FOR BIOPSY: CPT | Mod: 26

## 2019-05-02 PROCEDURE — C1729: CPT

## 2019-05-02 PROCEDURE — 76000 FLUOROSCOPY <1 HR PHYS/QHP: CPT

## 2019-05-02 PROCEDURE — C1894: CPT

## 2019-05-02 PROCEDURE — C1769: CPT

## 2019-05-02 PROCEDURE — 49418 INSERT TUN IP CATH PERC: CPT

## 2019-05-02 PROCEDURE — 76942 ECHO GUIDE FOR BIOPSY: CPT

## 2019-05-02 RX ORDER — ACETAMINOPHEN 500 MG
975 TABLET ORAL ONCE
Qty: 0 | Refills: 0 | Status: COMPLETED | OUTPATIENT
Start: 2019-05-02 | End: 2019-05-02

## 2019-05-02 RX ADMIN — Medication 975 MILLIGRAM(S): at 14:37

## 2019-05-02 RX ADMIN — Medication 975 MILLIGRAM(S): at 15:15

## 2019-05-04 ENCOUNTER — TRANSCRIPTION ENCOUNTER (OUTPATIENT)
Age: 84
End: 2019-05-04

## 2019-05-04 ENCOUNTER — EMERGENCY (EMERGENCY)
Facility: HOSPITAL | Age: 84
LOS: 1 days | Discharge: HOME | End: 2019-05-04
Attending: EMERGENCY MEDICINE | Admitting: EMERGENCY MEDICINE
Payer: COMMERCIAL

## 2019-05-04 VITALS
TEMPERATURE: 98 F | HEART RATE: 81 BPM | RESPIRATION RATE: 16 BRPM | SYSTOLIC BLOOD PRESSURE: 132 MMHG | DIASTOLIC BLOOD PRESSURE: 74 MMHG | OXYGEN SATURATION: 98 %

## 2019-05-04 VITALS
DIASTOLIC BLOOD PRESSURE: 70 MMHG | OXYGEN SATURATION: 98 % | WEIGHT: 210.1 LBS | SYSTOLIC BLOOD PRESSURE: 114 MMHG | HEART RATE: 70 BPM | RESPIRATION RATE: 16 BRPM | TEMPERATURE: 98 F

## 2019-05-04 DIAGNOSIS — K46.0 UNSPECIFIED ABDOMINAL HERNIA WITH OBSTRUCTION, WITHOUT GANGRENE: Chronic | ICD-10-CM

## 2019-05-04 DIAGNOSIS — Z98.890 OTHER SPECIFIED POSTPROCEDURAL STATES: Chronic | ICD-10-CM

## 2019-05-04 PROCEDURE — 99284 EMERGENCY DEPT VISIT MOD MDM: CPT

## 2019-05-04 PROCEDURE — 99284 EMERGENCY DEPT VISIT MOD MDM: CPT | Mod: 25

## 2019-05-04 RX ORDER — LEVOTHYROXINE SODIUM 125 MCG
225 TABLET ORAL
Qty: 0 | Refills: 0 | COMMUNITY

## 2019-05-04 RX ORDER — IPRATROPIUM/ALBUTEROL SULFATE 18-103MCG
3 AEROSOL WITH ADAPTER (GRAM) INHALATION
Qty: 0 | Refills: 0 | COMMUNITY

## 2019-05-04 RX ORDER — POTASSIUM CHLORIDE 20 MEQ
1 PACKET (EA) ORAL
Qty: 0 | Refills: 0 | COMMUNITY

## 2019-05-04 RX ORDER — OMEGA-3 ACID ETHYL ESTERS 1 G
1 CAPSULE ORAL
Qty: 0 | Refills: 0 | COMMUNITY

## 2019-05-04 RX ORDER — GABAPENTIN 400 MG/1
1 CAPSULE ORAL
Qty: 0 | Refills: 0 | COMMUNITY

## 2019-05-04 RX ORDER — FLUTICASONE PROPIONATE AND SALMETEROL 50; 250 UG/1; UG/1
1 POWDER ORAL; RESPIRATORY (INHALATION)
Qty: 0 | Refills: 0 | COMMUNITY

## 2019-05-04 RX ORDER — CHOLECALCIFEROL (VITAMIN D3) 125 MCG
1 CAPSULE ORAL
Qty: 0 | Refills: 0 | COMMUNITY

## 2019-05-04 RX ORDER — ALBUTEROL 90 UG/1
2 AEROSOL, METERED ORAL
Qty: 0 | Refills: 0 | COMMUNITY

## 2019-05-04 RX ORDER — PYRIDOXINE HCL (VITAMIN B6) 100 MG
1 TABLET ORAL
Qty: 0 | Refills: 0 | COMMUNITY

## 2019-05-04 NOTE — ED PROVIDER NOTE - OBJECTIVE STATEMENT
Pt is a 89 yo female extensive hx including malignant ascites from ovarian cyst. pt with recent tunneled ascites tube placed by dr beltre. today 950ml drained by nurse, but has been leaking ascites in large quantities and soaking pads and clothing. pt denies f/c sob, but came to er for evaluation.  no a/a factors, pt denies pain.

## 2019-05-04 NOTE — ED PROVIDER NOTE - GASTROINTESTINAL, MLM
Abdomen distended with ascites, with soaked dressings, abd nt, left sided colostomy clean and draining

## 2019-05-04 NOTE — ED PROVIDER NOTE - CHPI ED SYMPTOMS NEG
no fever/no nausea/no vomiting/no pain/no weakness/no decreased eating/drinking/no chills/no dizziness

## 2019-05-04 NOTE — ED CLERICAL - NS ED CLERK NOTE PRE-ARRIVAL INFORMATION; ADDITIONAL PRE-ARRIVAL INFORMATION

## 2019-05-04 NOTE — ED PROVIDER NOTE - CLINICAL SUMMARY MEDICAL DECISION MAKING FREE TEXT BOX
pt with recent tunneled ascites tube placed by dr beltre. today 950ml drained by nurse, but has been leaking ascites in large quantities and soaking pads and clothes.   1 liter ascites drained but no other bottles available. family wishes d/c and dressing changes and fu dr lindo monday

## 2019-05-04 NOTE — ED ADULT NURSE NOTE - OBJECTIVE STATEMENT
Pt received alert and oriented x 4 c/o leaking catheter. Pt reports on 5/2 tunnel catheter was placed for ascites r/t ovarian cancer. Site is leaking x hours. Pt denies pain, fever, chills.

## 2019-05-07 ENCOUNTER — APPOINTMENT (OUTPATIENT)
Age: 84
End: 2019-05-07

## 2019-05-07 VITALS
TEMPERATURE: 98.1 F | RESPIRATION RATE: 16 BRPM | DIASTOLIC BLOOD PRESSURE: 68 MMHG | HEIGHT: 68 IN | HEART RATE: 68 BPM | SYSTOLIC BLOOD PRESSURE: 104 MMHG | WEIGHT: 189 LBS | BODY MASS INDEX: 28.64 KG/M2

## 2019-05-24 ENCOUNTER — RX RENEWAL (OUTPATIENT)
Age: 84
End: 2019-05-24

## 2019-05-28 ENCOUNTER — APPOINTMENT (OUTPATIENT)
Age: 84
End: 2019-05-28

## 2019-05-28 VITALS
HEART RATE: 72 BPM | DIASTOLIC BLOOD PRESSURE: 72 MMHG | TEMPERATURE: 97.4 F | SYSTOLIC BLOOD PRESSURE: 110 MMHG | RESPIRATION RATE: 14 BRPM

## 2019-06-06 NOTE — H&P ADULT. - PROBLEM/PLAN-2
Problem: Adult Inpatient Plan of Care  Goal: Plan of Care Review  Outcome: Ongoing (interventions implemented as appropriate)  Plan of care reviewed with patient. Patient oriented to self only. VSS/NADN. IV fluids infusing. No request for pain medication. Incontinent care provided. SR up x 2, bed in lowest position, call light in reach. Will continue to monitor.        DISPLAY PLAN FREE TEXT

## 2019-06-24 ENCOUNTER — MOBILE ON CALL (OUTPATIENT)
Age: 84
End: 2019-06-24

## 2019-07-01 ENCOUNTER — TRANSCRIPTION ENCOUNTER (OUTPATIENT)
Age: 84
End: 2019-07-01

## 2019-07-02 NOTE — ED PROVIDER NOTE - DIAGNOSIS COUNSELING, MDM
Addendum  created 07/02/19 1202 by Beatriz Hill CRNA    Intraprocedure Staff edited conducted a detailed discussion... I had a detailed discussion with the patient and/or guardian regarding the historical points, exam findings, and any diagnostic results supporting the discharge/admit diagnosis.

## 2019-07-03 ENCOUNTER — APPOINTMENT (OUTPATIENT)
Dept: HOME HEALTH SERVICES | Facility: HOME HEALTH | Age: 84
End: 2019-07-03
Payer: MEDICARE

## 2019-07-03 VITALS — TEMPERATURE: 97.2 F | SYSTOLIC BLOOD PRESSURE: 104 MMHG | RESPIRATION RATE: 16 BRPM | DIASTOLIC BLOOD PRESSURE: 58 MMHG

## 2019-07-03 DIAGNOSIS — R18.0 MALIGNANT ASCITES: ICD-10-CM

## 2019-07-03 DIAGNOSIS — I48.91 UNSPECIFIED ATRIAL FIBRILLATION: ICD-10-CM

## 2019-07-03 DIAGNOSIS — E11.9 TYPE 2 DIABETES MELLITUS W/OUT COMPLICATIONS: ICD-10-CM

## 2019-07-03 PROCEDURE — 99350 HOME/RES VST EST HIGH MDM 60: CPT

## 2019-07-03 NOTE — COUNSELING
[DNR] : Code Status: DNR [[facility] Ver[facility] Date: _____] : [facility] Com[facility]/last verification date: [unfilled] [Trial of Intubation] : Intubation: Trial of Intubation [No Limitations] : Treatment Guidelines: No limitations [_____] : HCP: [unfilled]

## 2019-07-09 PROBLEM — E11.9 TYPE 2 DIABETES MELLITUS: Status: ACTIVE | Noted: 2018-10-23

## 2019-07-09 PROBLEM — R18.0 MALIGNANT ASCITES: Status: ACTIVE | Noted: 2017-03-12

## 2019-07-09 PROBLEM — I48.91 A-FIB: Status: ACTIVE | Noted: 2018-10-23

## 2019-07-09 NOTE — HISTORY OF PRESENT ILLNESS
[Patient] : patient [Family Member] : family member [FreeTextEntry2] : Patient is a 89 yo woman with malignant ascites, pt is s/p ovarian CA with mets to colon and s/p partial colectomy (now with stoma) and chemo x 2, T2DM, Afib, CKD Stage 3, diabetic nephropathy, asthma, B/L leg edema, being seen for followup.\par \par updates are as follows:\par last night bought a recliner, but recliner was stuck in a position, so pt needed to go to the foot part to get out of the recliner.  The foot part and chair went down, and pt slid down and was stuck in btw the chair and the credenza.  stayed on the floor until help came 30 minutes later.  Is getting recliner fixed Friday (in 2 days).  Has multiple bruises.\par \par Also, pt states that her abd feels like it's swollen, "but not as bad as yesterday."    feels like the swelling starts in her upper abd usually.  but doesn't feel that bad today.\par \par \par re: edema - RN removing 1-2 bottles of fluid each visit M-F, see phone notes prior for more details.\par \par Pt had tunnel cath 5/2/19 - "for a better quality of life" as "the paracentesis every 2 weeks isn't working anymore".\par \par re: afib: no palpitations.  Has PPM, and had it interrogated.  \par Has SOB, but multiple issues involved (fluid).\par re: DM: controlled.  Eats well, but has snacks as well.\par \par Colostomy still in place - has sore at colostomy, that is taken care of by wound care rn, and it hasn't been getting better.  In hospital, had a bx of it, and it "was just a sore."\par \par macular degeneration: still issue, can't read.  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

## 2019-07-09 NOTE — PHYSICAL EXAM
[Well Nourished] : well nourished [No Acute Distress] : no acute distress [Well Developed] : well developed [Normal Sclera/Conjunctiva] : normal sclera/conjunctiva [Normal Voice/Communication] : normal voice communication [Supple] : the neck was supple [Normal Outer Ear/Nose] : the ears and nose were normal in appearance [EOMI] : extra ocular movement intact [No LAD] : no lymphadenopathy [No Respiratory Distress] : no respiratory distress [No Accessory Muscle Use] : no accessory muscle use [Normal S1, S2] : normal S1 and S2 [Normal Rate] : heart rate was normal  [Regular Rhythm] : with a regular rhythm [Non Tender] : non-tender [Normal Bowel Sounds] : normal bowel sounds [Soft] : abdomen soft [Not Distended] : not distended [Normal Post Cervical Nodes] : no posterior cervical lymphadenopathy [Normal Gait] : normal gait [No Joint Swelling] : no joint swelling seen [Normal Anterior Cervical Nodes] : no anterior cervical lymphadenopathy [No Clubbing, Cyanosis] : no clubbing  or cyanosis of the fingernails [Cranial Nerves Intact] : cranial nerves 2-12 were intact [No Motor Deficits] : the motor exam was normal [Normal Affect] : the affect was normal [Oriented x3] : oriented to person, place, and time [Normal Mood] : the mood was normal [de-identified] : pulse ox difficult to perform due to nailpolish . [de-identified] : L TM clear, R TM shows impacted cerumen. [de-identified] : LLL crackles [de-identified] : + ERNESTO, R>L. [de-identified] : + murmur, systolic and diastolic.; LE edema 3+ b/l [de-identified] : has large LLQ hernia.  + fluid on palpation of abdomen.  However, stoma not visible at this visit because dressing was just placed by home care RN.  Tunnel cath surrounding area isn't red.  Dressing due to be changed by RN later today. edema in legs 3+ [de-identified] : see abd above; also, bruises s/p fall.

## 2019-07-09 NOTE — CURRENT MEDS
[Compliant with medications] : Patient is compliant with medications [Reviewed patient reported medication adherence from Comprehensive Assessment] : Reviewed patient reported medication adherence from comprehensive assessment [Medication and Allergies Reconciled] : medication and allergies reconciled [Adherent to medications] : Patient is adherent to medications as prescribed

## 2019-07-09 NOTE — REASON FOR VISIT
[Follow-Up] : a follow-up visit [Pre-Visit Preparation] : pre-visit preparation was done [Intercurrent Specialty/Sub-specialty Visits] : the patient has intercurrent specialty/sub-specialty visits [FreeTextEntry3] : has therapeutic paracentesis performed at [facility].  saw cardiologist for pacemaker interrogation in May.

## 2019-07-23 LAB
ALBUMIN SERPL ELPH-MCNC: 3 G/DL
ALP BLD-CCNC: 108 U/L
ALT SERPL-CCNC: 11 U/L
ANION GAP SERPL CALC-SCNC: 10 MMOL/L
AST SERPL-CCNC: 25 U/L
BASOPHILS # BLD AUTO: 0.09 K/UL
BASOPHILS NFR BLD AUTO: 1.3 %
BILIRUB SERPL-MCNC: 0.2 MG/DL
BUN SERPL-MCNC: 27 MG/DL
CALCIUM SERPL-MCNC: 8.7 MG/DL
CHLORIDE SERPL-SCNC: 102 MMOL/L
CO2 SERPL-SCNC: 26 MMOL/L
CREAT SERPL-MCNC: 1.19 MG/DL
EOSINOPHIL # BLD AUTO: 0.15 K/UL
EOSINOPHIL NFR BLD AUTO: 2.1 %
HCT VFR BLD CALC: 38.1 %
HGB BLD-MCNC: 11.5 G/DL
IMM GRANULOCYTES NFR BLD AUTO: 0.4 %
LYMPHOCYTES # BLD AUTO: 0.67 K/UL
LYMPHOCYTES NFR BLD AUTO: 9.3 %
MAN DIFF?: NORMAL
MCHC RBC-ENTMCNC: 24.4 PG
MCHC RBC-ENTMCNC: 30.2 GM/DL
MCV RBC AUTO: 80.7 FL
MONOCYTES # BLD AUTO: 0.67 K/UL
MONOCYTES NFR BLD AUTO: 9.3 %
NEUTROPHILS # BLD AUTO: 5.56 K/UL
NEUTROPHILS NFR BLD AUTO: 77.6 %
NT-PROBNP SERPL-MCNC: 2051 PG/ML
PLATELET # BLD AUTO: 501 K/UL
POTASSIUM SERPL-SCNC: 5.8 MMOL/L
PROT SERPL-MCNC: 6.7 G/DL
RBC # BLD: 4.72 M/UL
RBC # FLD: 18.8 %
SODIUM SERPL-SCNC: 138 MMOL/L
WBC # FLD AUTO: 7.17 K/UL

## 2019-08-07 ENCOUNTER — APPOINTMENT (OUTPATIENT)
Age: 84
End: 2019-08-07

## 2019-08-07 VITALS
HEART RATE: 88 BPM | DIASTOLIC BLOOD PRESSURE: 60 MMHG | RESPIRATION RATE: 16 BRPM | TEMPERATURE: 98.2 F | SYSTOLIC BLOOD PRESSURE: 104 MMHG

## 2019-08-12 ENCOUNTER — MEDICATION RENEWAL (OUTPATIENT)
Age: 84
End: 2019-08-12

## 2019-08-26 ENCOUNTER — RX RENEWAL (OUTPATIENT)
Age: 84
End: 2019-08-26

## 2019-08-26 ENCOUNTER — MEDICATION RENEWAL (OUTPATIENT)
Age: 84
End: 2019-08-26

## 2019-09-07 ENCOUNTER — TRANSCRIPTION ENCOUNTER (OUTPATIENT)
Age: 84
End: 2019-09-07

## 2019-09-10 ENCOUNTER — LABORATORY RESULT (OUTPATIENT)
Age: 84
End: 2019-09-10

## 2019-09-16 ENCOUNTER — APPOINTMENT (OUTPATIENT)
Age: 84
End: 2019-09-16

## 2019-09-16 VITALS
HEART RATE: 72 BPM | WEIGHT: 201.25 LBS | RESPIRATION RATE: 15 BRPM | BODY MASS INDEX: 30.6 KG/M2 | DIASTOLIC BLOOD PRESSURE: 64 MMHG | SYSTOLIC BLOOD PRESSURE: 118 MMHG | TEMPERATURE: 98.4 F

## 2019-09-16 RX ORDER — FLUTICASONE PROPIONATE AND SALMETEROL 50; 250 UG/1; UG/1
250-50 POWDER RESPIRATORY (INHALATION)
Qty: 1 | Refills: 11 | Status: ACTIVE | COMMUNITY
Start: 2019-01-15

## 2019-09-16 RX ORDER — GABAPENTIN 300 MG/1
300 CAPSULE ORAL EVERY 8 HOURS
Qty: 90 | Refills: 3 | Status: ACTIVE | COMMUNITY

## 2019-09-16 RX ORDER — TORSEMIDE 10 MG/1
10 TABLET ORAL
Qty: 180 | Refills: 0 | Status: ACTIVE | COMMUNITY
Start: 2019-01-15

## 2019-09-16 RX ORDER — AMINO ACIDS/PROTEIN HYDROLYS 15 G-60/30
LIQUID (ML) ORAL
Refills: 0 | Status: ACTIVE | COMMUNITY
Start: 2018-12-18

## 2019-09-16 RX ORDER — RIVAROXABAN 15 MG/1
15 TABLET, FILM COATED ORAL
Qty: 30 | Refills: 0 | Status: ACTIVE | COMMUNITY

## 2019-09-16 RX ORDER — IPRATROPIUM BROMIDE AND ALBUTEROL SULFATE 2.5; .5 MG/3ML; MG/3ML
0.5-2.5 (3) SOLUTION RESPIRATORY (INHALATION)
Qty: 1 | Refills: 3 | Status: ACTIVE | COMMUNITY
Start: 2018-11-30

## 2019-09-16 RX ORDER — RENO CAPS 100; 1.5; 1.7; 20; 10; 1; 150; 5; 6 MG/1; MG/1; MG/1; MG/1; MG/1; MG/1; UG/1; MG/1; UG/1
1 CAPSULE ORAL DAILY
Refills: 0 | Status: ACTIVE | COMMUNITY
Start: 2018-10-23

## 2019-09-16 RX ORDER — MAGNESIUM GLUCONATE 27 MG(500)
500 TABLET ORAL DAILY
Refills: 0 | Status: ACTIVE | COMMUNITY
Start: 2018-10-23

## 2019-09-16 RX ORDER — LEVOTHYROXINE SODIUM 0.2 MG/1
200 TABLET ORAL
Qty: 90 | Refills: 0 | Status: ACTIVE | COMMUNITY
Start: 2019-05-24

## 2019-09-16 RX ORDER — ALBUTEROL SULFATE 90 UG/1
108 (90 BASE) AEROSOL, METERED RESPIRATORY (INHALATION)
Qty: 1 | Refills: 4 | Status: ACTIVE | COMMUNITY
Start: 2018-10-31

## 2019-09-16 RX ORDER — LEVOTHYROXINE SODIUM 0.03 MG/1
25 TABLET ORAL
Qty: 90 | Refills: 3 | Status: ACTIVE | COMMUNITY
Start: 2018-10-23

## 2019-09-16 RX ORDER — MONTELUKAST 10 MG/1
10 TABLET, FILM COATED ORAL DAILY
Refills: 3 | Status: ACTIVE | COMMUNITY

## 2019-09-16 RX ORDER — SPIRONOLACTONE 25 MG/1
25 TABLET ORAL
Qty: 60 | Refills: 3 | Status: ACTIVE | COMMUNITY
Start: 2019-01-15

## 2019-09-23 ENCOUNTER — TRANSCRIPTION ENCOUNTER (OUTPATIENT)
Age: 84
End: 2019-09-23

## 2019-09-23 LAB
ALBUMIN SERPL ELPH-MCNC: 2.6 G/DL
ALP BLD-CCNC: 169 U/L
ALT SERPL-CCNC: 15 U/L
ANION GAP SERPL CALC-SCNC: 12 MMOL/L
AST SERPL-CCNC: 16 U/L
BASOPHILS # BLD AUTO: 0.07 K/UL
BASOPHILS NFR BLD AUTO: 0.7 %
BILIRUB SERPL-MCNC: <0.2 MG/DL
BUN SERPL-MCNC: 15 MG/DL
CALCIUM SERPL-MCNC: 8.5 MG/DL
CHLORIDE SERPL-SCNC: 100 MMOL/L
CO2 SERPL-SCNC: 25 MMOL/L
CREAT SERPL-MCNC: 1 MG/DL
EOSINOPHIL # BLD AUTO: 0.12 K/UL
EOSINOPHIL NFR BLD AUTO: 1.1 %
HCT VFR BLD CALC: 40.3 %
HGB BLD-MCNC: 11.6 G/DL
IMM GRANULOCYTES NFR BLD AUTO: 0.9 %
LYMPHOCYTES # BLD AUTO: 0.61 K/UL
LYMPHOCYTES NFR BLD AUTO: 5.7 %
MAN DIFF?: NORMAL
MCHC RBC-ENTMCNC: 24.3 PG
MCHC RBC-ENTMCNC: 28.8 GM/DL
MCV RBC AUTO: 84.5 FL
MONOCYTES # BLD AUTO: 0.81 K/UL
MONOCYTES NFR BLD AUTO: 7.6 %
NEUTROPHILS # BLD AUTO: 8.97 K/UL
NEUTROPHILS NFR BLD AUTO: 84 %
PLATELET # BLD AUTO: 318 K/UL
POTASSIUM SERPL-SCNC: 5.6 MMOL/L
PROT SERPL-MCNC: 6.7 G/DL
RBC # BLD: 4.77 M/UL
RBC # FLD: 16.7 %
SODIUM SERPL-SCNC: 137 MMOL/L
WBC # FLD AUTO: 10.68 K/UL

## 2019-09-24 ENCOUNTER — LABORATORY RESULT (OUTPATIENT)
Age: 84
End: 2019-09-24

## 2019-09-25 ENCOUNTER — APPOINTMENT (OUTPATIENT)
Dept: HOME HEALTH SERVICES | Facility: HOME HEALTH | Age: 84
End: 2019-09-25
Payer: MEDICARE

## 2019-09-25 VITALS
HEART RATE: 70 BPM | OXYGEN SATURATION: 98 % | TEMPERATURE: 98.9 F | SYSTOLIC BLOOD PRESSURE: 120 MMHG | DIASTOLIC BLOOD PRESSURE: 60 MMHG

## 2019-09-25 DIAGNOSIS — Z71.89 OTHER SPECIFIED COUNSELING: ICD-10-CM

## 2019-09-25 DIAGNOSIS — E03.9 HYPOTHYROIDISM, UNSPECIFIED: ICD-10-CM

## 2019-09-25 DIAGNOSIS — E11.21 TYPE 2 DIABETES MELLITUS WITH DIABETIC NEPHROPATHY: ICD-10-CM

## 2019-09-25 DIAGNOSIS — N39.0 URINARY TRACT INFECTION, SITE NOT SPECIFIED: ICD-10-CM

## 2019-09-25 DIAGNOSIS — I50.9 HEART FAILURE, UNSPECIFIED: ICD-10-CM

## 2019-09-25 DIAGNOSIS — I70.0 ATHEROSCLEROSIS OF AORTA: ICD-10-CM

## 2019-09-25 DIAGNOSIS — H61.21 IMPACTED CERUMEN, RIGHT EAR: ICD-10-CM

## 2019-09-25 DIAGNOSIS — E46 UNSPECIFIED PROTEIN-CALORIE MALNUTRITION: ICD-10-CM

## 2019-09-25 DIAGNOSIS — Z85.43 PERSONAL HISTORY OF MALIGNANT NEOPLASM OF OVARY: ICD-10-CM

## 2019-09-25 DIAGNOSIS — C76.2 MALIGNANT NEOPLASM OF ABDOMEN: ICD-10-CM

## 2019-09-25 LAB
ALBUMIN SERPL ELPH-MCNC: 2.7 G/DL
ALP BLD-CCNC: 134 U/L
ALT SERPL-CCNC: 10 U/L
ANION GAP SERPL CALC-SCNC: 16 MMOL/L
AST SERPL-CCNC: 12 U/L
BASOPHILS # BLD AUTO: 0.07 K/UL
BASOPHILS NFR BLD AUTO: 0.7 %
BILIRUB SERPL-MCNC: 0.2 MG/DL
BUN SERPL-MCNC: 23 MG/DL
CALCIUM SERPL-MCNC: 8.5 MG/DL
CHLORIDE SERPL-SCNC: 103 MMOL/L
CO2 SERPL-SCNC: 22 MMOL/L
CREAT SERPL-MCNC: 0.94 MG/DL
EOSINOPHIL # BLD AUTO: 0.04 K/UL
EOSINOPHIL NFR BLD AUTO: 0.4 %
HCT VFR BLD CALC: 37.6 %
HGB BLD-MCNC: 10.8 G/DL
IMM GRANULOCYTES NFR BLD AUTO: 0.7 %
LYMPHOCYTES # BLD AUTO: 0.44 K/UL
LYMPHOCYTES NFR BLD AUTO: 4.3 %
MAN DIFF?: NORMAL
MCHC RBC-ENTMCNC: 23.7 PG
MCHC RBC-ENTMCNC: 28.7 GM/DL
MCV RBC AUTO: 82.6 FL
MONOCYTES # BLD AUTO: 0.79 K/UL
MONOCYTES NFR BLD AUTO: 7.8 %
NEUTROPHILS # BLD AUTO: 8.71 K/UL
NEUTROPHILS NFR BLD AUTO: 86.1 %
PLATELET # BLD AUTO: 301 K/UL
POTASSIUM SERPL-SCNC: 5.1 MMOL/L
PREALB SERPL NEPH-MCNC: 9 MG/DL
PROT SERPL-MCNC: 6.3 G/DL
RBC # BLD: 4.55 M/UL
RBC # FLD: 17.2 %
SODIUM SERPL-SCNC: 141 MMOL/L
TSH SERPL-ACNC: 2.7 UIU/ML
WBC # FLD AUTO: 10.12 K/UL

## 2019-09-25 PROCEDURE — 99350 HOME/RES VST EST HIGH MDM 60: CPT | Mod: 25

## 2019-09-25 PROCEDURE — 99498 ADVNCD CARE PLAN ADDL 30 MIN: CPT | Mod: 25

## 2019-09-25 PROCEDURE — 99497 ADVNCD CARE PLAN 30 MIN: CPT

## 2019-09-26 ENCOUNTER — TRANSCRIPTION ENCOUNTER (OUTPATIENT)
Age: 84
End: 2019-09-26

## 2019-09-26 PROBLEM — Z71.89 ACP (ADVANCE CARE PLANNING): Status: ACTIVE | Noted: 2018-11-05

## 2019-09-26 PROBLEM — C76.2 ABDOMINAL CARCINOMATOSIS: Status: ACTIVE | Noted: 2019-09-24

## 2019-09-26 PROBLEM — I70.0 AORTIC ATHEROSCLEROSIS: Status: ACTIVE | Noted: 2019-09-24

## 2019-09-26 PROBLEM — I50.9 CHF (CONGESTIVE HEART FAILURE): Status: ACTIVE | Noted: 2018-11-05

## 2019-09-26 PROBLEM — N39.0 URINARY TRACT INFECTION, ACUTE: Status: RESOLVED | Noted: 2019-01-15 | Resolved: 2019-09-26

## 2019-09-26 PROBLEM — E46 PROTEIN-CALORIE MALNUTRITION: Status: ACTIVE | Noted: 2019-09-26

## 2019-09-26 PROBLEM — E03.9 HYPOTHYROIDISM: Status: ACTIVE | Noted: 2018-10-23

## 2019-09-26 PROBLEM — H61.21 IMPACTED CERUMEN OF RIGHT EAR: Status: RESOLVED | Noted: 2019-07-09 | Resolved: 2019-09-26

## 2019-09-26 PROBLEM — E11.21 DIABETIC NEPHROPATHY ASSOCIATED WITH TYPE 2 DIABETES MELLITUS: Status: ACTIVE | Noted: 2018-10-23

## 2019-09-26 PROBLEM — Z85.43 HISTORY OF OVARIAN CANCER: Status: RESOLVED | Noted: 2017-03-02 | Resolved: 2019-09-26

## 2019-09-26 NOTE — COUNSELING
[Annual Discussion and review of: ___] : Annual discussion and review of [unfilled] [Completed Medical Orders for Life-Sustaining Treatment] : completed medical orders for life-sustaining treatment [DNR] : Code Status: DNR [DNI] : Intubation: DNI [Comfort] : Treatment Guidelines: Comfort [_____] : HCP: [unfilled] [Last Verification Date: _____] : [facility] Completion/last verification date: [unfilled] [de-identified] : updated molst

## 2019-09-26 NOTE — CURRENT MEDS
[Medication and Allergies Reconciled] : medication and allergies reconciled [Reviewed patient reported medication adherence from Comprehensive Assessment] : Reviewed patient reported medication adherence from comprehensive assessment [Adherent to medications] : Patient is adherent to medications as prescribed

## 2019-09-26 NOTE — LETTER HEADER
[Care Plan reviewed and provided to patient/caregiver] : Care plan reviewed and provided to patient/caregiver [Care Plan reviewed every ___ weeks] : Care plan reviewed every [unfilled] weeks [Initiation or substantial revisions made to care plan involving mod/high medical decision making for complex CCM] : Initiation or substantial revisions made to care plan involving mod/high medical decision making for complex CCM [Care Plan managed/Care coordinated by: ___] : Care plan managed/Care coordinated by: [unfilled]

## 2019-09-26 NOTE — REASON FOR VISIT
[Follow-Up] : a follow-up visit [Family Member] : family member [Pre-Visit Preparation] : pre-visit preparation was done

## 2019-09-26 NOTE — ASSESSMENT
[FreeTextEntry1] :  hospice contacted, and will be arriving night of 9/25/19.\par contacted at night - pt accepted into hospice program.

## 2019-09-26 NOTE — HEALTH RISK ASSESSMENT
[HRA Reviewed] : Health risk assessment reviewed [Some assistance needed] : managing medications [Full assistance needed] : managing finances [One fall no injury in past year] : Patient reported one fall in the past year without injury [Yes] : The patient does have visual impairment [TimeGetUpGo] : n/a [de-identified] : pt can't get out of bed.

## 2019-09-26 NOTE — PHYSICAL EXAM
[Well Nourished] : well nourished [Well Developed] : well developed [Normal Voice/Communication] : normal voice communication [Normal Sclera/Conjunctiva] : normal sclera/conjunctiva [EOMI] : extra ocular movement intact [Normal Outer Ear/Nose] : the ears and nose were normal in appearance [No LAD] : no lymphadenopathy [Supple] : the neck was supple [Clear to Auscultation] : lungs were clear to auscultation bilaterally [No Accessory Muscle Use] : no accessory muscle use [No Respiratory Distress] : no respiratory distress [Normal Rate] : heart rate was normal  [Normal S1, S2] : normal S1 and S2 [Regular Rhythm] : with a regular rhythm [Soft] : abdomen soft [Normal Bowel Sounds] : normal bowel sounds [Non Tender] : non-tender [Not Distended] : not distended [Normal Gait] : normal gait [No Joint Swelling] : no joint swelling seen [No Motor Deficits] : the motor exam was normal [No Clubbing, Cyanosis] : no clubbing  or cyanosis of the fingernails [Cranial Nerves Intact] : cranial nerves 2-12 were intact [Oriented x3] : oriented to person, place, and time [Normal Affect] : the affect was normal [Normal Mood] : the mood was normal [de-identified] : tachypneic, anxious [de-identified] : + ERNESTO, R>L. [de-identified] : + murmur, systolic and diastolic.; LE edema 3+ b/l [de-identified] : has large LLQ hernia. However, stoma not visible at this visit because dressing was just placed by home care RN.  Tunnel cath surrounding area isn't red.  edema in legs 3+

## 2019-09-26 NOTE — HISTORY OF PRESENT ILLNESS
[Patient] : patient [Family Member] : family member [FreeTextEntry1] : weakness, fatigue, malignant ascites and CHF. [FreeTextEntry2] : Patient is a 89 yo woman with malignant ascites, pt is s/p ovarian CA with mets to colon and s/p partial colectomy (now with stoma) and chemo x 2, T2DM, Afib, CKD Stage 3, diabetic nephropathy, asthma, B/L leg edema, CHF, being seen for followup.\par \par updates are as follows:\par 3d of weakness and SOB.\par hasn't 'felt good" x 1 week, but on Monday, had a big change - was weak, SOB.  Yesterday, rallied and ate bkfast and lunch.  Today, can't swallow.\par Was able to go to family activities outside earlier this month, but started feeling weak x 1 week, and Monday had a big decline.\par \par re: malignant ascites - pt has had decreased fluid output from drain x 2 weeks.  Would drain "like a garden hose" and fill 2 x 1L bottles in 15 minutes.  Now, has gone to 200 mL in 45 minutes.\par \par urination decreased b/c diuretic has stopped.\par \par Within the past hour, has had a hard time breathing.\par \par + Nausea, dry heaves today.\par \par re: afib: no palpitations.  Has PPM, and had it interrogated.  \par re: DM: controlled.

## 2019-09-27 DIAGNOSIS — Z51.5 ENCOUNTER FOR PALLIATIVE CARE: ICD-10-CM

## 2019-09-27 RX ORDER — POTASSIUM CHLORIDE 1.5 G/1.58G
20 POWDER, FOR SOLUTION ORAL
Refills: 2 | Status: DISCONTINUED | COMMUNITY
End: 2019-09-27

## 2019-09-27 RX ORDER — MIDODRINE HYDROCHLORIDE 5 MG/1
5 TABLET ORAL
Qty: 270 | Refills: 1 | Status: DISCONTINUED | COMMUNITY
Start: 2019-04-01 | End: 2019-09-27

## 2019-09-27 RX ORDER — CHLORHEXIDINE GLUCONATE 4 %
400 LIQUID (ML) TOPICAL DAILY
Qty: 90 | Refills: 0 | Status: DISCONTINUED | COMMUNITY
End: 2019-09-27

## 2019-09-27 RX ORDER — CIDER VINEGAR 300 MG
1000 TABLET ORAL DAILY
Qty: 30 | Refills: 0 | Status: DISCONTINUED | COMMUNITY
Start: 2018-10-23 | End: 2019-09-27

## 2019-09-27 RX ORDER — NICOTINE 11MG/24HR
50 MCG PATCH, TRANSDERMAL 24 HOURS TRANSDERMAL
Refills: 0 | Status: DISCONTINUED | COMMUNITY
End: 2019-09-27

## 2019-09-27 RX ORDER — CALCIUM CARBONATE/VITAMIN D3 600 MG-20
100 TABLET ORAL DAILY
Refills: 0 | Status: DISCONTINUED | COMMUNITY
Start: 2018-10-23 | End: 2019-09-27

## 2019-10-02 DIAGNOSIS — R06.00 DYSPNEA, UNSPECIFIED: ICD-10-CM

## 2019-10-02 RX ORDER — OXYCODONE HYDROCHLORIDE 5 MG/5ML
5 SOLUTION ORAL
Qty: 1 | Refills: 0 | Status: ACTIVE | COMMUNITY
Start: 2019-10-02 | End: 1900-01-01

## 2019-10-03 ENCOUNTER — RX RENEWAL (OUTPATIENT)
Age: 84
End: 2019-10-03

## 2019-10-05 RX ORDER — MORPHINE SULFATE 100 MG/5ML
100 SOLUTION ORAL
Qty: 1 | Refills: 0 | Status: DISCONTINUED | COMMUNITY
Start: 2019-09-25 | End: 2019-10-02

## 2019-10-14 ENCOUNTER — TRANSCRIPTION ENCOUNTER (OUTPATIENT)
Age: 84
End: 2019-10-14

## 2019-11-15 NOTE — FAMILY HISTORY
[Family History Reviewed and Updated] : family history reviewed and updated left knee replacement/Insulin pump

## 2020-04-01 NOTE — ED ADULT NURSE NOTE - NS ED NURSE DISCH DISPOSITION
Called reach community services [facility] ED to inquire if patient may be able to obtain necessary supplies such as toilette paper, napkins, kleenex, foil and other home goods patient needs.      REACH Sree to those sick and shut in, an Discharged

## 2020-04-02 PROBLEM — Z51.5 HOSPICE CARE PATIENT: Status: ACTIVE | Noted: 2019-09-27

## 2020-05-15 NOTE — PRE-OP CHECKLIST - SKIN PREP
done
I have personally performed a face to face diagnostic evaluation on this patient. I have reviewed the ACP note and agree with the history, exam and plan of care, except as noted.

## 2020-11-08 NOTE — PROCEDURE NOTE - PRACTITIONER PERFORMING THE TIME OUT
kayley PA Implemented All Universal Safety Interventions:  [place] to call system. Call bell, personal items and telephone within reach. Instruct patient to call for assistance. Room bathroom lighting operational. Non-slip footwear when patient is off stretcher. Physically safe environment: no spills, clutter or unnecessary equipment. Stretcher in lowest position, wheels locked, appropriate side rails in place.

## 2020-12-31 NOTE — ED PROVIDER NOTE - ENMT, MLM
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   Spouse noted increased confusion by patient at home last night.    Hematology consulted for acute on chronic anemia.  Usually her hgb runs ~9, but now 6.  Denies any bleeding.  No s/s of infection or hemoyl    Macrocytic anemia  -b12 folate wnl  -myeloma labs  -no evidence of hemolysis or bleeding  -PBS with hyperseg neutrophils, dysplastic neutrophils  -if above workup neg, BM biopsy  -transfuse for Hgb <7  -fu Dr. 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      Hematology consulted for acute on chronic anemia.  Macrocytic anemia.  -No evidence of hemolysis -  Haptoglobin 174, bili 0.2, direct dedrick neg.  -pt denies any bleeding.  -no evidence of nutritional deficiencies - B12 and folate wnl.    -iron sat 11% though ferritin 44.  start iron supplementation, pt is on iron b12 and folate at home.  -PBS reviewed with hypersegmented neutrophils, some dysplastic neutrophils - concerned for underlying BM disorder  -send SPEP with YULI, UPEP w YULI, IG levels, FLC  -c/w supportive transfusions if hgb <7  -if above workup negative, bone marrow biopsy next week  -fu Dr. 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      Hematology consulted for acute on chronic anemia.  Macrocytic anemia.  -No evidence of hemolysis -  Haptoglobin 174, bili 0.2, direct dedrick neg.  -pt denies any bleeding. consider RP bleed.  -no evidence of nutritional deficiencies - B12 and folate wnl.    -iron sat 11% though ferritin 44.  start iron supplementation, pt is on iron b12 and folate at home.  -send SPEP with YULI, UPEP w YULI, IG levels, FLC  -PBS reviewed with hypersegmented and dysplastic neutrophils - concerned for underlying BM disorder exacerbated by infection  -rec to trend cbc with diff daily  -if no improvement, likely will need bone marrow biopsy next week to further evaluate  -c/w supportive transfusions if hgb <7  -fu Dr. 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      Hematology consulted for acute on chronic anemia.  Macrocytic anemia.  -No evidence of hemolysis -  Haptoglobin 174, bili 0.2, direct dedrick neg.  -pt denies any bleeding. consider RP bleed.  -no evidence of nutritional deficiencies - B12 and folate wnl.    -iron sat 11% though ferritin 44.  start iron supplementation, pt is on iron b12 and folate at home.  -send SPEP with YULI, UPEP w YULI, IG levels, FLC  -PBS reviewed with anisocytosis, elliptocytes, polychromasia, no schistocytes, hypersegmented and dysplastic neutrophils - concerned for underlying BM disorder exacerbated by infection  -rec to trend cbc with diff daily  -if no improvement, likely will need bone marrow biopsy next week to further evaluate  -c/w supportive transfusions if hgb <7  -fu Dr. Martinez upon discharge    Brenda Hammer  Hematology Attending  [phone]      - Airway patent, Nasal mucosa clear. Mouth with normal mucosa. Throat has no vesicles, no oropharyngeal exudates and uvula is midline.

## 2021-01-01 NOTE — PROGRESS NOTE ADULT - PROVIDER SPECIALTY LIST ADULT
Anesthesia
Cardiology
Cardiology
Hospitalist
Surgery
Cardiology
Critical Care
Statement Selected

## 2021-01-14 NOTE — H&P ADULT - GASTROINTESTINAL
"Last Written Prescription Date:  8/6/[facility] Refill Protocol.    Mady Hernandez   Ob[facility]Gyn Clinic  RN    " details…

## 2021-02-23 NOTE — PROGRESS NOTE ADULT - PROBLEM/PLAN-3
DISPLAY PLAN FREE TEXT Advancement Flap (Single) Text: The defect edges were debeveled with a #15 scalpel blade.  Given the location of the defect and the proximity to free margins a single advancement flap was deemed most appropriate.  Using a sterile surgical marker, an appropriate advancement flap was drawn incorporating the defect and placing the expected incisions within the relaxed skin tension lines where possible.    The area thus outlined was incised deep to adipose tissue with a #15 scalpel blade.  The skin margins were undermined to an appropriate distance in all directions utilizing iris scissors.

## 2021-03-16 NOTE — PATIENT PROFILE ADULT - BRADEN ACTIVITY
Patient not medically stable for discharge.  Intubated today.    CM to follow for discharge planning needs.  LUPILLO Melgar RN  Case Management  [id number]        10/27/20 1443   Discharge Reassessment   Assessment Type Discharge Planning Reassessment   Provided patient/caregiver education on the expected discharge date and the discharge plan Yes   Do you have any problems affording any of your prescribed medications? TBD   Discharge Plan A Home   Discharge Plan B Home;Home Health   DME Needed Upon Discharge  other (see comments)  (TBD)   Anticipated Discharge Disposition Home   Can the patient/caregiver answer the patient profile reliably? No, cognitively impaired  (I&S)   Describe the patient's ability to walk at the present time. Does not walk or unable to take any steps at all   Number of comorbid conditions (as recorded on the chart) Four   Post-Acute Status   Post-Acute Authorization Placement   Post-Acute Placement Status Awaiting Internal Medical Clearance   Discharge Delays None known at this time      Patient: Janay Randolph    Procedure Summary     Date: 03/16/21 Room / Location: [facility] ENDOSCOPY 01 / [facility] ENDOSCOPY    Anesthesia Start: 7896 Anesthesia Stop: 2320    Procedure: ESOPHAGOGASTRODUODENOSCOPY (EGD) (N/A ) Diagnosis:       Anemia, unspecified typ (3) walks occasionally

## 2021-05-21 NOTE — ED ADULT NURSE NOTE - NSSUSCREENINGQ3_ED_ALL_ED
Detail Level: Detailed Quality 130: Documentation Of Current Medications In The Medical Record: Current Medications Documented Quality 110: Preventive Care And Screening: Influenza Immunization: Influenza Immunization previously received during influenza season Quality 431: Preventive Care And Screening: Unhealthy Alcohol Use - Screening: Patient screened for unhealthy alcohol use using a single question and scores less than 2 times per year Quality 47: Advance Care Plan: Advance Care Planning discussed and documented; advance care plan or surrogate decision maker documented in the medical record. Quality 111:Pneumonia Vaccination Status For Older Adults: Pneumococcal Vaccination Previously Received Quality 226: Preventive Care And Screening: Tobacco Use: Screening And Cessation Intervention: Patient screened for tobacco use and is an ex/non-smoker No

## 2021-06-08 NOTE — ED PROVIDER NOTE - NSCAREINITIATED _GEN_ER
Assessment:   1. Counseling for travel  Safe for travel.  - Typhoid, Inactive, Inj  - amoxicillin (AMOXIL) 875 MG tablet; Take 1 tablet (875 mg total) by mouth 2 (two) times a day for 10 days.  Dispense: 20 tablet; Refill: 0    2. Need for vaccination  Complete vaccination  - Typhoid, Inactive, Inj    3. Migraine with aura and without status migrainosus  Refill medication for migraine   - amitriptyline (ELAVIL) 25 MG tablet; Take 1 tablet (25 mg total) by mouth bedtime.  Dispense: 120 tablet; Refill: 0    Plan:   Issues discussed: environmental concerns, freshwater swimming, malaria and Yellow Fever.  Immunizations recommended: Typhoid (parenteral) and Yellow Fever.  Traveler's diarrhea prophylaxis: not indicated.  Follow-up as needed for acute illness.        Subjective:   Jazmin Camarillo is a 19 y.o. female who presents to the clinic for travel consultation and to establish care with her mother. She is accompanied by her mother. She was previously seen at [facility] in [place], [place]. She is traveling to [place], [place], and [place] for three and a half months for a foreign studies program through school. She was advised to have the yellow fever vaccine because [place].     Migraines: Managed with amitriptyline. She requests a four month supply for her travel.     Health Maintenance: She does receive flu shots. She already had 2 hepatitis A vaccines.     Planned departure date: 02/23/17         Planned return date: 06/01/17  Countries of travel: [place]  Areas in country: urban    Accommodations: [place]  Purpose of travel: foreign study  Prior travel out of [place]: yes  Currently ill / Fever: no  History of liver or kidney disease: no    PFSH: No previous surgeries. She has a history of migraines. Parents are alive. Maternal history of hypertension. Paternal history of hypotension. She has one sister who is healthy. Paternal grandmother history of breast cancer; she is living.  Maternal grandmother and grandfather with hypertension. She is not sexually active. She does not use drugs, alcohol, or tobacco. She developed a rash to penicillin when a child but has tolerated amoxicillin in the past. She developed a rash with Zithromax. She is pre-med in college. She is hoping to pursue surgery. She returned home from [place] yesterday.     Data Review:   See below  The following portions of the patient's history were reviewed and updated as appropriate: allergies, current medications, past family history, past medical history, past social history, past surgical history and problem list.    Review of Systems  Pertinent items are noted in HPI.  A 12 point comprehensive review of systems was negative except as noted.    No chest pain, SOB, fever, or chills. No heartburn or constipation.      Objective:        General Appearance:    Alert, cooperative, no distress, appears stated age   Head:    Normocephalic, without obvious abnormality, atraumatic   Eyes:    PERRL, conjunctiva/corneas clear, EOM's intact, fundi     benign, both eyes   Ears:    Normal TM's and external ear canals, both ears   Nose:   Nares normal, septum midline, mucosa normal, no drainage    or sinus tenderness   Throat:   Lips, mucosa, and tongue normal; teeth and gums normal   Neck:   Supple, symmetrical, trachea midline, no adenopathy;     thyroid:  no enlargement/tenderness/nodules; no carotid    bruit or JVD   Back:     Symmetric, no curvature, ROM normal, no CVA tenderness   Lungs:     Clear to auscultation bilaterally, respirations unlabored   Chest Wall:    No tenderness or deformity    Heart:    Regular rate and rhythm, S1 and S2 normal, no murmur, rub   or gallop       Abdomen:     Soft, non-tender, bowel sounds active all four quadrants,     no masses, no organomegaly           Extremities:   Extremities normal, atraumatic, no cyanosis or edema   Pulses:   2+ and symmetric all extremities   Skin:   Skin color, texture,  turgor normal, no rashes or lesions   Lymph nodes:   Cervical, supraclavicular, and axillary nodes normal   Neurologic:   CNII-XII intact, normal strength, sensation and reflexes     throughout         ADDITIONAL HISTORY SUMMARIZED (2): Additional history from mom regarding previous medication reactions.   DECISION TO OBTAIN EXTRA INFORMATION (1): [facility] accessed.   RADIOLOGY TESTS (1): None.  LABS (1): None.  MEDICINE TESTS (1): None.  INDEPENDENT REVIEW (2 each): None.     The visit lasted a total of 22 minutes face to face with the patient. Over 50% of the time was spent counseling and educating the patient about travel precautions.    Digna FALL, am scribing for and in the presence of, Promise Amajiri, FNP.    IMary FNP, personally performed the services described in this documentation, as scribed by Digna Nguyen in my presence, and it is both accurate and complete.    Total Points: 3     Jay Landa(Attending)

## 2021-07-20 NOTE — PATIENT PROFILE ADULT. - PROVIDER NOTIFICATION
Chief Complaint   Patient presents with    Follow-up     BP    Knee Pain     right knee, pt thinks she is having an allergic reaction on knee scar     Hip Pain     left hip - since having knee surgery        1. Have you been to the ER, urgent care clinic since your last visit? Hospitalized since your last visit? Yes When: 06/28/2021 Where: [facility] Reason for visit: wound check     2. Have you seen or consulted any other health care providers outside of the [address] [place] [address] since your last visit? Include any pap smears or colon screening.  No Declines

## 2022-02-04 NOTE — ED ADULT NURSE NOTE - NS_BELOGINGS_RETUNED_NAME_ED_ALL_ED_FT
1 day PO: Patient is doing well post-operatively. The importance of post-op drop compliance was emphasized. Drop schedule reviewed with patient. Patient to call if any visual changes or concerns. pt

## 2022-02-27 NOTE — PHYSICAL THERAPY INITIAL EVALUATION ADULT - PERTINENT HX OF CURRENT PROBLEM, REHAB EVAL
Pt is a 89 y/o F with PMH of DM type 2, Colon CA, Asthma, HTN admitted with ventral hernia, abdominal pain 10/10. Pt is currently s/p Ventral hernia repair.
restless

## 2022-04-02 NOTE — PATIENT PROFILE ADULT. - SOURCE OF INFORMATION, PROFILE
The patient is Stable - Low risk of patient condition declining or worsening    Shift Goals  Clinical Goals: rest, pain control, breastfeeding support.  Patient Goals: Adequate pain control; breastfeeding  Family Goals: bonding    Progress made toward(s) clinical / shift goals:  remain clinically stable    Patient is not progressing towards the following goals:       family

## 2022-05-14 NOTE — ED ADULT NURSE NOTE - NSSUSCREENINGQ4_ED_ALL_ED
Received report from ROSENDO Hopper in [place]. Pt A&Ox3. Aware of plan of care. Safety and comfort maintained. Call bell within reach
No

## 2022-05-31 NOTE — PHYSICAL THERAPY INITIAL EVALUATION ADULT - IMPAIRMENTS FOUND, PT EVAL
gait, locomotion, and balance/muscle strength Inflammation Suggestive Of Cancer Camouflage Histology Text: There was a dense lymphocytic infiltrate which prevented adequate histologic evaluation of adjacent structures.

## 2022-09-26 NOTE — ED PROVIDER NOTE - TEMPLATE
Abdominal Pain, N/V/D Partial Purse String (Simple) Text: Given the location of the defect and the characteristics of the surrounding skin a simple purse string closure was deemed most appropriate.  Undermining was performed circumferentially around the surgical defect.  A purse string suture was then placed and tightened. Wound tension only allowed a partial closure of the circular defect.

## 2022-10-15 NOTE — ED ADULT NURSE NOTE - PMH
room air Afib  s/p PPM  Ascites, malignant  from advanced ovarian cancer  Asthma    Colon cancer    DM Type 2 (Diabetes Mellitus, Type 2)    HTN (Hypertension)    Hypothyroidism    Obese    Ovarian cancer    Overactive Bladder

## 2022-12-14 NOTE — ED ADULT NURSE NOTE - CADM POA PRESS ULCER
From: Edyta Franco  To: Isabela Nassar MD  Sent: 12/14/2022 2:01 PM EST  Subject: Treatment on Friday    Good afternoon. I have a couple of questions before I come in on Friday. After the first treatment, things were better for a couple of weeks. Over the last week or so, I’m having the same issues. The Clobetasol seems to be damaging my skin. It really burns every time I apply it. My biopsy spot has started bleeding again,and I’m having significant pain when going to the bathroom.   Just trying to get your thoughts before Friday.   Thanks so much!  
No

## 2023-02-02 NOTE — PATIENT PROFILE ADULT. - FALLEN IN THE PAST
Return to emergency room for new or worsening symptoms including, not limited to, developing fever, chills, sweats, inability to tolerate food or drink, spreading of the redness, pus drainage, worsening pain, or other symptoms/concerns. Follow-up with the physician referral service line neurology establish a PCP for your future nonemergent medical needs    Medication as prescribed. Ensure that you eat prior to taking ibuprofen as this is an NSAID medication that can otherwise cause gastrointestinal bleeding/stomach ulcers if taken on an empty stomach. Also, ensure that you complete the full course of the Bactrim and Keflex as these are antibiotics. You should not have any leftover. You should not drink alcohol while taking the antibiotic's as this praxis does reduce the effectiveness of the drug and you will not clear the infection. no

## 2023-02-21 NOTE — ED PROVIDER NOTE - PMH
Care Transitions Outreach Attempt    2nd unsuccessful attempt to reach for Care Transition follow up call. HIPAA compliant message left requesting call back to [phone]. Episode closed per protocol, no further outreach scheduled. Unable to reach letter (UTR) sent via My Chart. Patient: William Cantrell Patient : 1962 MRN: [id number]    Last Discharge [address]       Date Complaint Diagnosis Description Type Department Provider    23 Hyperglycemia Slurred speech . .. ED to Hosp-Admission (Discharged) (ADMITTED) Bailey Martinez MD; Steven Fontenot...         Noted following upcoming appointments from discharge chart review:   [facility] follow up appointment(s):   Future Appointments   Date Time Provider [facility] Hightower   2023  2:15 PM Amarilis Garsia MD [facility]   2023  3:00 PM Doreen Antonio MD [facility] [address]   2023  2:00 PM Deborah Butler MD [facility]       Chula Beasley RN [phone] Ascites, malignant  from advanced ovarian cancer  Asthma    Colon cancer    DM Type 2 (Diabetes Mellitus, Type 2)    HTN (Hypertension)    Hypothyroidism    Obese    Overactive Bladder

## 2023-09-29 NOTE — PATIENT PROFILE ADULT. - HEALTHCARE QUESTIONS, PROFILE
Mom reports noticing chronic left eye slightly bigger than right    9/29/23: Pupil testing with 1mm difference OS>OD, similar in light and dark  No associated ptosis or EOM deficit    Plan:  Consistent with physiologic anisocoria  Observe   none

## 2023-11-04 NOTE — DIETITIAN INITIAL EVALUATION ADULT. - NUTRITION INTERVENTION
"Incision & Drainage    Date/Time: 11/4/2023 4:30 PM    Performed by: Christina Patel MD  Authorized by: Christina Patel MD    Time out: Immediately prior to procedure a "time out" was called to verify the correct patient, procedure, equipment, support staff and site/side marked as required.    Consent Done?:  Yes (Verbal)    Type:  Hematoma  Body area:  Lower extremity  Location details:  Right leg  Scalpel size: Used an 18 gauge needle for aspiration.  Complexity:  Simple  Drainage:  Bloody  Drainage amount:  Copious  Wound treatment:  Wound left open  Patient tolerance:  Patient tolerated the procedure well with no immediate complications    Wound was dressed with mupirocin ointment and a nonstick gauze.  The skin did tear during the procedure.  Patient given wound care instructions verbally and in his discharge instructions.  He verbalizes understanding    "
Meals and Snack

## 2023-11-15 NOTE — ED ADULT TRIAGE NOTE - NSWEIGHTCALCTOOLDRUG_GEN_A_CORE
SUBJECTIVE: Shanna Cuellar , a 11 year old  male, presents for counseling and information regarding upcoming travel to [facility]. Special medical concerns include: none. He anticipates the following unusual exposures: none.    Itinerary:  [facility]    Departure Date: 12/10/23 Return date: 12/30/23    Reason for travel (i.e. Business, pleasure): pleasure    Visiting an urban or rural area?: unknown    Accommodations (i.e. hotel, hostel, friends, family, etc): family      IMMUNIZATION HISTORY  Have you received any vaccinations in the past 4 weeks?  No  Have you ever fainted from having your blood drawn or from an injection?  No  Have you ever had a fever reaction to vaccination?  No  Have you ever had any bad reaction or side effect from any vaccination?  No  Have you ever had hepatitis A or B vaccine?  yes  Do you live (or work closely) with anyone who has AIDS, an AIDS-like condition, any other immune disorder or who is on chemotherapy for cancer?  No  Have you received any injection of immune globulin or any blood products during the past 12 months?  No    GENERAL MEDICAL HISTORY  Do you have a medical condition that warrants maintenance medication or physician follow-up?  No  Do you have a medical condition that is stable now, but that may recur while traveling?  No  Has your spleen been removed?  No  Have you had an acute illness or a fever in the past 48 hours?  Yes, cough  Are you pregnant, or might you become pregnant on this trip?  Any chance of pregnancy?  No  Are you breastfeeding?  No  Do you have HIV, AIDS, an AIDS-like condition, any other immune disorder, leukemia or cancer?  No  Do you have a severe combined immunodeficiency disease?  No  Have you had your thymus gland removed or history of problems with your thymus, such as myasthenia gravis, DiGeorge syndrome, or thymoma?  No    Do you have severe thrombocytopenia (low platelet count) or a coagulation disorder?  No  Have you ever had a convulsion,  seizure, epilepsy, neurologic condition or brain infection?  No  Do you have any stomach conditions?  No  Do you have a G6PD deficiency?  No  Do you have severe renal or kidney impairment?  No  Do you have a history of psychiatric problems?  No  Do you have a problem with strange dreams and/or nightmares?  No  Do you have insomnia?  No  Do you have problems with vaginitis?  No  Do you have psoriasis?  No  Are you prone to motion sickness?  No  Have you ever had headaches, nausea, vomiting, or breathing problems from altitude exposure?  No      No past medical history on file.   Immunization History   Administered Date(s) Administered    COVID-19 Vaccine Peds 5-11Y (Pfizer) 12/10/2021, 12/31/2021    DTAP (<7y) 06/02/2014, 07/31/2015, 09/23/2016    DTAP-IPV, <7Y (QUADRACEL/KINRIX) 07/05/2018    HEPATITIS A (PEDS 12M-18Y) 06/03/2021    Hepatitis B, Peds 10/22/2019, 01/02/2020, 10/21/2020    Ig, Unspecified Formulation 2011, 02/01/2012    Influenza Vaccine >6 months (Alfuria,Fluzone) 03/10/2020    MMR 08/07/2017, 08/19/2019    Poliovirus, inactivated (IPV) 07/31/2015, 09/23/2016    Synagis 2011, 02/01/2012    Typhoid IM 06/03/2021    Varicella 07/05/2018, 08/19/2019       Current Outpatient Medications   Medication Sig Dispense Refill    acetaminophen (TYLENOL) 120 MG suppository Place 1 suppository rectally every 4 hours as needed for fever. (Patient not taking: Reported on 6/3/2021) 12 suppository 0    azithromycin (ZITHROMAX) 500 MG tablet Take 1 tablet daily x 3 days for traveler's diarrhea 6 tablet 0    docusate (COLACE) 50 MG/5ML liquid Two drops in both ears twice daily to loosen ear wax (Patient not taking: Reported on 6/3/2021) 20 mL 0    mefloquine (LARIAM) 250 MG tablet Take one tablet weekly - start 2 weeks prior to travel to malaria area, continue weekly through 4 weeks after leaving malaria area 14 tablet 0    NO ACTIVE MEDICATIONS       ondansetron (ZOFRAN) 4 MG/5ML solution Take 3.5 mLs (2.8  mg) by mouth 3 times daily as needed for nausea or vomiting (Patient not taking: Reported on 6/3/2021) 35 mL 0    vitamin A-D-C & iron drops (TRI-VI-SOL W/ IRON) 10 MG/ML SOLN Take 1 mL by mouth daily. (Patient not taking: Reported on 6/3/2021) 1 Bottle 6     No Known Allergies     EXAM: deferred    Immunizations discussed include: Hepatitis A and Typhoid (will return for MA visit)  Malaria prophylaxis recommended: Malarone  Symptomatic treatment for traveler's diarrhea: bismuth subsalicylate, loperamide/diphenoxylate, and azithromycin    ASSESSMENT/PLAN:    (Z71.84) Encounter for counseling for travel  (primary encounter diagnosis)    Comment: No vaccines today. Patient will return or follow-up with PCP as needed. Prophylaxis given for Traveler's diarrhea and Malaria. All questions were answered.     Plan: atovaquone-proguanil (MALARONE) 250-100 MG         tablet, azithromycin (ZITHROMAX) 500 MG tablet              I have reviewed general recommendations for safe travel   including: food/water precautions, insect avoidance, safe sex   practices given high prevalence of HIV and other STDs,   roadway safety. Educational materials and links to the CDC   Traveler's health website have been provided.    Total time 15 minutes, greater than 50 percent in counseling   and coordination of care.            used

## 2024-01-10 NOTE — DIETITIAN INITIAL EVALUATION ADULT. - FACTORS AFF FOOD INTAKE
Detail Level: Detailed Quality 130: Documentation Of Current Medications In The Medical Record: Current Medications Documented Quality 431: Preventive Care And Screening: Unhealthy Alcohol Use - Screening: Patient not identified as an unhealthy alcohol user when screened for unhealthy alcohol use using a systematic screening method Quality 226: Preventive Care And Screening: Tobacco Use: Screening And Cessation Intervention: Patient screened for tobacco use and is an ex/non-smoker hx colostomy, avoids gassy foods/other (specify)

## 2024-01-30 NOTE — CONSULT NOTE ADULT - SUBJECTIVE AND OBJECTIVE BOX
normal mood with appropriate affect Chief Complaint:  Patient is a 89y old  Female who presents with a chief complaint of abdominal pain (26 Jul 2018 10:40)    Ovarian cancer  Afib  Ascites, malignant  Colon cancer  Obese  Hypothyroidism  HTN (Hypertension)  Overactive Bladder  DM Type 2 (Diabetes Mellitus, Type 2)  Asthma  Incarcerated hernia  H/O hernia repair  Artificial pacemaker  S/P colon resection  S/P Cataract Surgery  History of Tonsillectomy  History of Appendectomy  Status Post Total Knee Replacement     HPI:  89 y/o F PMHx ovarian cancer w/ recurrent ascites, colon ca s/p colostomy, s/p incarcerated ventral hernia repair 10/2017, DM2, HTN, afib s/p PPM, hypothyroid, obesity, and overactive bladder presents with generalized weakness x 2 days.  Patient reports that she began having chills 2 days ago, and bundled up in a chair for a nap, and then was not strong enough to get out of the chair on her own.  Per granddaughter patient has been confused, and was weaker than normal the evening before admission, and decided to call EMS.    In ED patient is hypotensive (70/35), febrile (100.8), SpO2 100% on RA.  Labs significant for WBC 21.04, Hbg 9.7, Hct 30.9, Plt 447, Bands 33%, Lactate 4.2 (repeat 4.3), Crt 2.5, Gfr 16, Trop 0.101; CT ab/pelv shows moderate ascites similar to prior study.  Patient received Vanco/Zosyn, 2L NS bolus; tylenol 650mg x1. (21 Jul 2018 18:13)    GI consulted for dyspepsia. chart reviewed. ICU course- 89 y/o female with ovarian cancer and peritoneal carcinomatosis with recurrent ascites requiring frequent paracentesis, multiple SBOs requiring surgery, also afib, CHF, colon cancer s/p colostomy, with septic shock likely due to SBP and/or RLE cellulitis. She also has BRITTANEY likely prerenal vs ATN which is resolving. pt seen and examined.        No Known Allergies      amoxicillin  875 milliGRAM(s)/clavulanate 1 Tablet(s) Oral every 12 hours  ciprofloxacin     Tablet 500 milliGRAM(s) Oral every 12 hours  levothyroxine 225 MICROGram(s) Oral daily  midodrine 15 milliGRAM(s) Oral every 8 hours  montelukast 10 milliGRAM(s) Oral daily  ondansetron Injectable 4 milliGRAM(s) IV Push every 8 hours PRN  pantoprazole    Tablet 40 milliGRAM(s) Oral two times a day  rivaroxaban 15 milliGRAM(s) Oral daily  simethicone 80 milliGRAM(s) Chew three times a day PRN        FAMILY HISTORY:  No pertinent family history in first degree relatives        Review of Systems:    General:  No wt loss, fevers, chills, night sweats, fatigue  Eyes:  Good vision, no reported pain  ENT:  No sore throat, pain, runny nose, dysphagia  CV:  No pain, palpitations, no lightheadedness  Resp:  No dyspnea, cough, tachypnea, wheezing  GI: see above  :  No pain, bleeding, incontinence, nocturia  Muscle:  No pain, weakness  Neuro:  No weakness, tingling, memory problems  Psych:  No fatigue, insomnia, mood problems, depression  Endocrine:  No polyuria, polydypsia, cold/heat intolerance  Heme:  No petechiae, ecchymosis, easy bruisability  Skin:  No rash, tattoos, scars, edema    Relevant Family History:   n/c    Relevant Social History: n/c      Physical Exam:    Vital Signs:  Vital Signs Last 24 Hrs  T(C): 36.6 (26 Jul 2018 05:00), Max: 36.7 (25 Jul 2018 13:31)  T(F): 97.9 (26 Jul 2018 05:00), Max: 98.1 (25 Jul 2018 20:56)  HR: 70 (26 Jul 2018 05:00) (70 - 70)  BP: 112/75 (26 Jul 2018 05:00) (112/75 - 126/77)  BP(mean): --  RR: 16 (26 Jul 2018 05:00) (16 - 18)  SpO2: 94% (26 Jul 2018 05:00) (94% - 95%)  Daily     Daily     General:  Appears stated age, well-groomed, nad  HEENT:  NC/AT,  conjunctivae clear and pink, no thyromegaly, nodules, adenopathy, no JVD  Chest:  Full & symmetric excursion, no increased effort, breath sounds clear  Cardiovascular:  Regular rhythm, S1, S2, no murmur/rub/S3/S4, no abdominal bruit, no edema  Abdomen:  Soft, non-tender, non-distended, normoactive bowel sounds,  no masses ,no hepatosplenomeagaly, no signs of chronic liver disease  Extremities:  no cyanosis,clubbing or edema  Skin:  No rash/erythema/ecchymoses/petechiae/wounds/abscess/warm/dry  Neuro/Psych:  A&O  , no asterixis, no tremor, no encephalopathy    Laboratory:                            9.8    8.55  )-----------( 502      ( 26 Jul 2018 09:08 )             31.1     07-26    141  |  106  |  17  ----------------------------<  82  4.0   |  27  |  0.87    Ca    7.9<L>      26 Jul 2018 09:08  Phos  2.2     07-25  Mg     2.2     07-25    TPro  5.7<L>  /  Alb  1.8<L>  /  TBili  0.4  /  DBili  x   /  AST  16  /  ALT  16  /  AlkPhos  97  07-25    LIVER FUNCTIONS - ( 25 Jul 2018 05:52 )  Alb: 1.8 g/dL / Pro: 5.7 g/dL / ALK PHOS: 97 U/L / ALT: 16 U/L / AST: 16 U/L / GGT: x                 Imaging:    < from: CT Abdomen and Pelvis No Cont (07.21.18 @ 13:36) >    EXAM:  CT ABDOMEN AND PELVIS                            PROCEDURE DATE:  07/21/2018          INTERPRETATION:  CT ABDOMEN AND PELVIS    CLINICAL INFORMATION:  Weakness. Decreased p.o. intake. History of   ovarian cancer and colon cancer. Prior ventral hernia repair.    PROCEDURE:  Using multislice helical CT, without intravenous or oral   contrast 2.5 mm sections were obtained from the domes of the diaphragm to   the ischial tuberosities.  Coronal reformatted images were performed.    COMPARISON: CT scan abdomen and pelvis 6/20/2018.    FINDINGS: Evaluation of the solid organ parenchyma is limited by the lack   of intravenous contrast.      There is a small partially loculated left-sided pleural effusion.    There is a heterogeneous appearance of the nonenhanced liver, evaluation   for metastatic disease is limited without intravenous contrast.    The spleen, adrenal glands and nonenhanced pancreas are unremarkable in   appearance.    Cholelithiasis with probable mild gallbladder wall thickening is again   noted.    There is a moderate volume of abdominal and pelvic ascites, similar to   prior study.  Nodularity along the peritoneal omental and mesenteric surfaces is   suggestive of peritoneal carcinomatosis.    No hydroureteronephrosis is noted.    There is arteriosclerotic calcification of the abdominal aorta.  There are enlarged retroperitoneal para-aortic and aortocaval lymph nodes.    The evaluation of the stomach and gastrointestinal tract is limited   without distention.  Again noted is segmental sigmoid resection with left-sided colostomy with   large parastomal hernia containing nondistended, nonobstructed colon and   ascites.  There is mild infiltration of the subcutaneous soft tissues along the   anterior abdominalwall, likely at the site of prior ventral repair.   There is a moderately distended fluid and particulate filled cecum, with   some moderately distended small bowel loops and probable segmental bowel   wall thickening. The evaluation of the bowel wall is limited without   intravenous contrast. Note discrete change of caliber is noted.    No free intraperitoneal air is noted.    The urinary bladder is nondistended.  No pelvic mass is noted.    Bilateral inguinal lymph nodes are noted.    There aremultilevel degenerative changes of the spine.    Impression:    Moderate volume of abdominal pelvic ascites, similar to prior study.   Peritoneal and mesenteric nodularity suggestive of carcinomatosis.    Large left-sided parastomal hernia, similar in appearance to prior study.    Mildly distended, probably subsegmental thickened small bowel, without   discrete change in caliber, no high-grade small bowel obstruction noted.    Other findings as discussed above.                                YESSY MALIK M.D., ATTENDING RADIOLOGIST  This document has been electronically signed. Jul 21 2018  2:16PM                < end of copied text > Chief Complaint:  Patient is a 89y old  Female who presents with a chief complaint of abdominal pain (26 Jul 2018 10:40)    Ovarian cancer  Afib  Ascites, malignant  Colon cancer  Obese  Hypothyroidism  HTN (Hypertension)  Overactive Bladder  DM Type 2 (Diabetes Mellitus, Type 2)  Asthma  Incarcerated hernia  H/O hernia repair  Artificial pacemaker  S/P colon resection  S/P Cataract Surgery  History of Tonsillectomy  History of Appendectomy  Status Post Total Knee Replacement     HPI:  89 y/o F PMHx ovarian cancer w/ recurrent ascites, colon ca s/p colostomy, s/p incarcerated ventral hernia repair 10/2017, DM2, HTN, afib s/p PPM, hypothyroid, obesity, and overactive bladder presents with generalized weakness x 2 days.  Patient reports that she began having chills 2 days ago, and bundled up in a chair for a nap, and then was not strong enough to get out of the chair on her own.  Per granddaughter patient has been confused, and was weaker than normal the evening before admission, and decided to call EMS.    In ED patient is hypotensive (70/35), febrile (100.8), SpO2 100% on RA.  Labs significant for WBC 21.04, Hbg 9.7, Hct 30.9, Plt 447, Bands 33%, Lactate 4.2 (repeat 4.3), Crt 2.5, Gfr 16, Trop 0.101; CT ab/pelv shows moderate ascites similar to prior study.  Patient received Vanco/Zosyn, 2L NS bolus; tylenol 650mg x1. (21 Jul 2018 18:13)    GI consulted for dyspepsia. chart reviewed. ICU course- 89 y/o female with ovarian cancer and peritoneal carcinomatosis with recurrent ascites requiring frequent paracentesis, multiple SBOs requiring surgery, also afib, CHF, colon cancer s/p colostomy, with septic shock likely due to SBP and/or RLE cellulitis. She also has BRITTANEY likely prerenal vs ATN which is resolving. pt seen and examined. states she feels well gi wise and symptoms have resolved, for dc to rehab today per her report. had nausea yesterday, given simethicone and zofran with relief. denies vomiting and abd pain, states ostomy functioning normally. abd sxs as above. unsure if she has had prior scopes in the past. denies toxic habits. on xarelto    recent labs/imaging/vs reviewed- afebrile      No Known Allergies      amoxicillin  875 milliGRAM(s)/clavulanate 1 Tablet(s) Oral every 12 hours  ciprofloxacin     Tablet 500 milliGRAM(s) Oral every 12 hours  levothyroxine 225 MICROGram(s) Oral daily  midodrine 15 milliGRAM(s) Oral every 8 hours  montelukast 10 milliGRAM(s) Oral daily  ondansetron Injectable 4 milliGRAM(s) IV Push every 8 hours PRN  pantoprazole    Tablet 40 milliGRAM(s) Oral two times a day  rivaroxaban 15 milliGRAM(s) Oral daily  simethicone 80 milliGRAM(s) Chew three times a day PRN        FAMILY HISTORY:  No pertinent family history in first degree relatives        Review of Systems:    General:  No wt loss, fevers, chills, night sweats, fatigue  Eyes:  Good vision, no reported pain  ENT:  No sore throat, pain, runny nose, dysphagia  CV:  No pain, palpitations, no lightheadedness  Resp:  No dyspnea, cough, tachypnea, wheezing  GI: see above  :  No pain, bleeding, incontinence, nocturia  Muscle:  No pain, weakness  Neuro:  No weakness, tingling, memory problems  Psych:  No fatigue, insomnia, mood problems, depression  Endocrine:  No polyuria, polydypsia, cold/heat intolerance  Heme:  No petechiae, ecchymosis, easy bruisability  Skin:  No rash, tattoos, scars, edema    Relevant Family History:   n/c    Relevant Social History: n/c      Physical Exam:    Vital Signs:  Vital Signs Last 24 Hrs  T(C): 36.6 (26 Jul 2018 05:00), Max: 36.7 (25 Jul 2018 13:31)  T(F): 97.9 (26 Jul 2018 05:00), Max: 98.1 (25 Jul 2018 20:56)  HR: 70 (26 Jul 2018 05:00) (70 - 70)  BP: 112/75 (26 Jul 2018 05:00) (112/75 - 126/77)  BP(mean): --  RR: 16 (26 Jul 2018 05:00) (16 - 18)  SpO2: 94% (26 Jul 2018 05:00) (94% - 95%)  Daily     Daily     General:  lying in bed in nad  HEENT:  NC/AT,  perrl  Chest:  dec bs  Cardiovascular: rrr s1s2  Abdomen:  Soft w areas of induration, +DT, +ostomy with yellow brown stool, +surrounding hernia, scar to midline abd noted  Extremities:  mild edema, rle erythema  Skin:  see above  Neuro/Psych:  Awake alert responds appropriately    Laboratory:                            9.8    8.55  )-----------( 502      ( 26 Jul 2018 09:08 )             31.1     07-26    141  |  106  |  17  ----------------------------<  82  4.0   |  27  |  0.87    Ca    7.9<L>      26 Jul 2018 09:08  Phos  2.2     07-25  Mg     2.2     07-25    TPro  5.7<L>  /  Alb  1.8<L>  /  TBili  0.4  /  DBili  x   /  AST  16  /  ALT  16  /  AlkPhos  97  07-25    LIVER FUNCTIONS - ( 25 Jul 2018 05:52 )  Alb: 1.8 g/dL / Pro: 5.7 g/dL / ALK PHOS: 97 U/L / ALT: 16 U/L / AST: 16 U/L / GGT: x                 Imaging:    < from: CT Abdomen and Pelvis No Cont (07.21.18 @ 13:36) >    EXAM:  CT ABDOMEN AND PELVIS                            PROCEDURE DATE:  07/21/2018          INTERPRETATION:  CT ABDOMEN AND PELVIS    CLINICAL INFORMATION:  Weakness. Decreased p.o. intake. History of   ovarian cancer and colon cancer. Prior ventral hernia repair.    PROCEDURE:  Using multislice helical CT, without intravenous or oral   contrast 2.5 mm sections were obtained from the domes of the diaphragm to   the ischial tuberosities.  Coronal reformatted images were performed.    COMPARISON: CT scan abdomen and pelvis 6/20/2018.    FINDINGS: Evaluation of the solid organ parenchyma is limited by the lack   of intravenous contrast.      There is a small partially loculated left-sided pleural effusion.    There is a heterogeneous appearance of the nonenhanced liver, evaluation   for metastatic disease is limited without intravenous contrast.    The spleen, adrenal glands and nonenhanced pancreas are unremarkable in   appearance.    Cholelithiasis with probable mild gallbladder wall thickening is again   noted.    There is a moderate volume of abdominal and pelvic ascites, similar to   prior study.  Nodularity along the peritoneal omental and mesenteric surfaces is   suggestive of peritoneal carcinomatosis.    No hydroureteronephrosis is noted.    There is arteriosclerotic calcification of the abdominal aorta.  There are enlarged retroperitoneal para-aortic and aortocaval lymph nodes.    The evaluation of the stomach and gastrointestinal tract is limited   without distention.  Again noted is segmental sigmoid resection with left-sided colostomy with   large parastomal hernia containing nondistended, nonobstructed colon and   ascites.  There is mild infiltration of the subcutaneous soft tissues along the   anterior abdominalwall, likely at the site of prior ventral repair.   There is a moderately distended fluid and particulate filled cecum, with   some moderately distended small bowel loops and probable segmental bowel   wall thickening. The evaluation of the bowel wall is limited without   intravenous contrast. Note discrete change of caliber is noted.    No free intraperitoneal air is noted.    The urinary bladder is nondistended.  No pelvic mass is noted.    Bilateral inguinal lymph nodes are noted.    There aremultilevel degenerative changes of the spine.    Impression:    Moderate volume of abdominal pelvic ascites, similar to prior study.   Peritoneal and mesenteric nodularity suggestive of carcinomatosis.    Large left-sided parastomal hernia, similar in appearance to prior study.    Mildly distended, probably subsegmental thickened small bowel, without   discrete change in caliber, no high-grade small bowel obstruction noted.    Other findings as discussed above.                                YESSY MALIK M.D., ATTENDING RADIOLOGIST  This document has been electronically signed. Jul 21 2018  2:16PM                < end of copied text >

## 2024-03-23 NOTE — PHYSICAL THERAPY INITIAL EVALUATION ADULT - DISCHARGE DISPOSITION, PT EVAL
Problem: Bronchoconstriction  Goal: Improve in air movement and diminished wheezing  Description: Target End Date:  2 to 3 days    1.  Implement inhaled treatments  2.  Evaluate and manage medication effects  Outcome: Progressing   Duo q4    TBD, anticipate possible HCPT /c assist as needed

## 2025-02-07 NOTE — H&P ADULT - PROBLEM/PLAN-5
Called [facility] scheduling to see if orders in correct and was told it is; called pt to inform her she needs to call the number that I gave her in the message, I am not able to schedule PFT's; pt states she did call that number and was transferred to another department and then put on hold and disconnected; states she will try again   DISPLAY PLAN FREE TEXT